# Patient Record
Sex: FEMALE | Race: WHITE | NOT HISPANIC OR LATINO | Employment: FULL TIME | URBAN - METROPOLITAN AREA
[De-identification: names, ages, dates, MRNs, and addresses within clinical notes are randomized per-mention and may not be internally consistent; named-entity substitution may affect disease eponyms.]

---

## 2017-01-03 ENCOUNTER — TELEPHONE (OUTPATIENT)
Dept: FAMILY MEDICINE | Facility: OTHER | Age: 52
End: 2017-01-03

## 2017-01-03 ENCOUNTER — RADIANT APPOINTMENT (OUTPATIENT)
Dept: MRI IMAGING | Facility: CLINIC | Age: 52
End: 2017-01-03
Attending: FAMILY MEDICINE
Payer: COMMERCIAL

## 2017-01-03 DIAGNOSIS — M75.121 COMPLETE TEAR OF RIGHT ROTATOR CUFF: Primary | ICD-10-CM

## 2017-01-03 DIAGNOSIS — S49.91XA RIGHT SHOULDER INJURY, INITIAL ENCOUNTER: ICD-10-CM

## 2017-01-03 PROCEDURE — 73221 MRI JOINT UPR EXTREM W/O DYE: CPT | Mod: RT | Performed by: RADIOLOGY

## 2017-01-03 NOTE — TELEPHONE ENCOUNTER
Please inform patient that the MRI showed a full-thickness tear of one of her rotator cuff muscles. Will refer to orthopedics for further evaluation

## 2017-01-04 NOTE — TELEPHONE ENCOUNTER
Spoke to patient and informed her of message below. And that someone will be calling her to set up appt.

## 2017-01-11 ENCOUNTER — TRANSFERRED RECORDS (OUTPATIENT)
Dept: HEALTH INFORMATION MANAGEMENT | Facility: CLINIC | Age: 52
End: 2017-01-11

## 2017-02-06 ENCOUNTER — OFFICE VISIT (OUTPATIENT)
Dept: OPHTHALMOLOGY | Facility: CLINIC | Age: 52
End: 2017-02-06
Payer: COMMERCIAL

## 2017-02-06 DIAGNOSIS — Z79.899 HIGH RISK MEDICATION USE: Primary | ICD-10-CM

## 2017-02-06 PROCEDURE — 92134 CPTRZ OPH DX IMG PST SGM RTA: CPT | Performed by: OPHTHALMOLOGY

## 2017-02-06 PROCEDURE — 92083 EXTENDED VISUAL FIELD XM: CPT | Performed by: OPHTHALMOLOGY

## 2017-02-06 PROCEDURE — 92014 COMPRE OPH EXAM EST PT 1/>: CPT | Performed by: OPHTHALMOLOGY

## 2017-02-06 RX ORDER — BIMATOPROST 3 UG/ML
SOLUTION TOPICAL
Qty: 1 BOTTLE | Refills: 3 | Status: SHIPPED | OUTPATIENT
Start: 2017-02-06 | End: 2017-02-09 | Stop reason: ALTCHOICE

## 2017-02-06 ASSESSMENT — TONOMETRY
IOP_METHOD: TONOPEN
OD_IOP_MMHG: 17
OS_IOP_MMHG: 15

## 2017-02-06 ASSESSMENT — SLIT LAMP EXAM - LIDS
COMMENTS: NORMAL
COMMENTS: NORMAL

## 2017-02-06 ASSESSMENT — VISUAL ACUITY
OD_CC: 20/20
OS_CC: 20/20
CORRECTION_TYPE: CONTACTS
METHOD: SNELLEN - LINEAR
OD_CC+: +2

## 2017-02-06 ASSESSMENT — EXTERNAL EXAM - LEFT EYE: OS_EXAM: NORMAL

## 2017-02-06 ASSESSMENT — CUP TO DISC RATIO
OD_RATIO: 0.1
OS_RATIO: 0.1

## 2017-02-06 ASSESSMENT — CONF VISUAL FIELD
OS_NORMAL: 1
OD_NORMAL: 1

## 2017-02-06 ASSESSMENT — EXTERNAL EXAM - RIGHT EYE: OD_EXAM: NORMAL

## 2017-02-06 NOTE — PROGRESS NOTES
Assessment & Plan   Zena Quintana is a 51 year old female who presents with:   Review of systems for the eyes was negative other than the pertinent positives and negatives noted in the HPI.  History is obtained from the patient.    High risk medication use - - Plaquenil  - HVF 10-2 OU  - SD-OCT Macula Optovue OU (both eyes)      Return in 1 year for annual exam w/ HVF and OCT    Documentation for today's encounter was performed by Celestina ZULETA  Acting as a scribe in my presence. I have reviewed and verified that it is an accurate recording of today's encounter.    Attending Physician Attestation:  Complete documentation of historical and exam elements from today's encounter can be found in the full encounter summary report (not reduplicated in this progress note).  I personally obtained the chief complaint(s) and history of present illness.  I confirmed and edited as necessary the review of systems, past medical/surgical history, family history, social history, and examination findings as documented by others; and I examined the patient myself.  I personally reviewed the relevant tests, images, and reports as documented above.  I formulated and edited as necessary the assessment and plan and discussed the findings and management plan with the patient and family. - Julito Garcia MD

## 2017-02-06 NOTE — Clinical Note
2/6/2017       RE: Zena Quintana  [address] [facility] [address]     Dear Colleague,    Thank you for referring your patient, Zena Quintana, to the [facility]. Please see a copy of my visit note below.    Assessment & Plan   Zena Quintana is a 51 year old female who presents with:   Review of systems for the eyes was negative other than the pertinent positives and negatives noted in the HPI.  History is obtained from the patient.    High risk medication use - - Plaquenil  - HVF 10-2 OU  - SD-OCT Macula Optovue OU (both eyes)      Return in 1 year for annual exam w/ HVF and OCT    Documentation for today's encounter was performed by Celestina ZULETA  Acting as a scribe in my presence. I have reviewed and verified that it is an accurate recording of today's encounter.    Attending Physician Attestation:  Complete documentation of historical and exam elements from today's encounter can be found in the full encounter summary report (not reduplicated in this progress note).  I personally obtained the chief complaint(s) and history of present illness.  I confirmed and edited as necessary the review of systems, past medical/surgical history, family history, social history, and examination findings as documented by others; and I examined the patient myself.  I personally reviewed the relevant tests, images, and reports as documented above.  I formulated and edited as necessary the assessment and plan and discussed the findings and management plan with the patient and family. - Julito Garcia MD        Again, thank you for allowing me to participate in the care of your patient.      Sincerely,    Julito Garcia MD

## 2017-02-09 DIAGNOSIS — Z79.899 HIGH RISK MEDICATION USE: Primary | ICD-10-CM

## 2017-02-09 RX ORDER — BIMATOPROST 3 UG/ML
SOLUTION TOPICAL AT BEDTIME
Qty: 1 BOTTLE | Refills: 3 | Status: SHIPPED | OUTPATIENT
Start: 2017-02-09

## 2017-02-09 NOTE — TELEPHONE ENCOUNTER
Original order sent to pharmacy with [place] checked. Reordered as previously written.  Sent to RN for signature.  Celestina HOOKER.

## 2017-03-06 DIAGNOSIS — M05.79 RHEUMATOID ARTHRITIS INVOLVING MULTIPLE SITES WITH POSITIVE RHEUMATOID FACTOR (H): ICD-10-CM

## 2017-03-06 LAB
ALBUMIN SERPL-MCNC: 4.3 G/DL (ref 3.4–5)
ALP SERPL-CCNC: 37 U/L (ref 40–150)
ALT SERPL W P-5'-P-CCNC: 23 U/L (ref 0–50)
AST SERPL W P-5'-P-CCNC: 20 U/L (ref 0–45)
BASOPHILS # BLD AUTO: 0 10E9/L (ref 0–0.2)
BASOPHILS NFR BLD AUTO: 0.3 %
BILIRUB DIRECT SERPL-MCNC: 0.1 MG/DL (ref 0–0.2)
BILIRUB SERPL-MCNC: 0.3 MG/DL (ref 0.2–1.3)
CREAT SERPL-MCNC: 0.9 MG/DL (ref 0.52–1.04)
CRP SERPL-MCNC: <2.9 MG/L (ref 0–8)
DIFFERENTIAL METHOD BLD: NORMAL
EOSINOPHIL # BLD AUTO: 0.1 10E9/L (ref 0–0.7)
EOSINOPHIL NFR BLD AUTO: 1.2 %
ERYTHROCYTE [DISTWIDTH] IN BLOOD BY AUTOMATED COUNT: 12.1 % (ref 10–15)
ERYTHROCYTE [SEDIMENTATION RATE] IN BLOOD BY WESTERGREN METHOD: 8 MM/H (ref 0–30)
GFR SERPL CREATININE-BSD FRML MDRD: 65 ML/MIN/1.7M2
HCT VFR BLD AUTO: 37.3 % (ref 35–47)
HGB BLD-MCNC: 12.3 G/DL (ref 11.7–15.7)
LYMPHOCYTES # BLD AUTO: 1.6 10E9/L (ref 0.8–5.3)
LYMPHOCYTES NFR BLD AUTO: 21.1 %
MCH RBC QN AUTO: 31 PG (ref 26.5–33)
MCHC RBC AUTO-ENTMCNC: 33 G/DL (ref 31.5–36.5)
MCV RBC AUTO: 94 FL (ref 78–100)
MONOCYTES # BLD AUTO: 0.7 10E9/L (ref 0–1.3)
MONOCYTES NFR BLD AUTO: 9.8 %
NEUTROPHILS # BLD AUTO: 5.1 10E9/L (ref 1.6–8.3)
NEUTROPHILS NFR BLD AUTO: 67.6 %
PLATELET # BLD AUTO: 307 10E9/L (ref 150–450)
PROT SERPL-MCNC: 7.3 G/DL (ref 6.8–8.8)
RBC # BLD AUTO: 3.97 10E12/L (ref 3.8–5.2)
WBC # BLD AUTO: 7.5 10E9/L (ref 4–11)

## 2017-03-06 PROCEDURE — 85025 COMPLETE CBC W/AUTO DIFF WBC: CPT | Performed by: FAMILY MEDICINE

## 2017-03-06 PROCEDURE — 80076 HEPATIC FUNCTION PANEL: CPT | Performed by: FAMILY MEDICINE

## 2017-03-06 PROCEDURE — 36415 COLL VENOUS BLD VENIPUNCTURE: CPT | Performed by: FAMILY MEDICINE

## 2017-03-06 PROCEDURE — 82565 ASSAY OF CREATININE: CPT | Performed by: FAMILY MEDICINE

## 2017-03-06 PROCEDURE — 86140 C-REACTIVE PROTEIN: CPT | Performed by: FAMILY MEDICINE

## 2017-03-06 PROCEDURE — 85652 RBC SED RATE AUTOMATED: CPT | Performed by: FAMILY MEDICINE

## 2017-03-06 NOTE — PATIENT INSTRUCTIONS
- Hold Plaquenil 5 days pre-op       Before Your Surgery      Call your surgeon if there is any change in your health. This includes signs of a cold or flu (such as a sore throat, runny nose, cough, rash or fever).    Do not smoke, drink alcohol or take over the counter medicine (unless your surgeon or primary care doctor tells you to) for the 24 hours before and after surgery.    If you take prescribed drugs: Follow your doctor s orders about which medicines to take and which to stop until after surgery.    Eating and drinking prior to surgery: follow the instructions from your surgeon    Take a shower or bath the night before surgery. Use the soap your surgeon gave you to gently clean your skin. If you do not have soap from your surgeon, use your regular soap. Do not shave or scrub the surgery site.  Wear clean pajamas and have clean sheets on your bed.

## 2017-03-06 NOTE — PROGRESS NOTES
SUBJECTIVE:                                                    Zena Quintana is a 51 year old female who presents to clinic today for the following health issues:      HPI  [address] [address] [phone]  Dept: [phone]    PRE-OP EVALUATION:  Today's date: 3/9/2017    Zena Quintana (: 1965) presents for pre-operative evaluation assessment as requested by Dr. Perera.  She requires evaluation and anesthesia risk assessment prior to undergoing surgery/procedure for treatment of left shoulder rotator cuff injury.  Proposed procedure: Rotator cuff repair.     Date of Surgery/ Procedure: 2017  Time of Surgery/ Procedure: [facility] [[email or web address]  Hospital/Surgical Facility: [facility] in [place]  Fax number for surgical facility: [phone]  Primary Physician: [facility]  Type of Anesthesia Anticipated: General    Patient has a Health Care Directive or Living Will:  NO    Preop Questions 3/9/2017   1.  Do you have a history of heart attack, stroke, stent, bypass or surgery on an artery in the head, neck, heart or legs? No   2.  Do you ever have any pain or discomfort in your chest? No   3.  Do you have a history of  Heart Failure? No   4.   Are you troubled by shortness of breath when:  walking on a level surface, or up a slight hill, or at night? No   5.  Do you currently have a cold, bronchitis or other respiratory infection? No   6.  Do you have a cough, shortness of breath, or wheezing? No   7.  Do you sometimes get pains in the calves of your legs when you walk? No   8. Do you or anyone in your family have previous history of blood clots? No   9.  Do you or does anyone in your family have a serious bleeding problem such as prolonged bleeding following surgeries or cuts? No   10. Have you ever had problems with anemia or been told to take iron pills? Yes, long time ago, never took anything for it    11. Have you had any abnormal  blood loss such as black, tarry or bloody stools, or abnormal vaginal bleeding? No   12. Have you ever had a blood transfusion? No   13. Have you or any of your relatives ever had problems with anesthesia? No   14. Do you have sleep apnea, excessive snoring or daytime drowsiness? No   15. Do you have any prosthetic heart valves? No   16. Do you have prosthetic joints? No   17. Is there any chance that you may be pregnant? 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  Problems all longstanding and stable, except as noted/documented.  See ROS for pertinent symptoms related to these conditions.                                                                                                  .    MEDICAL HISTORY:                                                      Patient Active Problem List    Diagnosis Date Noted     Disturbance of salivary secretion (XEROSTOMIA) 10/08/2014     Priority: High     RA (rheumatoid arthritis) (H) 10/10/2013     Priority: High     Impingement syndrome of right shoulder 05/24/2016     Priority: Medium     Sjogren's syndrome (H) 05/24/2016     Priority: Medium     Endometriosis 07/18/2014     Priority: Medium     Ovarian cyst 07/18/2014     Priority: Medium     Problem list name updated by automated process. Provider to review       High risk medication use 09/16/2013     Priority: Medium     HYPERLIPIDEMIA LDL GOAL <160 10/31/2010     Priority: Medium     Arthropathy 08/27/2007     Priority: Medium     Problem list name updated by automated process. Provider to review       Pure hypercholesterolemia 07/06/2006     Priority: Medium     Stomatitis and mucositis (ulcerative) 05/06/2005     Priority: Medium     chronic - non-specific  IMO update changed this record. Please review for accuracy        Allergic rhinitis 01/30/2004     Priority: Medium     Problem list name updated by automated process. Provider to review       Internal derangement of knee 04/30/2002     Priority: Medium     Problem list name updated by automated process. Provider to review       Iron deficiency anemia 04/30/2002     Priority: Medium     Problem list name updated by automated process. Provider to review        Past Medical History   Diagnosis Date     Allergic rhinitis, cause unspecified      Allergic rhinitis     Endometriosis, site unspecified      Endometriosis     Female infertility of other specified origin      Iron deficiency anemia, unspecified      Anemia, iron def.     RA (rheumatoid arthritis) (H)      Stomatitis and mucositis (ulcerative)      chronic - non-specific     Past Surgical History   Procedure Laterality Date     C nonspecific procedure  1995, 1997     laparoscopy for endometrial fulguration     Hc remove tonsils/adenoids,11+ y/o       T & A 11+y.o.     Hc create eardrum opening,gen anesth       P.E. Tubes     Hc knee scope, diagnostic        left knee, ruptured ACL     Hc colonoscopy w/wo brush/wash  5/25/2005     Current Outpatient Prescriptions   Medication Sig Dispense Refill     bimatoprost (LATISSE) 0.03 % SOLN Externally apply topically At Bedtime 1 Bottle 3     Ibuprofen (ADVIL PO) Take 200 mg by mouth       HYDROcodone-acetaminophen (NORCO) 5-325 MG per tablet Take 1-2 tablets by mouth every 4 hours as needed for moderate to severe pain maximum 2  tablet(s) per day 20 tablet 0     cyclobenzaprine (FLEXERIL) 5 MG tablet Take 1 tablet (5 mg) by mouth 3 times daily as needed for muscle spasms 42 tablet 0     Loratadine (CLARITIN PO)        ciprofloxacin (CILOXAN) 0.3 % ophthalmic solution Instill 1-2 drops in both eyes every 2 hours while awake for 2 days then 1-2 drops every 4 hours while awake for the next 5 days. 1 Bottle 0     albuterol (PROAIR HFA, PROVENTIL HFA, VENTOLIN HFA) 108 (90 BASE) MCG/ACT  inhaler Inhale 1-2 puffs into the lungs every 4 hours as needed for shortness of breath / dyspnea or wheezing 1 Inhaler 0     hydroxychloroquine (PLAQUENIL) 200 MG tablet Take 2 tablets (400 mg) by mouth daily 180 tablet 3     acetaminophen (TYLENOL) 500 MG tablet Take 1-2 tablets by mouth every 6 hours as needed for pain.       ASPIRIN 81 MG OR TABS 1 TABLET DAILY       MULTIPLE VITAMINS/WOMENS OR None Entered       OMEGA-3 FATTY ACIDS 600 MG OR CAPS 2 tabs daily 30 0     OTC products: None, except as noted above    Allergies   Allergen Reactions     Penicillins Hives     hives      Latex Allergy: NO    Social History   Substance Use Topics     Smoking status: Never Smoker     Smokeless tobacco: Never Used      Comment: no smokers in household     Alcohol use 1.0 oz/week      Comment: juliana Owen x2/1-2x per month     History   Drug Use No       REVIEW OF SYSTEMS:                                                    Constitutional, neuro, ENT, endocrine, pulmonary, cardiac, gastrointestinal, genitourinary, musculoskeletal, integument and psychiatric systems are negative, except as otherwise noted.    EXAM:                                                    There were no vitals taken for this visit.  GENERAL APPEARANCE: healthy, alert and no distress  HENT: ear canals and TM's normal and nose and mouth without ulcers or lesions  RESP: lungs clear to auscultation - no rales, rhonchi or wheezes  CV: regular rate and rhythm, normal S1 S2, no S3 or S4 and no murmur, click or rub   ABDOMEN: soft, nontender, no HSM or masses and bowel sounds normal  NEURO: Normal strength and tone, sensory exam grossly normal, mentation intact and speech normal    DIAGNOSTICS:                                                    EKG: Normal sinus rate and rhythm, no acute changes, normal voltage and axis, no previous available     Recent Labs   Lab Test  05/24/16   0940  11/11/15   1327  04/07/15   1322   HGB  12.2  11.7  11.9   PLT  321   305  327   NA  137   --   139   POTASSIUM  4.4   --   3.9   CR  0.92   --   0.96      Results for SHAR NAVARRETE (MRN [id number])   Ref. Range 3/6/2017 13:00   Creatinine Latest Ref Range: 0.52 - 1.04 mg/dL 0.90   GFR Estimate Latest Ref Range: >60 mL/min/1.7m2 65   GFR Estimate If Black Latest Ref Range: >60 mL/min/1.7m2 79   Albumin Latest Ref Range: 3.4 - 5.0 g/dL 4.3   Protein Total Latest Ref Range: 6.8 - 8.8 g/dL 7.3   Bilirubin Total Latest Ref Range: 0.2 - 1.3 mg/dL 0.3   Alkaline Phosphatase Latest Ref Range: 40 - 150 U/L 37 (L)   ALT Latest Ref Range: 0 - 50 U/L 23   AST Latest Ref Range: 0 - 45 U/L 20   Bilirubin Direct Latest Ref Range: 0.0 - 0.2 mg/dL 0.1   CRP Inflammation Latest Ref Range: 0.0 - 8.0 mg/L <2.9   WBC Latest Ref Range: 4.0 - 11.0 10e9/L 7.5   Hemoglobin Latest Ref Range: 11.7 - 15.7 g/dL 12.3   Hematocrit Latest Ref Range: 35.0 - 47.0 % 37.3   Platelet Count Latest Ref Range: 150 - 450 10e9/L 307   RBC Count Latest Ref Range: 3.8 - 5.2 10e12/L 3.97   MCV Latest Ref Range: 78 - 100 fl 94   MCH Latest Ref Range: 26.5 - 33.0 pg 31.0   MCHC Latest Ref Range: 31.5 - 36.5 g/dL 33.0   RDW Latest Ref Range: 10.0 - 15.0 % 12.1       IMPRESSION:                                                    Reason for surgery/procedure: Rotator cuff injury   Diagnosis/reason for consult: Pre op consult     The proposed surgical procedure is considered INTERMEDIATE risk.    REVISED CARDIAC RISK INDEX  The patient has the following serious cardiovascular risks for perioperative complications such as (MI, PE, VFib and 3  AV Block):  No serious cardiac risks  INTERPRETATION: 0 risks: Class I (very low risk - 0.4% complication rate)    The patient has the following additional risks for perioperative complications:  No identified additional risks      ICD-10-CM    1. Preop general physical exam Z01.818 EKG 12-lead complete w/read - Clinics   2. Rotator cuff injury, left, subsequent encounter S46.002D    3.  Rheumatoid arthritis involving multiple sites with positive rheumatoid factor (H) M05.79    4. Iron deficiency anemia secondary to inadequate dietary iron intake D50.8    5. Hyperlipidemia LDL goal <160 E78.5    6. Sjogren's syndrome (H) M35.00        RECOMMENDATIONS:                                                      --Consult hospital rounder / IM to assist post-op medical management    --Patient is to take all scheduled medications on the day of surgery EXCEPT for modifications listed below.    Rheumatologic DMARD Use  --Hydroxychloroquine - Continue in the perioperative period, hold morning of     - HOLD Asprin 7-10 days before procedure     APPROVAL GIVEN to proceed with proposed procedure, without further diagnostic evaluation       Signed Electronically by: Sonya Mcdowell-DAVY Howell    Copy of this evaluation report is provided to requesting physician.    [facility] Preop Guidelines

## 2017-03-09 ENCOUNTER — OFFICE VISIT (OUTPATIENT)
Dept: FAMILY MEDICINE | Facility: OTHER | Age: 52
End: 2017-03-09
Payer: COMMERCIAL

## 2017-03-09 ENCOUNTER — MYC MEDICAL ADVICE (OUTPATIENT)
Dept: FAMILY MEDICINE | Facility: OTHER | Age: 52
End: 2017-03-09

## 2017-03-09 VITALS
DIASTOLIC BLOOD PRESSURE: 60 MMHG | RESPIRATION RATE: 14 BRPM | WEIGHT: 135.2 LBS | HEIGHT: 66 IN | BODY MASS INDEX: 21.73 KG/M2 | TEMPERATURE: 97 F | SYSTOLIC BLOOD PRESSURE: 90 MMHG | HEART RATE: 53 BPM

## 2017-03-09 DIAGNOSIS — S46.002D ROTATOR CUFF INJURY, LEFT, SUBSEQUENT ENCOUNTER: ICD-10-CM

## 2017-03-09 DIAGNOSIS — D50.8 IRON DEFICIENCY ANEMIA SECONDARY TO INADEQUATE DIETARY IRON INTAKE: ICD-10-CM

## 2017-03-09 DIAGNOSIS — M35.00 SJOGREN'S SYNDROME (H): ICD-10-CM

## 2017-03-09 DIAGNOSIS — Z01.818 PREOP GENERAL PHYSICAL EXAM: Primary | ICD-10-CM

## 2017-03-09 DIAGNOSIS — E78.5 HYPERLIPIDEMIA LDL GOAL <160: ICD-10-CM

## 2017-03-09 DIAGNOSIS — M05.79 RHEUMATOID ARTHRITIS INVOLVING MULTIPLE SITES WITH POSITIVE RHEUMATOID FACTOR (H): ICD-10-CM

## 2017-03-09 PROCEDURE — 99214 OFFICE O/P EST MOD 30 MIN: CPT | Performed by: PHYSICIAN ASSISTANT

## 2017-03-09 PROCEDURE — 93000 ELECTROCARDIOGRAM COMPLETE: CPT | Performed by: PHYSICIAN ASSISTANT

## 2017-03-09 ASSESSMENT — PAIN SCALES - GENERAL: PAINLEVEL: MILD PAIN (2)

## 2017-03-09 NOTE — NURSING NOTE
"Chief Complaint   Patient presents with     Pre-Op Exam     Health Maintenance     height, colon cancer, hep c screening       Initial BP 90/60 (BP Location: Right arm, Patient Position: Chair, Cuff Size: Adult Regular)  Pulse 53  Temp 97  F (36.1  C)  Resp 14  Ht 5' 5.75\" (1.67 m)  Wt 135 lb 3.2 oz (61.3 kg)  BMI 21.99 kg/m2 Estimated body mass index is 21.99 kg/(m^2) as calculated from the following:    Height as of this encounter: 5' 5.75\" (1.67 m).    Weight as of this encounter: 135 lb 3.2 oz (61.3 kg).  Medication Reconciliation: complete   Carli Rasmussen MA  March 9, 2017      "

## 2017-03-09 NOTE — MR AVS SNAPSHOT
After Visit Summary   3/9/2017    Zena Quintana    MRN: [id number]           Patient Information     Date Of Birth          1965        Visit Information        Provider Department      3/9/2017 1:15 PM Sonya Rankin PA-C [facility]        Today's Diagnoses     Preop general physical exam    -  1    Rotator cuff injury, left, subsequent encounter        Rheumatoid arthritis involving multiple sites with positive rheumatoid factor (H)        Iron deficiency anemia secondary to inadequate dietary iron intake        Hyperlipidemia LDL goal <160        Sjogren's syndrome (H)          Care Instructions    - Hold Plaquenil 5 days pre-op       Before Your Surgery      Call your surgeon if there is any change in your health. This includes signs of a cold or flu (such as a sore throat, runny nose, cough, rash or fever).    Do not smoke, drink alcohol or take over the counter medicine (unless your surgeon or primary care doctor tells you to) for the 24 hours before and after surgery.    If you take prescribed drugs: Follow your doctor s orders about which medicines to take and which to stop until after surgery.    Eating and drinking prior to surgery: follow the instructions from your surgeon    Take a shower or bath the night before surgery. Use the soap your surgeon gave you to gently clean your skin. If you do not have soap from your surgeon, use your regular soap. Do not shave or scrub the surgery site.  Wear clean pajamas and have clean sheets on your bed.         Follow-ups after your visit        Your next 10 appointments already scheduled     Mar 15, 2017  1:20 PM CDT   Return Visit with Rome Hardy MD   [facility] [place] ([facility])    [address]  [place] [address] [phone]              Who to contact     If you have questions or need follow up information about today's clinic visit or your schedule please contact  "[facility] directly at [phone].  Normal or non-critical lab and imaging results will be communicated to you by MyChart, letter or phone within 4 business days after the clinic has received the results. If you do not hear from us within 7 days, please contact the clinic through [facility] or phone. If you have a critical or abnormal lab result, we will notify you by phone as soon as possible.  Submit refill requests through [employer] or call your pharmacy and they will forward the refill request to us. Please allow 3 business days for your refill to be completed.          Additional Information About Your Visit        Diabetic[facility]ar[facility]par[facility]zi Information     [employer] gives you secure access to your electronic health record. If you see a primary care provider, you can also send messages to your care team and make appointments. If you have questions, please call your primary care clinic.  If you do not have a primary care provider, please call [phone] and they will assist you.        Care EveryWhere ID     This is your Care EveryWhere ID. This could be used by other organizations to access your [facility] medical records  [id number]        Your Vitals Were     Pulse Temperature Respirations Height BMI (Body Mass Index)       53 97  F (36.1  C) 14 5' 5.75\" (1.67 m) 21.99 kg/m2        Blood Pressure from Last 3 Encounters:   03/09/17 90/60   12/02/16 104/60   11/19/16 96/61    Weight from Last 3 Encounters:   03/09/17 135 lb 3.2 oz (61.3 kg)   12/02/16 133 lb (60.3 kg)   09/14/16 137 lb 9.6 oz (62.4 kg)              We Performed the Following     EKG 12-lead complete w/read - Clinics        Primary Care Provider Office Phone # Fax #    Sonya Rankin PA-C [phone]       [facility] [address]  [facility] [address]        Thank you!     Thank you for choosing [facility]  for your care. Our goal is always to provide you with excellent care. " Hearing back from our patients is one way we can continue to improve our services. Please take a few minutes to complete the written survey that you may receive in the mail after your visit with us. Thank you!             Your Updated Medication List - Protect others around you: Learn how to safely use, store and throw away your medicines at www.disposemymeds.org.          This list is accurate as of: 3/9/17  2:00 PM.  Always use your most recent med list.                   Brand Name Dispense Instructions for use    acetaminophen 500 MG tablet    TYLENOL     Take 1-2 tablets by mouth every 6 hours as needed for pain.       ADVIL PO      Take 200 mg by mouth       albuterol 108 (90 BASE) MCG/ACT Inhaler    PROAIR HFA/PROVENTIL HFA/VENTOLIN HFA    1 Inhaler    Inhale 1-2 puffs into the lungs every 4 hours as needed for shortness of breath / dyspnea or wheezing       aspirin 81 MG tablet      1 TABLET DAILY       bimatoprost 0.03 % Soln    latisse    1 Bottle    Externally apply topically At Bedtime       CLARITIN PO          hydroxychloroquine 200 MG tablet    PLAQUENIL    180 tablet    Take 2 tablets (400 mg) by mouth daily       MULTIPLE VITAMINS/WOMENS PO      None Entered       Omega-3 Fatty Acids 600 MG Caps     30    2 tabs daily

## 2017-03-15 ENCOUNTER — OFFICE VISIT (OUTPATIENT)
Dept: RHEUMATOLOGY | Facility: CLINIC | Age: 52
End: 2017-03-15
Payer: COMMERCIAL

## 2017-03-15 VITALS
BODY MASS INDEX: 21.66 KG/M2 | HEART RATE: 64 BPM | SYSTOLIC BLOOD PRESSURE: 105 MMHG | HEIGHT: 66 IN | OXYGEN SATURATION: 100 % | WEIGHT: 134.8 LBS | DIASTOLIC BLOOD PRESSURE: 63 MMHG

## 2017-03-15 DIAGNOSIS — Z79.899 HIGH RISK MEDICATIONS (NOT ANTICOAGULANTS) LONG-TERM USE: ICD-10-CM

## 2017-03-15 DIAGNOSIS — M05.79 RHEUMATOID ARTHRITIS INVOLVING MULTIPLE SITES WITH POSITIVE RHEUMATOID FACTOR (H): Primary | ICD-10-CM

## 2017-03-15 DIAGNOSIS — M35.00 SJOGREN'S SYNDROME (H): ICD-10-CM

## 2017-03-15 PROCEDURE — 99214 OFFICE O/P EST MOD 30 MIN: CPT | Performed by: INTERNAL MEDICINE

## 2017-03-15 RX ORDER — PILOCARPINE HYDROCHLORIDE 5 MG/1
5 TABLET, FILM COATED ORAL 3 TIMES DAILY
Qty: 90 TABLET | Refills: 5 | Status: SHIPPED | OUTPATIENT
Start: 2017-03-15 | End: 2018-07-26

## 2017-03-15 RX ORDER — HYDROXYCHLOROQUINE SULFATE 200 MG/1
400 TABLET, FILM COATED ORAL DAILY
Qty: 180 TABLET | Refills: 3 | Status: SHIPPED | OUTPATIENT
Start: 2017-03-15 | End: 2017-09-13

## 2017-03-15 NOTE — PROGRESS NOTES
Rheumatology Clinic Visit      Zena Quintana MRN# [id number]   YOB: 1965 Age: 51 year old      Date of visit: 3/15/17   PCP: Sonya Starr    Chief Complaint   Patient presents with:  Arthritis: RA, patient states she has good days and bad days for pain      Assessment and Plan     1. Seropositive nonerosive rheumatoid arthritis (RF low positive, CCP negative): Reportedly at the time of diagnosis, she had synovitis in a symmetric distribution that was steroid responsive. Currently on hydroxychloroquine 400 mg daily.  RA is doing well.   - Continue hydroxychloroquine 400 mg daily (last eye exam on 2/6/2017; has used hydroxychloroquine since approximately 2007)  - Labs 2-3 days prior to her next rheumatology clinic visit: CBC, Cr, Hepatic panel, ESR, CRP    2. 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 They evaluated her left shoulder that is also having similar issues but is not as symptomatic yet and therefore they are operating to prevent rotator cuff tears.  For her surgery, she does not need to hold hydroxychloroquine.    3. Sjogren's syndrome: NJ by EIA negative but positive by IF, SSA and SSB negative, and minor salivary gland biopsy negative. She is currently following with ophthalmology who has given her eyedrops that have been beneficial.  Dry mouth is currently treated with frequent sips of water; she has good oral hygiene, follows with a dentist regularly, and does not have frequent cavities; however, more symptomatic recently and therefore will start pilocarpine. Risks and side effects of protocol were discussed in detail that include, but are not limited to, stomach upset, flushing, and reduced visual acuity particularly at night.  - Start pilocarpine 5 mg TID; she was instructed to start 5 mg once daily ×2  days, then twice daily ×2 days, then 3 times daily thereafter; increase as needed to control dry mouth but do not increase if having side effects.    4. History of oral sores / family history of lupus / polyarthritis / recurrent sinus infections: She is not having oral sores or recurrent sinus infections for several years now. Retrospectively, there would be concern for potential ANCA associated sinus issues and this could be checked if she had recurrent sinusitis again. Oral sores have improved since she started Plaquenil, that argues it could be connective tissue disease related. NJ was negative by EIA, but complement was on the low end of the normal range so NJ was rechecked and positive; further testing was negative as noted in the results section.      Ms. Quintana verbalized agreement with and understanding of the rational for the diagnosis and treatment plan.  All questions were answered to best of my ability and the patient's satisfaction. Ms. Quintana was advised to contact the clinic with any questions that may arise after the clinic visit.      Thank you for involving me in the care of the patient    Return to clinic: 6 months      HPI   Zena Quintana is a 51 year old female with medical history significant for iron deficiency anemia, hyperlipidemia, xerostomia, dry eyes, and rheumatoid arthritis who presents for follow-up of rheumatoid arthritis.    Today, Ms. Quintana reports doing well. Morning stiffness for no more than several minutes. Joints are doing well except for her shoulders. Occasional gelling phenomenon. In general, activity improves her stiffness and pain. Some achiness with weather changes.     She had more pain and difficulty with range of motion of her right shoulder and therefore was evaluated by orthopedic surgery who told her that she had a genetic issue causing a rotator cuff of her shoulder and that was normal and also they checked her left shoulder that was becoming minimally  symptomatic and found similar problems so she is planning to have surgery to prevent rotator cuff tears of her left shoulder. She is seeing an orthopedic surgeon outside of [place] and I do not see records of this; she did not recall the surgeon's name or name of the clinic he is at.    Denies fevers, chills, nausea, vomiting, constipation, diarrhea. No abdominal pain. No chest pain/pressure, palpitations, or shortness of breath. No neck pain. No rash. No LE swelling.  No photosensitivity or photophobia.  No sicca symptoms.  No eye pain or redness. No oral sores.  She has not had a sinus infection for several years now.    Tobacco: None  EtOH: Occasional  Drugs: None  Occupation: Works at a school    ROS   GEN: No fevers, chills, night sweats, or weight change  SKIN: No itching, rashes, sores  HEENT: No epistaxis. No oral or nasal ulcers.  CV: No chest pain, pressure, palpitations, or dyspnea on exertion.  PULM: No SOB, wheeze, cough.  GI:  No nausea, vomiting, constipation, diarrhea. No blood in stool. No abdominal pain.  : No blood in urine.  MSK: See HPI.  NEURO: No numbness, tingling, or weakness.  ENDO: No heat/cold intolerance.  EXT: No LE swelling    Active Problem List     Patient Active Problem List   Diagnosis     Internal derangement of knee     Iron deficiency anemia     Allergic rhinitis     Stomatitis and mucositis (ulcerative)     Pure hypercholesterolemia     Arthropathy     HYPERLIPIDEMIA LDL GOAL <160     High risk medication use     RA (rheumatoid arthritis) (H)     Endometriosis     Ovarian cyst     Disturbance of salivary secretion (XEROSTOMIA)     Impingement syndrome of right shoulder     Sjogren's syndrome (H)     Past Medical History     Past Medical History   Diagnosis Date     Allergic rhinitis, cause unspecified      Allergic rhinitis     Endometriosis, site unspecified      Endometriosis     Female infertility of other specified origin      Iron deficiency anemia, unspecified       Anemia, iron def.     RA (rheumatoid arthritis) (H)      Stomatitis and mucositis (ulcerative)      chronic - non-specific     Past Surgical History     Past Surgical History   Procedure Laterality Date     C nonspecific procedure  1995, 1997     laparoscopy for endometrial fulguration     Hc remove tonsils/adenoids,11+ y/o       T & A 11+y.o.     Hc create eardrum opening,gen anesth       P.E. Tubes     Hc knee scope, diagnostic        left knee, ruptured ACL     Hc colonoscopy w/wo brush/wash  5/25/2005     Allergy     Allergies   Allergen Reactions     Penicillins Hives     hives     Current Medication List     Current Outpatient Prescriptions   Medication Sig     bimatoprost (LATISSE) 0.03 % SOLN Externally apply topically At Bedtime     Ibuprofen (ADVIL PO) Take 200 mg by mouth     Loratadine (CLARITIN PO)      albuterol (PROAIR HFA, PROVENTIL HFA, VENTOLIN HFA) 108 (90 BASE) MCG/ACT inhaler Inhale 1-2 puffs into the lungs every 4 hours as needed for shortness of breath / dyspnea or wheezing     hydroxychloroquine (PLAQUENIL) 200 MG tablet Take 2 tablets (400 mg) by mouth daily     acetaminophen (TYLENOL) 500 MG tablet Take 1-2 tablets by mouth every 6 hours as needed for pain.     ASPIRIN 81 MG OR TABS 1 TABLET DAILY     MULTIPLE VITAMINS/WOMENS OR None Entered     OMEGA-3 FATTY ACIDS 600 MG OR CAPS Reported on 3/15/2017     No current facility-administered medications for this visit.          Social History   See HPI    Family History     Family History   Problem Relation Age of Onset     OSTEOPOROSIS Mother      Arthritis Mother      lupus     Connective Tissue Disorder Mother      Lupus     Cataracts Mother      Macular Degeneration Mother      Celiac Disease Mother      HEART DISEASE Father      aortic aneurysm     GASTROINTESTINAL DISEASE Father      abdominal anurysm     Hypertension Father      Cataracts Father      GASTROINTESTINAL DISEASE Sister      IBS, colon problems     Alzheimer Disease Maternal  "Grandmother      OSTEOPOROSIS Maternal Grandmother      CEREBROVASCULAR DISEASE Maternal Grandfather      CANCER Paternal Grandmother      CEREBROVASCULAR DISEASE Paternal Grandfather      Circulatory Sister      carotid stenosis     Cancer - colorectal Maternal Uncle      Cancer - colorectal Maternal Uncle      Mother: Lupus    Physical Exam     Temp Readings from Last 3 Encounters:   03/09/17 97  F (36.1  C)   12/02/16 98  F (36.7  C) (Temporal)   11/19/16 97.7  F (36.5  C) (Temporal)     BP Readings from Last 5 Encounters:   03/15/17 105/63   03/09/17 90/60   12/02/16 104/60   11/19/16 96/61   09/14/16 106/68     Pulse Readings from Last 1 Encounters:   03/15/17 64     Resp Readings from Last 1 Encounters:   03/09/17 14     Estimated body mass index is 22.09 kg/(m^2) as calculated from the following:    Height as of this encounter: 1.664 m (5' 5.5\").    Weight as of this encounter: 61.1 kg (134 lb 12.8 oz).    GEN: NAD  HEENT: Dry mucous membranes. No oral lesions.  Anicteric, noninjected sclera. Eyes appear to have good moisturizer by gross examination.  CV: S1, S2. RRR. No m/r/g.  PULM: CTA bilaterally. No w/c.  MSK:  Hands, wrists, and elbows without synovial swelling, increased warmth, tenderness to palpation, or overlying erythema.  Negative MCP squeeze.  Normal  strength. Right shoulder with limited flexion and extension and minimal-to-no abduction; left shoulder with full range of motion; no swelling of either shoulder.  Knees, ankles, and feet without swelling, increased warmth, tenderness to palpation, or overlying erythema. Negative MTP squeeze.     NEURO: UE and LE strengths 5/5 and equal bilaterally.   SKIN: No rash  EXT: No LE edema  PSYCH: Alert. Appropriate.    Labs / Imaging (select studies)   RF/CCP  Recent Labs   Lab Test  08/29/13   1413   CCPABY  <20 Interpretation:  Negative     NJ/RNP/Sm/SSA/SSB/dsDNA  Recent Labs   Lab Test  07/12/16   1017  05/24/16   0940  10/10/13   0852  08/29/13   " 1413   HEATHER   --    --   <1.0 Interpretation:  Negative   --    ANAIGG   --   1:160  Reference range: <1:40  (Note)  Homogeneous pattern.  INTERPRETIVE INFORMATION: NJ by IFA, IgG  Anti-nuclear antibodies (NJ) are seen in a variety of  systemic rheumatic diseases and are determined by indirect  fluorescence assay (IFA) using HEp-2 substrate with an  IgG-specific conjugate. NJ titers less than or equal to  1:80 have variable relevance while titers greater than or  equal to 1:160 are considered clinically significant. These  antibodies may precede clinical disease onset; however,  healthy individuals and those with advanced age have been  reported to be positive for NJ. When observed, one of the  five basic patterns is reported: homogeneous,  peripheral/rim, speckled, centromere, or nucleolar. If  cytoplasmic fluorescence is observed, it is noted. IFA  methodology is subjective and has occasionally been shown  to lack sensitivity for anti-SSA/Ro antibodies.  Negative results do not necessarily rule out the presence  of SSc. If clinical suspicion remains, consi netta further  testing for U3-RNP, PM/Scl, or Th/To antibodies associated  with SSc.  Performed by [facility],  [address] [place] [id number] [phone]  [email or web address], Dylan Olvera MD, Lab. Director  *   --    --    RNPIGG  <0.2  Negative   Antibody index (AI) values reflect qualitative changes in antibody   concentration that cannot be directly associated with clinical condition or   disease state.     --    --    --    SMIGG  <0.2  Negative   Antibody index (AI) values reflect qualitative changes in antibody   concentration that cannot be directly associated with clinical condition or   disease state.     --    --    --    ENASM   --    --    --   2   SSAIGG  <0.2  Negative   Antibody index (AI) values reflect qualitative changes in antibody   concentration that cannot be directly associated with clinical condition or   disease state.     --     --    --    ENASSA   --    --    --   8   SSBIGG  <0.2  Negative   Antibody index (AI) values reflect qualitative changes in antibody   concentration that cannot be directly associated with clinical condition or   disease state.     --    --    --    ENASSB   --    --    --   0   ENARMP   --    --    --   0   SCLIGG  <0.2  Negative   Antibody index (AI) values reflect qualitative changes in antibody   concentration that cannot be directly associated with clinical condition or   disease state.     --    --    --    O9MNCQO   --   83   --   81   R5CYWMG   --   17   --   19     Recent Labs   Lab Test  07/12/16   1017  08/29/13   1413   DNA  1  <15 Interpretation:  Negative     Antiphospholipid Antibodies  Recent Labs   Lab Test  07/12/16   1017  08/29/13   1413   B2GPG  1.0   --    B2GPM  <0.9  Negative     --    CARG  <15.0  Interpretation:  Negative    <15.0 Interpretation:  Negative   ANGELITO  <12.5  Interpretation:  Negative    <12.5 Interpretation:  Negative   LUPINT  Negative  (Note)  COMMENTS:  The INR is normal.  APTT ratio is normal.  DRVVT Screen ratio is normal.  Thrombin time is normal.  NEGATIVE TEST; A LUPUS ANTICOAGULANT WAS NOT DETECTED IN THIS  SPECIMEN WITHIN THE LIMITS OF THE TESTING REPERTOIRE.  If the clinical picture is strongly suggestive of an antiphospholipid  syndrome, recommend anticardiolipin and beta-2-glycoprotein (IgG and  IgM) antibody tests.  Bree Woodson M.D.  [phone]  7/13/2016    INR =  0.98    Reference range: 0.86-1.14  Thrombin Time= 16.5    Reference range: 13.0-19.0 sec    APTT:       Ratio  Patient  =  1.00  1:2 Mix  =  N/A  Reference:  Negative: Less than or equal to 1.16  Positive: Greater than or equal to 1.17     DILUTE TARIQ VIPER VENOM TEST:  Screen Ratio = 0.74   Normal is less than 1.21       --      CBC  Recent Labs   Lab Test  03/06/17   1300  05/24/16   0940  11/11/15   1327   WBC  7.5  6.4  5.4   RBC  3.97  4.00  3.77*   HGB  12.3  12.2  11.7   HCT   37.3  37.8  34.5*   MCV  94  95  92   RDW  12.1  12.8  12.3   PLT  307  321  305   MCH  31.0  30.5  31.0   MCHC  33.0  32.3  33.9   NEUTROPHIL  67.6  65.7  56.6   LYMPH  21.1  20.2  29.7   MONOCYTE  9.8  11.2  11.4   EOSINOPHIL  1.2  2.3  1.7   BASOPHIL  0.3  0.6  0.6   ANEU  5.1  4.2  3.1   ALYM  1.6  1.3  1.6   ELIGIO  0.7  0.7  0.6   AEOS  0.1  0.2  0.1   ABAS  0.0  0.0  0.0     CMP  Recent Labs   Lab Test  03/06/17   1300  05/24/16   0940  11/11/15   1327  04/07/15   1322  10/08/14   1348   NA   --   137   --   139  139   POTASSIUM   --   4.4   --   3.9  3.7   CHLORIDE   --   105   --   104  105   CO2   --   29   --   29  26   ANIONGAP   --   3   --   6  8   GLC   --   85   --   88  147*   BUN   --   18   --   16  13   CR  0.90  0.92   --   0.96  0.90   GFRESTIMATED  65  64   --   61  66   GFRESTBLACK  79  77   --   74  80   VALERIA   --   9.2   --   9.1  9.0   BILITOTAL  0.3  0.4  0.3  0.3  0.2   ALBUMIN  4.3  4.4  4.3  3.9  3.6*   PROTTOTAL  7.3  7.3  7.5  7.3  6.8   ALKPHOS  37*  42  40  34*  26*   AST  20  30  44  34  23   ALT  23  24  37  29  20     Calcium/VitaminD  Recent Labs   Lab Test  05/24/16   0940  07/13/15   1036  04/07/15   1322  10/08/14   1348   VALERIA  9.2   --   9.1  9.0   VITDT   --   48   --    --      ESR/CRP  Recent Labs   Lab Test  03/06/17   1300  05/24/16   0940  04/07/15   1322   SED  8  6   --    CRP  <2.9  <2.9  <2.9     CK/Aldolase  Recent Labs   Lab Test  07/12/16   1017   CKT  200     TSH/T4  Recent Labs   Lab Test  07/13/15   1036  12/03/09   0946   TSH  2.95  1.55     Lipid Panel  Recent Labs   Lab Test  07/13/15   1036  12/03/09   0946   CHOL  181  213*   TRIG  132  64   HDL  74  81   LDL  81  119   VLDL  26  13   CHOLHDLRATIO  2.4  3.0     Immunization History     Immunization History   Administered Date(s) Administered     Influenza (H1N1) 12/03/2009     Influenza (IIV3) 11/17/2005, 11/17/2006, 10/25/2010, 01/09/2013     Influenza Vaccine IM 3yrs+ 4 Valent IIV4 10/10/2013, 09/14/2016      TD (ADULT, 7+) 02/01/2005     TDAP (ADACEL AGES 11-64) 01/09/2013          Chart documentation done in part with Dragon Voice recognition Software. Although reviewed after completion, some word and grammatical error may remain.    Rome Hardy MD

## 2017-03-15 NOTE — Clinical Note
Zhang Hassan RA doing well. Sjogren's more symptomatic and I am starting pilocarpine.    Regarding plaquenil, there is no need to hold if for the surgery.  I saw your note to the patient about plaquenil and that I had not replied to you regarding the plaquenil - - I never got a message about this.  Thank you! Rome

## 2017-03-15 NOTE — MR AVS SNAPSHOT
After Visit Summary   3/15/2017    Zena Quintana    MRN: [id number]           Patient Information     Date Of Birth          1965        Visit Information        Provider Department      3/15/2017 1:20 PM Rome Hardy MD [facility]        Today's Diagnoses     Rheumatoid arthritis involving multiple sites with positive rheumatoid factor (H)    -  1    Sicca syndrome (H)          Care Instructions    Please have labs done 2-3 days before follow up appointment.        Follow-ups after your visit        Your next 10 appointments already scheduled     Sep 13, 2017  1:20 PM CDT   Return Visit with Rome Hardy MD   [facility] ([facility])    [address] [facility] [id number] [phone]              Future tests that were ordered for you today     Open Future Orders        Priority Expected Expires Ordered    CBC with platelets differential Routine 9/11/2017 10/7/2017 3/15/2017    Creatinine Routine 9/11/2017 10/7/2017 3/15/2017    CRP inflammation Routine 9/11/2017 10/7/2017 3/15/2017    Erythrocyte sedimentation rate auto Routine 9/11/2017 10/7/2017 3/15/2017    Hepatic panel Routine 9/11/2017 10/7/2017 3/15/2017            Who to contact     If you have questions or need follow up information about today's clinic visit or your schedule please contact [facility] directly at [phone].  Normal or non-critical lab and imaging results will be communicated to you by MyChart, letter or phone within 4 business days after the clinic has received the results. If you do not hear from us within 7 days, please contact the clinic through MyChart or phone. If you have a critical or abnormal lab result, we will notify you by phone as soon as possible.  Submit refill requests through [employer] or call your pharmacy and they will forward the refill request to us. Please allow 3 business days for your refill to be completed.           "Additional Information About Your Visit        MyChart Information     [employer] gives you secure access to your electronic health record. If you see a primary care provider, you can also send messages to your care team and make appointments. If you have questions, please call your primary care clinic.  If you do not have a primary care provider, please call [phone] and they will assist you.        Care EveryWhere ID     This is your Care EveryWhere ID. This could be used by other organizations to access your [facility] medical records  [id number]        Your Vitals Were     Pulse Height Pulse Oximetry BMI (Body Mass Index)          64 1.664 m (5' 5.5\") 100% 22.09 kg/m2         Blood Pressure from Last 3 Encounters:   03/15/17 105/63   03/09/17 90/60   12/02/16 104/60    Weight from Last 3 Encounters:   03/15/17 61.1 kg (134 lb 12.8 oz)   03/09/17 61.3 kg (135 lb 3.2 oz)   12/02/16 60.3 kg (133 lb)                 Today's Medication Changes          These changes are accurate as of: 3/15/17  1:44 PM.  If you have any questions, ask your nurse or doctor.               Start taking these medicines.        Dose/Directions    pilocarpine 5 MG tablet   Commonly known as:  SALAGEN   Used for:  Sicca syndrome (H)   Started by:  Rome Hardy MD        Dose:  5 mg   Take 1 tablet (5 mg) by mouth 3 times daily   Quantity:  90 tablet   Refills:  5            Where to get your medicines      These medications were sent to [id number] - [place] - [address]  [address] [address] [place] [address]    Hours:  test fax successful 4/5/04  Phone:  [phone]     hydroxychloroquine 200 MG tablet    pilocarpine 5 MG tablet                Primary Care Provider Office Phone # Fax #    Sonya Rankin PA-C [phone]       [facility] [address]  [facility] [address]        Thank you!     Thank you for choosing [facility]  for " your care. Our goal is always to provide you with excellent care. Hearing back from our patients is one way we can continue to improve our services. Please take a few minutes to complete the written survey that you may receive in the mail after your visit with us. Thank you!             Your Updated Medication List - Protect others around you: Learn how to safely use, store and throw away your medicines at www.disposemymeds.org.          This list is accurate as of: 3/15/17  1:44 PM.  Always use your most recent med list.                   Brand Name Dispense Instructions for use    acetaminophen 500 MG tablet    TYLENOL     Take 1-2 tablets by mouth every 6 hours as needed for pain.       ADVIL PO      Take 200 mg by mouth       albuterol 108 (90 BASE) MCG/ACT Inhaler    PROAIR HFA/PROVENTIL HFA/VENTOLIN HFA    1 Inhaler    Inhale 1-2 puffs into the lungs every 4 hours as needed for shortness of breath / dyspnea or wheezing       aspirin 81 MG tablet      1 TABLET DAILY       bimatoprost 0.03 % Soln    latisse    1 Bottle    Externally apply topically At Bedtime       CLARITIN PO          hydroxychloroquine 200 MG tablet    PLAQUENIL    180 tablet    Take 2 tablets (400 mg) by mouth daily       MULTIPLE VITAMINS/WOMENS PO      None Entered       Omega-3 Fatty Acids 600 MG Caps     30    Reported on 3/15/2017       pilocarpine 5 MG tablet    SALAGEN    90 tablet    Take 1 tablet (5 mg) by mouth 3 times daily

## 2017-03-15 NOTE — NURSING NOTE
"Chief Complaint   Patient presents with     Arthritis     RA, patient states she has good days and bad days for pain       Initial /63 (BP Location: Left arm, Patient Position: Chair, Cuff Size: Adult Regular)  Pulse 64  Ht 1.664 m (5' 5.5\")  Wt 61.1 kg (134 lb 12.8 oz)  SpO2 100%  BMI 22.09 kg/m2 Estimated body mass index is 22.09 kg/(m^2) as calculated from the following:    Height as of this encounter: 1.664 m (5' 5.5\").    Weight as of this encounter: 61.1 kg (134 lb 12.8 oz).  BP completed using cuff size: regular         RAPID3 (0-30) Cumulative Score  0.8          RAPID3 Weighted Score (divide #4 by 3 and that is the weighted score)  0.267         "

## 2017-04-25 ENCOUNTER — TELEPHONE (OUTPATIENT)
Dept: FAMILY MEDICINE | Facility: OTHER | Age: 52
End: 2017-04-25

## 2017-04-25 NOTE — TELEPHONE ENCOUNTER
Summary:    Patient is due/failing the following:   COLONOSCOPY    Action needed:   Schedule a colonoscopy or complete a FIT test     Type of outreach:    Phone, left message for patient to call back.     Questions for provider review:    None                                                                                                                                    Patricia Prado       Chart routed to Care Team .      Panel Management Review      Patient has the following on her problem list: None      Composite cancer screening  Chart review shows that this patient is due/due soon for the following Colonoscopy

## 2017-04-27 NOTE — TELEPHONE ENCOUNTER
Pt was informed of message, she plans to call back in the summer months to schedule, due to she just had surgery.

## 2017-06-19 NOTE — PROGRESS NOTES
SUBJECTIVE:                                                    Zena Quintana is a 52 year old female who presents to clinic today for the following health issues:      HPI    Concern - Heart Rate High when working out     Onset: x 1 year    Description:   Was around 180-190's, then in the last 3 months has been close to 200-215's    Intensity: mild    Progression of Symptoms:  worsening    Accompanying Signs & Symptoms:  Last couple weeks it has been close to 200's and has been more dizzy- has had to stop and rest.       Previous history of similar problem:   Patient has always had low blood pressures    Precipitating factors:   Worsened by: Running    Alleviating factors:  Improved by: Rest       Therapies Tried and outcome: Stopped Caffeine       Problem list and histories reviewed & adjusted, as indicated.  Additional history: as documented        Patient Active Problem List   Diagnosis     Internal derangement of knee     Iron deficiency anemia     Allergic rhinitis     Stomatitis and mucositis (ulcerative)     Pure hypercholesterolemia     Arthropathy     HYPERLIPIDEMIA LDL GOAL <160     High risk medication use     RA (rheumatoid arthritis) (H)     Endometriosis     Ovarian cyst     Disturbance of salivary secretion (XEROSTOMIA)     Impingement syndrome of right shoulder     Sjogren's syndrome (H)     Palpitations     Past Surgical History:   Procedure Laterality Date     C NONSPECIFIC PROCEDURE  1995, 1997    laparoscopy for endometrial fulguration     HC COLONOSCOPY W/WO BRUSH/WASH  5/25/2005     HC CREATE EARDRUM OPENING,GEN ANESTH      P.E. Tubes     HC KNEE SCOPE, DIAGNOSTIC       left knee, ruptured ACL     HC REMOVE TONSILS/ADENOIDS,12+ Y/O      T & A 11+y.o.       Social History   Substance Use Topics     Smoking status: Never Smoker     Smokeless tobacco: Never Used      Comment: no smokers in household     Alcohol use 1.0 oz/week      Comment: juliana Owen x2/1-2x per month     Family History    Problem Relation Age of Onset     OSTEOPOROSIS Mother      Arthritis Mother      lupus     Connective Tissue Disorder Mother      Lupus     Cataracts Mother      Macular Degeneration Mother      Celiac Disease Mother      HEART DISEASE Father      aortic aneurysm     GASTROINTESTINAL DISEASE Father      abdominal anurysm     Hypertension Father      Cataracts Father      GASTROINTESTINAL DISEASE Sister      IBS, colon problems     Alzheimer Disease Maternal Grandmother      OSTEOPOROSIS Maternal Grandmother      CEREBROVASCULAR DISEASE Maternal Grandfather      CANCER Paternal Grandmother      CEREBROVASCULAR DISEASE Paternal Grandfather      Circulatory Sister      carotid stenosis     Cancer - colorectal Maternal Uncle      Cancer - colorectal Maternal Uncle          Current Outpatient Prescriptions   Medication Sig Dispense Refill     hydroxychloroquine (PLAQUENIL) 200 MG tablet Take 2 tablets (400 mg) by mouth daily 180 tablet 3     bimatoprost (LATISSE) 0.03 % SOLN Externally apply topically At Bedtime 1 Bottle 3     Ibuprofen (ADVIL PO) Take 200 mg by mouth       Loratadine (CLARITIN PO)        acetaminophen (TYLENOL) 500 MG tablet Take 1-2 tablets by mouth every 6 hours as needed for pain.       ASPIRIN 81 MG OR TABS 1 TABLET DAILY       MULTIPLE VITAMINS/WOMENS OR None Entered       OMEGA-3 FATTY ACIDS 600 MG OR CAPS Reported on 3/15/2017 30 0     pilocarpine (SALAGEN) 5 MG tablet Take 1 tablet (5 mg) by mouth 3 times daily (Patient not taking: Reported on 6/20/2017) 90 tablet 5     albuterol (PROAIR HFA, PROVENTIL HFA, VENTOLIN HFA) 108 (90 BASE) MCG/ACT inhaler Inhale 1-2 puffs into the lungs every 4 hours as needed for shortness of breath / dyspnea or wheezing (Patient not taking: Reported on 6/20/2017) 1 Inhaler 0     Allergies   Allergen Reactions     Penicillins Hives     hives     BP Readings from Last 3 Encounters:   06/20/17 100/60   03/15/17 105/63   03/09/17 90/60    Wt Readings from Last 3  "Encounters:   06/20/17 135 lb (61.2 kg)   03/15/17 134 lb 12.8 oz (61.1 kg)   03/09/17 135 lb 3.2 oz (61.3 kg)                  Labs reviewed in EPIC    ROS:  Constitutional, HEENT, cardiovascular, pulmonary, GI, , musculoskeletal, neuro, skin, endocrine and psych systems are negative, except as otherwise noted.    OBJECTIVE:                                                    /60 (BP Location: Right arm, Patient Position: Chair, Cuff Size: Adult Regular)  Pulse 69  Temp 98.3  F (36.8  C) (Oral)  Resp 14  Ht 5' 5.5\" (1.664 m)  Wt 135 lb (61.2 kg)  SpO2 100%  BMI 22.12 kg/m2  Body mass index is 22.12 kg/(m^2).  Physical Exam   Constitutional: She is oriented to person, place, and time. She appears well-developed and well-nourished.   HENT:   Head: Normocephalic and atraumatic.   Right Ear: External ear normal.   Left Ear: External ear normal.   Mouth/Throat: Oropharynx is clear and moist. No oropharyngeal exudate.   Eyes: EOM are normal.   Cardiovascular: Normal rate, regular rhythm, normal heart sounds and intact distal pulses.  Exam reveals no gallop and no friction rub.    No murmur heard.  Pulmonary/Chest: Breath sounds normal.   Neurological: She is alert and oriented to person, place, and time.   Psychiatric: She has a normal mood and affect.         Diagnostic Test Results:  none      ASSESSMENT/PLAN:                                                      Problem List Items Addressed This Visit     Palpitations - Primary     Increased heart rate to upto >200's with exercise  Has strong family history of a.fib and strokes and so has been concerned  Will get EKG and baseline labs to r/o other triggering factors  Will get an Event monitor to get capture rhythm abnormalities  Discussed home care  Reportable signs and symptoms discussed  RTC if symptoms persist or fail to improve           Relevant Orders    Cardiac Event Monitor - Peds/Adult    CBC with platelets and differential    Comprehensive " metabolic panel (BMP + Alb, Alk Phos, ALT, AST, Total. Bili, TP)    TSH with free T4 reflex      Other Visit Diagnoses     Screen for colon cancer        Relevant Orders    GASTROENTEROLOGY ADULT REF PROCEDURE ONLY    Need for hepatitis C screening test        Relevant Orders    Hepatitis C antibody               Chana Armenta MD  [facility]

## 2017-06-20 ENCOUNTER — OFFICE VISIT (OUTPATIENT)
Dept: FAMILY MEDICINE | Facility: OTHER | Age: 52
End: 2017-06-20
Payer: COMMERCIAL

## 2017-06-20 VITALS
HEIGHT: 66 IN | DIASTOLIC BLOOD PRESSURE: 60 MMHG | RESPIRATION RATE: 14 BRPM | TEMPERATURE: 98.3 F | HEART RATE: 69 BPM | SYSTOLIC BLOOD PRESSURE: 100 MMHG | WEIGHT: 135 LBS | BODY MASS INDEX: 21.69 KG/M2 | OXYGEN SATURATION: 100 %

## 2017-06-20 DIAGNOSIS — Z11.59 NEED FOR HEPATITIS C SCREENING TEST: ICD-10-CM

## 2017-06-20 DIAGNOSIS — Z12.11 SCREEN FOR COLON CANCER: ICD-10-CM

## 2017-06-20 DIAGNOSIS — R00.2 PALPITATIONS: Primary | ICD-10-CM

## 2017-06-20 LAB
BASOPHILS # BLD AUTO: 0 10E9/L (ref 0–0.2)
BASOPHILS NFR BLD AUTO: 0.5 %
DIFFERENTIAL METHOD BLD: NORMAL
EOSINOPHIL # BLD AUTO: 0.1 10E9/L (ref 0–0.7)
EOSINOPHIL NFR BLD AUTO: 1.2 %
ERYTHROCYTE [DISTWIDTH] IN BLOOD BY AUTOMATED COUNT: 12.5 % (ref 10–15)
HCT VFR BLD AUTO: 37.7 % (ref 35–47)
HGB BLD-MCNC: 12.3 G/DL (ref 11.7–15.7)
LYMPHOCYTES # BLD AUTO: 1.7 10E9/L (ref 0.8–5.3)
LYMPHOCYTES NFR BLD AUTO: 19.7 %
MCH RBC QN AUTO: 31.1 PG (ref 26.5–33)
MCHC RBC AUTO-ENTMCNC: 32.6 G/DL (ref 31.5–36.5)
MCV RBC AUTO: 95 FL (ref 78–100)
MONOCYTES # BLD AUTO: 1 10E9/L (ref 0–1.3)
MONOCYTES NFR BLD AUTO: 11.8 %
NEUTROPHILS # BLD AUTO: 5.6 10E9/L (ref 1.6–8.3)
NEUTROPHILS NFR BLD AUTO: 66.8 %
PLATELET # BLD AUTO: 324 10E9/L (ref 150–450)
RBC # BLD AUTO: 3.95 10E12/L (ref 3.8–5.2)
WBC # BLD AUTO: 8.4 10E9/L (ref 4–11)

## 2017-06-20 PROCEDURE — 86803 HEPATITIS C AB TEST: CPT | Performed by: FAMILY MEDICINE

## 2017-06-20 PROCEDURE — 80050 GENERAL HEALTH PANEL: CPT | Performed by: FAMILY MEDICINE

## 2017-06-20 PROCEDURE — 36415 COLL VENOUS BLD VENIPUNCTURE: CPT | Performed by: FAMILY MEDICINE

## 2017-06-20 PROCEDURE — 93000 ELECTROCARDIOGRAM COMPLETE: CPT | Performed by: FAMILY MEDICINE

## 2017-06-20 PROCEDURE — 99214 OFFICE O/P EST MOD 30 MIN: CPT | Performed by: FAMILY MEDICINE

## 2017-06-20 ASSESSMENT — PAIN SCALES - GENERAL: PAINLEVEL: NO PAIN (0)

## 2017-06-20 NOTE — MR AVS SNAPSHOT
After Visit Summary   6/20/2017    Zena Quintana    MRN: [id number]           Patient Information     Date Of Birth          1965        Visit Information        Provider Department      6/20/2017 1:40 PM Chana Armenta MD [facility]        Today's Diagnoses     Palpitations    -  1    Screen for colon cancer        Need for hepatitis C screening test           Follow-ups after your visit        Additional Services     GASTROENTEROLOGY ADULT REF PROCEDURE ONLY       Last Lab Result: Creatinine (mg/dL)       Date                     Value                 03/06/2017               0.90             ----------  Body mass index is 22.12 kg/(m^2).     Needed:  No  Language:  English    Patient will be contacted to schedule procedure.     Please be aware that coverage of these services is subject to the terms and limitations of your health insurance plan.  Call member services at your health plan with any benefit or coverage questions.  Any procedures must be performed at a [facility] facility OR coordinated by your clinic's referral office.    Please bring the following with you to your appointment:    (1) Any X-Rays, CTs or MRIs which have been performed.  Contact the facility where they were done to arrange for  prior to your scheduled appointment.    (2) List of current medications   (3) This referral request   (4) Any documents/labs given to you for this referral                  Your next 10 appointments already scheduled     Jun 21, 2017  1:00 PM CDT   Event Monitor with MG DEVICE MG RAYNA CV TECH   [facility]    [address] [place] [id number] [phone]            Sep 13, 2017  1:20 PM CDT   Return Visit with Rome Hardy MD   [facility] [place] ([facility] [place])    [address] [place] [address]  [place] [id number] [phone]              Future tests that were ordered for you  "today     Open Future Orders        Priority Expected Expires Ordered    Cardiac Event Monitor - Peds/Adult Routine  8/4/2017 6/20/2017            Who to contact     If you have questions or need follow up information about today's clinic visit or your schedule please contact [facility] directly at [phone].  Normal or non-critical lab and imaging results will be communicated to you by MyChart, letter or phone within 4 business days after the clinic has received the results. If you do not hear from us within 7 days, please contact the clinic through [facility] or phone. If you have a critical or abnormal lab result, we will notify you by phone as soon as possible.  Submit refill requests through [employer] or call your pharmacy and they will forward the refill request to us. Please allow 3 business days for your refill to be completed.          Additional Information About Your Visit        MyChart Information     [employer] gives you secure access to your electronic health record. If you see a primary care provider, you can also send messages to your care team and make appointments. If you have questions, please call your primary care clinic.  If you do not have a primary care provider, please call [phone] and they will assist you.        Care EveryWhere ID     This is your Care EveryWhere ID. This could be used by other organizations to access your [place] medical records  [id number]        Your Vitals Were     Pulse Temperature Respirations Height Pulse Oximetry BMI (Body Mass Index)    69 98.3  F (36.8  C) (Oral) 14 5' 5.5\" (1.664 m) 100% 22.12 kg/m2       Blood Pressure from Last 3 Encounters:   06/20/17 100/60   03/15/17 105/63   03/09/17 90/60    Weight from Last 3 Encounters:   06/20/17 135 lb (61.2 kg)   03/15/17 134 lb 12.8 oz (61.1 kg)   03/09/17 135 lb 3.2 oz (61.3 kg)              We Performed the Following     CBC with platelets and differential     Comprehensive metabolic panel (BMP " + Alb, Alk Phos, ALT, AST, Total. Bili, TP)     EKG 12-lead complete w/read - Clinics     GASTROENTEROLOGY ADULT REF PROCEDURE ONLY     Hepatitis C antibody     TSH with free T4 reflex        Primary Care Provider Office Phone # Fax #    Sonya YAN Rankin PA-C [phone]       [facility] [address]  [facility] [address]        Thank you!     Thank you for choosing [facility]  for your care. Our goal is always to provide you with excellent care. Hearing back from our patients is one way we can continue to improve our services. Please take a few minutes to complete the written survey that you may receive in the mail after your visit with us. Thank you!             Your Updated Medication List - Protect others around you: Learn how to safely use, store and throw away your medicines at www.disposemymeds.org.          This list is accurate as of: 6/20/17  2:26 PM.  Always use your most recent med list.                   Brand Name Dispense Instructions for use    acetaminophen 500 MG tablet    TYLENOL     Take 1-2 tablets by mouth every 6 hours as needed for pain.       ADVIL PO      Take 200 mg by mouth       albuterol 108 (90 BASE) MCG/ACT Inhaler    PROAIR HFA/PROVENTIL HFA/VENTOLIN HFA    1 Inhaler    Inhale 1-2 puffs into the lungs every 4 hours as needed for shortness of breath / dyspnea or wheezing       aspirin 81 MG tablet      1 TABLET DAILY       bimatoprost 0.03 % Soln    latisse    1 Bottle    Externally apply topically At Bedtime       CLARITIN PO          hydroxychloroquine 200 MG tablet    PLAQUENIL    180 tablet    Take 2 tablets (400 mg) by mouth daily       MULTIPLE VITAMINS/WOMENS PO      None Entered       Omega-3 Fatty Acids 600 MG Caps     30    Reported on 3/15/2017       pilocarpine 5 MG tablet    SALAGEN    90 tablet    Take 1 tablet (5 mg) by mouth 3 times daily

## 2017-06-20 NOTE — NURSING NOTE
"Chief Complaint   Patient presents with     Consult     Heart rate issue when working out     Health Maintenance     colon cancer, hep c       Initial /60 (BP Location: Right arm, Patient Position: Chair, Cuff Size: Adult Regular)  Pulse 69  Temp 98.3  F (36.8  C) (Oral)  Resp 14  Ht 5' 5.5\" (1.664 m)  Wt 135 lb (61.2 kg)  SpO2 100%  BMI 22.12 kg/m2 Estimated body mass index is 22.12 kg/(m^2) as calculated from the following:    Height as of this encounter: 5' 5.5\" (1.664 m).    Weight as of this encounter: 135 lb (61.2 kg).  Medication Reconciliation: complete   Jailene Claros CMA (AAMA)      "

## 2017-06-20 NOTE — ASSESSMENT & PLAN NOTE
Increased heart rate to upto >200's with exercise  Has strong family history of a.fib and strokes and so has been concerned  Will get EKG and baseline labs to r/o other triggering factors  Will get an Event monitor to get capture rhythm abnormalities  Discussed home care  Reportable signs and symptoms discussed  RTC if symptoms persist or fail to improve

## 2017-06-21 ENCOUNTER — ALLIED HEALTH/NURSE VISIT (OUTPATIENT)
Dept: CARDIOLOGY | Facility: CLINIC | Age: 52
End: 2017-06-21
Attending: FAMILY MEDICINE
Payer: COMMERCIAL

## 2017-06-21 DIAGNOSIS — R00.2 PALPITATIONS: ICD-10-CM

## 2017-06-21 LAB
ALBUMIN SERPL-MCNC: 4 G/DL (ref 3.4–5)
ALP SERPL-CCNC: 40 U/L (ref 40–150)
ALT SERPL W P-5'-P-CCNC: 21 U/L (ref 0–50)
ANION GAP SERPL CALCULATED.3IONS-SCNC: 7 MMOL/L (ref 3–14)
AST SERPL W P-5'-P-CCNC: 21 U/L (ref 0–45)
BILIRUB SERPL-MCNC: 0.2 MG/DL (ref 0.2–1.3)
BUN SERPL-MCNC: 22 MG/DL (ref 7–30)
CALCIUM SERPL-MCNC: 9.1 MG/DL (ref 8.5–10.1)
CHLORIDE SERPL-SCNC: 105 MMOL/L (ref 94–109)
CO2 SERPL-SCNC: 29 MMOL/L (ref 20–32)
CREAT SERPL-MCNC: 0.84 MG/DL (ref 0.52–1.04)
GFR SERPL CREATININE-BSD FRML MDRD: 71 ML/MIN/1.7M2
GLUCOSE SERPL-MCNC: 84 MG/DL (ref 70–99)
HCV AB SERPL QL IA: NORMAL
POTASSIUM SERPL-SCNC: 3.8 MMOL/L (ref 3.4–5.3)
PROT SERPL-MCNC: 6.8 G/DL (ref 6.8–8.8)
SODIUM SERPL-SCNC: 141 MMOL/L (ref 133–144)
TSH SERPL DL<=0.005 MIU/L-ACNC: 1.67 MU/L (ref 0.4–4)

## 2017-06-21 PROCEDURE — 93272 ECG/REVIEW INTERPRET ONLY: CPT

## 2017-06-21 PROCEDURE — 93270 REMOTE 30 DAY ECG REV/REPORT: CPT

## 2017-06-21 NOTE — MR AVS SNAPSHOT
MRN:[id number]                      After Visit Summary   6/21/2017    Zena Quintana    MRN: [id number]           Visit Information        Provider Department      6/21/2017 1:00 PM MG CV TECH; MG DEVICE [facility]        Your next 10 appointments already scheduled     Aug 07, 2017   Procedure with Berny Pedro MD   [facility] (--)    [address] [place] [address]   [phone]            Sep 13, 2017  1:20 PM CDT   [facility] Rome [facility]   [facility]    [address]  [facility] [address] [phone]              MyChart Information     [employer] gives you secure access to your electronic health record. If you see a primary care provider, you can also send messages to your care team and make appointments. If you have questions, please call your primary care clinic.  If you do not have a primary care provider, please call [phone] and they will assist you.      [employer] is an electronic gateway that provides easy, online access to your medical records. With [employer], you can request a clinic appointment, read your test results, renew a prescription or communicate with your care team.     To access your existing account, please contact your [facility] or call [phone] for assistance.        Care EveryWhere ID     This is your Care EveryWhere ID. This could be used by other organizations to access your [facility] medical records  [id number]        Equal Access to Services     CARLOS JOHANSEN : Hadii aad ku hadasho Soomaali, waaxda luqadaha, qaybta kaalmada adeegyada, waxrob alfaroin hayaan adeeg kharash la'aan . So [facility] [phone].    ATENCIÓN: Si habla español, tiene a sesay disposición servicios gratuitos de asistencia lingüística. Llame al [phone].    We comply with applicable federal civil rights laws and [place] laws. We do not  discriminate on the basis of race, color, national origin, age, disability sex, sexual orientation or gender identity.

## 2017-06-22 NOTE — PROGRESS NOTES
Ms. Quintana    Your recent test results are attached.     Hepatitis C test is negative. Blood chemistries including kidney and liver functions are normal. Thyroid test is normal. Blood counts did not show any signs of anemia. Will await event monitor results    If you have any questions or concerns please contact me via My Chart or call the clinic at [phone]     Thank You  Chana Armenta MD.

## 2017-07-13 ENCOUNTER — TRANSFERRED RECORDS (OUTPATIENT)
Dept: HEALTH INFORMATION MANAGEMENT | Facility: CLINIC | Age: 52
End: 2017-07-13

## 2017-07-26 ENCOUNTER — MYC MEDICAL ADVICE (OUTPATIENT)
Dept: FAMILY MEDICINE | Facility: OTHER | Age: 52
End: 2017-07-26

## 2017-07-27 NOTE — TELEPHONE ENCOUNTER
Received a call from [facility] stating patient had not yet returned the monitor and thus there are no results available.

## 2017-07-27 NOTE — TELEPHONE ENCOUNTER
Result is only preliminary. Attempted to call [facility] to get results, but had to leave a message. Told to call back.  Harmony De Luna, CMA

## 2017-08-07 ENCOUNTER — SURGERY (OUTPATIENT)
Age: 52
End: 2017-08-07

## 2017-08-07 ENCOUNTER — HOSPITAL ENCOUNTER (OUTPATIENT)
Facility: AMBULATORY SURGERY CENTER | Age: 52
Discharge: HOME | End: 2017-08-07
Attending: SPECIALIST | Admitting: SPECIALIST
Payer: COMMERCIAL

## 2017-08-07 VITALS
OXYGEN SATURATION: 97 % | DIASTOLIC BLOOD PRESSURE: 58 MMHG | TEMPERATURE: 98.4 F | RESPIRATION RATE: 16 BRPM | SYSTOLIC BLOOD PRESSURE: 103 MMHG

## 2017-08-07 LAB — COLONOSCOPY: NORMAL

## 2017-08-07 PROCEDURE — 45378 DIAGNOSTIC COLONOSCOPY: CPT

## 2017-08-07 PROCEDURE — G8918 PT W/O PREOP ORDER IV AB PRO: HCPCS

## 2017-08-07 PROCEDURE — G8907 PT DOC NO EVENTS ON DISCHARG: HCPCS

## 2017-08-07 RX ORDER — LIDOCAINE 40 MG/G
CREAM TOPICAL
Status: DISCONTINUED | OUTPATIENT
Start: 2017-08-07 | End: 2017-08-08 | Stop reason: HOSPADM

## 2017-08-07 RX ORDER — ONDANSETRON 2 MG/ML
4 INJECTION INTRAMUSCULAR; INTRAVENOUS
Status: DISCONTINUED | OUTPATIENT
Start: 2017-08-07 | End: 2017-08-08 | Stop reason: HOSPADM

## 2017-08-07 RX ORDER — FENTANYL CITRATE 50 UG/ML
INJECTION, SOLUTION INTRAMUSCULAR; INTRAVENOUS PRN
Status: DISCONTINUED | OUTPATIENT
Start: 2017-08-07 | End: 2017-08-07 | Stop reason: HOSPADM

## 2017-08-07 RX ADMIN — FENTANYL CITRATE 25 MCG: 50 INJECTION, SOLUTION INTRAMUSCULAR; INTRAVENOUS at 08:44

## 2017-08-07 RX ADMIN — FENTANYL CITRATE 25 MCG: 50 INJECTION, SOLUTION INTRAMUSCULAR; INTRAVENOUS at 08:50

## 2017-08-07 RX ADMIN — FENTANYL CITRATE 25 MCG: 50 INJECTION, SOLUTION INTRAMUSCULAR; INTRAVENOUS at 08:53

## 2017-08-07 RX ADMIN — FENTANYL CITRATE 25 MCG: 50 INJECTION, SOLUTION INTRAMUSCULAR; INTRAVENOUS at 08:42

## 2017-08-07 RX ADMIN — FENTANYL CITRATE 50 MCG: 50 INJECTION, SOLUTION INTRAMUSCULAR; INTRAVENOUS at 08:38

## 2017-08-07 RX ADMIN — FENTANYL CITRATE 25 MCG: 50 INJECTION, SOLUTION INTRAMUSCULAR; INTRAVENOUS at 09:01

## 2017-08-07 NOTE — H&P
Pre-Endoscopy History and Physical     Zena Quintana MRN# [id number]   YOB: 1965 Age: 52 year old     Date of Procedure: 8/7/2017  Primary care provider: Sonya Rankin  Type of Endoscopy: Colonoscopy with possible biopsy, possible polypectomy, high risk group  Reason for Procedure: screening  Type of Anesthesia Anticipated: Conscious Sedation    HPI:    Zena is a 52 year old female who will be undergoing the above procedure.      A history and physical has been performed. The patient's medications and allergies have been reviewed. The risks and benefits of the procedure and the sedation options and risks were discussed with the patient.  All questions were answered and informed consent was obtained.      She denies a personal or family history of anesthesia complications or bleeding disorders.     Patient Active Problem List   Diagnosis     Internal derangement of knee     Iron deficiency anemia     Allergic rhinitis     Stomatitis and mucositis (ulcerative)     Pure hypercholesterolemia     Arthropathy     HYPERLIPIDEMIA LDL GOAL <160     High risk medication use     RA (rheumatoid arthritis) (H)     Endometriosis     Ovarian cyst     Disturbance of salivary secretion (XEROSTOMIA)     Impingement syndrome of right shoulder     Sjogren's syndrome (H)     Palpitations        Past Medical History:   Diagnosis Date     Allergic rhinitis, cause unspecified     Allergic rhinitis     Endometriosis, site unspecified     Endometriosis     Female infertility of other specified origin      Iron deficiency anemia, unspecified     Anemia, iron def.     RA (rheumatoid arthritis) (H)      Stomatitis and mucositis (ulcerative)     chronic - non-specific        Past Surgical History:   Procedure Laterality Date     C NONSPECIFIC PROCEDURE  1995, 1997    laparoscopy for endometrial fulguration     HC COLONOSCOPY W/WO BRUSH/WASH  5/25/2005     HC CREATE EARDRUM OPENING,GEN ANESTH      P.E. Tubes      HC KNEE SCOPE, DIAGNOSTIC       left knee, ruptured ACL     HC REMOVE TONSILS/ADENOIDS,11+ Y/O      T & A 11+y.o.     ORTHOPEDIC SURGERY      rotator cuff repair, left       Social History   Substance Use Topics     Smoking status: Never Smoker     Smokeless tobacco: Never Used      Comment: no smokers in household     Alcohol use 1.0 oz/week      Comment: juliana Owen x2/1-2x per month       Family History   Problem Relation Age of Onset     OSTEOPOROSIS Mother      Arthritis Mother      lupus     Connective Tissue Disorder Mother      Lupus     Cataracts Mother      Macular Degeneration Mother      Celiac Disease Mother      HEART DISEASE Father      aortic aneurysm     GASTROINTESTINAL DISEASE Father      abdominal anurysm     Hypertension Father      Cataracts Father      GASTROINTESTINAL DISEASE Sister      IBS, colon problems     Alzheimer Disease Maternal Grandmother      OSTEOPOROSIS Maternal Grandmother      CEREBROVASCULAR DISEASE Maternal Grandfather      CANCER Paternal Grandmother      CEREBROVASCULAR DISEASE Paternal Grandfather      Circulatory Sister      carotid stenosis     Cancer - colorectal Maternal Uncle      Cancer - colorectal Maternal Uncle    Colon cancer in father.    Prior to Admission medications    Medication Sig Start Date End Date Taking? Authorizing Provider   hydroxychloroquine (PLAQUENIL) 200 MG tablet Take 2 tablets (400 mg) by mouth daily 3/15/17  Yes Rome Hardy MD   Ibuprofen (ADVIL PO) Take 200 mg by mouth   Yes Reported, Patient   Loratadine (CLARITIN PO)    Yes Reported, Patient   acetaminophen (TYLENOL) 500 MG tablet Take 1-2 tablets by mouth every 6 hours as needed for pain. 3/10/12  Yes Ruben Lock MD   ASPIRIN 81 MG OR TABS 1 TABLET DAILY   Yes Reported, Patient   MULTIPLE VITAMINS/WOMENS OR None Entered   Yes Reported, Patient   OMEGA-3 FATTY ACIDS 600 MG OR CAPS Reported on 3/15/2017 5/6/05  Yes Helene Rossi MD   pilocarpine (SALAGEN) 5 MG tablet  "Take 1 tablet (5 mg) by mouth 3 times daily  Patient not taking: Reported on 6/20/2017 3/15/17   Rome Hardy MD   bimatoprost (LATISSE) 0.03 % SOLN Externally apply topically At Bedtime 2/9/17   Julito Garcia MD   albuterol (PROAIR HFA, PROVENTIL HFA, VENTOLIN HFA) 108 (90 BASE) MCG/ACT inhaler Inhale 1-2 puffs into the lungs every 4 hours as needed for shortness of breath / dyspnea or wheezing  Patient not taking: Reported on 6/20/2017 7/18/16   Tiarra Ramirez PA-C       Allergies   Allergen Reactions     Penicillins Hives     hives        REVIEW OF SYSTEMS:   5 point ROS negative except as noted above in HPI, including Gen., Resp., CV, GI &  system review.    PHYSICAL EXAM:   BP 98/56  Temp 98.4  F (36.9  C) (Temporal)  Resp 16  LMP 07/17/2017 (Approximate)  SpO2 100% Estimated body mass index is 22.12 kg/(m^2) as calculated from the following:    Height as of 6/20/17: 1.664 m (5' 5.5\").    Weight as of 6/20/17: 61.2 kg (135 lb).   GENERAL APPEARANCE: alert, and oriented  MENTAL STATUS: alert  AIRWAY EXAM: Mallampatti Class I (visualization of the soft palate, fauces, uvula, anterior and posterior pillars)  RESP: lungs clear to auscultation - no rales, rhonchi or wheezes  CV: regular rates and rhythm  DIAGNOSTICS:    Not indicated    IMPRESSION   ASA Class 2 - Mild systemic disease    PLAN:   Plan for Colonoscopy with possible biopsy, possible polypectomy. We discussed the risks, benefits and alternatives and the patient wished to proceed.    The above has been forwarded to the consulting provider.      Signed Electronically by: Berny Pedro  August 7, 2017          "

## 2017-08-07 NOTE — LETTER
[facility]  [address] [place] [address]  Phone: [phone]  August 7, 2017      Zena Quintana  [address] [facility] [address]      Dear Zena,    This letter is to inform you that your recent colonoscopy was normal. This can be repeated in 5 years.     Please call us at [phone] if you have any further questions.      Healthy Regards,    Your Health Care Team  [facility]

## 2017-08-07 NOTE — BRIEF OP NOTE
[facility] Brief Operative Note    Pre-operative diagnosis: Colonoscopy, Screen for colon cancer.  BMI  22.17  [facility] [phone]   Post-operative diagnosis Normal     Procedure: Procedure(s):  Colonoscopy, Screen for colon cancer.  BMI  22.17  [facility] [phone] - Wound Class: II-Clean Contaminated   Surgeon(s): Surgeon(s) and Role:     * Berny Pedro MD - Primary   Estimated blood loss: * No values recorded between 8/7/2017  8:37 AM and 8/7/2017  9:16 AM *    Specimens: * No specimens in log *   Findings: Please see ProVation procedure note in Chart Review

## 2017-08-10 ENCOUNTER — PRE VISIT (OUTPATIENT)
Dept: CARDIOLOGY | Facility: CLINIC | Age: 52
End: 2017-08-10

## 2017-08-10 NOTE — TELEPHONE ENCOUNTER
Called Dr. Armenta's office @ [phone] to request a copy of event monitor strips and summary, per staff they will have the reports scanned in asap and call when ready for view.

## 2017-08-17 ENCOUNTER — OFFICE VISIT (OUTPATIENT)
Dept: CARDIOLOGY | Facility: CLINIC | Age: 52
End: 2017-08-17
Payer: COMMERCIAL

## 2017-08-17 VITALS
BODY MASS INDEX: 22.31 KG/M2 | HEIGHT: 65 IN | WEIGHT: 133.9 LBS | HEART RATE: 66 BPM | SYSTOLIC BLOOD PRESSURE: 100 MMHG | DIASTOLIC BLOOD PRESSURE: 80 MMHG

## 2017-08-17 DIAGNOSIS — R00.2 PALPITATIONS: Primary | ICD-10-CM

## 2017-08-17 DIAGNOSIS — R42 DIZZINESS: ICD-10-CM

## 2017-08-17 PROCEDURE — 99204 OFFICE O/P NEW MOD 45 MIN: CPT | Mod: 25 | Performed by: INTERNAL MEDICINE

## 2017-08-17 PROCEDURE — 93000 ELECTROCARDIOGRAM COMPLETE: CPT | Performed by: INTERNAL MEDICINE

## 2017-08-17 RX ORDER — OMEGA-3 FATTY ACIDS/FISH OIL 300-1000MG
200 CAPSULE ORAL PRN
COMMUNITY
End: 2023-07-25

## 2017-08-17 RX ORDER — LORATADINE 10 MG/1
10 CAPSULE, LIQUID FILLED ORAL PRN
COMMUNITY

## 2017-08-17 NOTE — PROGRESS NOTES
HISTORY OF PRESENT ILLNESS:  The patient is a 52-year-old slender white female who is referred for cardiology consultation because of episodic palpitations and rapid heartbeat that may or may not be associated with lightheadedness or dizziness.  She has had isolated palpitations for many years, and has also had isolated episodes of lightheadedness and dizziness.  She has had electrocardiograms that have been considered within normal limits and she has also had event monitors that have shown episodic sinus tachycardia without significant tachydysrhythmia.  She has not had documented tachydysrhythmia, although there have been references to heart rates approaching 200, especially when she is exercising.      The patient's history for coronary disease risk factors is negative for cigarette smoking, hypertension, diabetes mellitus, hypercholesterolemia or family history of premature coronary disease, although there is a family history of atrial fibrillation.  She takes in minimal caffeine, seldom alcoholic beverage, but does take pseudoephedrine for treatment of her allergies.  She notices the palpitations and rapid heartbeat oftentimes with exercise.  She has had surgical repair of her rotator cuffs, but no other major surgeries or medical illness.  She works 20-25 hours a week in a low stress job.        She relates isolated episodes of lightheadedness and dizziness occasionally approaching that of near-syncope but no complete fainting spells.  The sense of palpitations or rapid heartbeat are not always associated with the dizziness or lightheadedness and is not necessarily associated with exercise or stress.  The patient does not relate persistent johana chest pain, diaphoresis, nausea, vomiting or syncope.  There is no history of documented myocardial infarction, congestive heart failure, rheumatic heart disease, congenital heart disease or heart murmur.  Because of the recurrent palpitations and also with a vague  history of hyperlipidemia, the patient was referred to Cardiology for further evaluation.  She does feel that the frequent palpitations and rapid heartbeat limit her exercise tolerance and her lifestyle.  It should be noted that she does have rheumatoid arthritis which she feels is well controlled.      MEDICATIONS:   1.  Ibuprofen 200 mg as needed.   2.  Loratadine 10 mg a day as needed for allergies, sometimes with a Sudafed.   3.  Plaquenil 400 mg a day.   4.  Acetaminophen 500-1000 mg every 6 hours as needed.   5.  Aspirin 81 mg a day.   6.  Multivitamins 1 per day.   7.  Omega-3 fatty acids 600 mg a day.   8.  Pilocarpine 5 mg tablet 3 times a day, although it is not clear she is taking this medication.        LABORATORY DATA:  Most recent laboratory data demonstrated sodium 141, potassium 3.8, BUN 22, creatinine 0.84.  Hemoglobin 12.3, platelet count 324,000.  ALT 21, AST 21.  There is a distant report of lipids from 2015 that showed cholesterol of 181, HDL 74, LDL 81 and triglycerides 132.      REVIEW OF SYSTEMS:  Remarkable for the dizziness and lightheadedness as already noted, but no seizure, stroke, severe headache or syncope.  She does have isolated episodes of asthmatic bronchitis which has required inhaler therapy but not johana asthma.  She does have multiple allergies.  She does not relate recent pneumonia, johana chest discomfort, peptic ulcer disease, bowel or bladder dysfunction, weakness or swelling of the lower extremities.  She does have intermittent to occasional UTIs and has the rheumatoid arthritis diagnosis.        She again presents to Cardiology Clinic for consultation because of the palpitations and rapid heartbeat.  She does feel limited by her symptoms.      PHYSICAL EXAMINATION:   GENERAL:  The patient is a middle-aged, slender, white female in no acute distress.   VITAL SIGNS:  Blood pressure 100/80 with a heart rate of 60-70 and regular.  Weight was 133 pounds.   HEENT:  Pupils equal,  round, react to light and accommodate.  Ear, nose and throat unremarkable.   NECK:  Without jugular venous distention, carotid bruit or palpable thyroid.   CHEST:  Essentially clear to percussion and auscultation with perhaps slightly prolonged expiratory phase.   CARDIAC:  Regular rhythm, soft S1, physiologically split S2.  There was no significant rub, murmur, gallop or click.   ABDOMEN:  Soft, nontender, without palpable liver, spleen or masses.  Bowel sounds are present.   EXTREMITIES:  Without cyanosis or edema.  Pulses were 2+ throughout without bruit.   NEUROLOGIC:  Grossly intact.  The patient was able to respond to sensory stimuli and move all extremities.      Electrocardiogram demonstrates a normal sinus rhythm with normal WA, QRS, QT interval and no significant ST abnormality.      CLINICAL IMPRESSION:   1.  Palpitations with episodic rapid heartbeat.   2.  Episodes of dizziness and lightheadedness, not necessarily associated with #1.   3.  History of rheumatoid arthritis.   4.  History of asthmatic bronchitis and multiple allergies.   5.  Recurrent UTIs.   6.  Positive family history for atrial fibrillation.      DISCUSSION:  The patient presents a history of isolated palpitations, rapid heartbeat as well as dizziness, lightheadedness not necessarily associated with palpitations, but can be.  She had multiple strips from an event recorder that showed sinus tachycardia ranging in rate anywhere from 100-150.  The patient does not remember her exact activity at the time.  She is concerned about her tolerance for rapid heartbeat and whether she is at risk for damaging her heart.  It would not be unreasonable to proceed with a stress echocardiogram to rule out ischemic disease but also to determine heart rate response and blood pressure response to exercise and exercise tolerance and to reassure the patient with regards to her symptomatology.  One could also consider a CT calcium score to rule out the  presence of ischemic disease; however, the patient does not have significant risk factors for coronary artery disease at this time.  She will be encouraged to avoid caffeine, Sudafed and other stimulants.  She appears to have adequate sleep at this time.  She might be a candidate if the frequency of the symptoms warrants for a 24-48 hour Holter monitor to record all of her rhythm for those 2 days.      RECOMMENDATIONS:   1.  Continue present medications.   2.  Schedule stress echocardiogram to  left ventricular function as well as heart rate and blood pressure response to exercise and evaluate rhythm with exercise.   3.  Avoid caffeine, Sudafed and other stimulants.   4.  Routine medical followup.   5.  Cardiology followup depending upon the results of tests.      cc:      [place] [address]         NA[facility] KIRK MD, [facility]             D: 2017 17:49   T: 2017 18:48   MT: AXEL      Name:     SHAR NAVARRETE   MRN:      [id number]        Account:      [id number]   :      1965           Service Date: 2017      Document: [id number]

## 2017-08-17 NOTE — MR AVS SNAPSHOT
After Visit Summary   8/17/2017    Zena Quintana    MRN: [id number]           Patient Information     Date Of Birth          1965        Visit Information        Provider Department      8/17/2017 11:15 AM Murphy Russell MD [facility] [place]        Today's Diagnoses     Palpitations    -  1    Dizziness           Follow-ups after your visit        Your next 10 appointments already scheduled     Sep 13, 2017  1:20 PM CDT   Return Visit with Rome Hardy MD   [facility] ([facility]    [address] [address]   [phone]              Future tests that were ordered for you today     Open Future Orders        Priority Expected Expires Ordered    Exercise Stress Echocardiogram Routine 8/24/2017 8/17/2018 8/17/2017            Who to contact     If you have questions or need follow up information about today's clinic visit or your schedule please contact [facility] [place] directly at [phone].  Normal or non-critical lab and imaging results will be communicated to you by [employer], letter or phone within 4 business days after the clinic has received the results. If you do not hear from us within 7 days, please contact the clinic through [employer] or phone. If you have a critical or abnormal lab result, we will notify you by phone as soon as possible.  Submit refill requests through [employer] or call your pharmacy and they will forward the refill request to us. Please allow 3 business days for your refill to be completed.          Additional Information About Your Visit        [employer] Information     [employer] gives you secure access to your electronic health record. If you see a primary care provider, you can also send messages to your care team and make appointments. If you have questions, please call your primary care clinic.  If you do not have a primary care provider,  "please call [phone] and they will assist you.        Care EveryWhere ID     This is your Care EveryWhere ID. This could be used by other organizations to access your [facility] medical records  [id number]        Your Vitals Were     Pulse Height Last Period BMI (Body Mass Index)          66 1.651 m (5' 5\") 07/17/2017 (Approximate) 22.28 kg/m2         Blood Pressure from Last 3 Encounters:   08/17/17 100/80   08/07/17 103/58   06/20/17 100/60    Weight from Last 3 Encounters:   08/17/17 60.7 kg (133 lb 14.4 oz)   06/20/17 61.2 kg (135 lb)   03/15/17 61.1 kg (134 lb 12.8 oz)              We Performed the Following     EKG 12-lead complete w/read - Clinics (performed today)        Primary Care Provider Office Phone # Fax #    Chana Armenta [phone]       [address] [place] [address]  [facility] [address]        Equal Access to Services     [facility]: Hadii sarah orlando hadasho Soomaali, waaxda luqadaha, qaybta kaalmada adeegyabret, ang stewart . So [facility] [phone].    ATENCIÓN: Si habla español, tiene a sesay disposición servicios gratuitos de asistencia lingüística. [facility] [phone].    We comply with applicable federal civil rights laws and [place] laws. We do not discriminate on the basis of race, color, national origin, age, disability sex, sexual orientation or gender identity.            Thank you!     Thank you for choosing [facility] [place]  for your care. Our goal is always to provide you with excellent care. Hearing back from our patients is one way we can continue to improve our services. Please take a few minutes to complete the written survey that you may receive in the mail after your visit with us. Thank you!             Your Updated Medication List - Protect others around you: Learn how to safely use, store and throw away your medicines at www.disposemymeds.org.          This list is accurate as of: 8/17/17 12:38 PM.  " Always use your most recent med list.                   Brand Name Dispense Instructions for use Diagnosis    acetaminophen 500 MG tablet    TYLENOL     Take 1-2 tablets by mouth every 6 hours as needed for pain.        ADVIL 200 MG capsule   Generic drug:  ibuprofen      Take 200 mg by mouth as needed for fever        aspirin 81 MG tablet      1 TABLET DAILY        bimatoprost 0.03 % Soln    latisse    1 Bottle    Externally apply topically At Bedtime    High risk medication use       hydroxychloroquine 200 MG tablet    PLAQUENIL    180 tablet    Take 2 tablets (400 mg) by mouth daily    Sjogren's syndrome (H), Rheumatoid arthritis involving multiple sites with positive rheumatoid factor (H)       loratadine 10 MG capsule      Take 10 mg by mouth as needed for allergies        MULTIPLE VITAMINS/WOMENS PO      None Entered        Omega-3 Fatty Acids 600 MG Caps     30    Reported on 3/15/2017    Stomatitis and mucositis (ulcerative)       pilocarpine 5 MG tablet    SALAGEN    90 tablet    Take 1 tablet (5 mg) by mouth 3 times daily    Sjogren's syndrome (H)

## 2017-08-17 NOTE — LETTER
8/17/2017    Sonya Rankin PA-C  [address] [facility] [address]  [place] [address]    RE: Zena Quintana       Dear Colleague,    I had the pleasure of seeing Zena Quintana in the [facility].    The patient is a 52-year-old slender white female who is referred for cardiology consultation because of episodic palpitations and rapid heartbeat that may or may not be associated with lightheadedness or dizziness.  She has had isolated palpitations for many years, and has also had isolated episodes of lightheadedness and dizziness.  She has had electrocardiograms that have been considered within normal limits and she has also had event monitors that have shown episodic sinus tachycardia without significant tachydysrhythmia.  She has not had documented tachydysrhythmia, although there have been references to heart rates approaching 200, especially when she is exercising.      The patient's history for coronary disease risk factors is negative for cigarette smoking, hypertension, diabetes mellitus, hypercholesterolemia or family history of premature coronary disease, although there is a family history of atrial fibrillation.  She takes in minimal caffeine, seldom alcoholic beverage, but does take pseudoephedrine for treatment of her allergies.  She notices the palpitations and rapid heartbeat oftentimes with exercise.  She has had surgical repair of her rotator cuffs, but no other major surgeries or medical illness.  She works 20-25 hours a week in a low stress job.        She relates isolated episodes of lightheadedness and dizziness occasionally approaching that of near-syncope but no complete fainting spells.  The sense of palpitations or rapid heartbeat are not always associated with the dizziness or lightheadedness and is not necessarily associated with exercise or stress.  The patient does not relate persistent johana chest pain, diaphoresis, nausea, vomiting or syncope.   There is no history of documented myocardial infarction, congestive heart failure, rheumatic heart disease, congenital heart disease or heart murmur.  Because of the recurrent palpitations and also with a vague history of hyperlipidemia, the patient was referred to Cardiology for further evaluation.  She does feel that the frequent palpitations and rapid heartbeat limit her exercise tolerance and her lifestyle.  It should be noted that she does have rheumatoid arthritis which she feels is well controlled.      MEDICATIONS:   1.  Ibuprofen 200 mg as needed.   2.  Loratadine 10 mg a day as needed for allergies, sometimes with a Sudafed.   3.  Plaquenil 400 mg a day.   4.  Acetaminophen 500-1000 mg every 6 hours as needed.   5.  Aspirin 81 mg a day.   6.  Multivitamins 1 per day.   7.  Omega-3 fatty acids 600 mg a day.   8.  Pilocarpine 5 mg tablet 3 times a day, although it is not clear she is taking this medication.        LABORATORY DATA:  Most recent laboratory data demonstrated sodium 141, potassium 3.8, BUN 22, creatinine 0.84.  Hemoglobin 12.3, platelet count 324,000.  ALT 21, AST 21.  There is a distant report of lipids from 2015 that showed cholesterol of 181, HDL 74, LDL 81 and triglycerides 132.      REVIEW OF SYSTEMS:  Remarkable for the dizziness and lightheadedness as already noted, but no seizure, stroke, severe headache or syncope.  She does have isolated episodes of asthmatic bronchitis which has required inhaler therapy but not johana asthma.  She does have multiple allergies.  She does not relate recent pneumonia, johana chest discomfort, peptic ulcer disease, bowel or bladder dysfunction, weakness or swelling of the lower extremities.  She does have intermittent to occasional UTIs and has the rheumatoid arthritis diagnosis.        She again presents to Cardiology Clinic for consultation because of the palpitations and rapid heartbeat.  She does feel limited by her symptoms.      PHYSICAL EXAMINATION:    GENERAL:  The patient is a middle-aged, slender, white female in no acute distress.   VITAL SIGNS:  Blood pressure 100/80 with a heart rate of 60-70 and regular.  Weight was 133 pounds.   HEENT:  Pupils equal, round, react to light and accommodate.  Ear, nose and throat unremarkable.   NECK:  Without jugular venous distention, carotid bruit or palpable thyroid.   CHEST:  Essentially clear to percussion and auscultation with perhaps slightly prolonged expiratory phase.   CARDIAC:  Regular rhythm, soft S1, physiologically split S2.  There was no significant rub, murmur, gallop or click.   ABDOMEN:  Soft, nontender, without palpable liver, spleen or masses.  Bowel sounds are present.   EXTREMITIES:  Without cyanosis or edema.  Pulses were 2+ throughout without bruit.   NEUROLOGIC:  Grossly intact.  The patient was able to respond to sensory stimuli and move all extremities.      Electrocardiogram demonstrates a normal sinus rhythm with normal IL, QRS, QT interval and no significant ST abnormality.      CLINICAL IMPRESSION:   1.  Palpitations with episodic rapid heartbeat.   2.  Episodes of dizziness and lightheadedness, not necessarily associated with #1.   3.  History of rheumatoid arthritis.   4.  History of asthmatic bronchitis and multiple allergies.   5.  Recurrent UTIs.   6.  Positive family history for atrial fibrillation.      DISCUSSION:  The patient presents a history of isolated palpitations, rapid heartbeat as well as dizziness, lightheadedness not necessarily associated with palpitations, but can be.  She had multiple strips from an event recorder that showed sinus tachycardia ranging in rate anywhere from 100-150.  The patient does not remember her exact activity at the time.  She is concerned about her tolerance for rapid heartbeat and whether she is at risk for damaging her heart.  It would not be unreasonable to proceed with a stress echocardiogram to rule out ischemic disease but also to determine  heart rate response and blood pressure response to exercise and exercise tolerance and to reassure the patient with regards to her symptomatology.  One could also consider a CT calcium score to rule out the presence of ischemic disease; however, the patient does not have significant risk factors for coronary artery disease at this time.  She will be encouraged to avoid caffeine, Sudafed and other stimulants.  She appears to have adequate sleep at this time.  She might be a candidate if the frequency of the symptoms warrants for a 24-48 hour Holter monitor to record all of her rhythm for those 2 days.      RECOMMENDATIONS:   1.  Continue present medications.   2.  Schedule stress echocardiogram to  left ventricular function as well as heart rate and blood pressure response to exercise and evaluate rhythm with exercise.   3.  Avoid caffeine, Sudafed and other stimulants.   4.  Routine medical followup.   5.  Cardiology followup depending upon the results of tests.     Again, thank you for allowing me to participate in the care of your patient.      Sincerely,    Murphy Russell MD     [facility]    cc:   Chana Armenta MD  [address] [place] [id number]

## 2017-08-24 ENCOUNTER — HOSPITAL ENCOUNTER (OUTPATIENT)
Dept: CARDIOLOGY | Facility: CLINIC | Age: 52
Discharge: HOME | End: 2017-08-24
Attending: INTERNAL MEDICINE | Admitting: INTERNAL MEDICINE
Payer: COMMERCIAL

## 2017-08-24 ENCOUNTER — TELEPHONE (OUTPATIENT)
Dept: CARDIOLOGY | Facility: CLINIC | Age: 52
End: 2017-08-24

## 2017-08-24 DIAGNOSIS — R42 DIZZINESS: ICD-10-CM

## 2017-08-24 DIAGNOSIS — R00.2 PALPITATIONS: ICD-10-CM

## 2017-08-24 PROCEDURE — 93350 STRESS TTE ONLY: CPT | Mod: 26 | Performed by: INTERNAL MEDICINE

## 2017-08-24 PROCEDURE — 93016 CV STRESS TEST SUPVJ ONLY: CPT | Performed by: INTERNAL MEDICINE

## 2017-08-24 PROCEDURE — 93325 DOPPLER ECHO COLOR FLOW MAPG: CPT | Mod: 26 | Performed by: INTERNAL MEDICINE

## 2017-08-24 PROCEDURE — 93321 DOPPLER ECHO F-UP/LMTD STD: CPT | Mod: 26 | Performed by: INTERNAL MEDICINE

## 2017-08-24 PROCEDURE — 93018 CV STRESS TEST I&R ONLY: CPT | Performed by: INTERNAL MEDICINE

## 2017-08-24 PROCEDURE — 93350 STRESS TTE ONLY: CPT | Mod: TC

## 2017-08-24 NOTE — TELEPHONE ENCOUNTER
Stress echo 8-24-17 - ordered at  8-13-17 for palpitations.   Resting ECG is normal. The visual ejection fraction is estimated at 55-60%  with no regional wall motion abnormalities noted.   There was a normal BP response to exercise.  The EKG portion of this stress test was negative for inducible ischemia (see  echo results below) as there was no chest pain or significant ST changes with  exercise.  The visual ejection fraction is estimated at >70% with normal resting wall  motion and no stress-induced wall motion abnormalities.   This was a normal stress EKG and a normal stress echocardiogram with no  evidence of stress-induced ischemia.   **The results of the stress echocardiogram were conveyed to the patient today.  [place]: Dr. Parson

## 2017-08-25 NOTE — TELEPHONE ENCOUNTER
Attempted to contact patient to clarify if she had any further questions about stress echo results (read as normal). Left message for patient to call back.

## 2017-09-07 DIAGNOSIS — M05.79 RHEUMATOID ARTHRITIS INVOLVING MULTIPLE SITES WITH POSITIVE RHEUMATOID FACTOR (H): ICD-10-CM

## 2017-09-07 LAB
ALBUMIN SERPL-MCNC: 4.3 G/DL (ref 3.4–5)
ALP SERPL-CCNC: 42 U/L (ref 40–150)
ALT SERPL W P-5'-P-CCNC: 25 U/L (ref 0–50)
AST SERPL W P-5'-P-CCNC: 25 U/L (ref 0–45)
BASOPHILS # BLD AUTO: 0 10E9/L (ref 0–0.2)
BASOPHILS NFR BLD AUTO: 0.6 %
BILIRUB DIRECT SERPL-MCNC: <0.1 MG/DL (ref 0–0.2)
BILIRUB SERPL-MCNC: 0.3 MG/DL (ref 0.2–1.3)
CREAT SERPL-MCNC: 0.99 MG/DL (ref 0.52–1.04)
CRP SERPL-MCNC: <2.9 MG/L (ref 0–8)
DIFFERENTIAL METHOD BLD: NORMAL
EOSINOPHIL # BLD AUTO: 0.1 10E9/L (ref 0–0.7)
EOSINOPHIL NFR BLD AUTO: 1.6 %
ERYTHROCYTE [DISTWIDTH] IN BLOOD BY AUTOMATED COUNT: 12.2 % (ref 10–15)
ERYTHROCYTE [SEDIMENTATION RATE] IN BLOOD BY WESTERGREN METHOD: 9 MM/H (ref 0–30)
GFR SERPL CREATININE-BSD FRML MDRD: 58 ML/MIN/1.7M2
HCT VFR BLD AUTO: 38.3 % (ref 35–47)
HGB BLD-MCNC: 12.5 G/DL (ref 11.7–15.7)
LYMPHOCYTES # BLD AUTO: 1.4 10E9/L (ref 0.8–5.3)
LYMPHOCYTES NFR BLD AUTO: 20.9 %
MCH RBC QN AUTO: 30.7 PG (ref 26.5–33)
MCHC RBC AUTO-ENTMCNC: 32.6 G/DL (ref 31.5–36.5)
MCV RBC AUTO: 94 FL (ref 78–100)
MONOCYTES # BLD AUTO: 0.8 10E9/L (ref 0–1.3)
MONOCYTES NFR BLD AUTO: 11.7 %
NEUTROPHILS # BLD AUTO: 4.4 10E9/L (ref 1.6–8.3)
NEUTROPHILS NFR BLD AUTO: 65.2 %
PLATELET # BLD AUTO: 305 10E9/L (ref 150–450)
PROT SERPL-MCNC: 7.7 G/DL (ref 6.8–8.8)
RBC # BLD AUTO: 4.07 10E12/L (ref 3.8–5.2)
WBC # BLD AUTO: 6.7 10E9/L (ref 4–11)

## 2017-09-07 PROCEDURE — 85652 RBC SED RATE AUTOMATED: CPT | Performed by: INTERNAL MEDICINE

## 2017-09-07 PROCEDURE — 80076 HEPATIC FUNCTION PANEL: CPT | Performed by: INTERNAL MEDICINE

## 2017-09-07 PROCEDURE — 36415 COLL VENOUS BLD VENIPUNCTURE: CPT | Performed by: INTERNAL MEDICINE

## 2017-09-07 PROCEDURE — 86140 C-REACTIVE PROTEIN: CPT | Performed by: INTERNAL MEDICINE

## 2017-09-07 PROCEDURE — 85025 COMPLETE CBC W/AUTO DIFF WBC: CPT | Performed by: INTERNAL MEDICINE

## 2017-09-07 PROCEDURE — 82565 ASSAY OF CREATININE: CPT | Performed by: INTERNAL MEDICINE

## 2017-09-13 ENCOUNTER — OFFICE VISIT (OUTPATIENT)
Dept: RHEUMATOLOGY | Facility: CLINIC | Age: 52
End: 2017-09-13
Payer: COMMERCIAL

## 2017-09-13 VITALS
HEART RATE: 64 BPM | DIASTOLIC BLOOD PRESSURE: 68 MMHG | BODY MASS INDEX: 22.33 KG/M2 | OXYGEN SATURATION: 100 % | TEMPERATURE: 97.2 F | WEIGHT: 134.2 LBS | SYSTOLIC BLOOD PRESSURE: 103 MMHG

## 2017-09-13 DIAGNOSIS — M35.01 SJOGREN'S SYNDROME WITH KERATOCONJUNCTIVITIS SICCA (H): ICD-10-CM

## 2017-09-13 DIAGNOSIS — Z79.899 HIGH RISK MEDICATIONS (NOT ANTICOAGULANTS) LONG-TERM USE: ICD-10-CM

## 2017-09-13 DIAGNOSIS — Z23 NEED FOR PROPHYLACTIC VACCINATION AND INOCULATION AGAINST INFLUENZA: ICD-10-CM

## 2017-09-13 DIAGNOSIS — M05.79 RHEUMATOID ARTHRITIS INVOLVING MULTIPLE SITES WITH POSITIVE RHEUMATOID FACTOR (H): Primary | ICD-10-CM

## 2017-09-13 DIAGNOSIS — Z23 NEED FOR VACCINATION: ICD-10-CM

## 2017-09-13 PROCEDURE — 90472 IMMUNIZATION ADMIN EACH ADD: CPT | Performed by: INTERNAL MEDICINE

## 2017-09-13 PROCEDURE — 90670 PCV13 VACCINE IM: CPT | Performed by: INTERNAL MEDICINE

## 2017-09-13 PROCEDURE — 99214 OFFICE O/P EST MOD 30 MIN: CPT | Mod: 25 | Performed by: INTERNAL MEDICINE

## 2017-09-13 PROCEDURE — 90471 IMMUNIZATION ADMIN: CPT | Performed by: INTERNAL MEDICINE

## 2017-09-13 PROCEDURE — 90686 IIV4 VACC NO PRSV 0.5 ML IM: CPT | Performed by: INTERNAL MEDICINE

## 2017-09-13 RX ORDER — HYDROXYCHLOROQUINE SULFATE 200 MG/1
TABLET, FILM COATED ORAL
Qty: 135 TABLET | Refills: 3 | Status: SHIPPED | OUTPATIENT
Start: 2017-09-13 | End: 2018-09-12

## 2017-09-13 NOTE — NURSING NOTE
"Chief Complaint   Patient presents with     RECHECK     6 month follow up for RA pt states a day here and there that may bother her but overall pretty good       Initial /68  Pulse 64  Temp 97.2  F (36.2  C) (Oral)  Wt 60.9 kg (134 lb 3.2 oz)  SpO2 100%  BMI 22.33 kg/m2 Estimated body mass index is 22.33 kg/(m^2) as calculated from the following:    Height as of 8/17/17: 1.651 m (5' 5\").    Weight as of this encounter: 60.9 kg (134 lb 3.2 oz).  BP completed using cuff size: regular         RAPID3 (0-30) Cumulative Score  2.2          RAPID3 Weighted Score (divide #4 by 3 and that is the weighted score)  .73     [place] Dominguez, MITA 9/13/2017 1:29 PM        "

## 2017-09-13 NOTE — PATIENT INSTRUCTIONS
Dr. Hardy s Rheumatology Clinics  Locations Clinic Hours Telephone Number   [facility]  [address] [place] [address]     Wednesday: 7:20AM - 4:00PM  Thursday:     7:20AM - 4:00PM     Friday:          7:20AM - 11:00AM       To schedule an appointment with  Dr. Hardy,  please contact  Specialty [phone]       [facility] [place]  [address]  [place] [address]       Monday:       7:20AM - 4:00PM      Lal[facility] [place]  [address] [place] [address]       Tuesday:      7:20AM - 4:00PM          Thank you!    [place]/ MITA

## 2017-09-13 NOTE — PROGRESS NOTES
Rheumatology Clinic Visit      Zena Quintana MRN# [id number]   YOB: 1965 Age: 52 year old      Date of visit: 9/13/17   PCP: Sonya Starr    Chief Complaint   Patient presents with:  RECHECK: 6 month follow up for RA pt states a day here and there that may bother her but overall pretty good      Assessment and Plan     1. Seropositive nonerosive rheumatoid arthritis (RF low positive, CCP negative): Reportedly at the time of diagnosis she had synovitis in a symmetric distribution that was steroid responsive. Currently on hydroxychloroquine 400 mg daily.  RA is doing well. Reduce HCQ to avg of 300mg daily.  - Reduce hydroxychloroquine to 200mg daily with an additional 200mg every other day (last eye exam on 2/6/2017; has used hydroxychloroquine since approximately 2007)    2. 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 Left shoulder doing better s/p surgery.    3. Sjogren's syndrome: NJ by EIA negative but positive by IF, SSA and SSB negative, and minor salivary gland biopsy negative. She is currently following with ophthalmology who has given her eyedrops that have been beneficial.  Dry mouth is currently treated with frequent sips of water; she has good oral hygiene, follows with a dentist regularly, and does not have frequent cavities; however, more symptomatic recently and therefore pilocarpine was previously Rx'd but not started yet.  She is considering pilocarpine versus a mouth rinse rec'd by her dentist.   - Start pilocarpine 5 mg TID; she was instructed to start 5 mg once daily ×2 days, then twice daily ×2 days, then 3 times daily thereafter; increase as needed to control dry mouth but do not increase if having side effects.    4. History of oral sores / family history of lupus / polyarthritis / recurrent sinus  infections: She is not having oral sores or recurrent sinus infections for several years now. Retrospectively, there would be concern for potential ANCA associated sinus issues and this could be checked if she had recurrent sinusitis again. Oral sores have improved since she started Plaquenil, that argues it could be connective tissue disease related. NJ was negative by EIA, but complement was on the low end of the normal range so NJ was rechecked and positive; further testing was negative as noted in the results section.      5. Vaccinations: Vaccinations reviewed with Ms. Quintana.  Risks and benefits of vaccinations were discussed.  - Influenza: will receive today  - [id number]: will receive today  - [id number]: to receive at least 8 weeks after [id number] is administered    Ms. Quintana verbalized agreement with and understanding of the rational for the diagnosis and treatment plan.  All questions were answered to best of my ability and the patient's satisfaction. Ms. Quintana was advised to contact the clinic with any questions that may arise after the clinic visit.      Thank you for involving me in the care of the patient    Return to clinic: 12 months, sooner if needed      HPI   Zena Quintana is a 52 year old female with medical history significant for iron deficiency anemia, hyperlipidemia, xerostomia, dry eyes, and rheumatoid arthritis who presents for follow-up of rheumatoid arthritis.    Today, Ms. Quintana reports doing well. Morning stiffness for no more than several minutes. Joints are doing well; left shoulder better after operation.  Right shoulder still and issue. In general, activity improves her stiffness and pain. Some achiness with weather changes. Dry mouth; didn't start pilocarpine because she had so many other things going on.  Also considering a rinse recommended by her dentist.     Denies fevers, chills, nausea, vomiting, constipation, diarrhea. No abdominal pain. No chest pain/pressure,  palpitations, or shortness of breath. No neck pain. No rash. No LE swelling.  No photosensitivity or photophobia.  No sicca symptoms.  No eye pain or redness. No oral sores.  She has not had a sinus infection for several years now.    Tobacco: None  EtOH: Occasional  Drugs: None  Occupation: Works at a school    ROS   GEN: No fevers, chills, night sweats, or weight change  SKIN: No itching, rashes, sores  HEENT: No epistaxis. No oral or nasal ulcers.  CV: No chest pain, pressure, palpitations, or dyspnea on exertion.  PULM: No SOB, wheeze, cough.  GI:  No nausea, vomiting, constipation, diarrhea. No blood in stool. No abdominal pain.  : No blood in urine.  MSK: See HPI.  NEURO: No numbness, tingling, or weakness.  ENDO: No heat/cold intolerance.  EXT: No LE swelling    Active Problem List     Patient Active Problem List   Diagnosis     Internal derangement of knee     Iron deficiency anemia     Allergic rhinitis     Stomatitis and mucositis (ulcerative)     Pure hypercholesterolemia     Arthropathy     HYPERLIPIDEMIA LDL GOAL <160     High risk medication use     RA (rheumatoid arthritis) (H)     Endometriosis     Ovarian cyst     Disturbance of salivary secretion (XEROSTOMIA)     Impingement syndrome of right shoulder     Sjogren's syndrome (H)     Palpitations     Past Medical History     Past Medical History:   Diagnosis Date     Allergic rhinitis, cause unspecified     Allergic rhinitis     Endometriosis, site unspecified     Endometriosis     Female infertility of other specified origin      Iron deficiency anemia, unspecified     Anemia, iron def.     RA (rheumatoid arthritis) (H)      Stomatitis and mucositis (ulcerative)     chronic - non-specific     Past Surgical History     Past Surgical History:   Procedure Laterality Date     C NONSPECIFIC PROCEDURE  1995, 1997    laparoscopy for endometrial fulguration     COLONOSCOPY WITH CO2 INSUFFLATION N/A 8/7/2017    Procedure: COLONOSCOPY WITH CO2 INSUFFLATION;   Colonoscopy, Screen for colon cancer.  BMI  22.17  [facility] [place] [phone];  Surgeon: Berny Pedro MD;  Location: [facility] OR     HC COLONOSCOPY W/WO BRUSH/WASH  5/25/2005     HC CREATE EARDRUM OPENING,GEN ANESTH      P.E. Tubes     HC KNEE SCOPE, DIAGNOSTIC       left knee, ruptured ACL     HC REMOVE TONSILS/ADENOIDS,12+ Y/O      T & A 12+y.o.     ORTHOPEDIC SURGERY      rotator cuff repair, left     Allergy     Allergies   Allergen Reactions     Penicillins Hives     hives     Current Medication List     Current Outpatient Prescriptions   Medication Sig     ibuprofen (ADVIL) 200 MG capsule Take 200 mg by mouth as needed for fever     loratadine 10 MG capsule Take 10 mg by mouth as needed for allergies     hydroxychloroquine (PLAQUENIL) 200 MG tablet Take 2 tablets (400 mg) by mouth daily     bimatoprost (LATISSE) 0.03 % SOLN Externally apply topically At Bedtime     acetaminophen (TYLENOL) 500 MG tablet Take 1-2 tablets by mouth every 6 hours as needed for pain.     ASPIRIN 81 MG OR TABS 1 TABLET DAILY     MULTIPLE VITAMINS/WOMENS OR None Entered     OMEGA-3 FATTY ACIDS 600 MG OR CAPS Reported on 3/15/2017     pilocarpine (SALAGEN) 5 MG tablet Take 1 tablet (5 mg) by mouth 3 times daily (Patient not taking: Reported on 9/13/2017)     No current facility-administered medications for this visit.          Social History   See HPI    Family History     Family History   Problem Relation Age of Onset     OSTEOPOROSIS Mother      Arthritis Mother      lupus     Connective Tissue Disorder Mother      Lupus     Cataracts Mother      Macular Degeneration Mother      Celiac Disease Mother      HEART DISEASE Father      aortic aneurysm     GASTROINTESTINAL DISEASE Father      abdominal anurysm     Hypertension Father      Cataracts Father      GASTROINTESTINAL DISEASE Sister      IBS, colon problems     Alzheimer Disease Maternal Grandmother      OSTEOPOROSIS Maternal Grandmother      CEREBROVASCULAR DISEASE Maternal  "Grandfather      CANCER Paternal Grandmother      CEREBROVASCULAR DISEASE Paternal Grandfather      Circulatory Sister      carotid stenosis     Cancer - colorectal Maternal Uncle      Cancer - colorectal Maternal Uncle      Mother: Lupus    Physical Exam     Temp Readings from Last 3 Encounters:   09/13/17 97.2  F (36.2  C) (Oral)   08/07/17 98.4  F (36.9  C) (Temporal)   06/20/17 98.3  F (36.8  C) (Oral)     BP Readings from Last 5 Encounters:   09/13/17 103/68   08/17/17 100/80   08/07/17 103/58   06/20/17 100/60   03/15/17 105/63     Pulse Readings from Last 1 Encounters:   09/13/17 64     Resp Readings from Last 1 Encounters:   08/07/17 16     Estimated body mass index is 22.33 kg/(m^2) as calculated from the following:    Height as of 8/17/17: 1.651 m (5' 5\").    Weight as of this encounter: 60.9 kg (134 lb 3.2 oz).    GEN: NAD  HEENT: Dry mucous membranes. No oral lesions.  Anicteric, noninjected sclera. Eyes appear to have good moisturizer by gross examination.  CV: S1, S2. RRR. No m/r/g.  PULM: CTA bilaterally. No w/c.  MSK:  Hands, wrists, and elbows without synovial swelling, increased warmth, tenderness to palpation, or overlying erythema.  Negative MCP squeeze.  Normal  strength. Right shoulder with limited flexion and extension and minimal-to-no abduction; left shoulder with full range of motion; no swelling of either shoulder.  Knees and feet without swelling, increased warmth, tenderness to palpation, or overlying erythema. Negative MTP squeeze.  Right ankle mildly tender to palpation but no swelling.  Left ankle without swelling or tenderness to palpation.   NEURO: UE and LE strengths 5/5 and equal bilaterally.   SKIN: No rash  EXT: No LE edema  PSYCH: Alert. Appropriate.    Labs / Imaging (select studies)   RF/CCP  Recent Labs   Lab Test  08/29/13   1413   CCPABY  <20 Interpretation:  Negative     CBC  Recent Labs   Lab Test  09/07/17   1322  06/20/17   1437  03/06/17   1300   WBC  6.7  8.4  " 7.5   RBC  4.07  3.95  3.97   HGB  12.5  12.3  12.3   HCT  38.3  37.7  37.3   MCV  94  95  94   RDW  12.2  12.5  12.1   PLT  305  324  307   MCH  30.7  31.1  31.0   MCHC  32.6  32.6  33.0   NEUTROPHIL  65.2  66.8  67.6   LYMPH  20.9  19.7  21.1   MONOCYTE  11.7  11.8  9.8   EOSINOPHIL  1.6  1.2  1.2   BASOPHIL  0.6  0.5  0.3   ANEU  4.4  5.6  5.1   ALYM  1.4  1.7  1.6   ELIGIO  0.8  1.0  0.7   AEOS  0.1  0.1  0.1   ABAS  0.0  0.0  0.0     CMP  Recent Labs   Lab Test  09/07/17   1322  06/20/17   1437  03/06/17   1300  05/24/16   0940   04/07/15   1322   NA   --   141   --   137   --   139   POTASSIUM   --   3.8   --   4.4   --   3.9   CHLORIDE   --   105   --   105   --   104   CO2   --   29   --   29   --   29   ANIONGAP   --   7   --   3   --   6   GLC   --   84   --   85   --   88   BUN   --   22   --   18   --   16   CR  0.99  0.84  0.90  0.92   --   0.96   GFRESTIMATED  58*  71  65  64   --   61   GFRESTBLACK  71  85  79  77   --   74   VALERIA   --   9.1   --   9.2   --   9.1   BILITOTAL  0.3  0.2  0.3  0.4   < >  0.3   ALBUMIN  4.3  4.0  4.3  4.4   < >  3.9   PROTTOTAL  7.7  6.8  7.3  7.3   < >  7.3   ALKPHOS  42  40  37*  42   < >  34*   AST  25  21  20  30   < >  34   ALT  25  21  23  24   < >  29    < > = values in this interval not displayed.     Calcium/VitaminD  Recent Labs   Lab Test  06/20/17   1437  05/24/16   0940  07/13/15   1036  04/07/15   1322   VALERIA  9.1  9.2   --   9.1   VITDT   --    --   48   --      ESR/CRP  Recent Labs   Lab Test  09/07/17   1322  03/06/17   1300  05/24/16   0940   SED  9  8  6   CRP  <2.9  <2.9  <2.9     Lipid Panel  Recent Labs   Lab Test  07/13/15   1036  12/03/09   0946   CHOL  181  213*   TRIG  132  64   HDL  74  81   LDL  81  119   VLDL  26  13   CHOLHDLRATIO  2.4  3.0     Hepatitis C  Recent Labs   Lab Test  06/20/17   1437   HCVAB  Nonreactive   Assay performance characteristics have not been established for newborns,   infants, and children       Immunization History      Immunization History   Administered Date(s) Administered     Influenza (H1N1) 12/03/2009     Influenza (IIV3) 11/17/2005, 11/17/2006, 10/25/2010, 01/09/2013     Influenza Vaccine IM 3yrs+ 4 Valent IIV4 10/10/2013, 09/14/2016     TD (ADULT, 7+) 02/01/2005     TDAP Vaccine (Adacel) 01/09/2013          Chart documentation done in part with Dragon Voice recognition Software. Although reviewed after completion, some word and grammatical error may remain.    Rome Hardy MD

## 2017-09-13 NOTE — MR AVS SNAPSHOT
After Visit Summary   2017    Zena Quintana    MRN: [id number]           Patient Information     Date Of Birth          1965        Visit Information        Provider Department      2017 1:20 PM Rome Hardy MD [facility]        Today's Diagnoses     Rheumatoid arthritis involving multiple sites with positive rheumatoid factor (H)    -  1    High risk medications (not anticoagulants) long-term use        Sjogren's syndrome with keratoconjunctivitis sicca (H)          Care Instructions    Dr. Hardy s Rheumatology Clinics  Locations Clinic Hours Telephone Number   [place] [facility]  [address]  [place] [address]     Wednesday: 7:20AM - 4:00PM  Thursday:     7:20AM - 4:00PM     Friday:          7:20AM - 11:00AM       To schedule an appointment with  Dr. Hardy,  please contact  Specialty [phone]       [place] [facility]  [address]  [place] [address]       Monday:       7:20AM - 4:00PM      [facility]  [address] [place] [address]       Tuesday:      7:20AM - 4:00PM          Thank you!    [place]/ [facility]            Follow-ups after your visit        Follow-up notes from your care team     Return in about 1 year (around 2018) for f/u RA.      Who to contact     If you have questions or need follow up information about today's clinic visit or your schedule please contact [facility] directly at [phone].  Normal or non-critical lab and imaging results will be communicated to you by MyChart, letter or phone within 4 business days after the clinic has received the results. If you do not hear from us within 7 days, please contact the clinic through [employer]karth[employer] or phone. If you have a critical or abnormal lab result, we will notify you by phone as soon as possible.  Submit refill requests through [employer] or call your pharmacy and they will forward the refill request to us. Please allow 3  business days for your refill to be completed.          Additional Information About Your Visit        [email or web address] Information     [employer] gives you secure access to your electronic health record. If you see a primary care provider, you can also send messages to your care team and make appointments. If you have questions, please call your primary care clinic.  If you do not have a primary care provider, please call [phone] and they will assist you.        Care EveryWhere ID     This is your Care EveryWhere ID. This could be used by other organizations to access your [place] medical records  [id number]        Your Vitals Were     Pulse Temperature Pulse Oximetry BMI (Body Mass Index)          64 97.2  F (36.2  C) (Oral) 100% 22.33 kg/m2         Blood Pressure from Last 3 Encounters:   09/13/17 103/68   08/17/17 100/80   08/07/17 103/58    Weight from Last 3 Encounters:   09/13/17 60.9 kg (134 lb 3.2 oz)   08/17/17 60.7 kg (133 lb 14.4 oz)   06/20/17 61.2 kg (135 lb)              Today, you had the following     No orders found for display         Today's Medication Changes          These changes are accurate as of: 9/13/17  1:56 PM.  If you have any questions, ask your nurse or doctor.               These medicines have changed or have updated prescriptions.        Dose/Directions    hydroxychloroquine 200 MG tablet   Commonly known as:  PLAQUENIL   This may have changed:    - how much to take  - how to take this  - when to take this  - additional instructions   Used for:  Rheumatoid arthritis involving multiple sites with positive rheumatoid factor (H)   Changed by:  Rome Hardy MD        Hydroxychloroquine 200mg daily; and an additional 200mg every other day.   Quantity:  135 tablet   Refills:  3            Where to get your medicines      These medications were sent to [id number] - [place] - [address]  [address] [place] [address]    Hours:  test fax successful  4/5/04  Phone:  [phone]     hydroxychloroquine 200 MG tablet                Primary Care Provider Office Phone # Fax #    Chana Armenta [phone]       [address] [place] [address] [address]  [facility] [address]        Equal Access to Services     CARLOS JOHANSEN : Hadii sarah ku hadasho Soomaali, waaxda luqadaha, qaybta kaalmada aderafatbret, ang guzmanmuralimario haider. So [facility] [phone].    ATENCIÓN: Si habla español, tiene a sesay disposición servicios gratuitos de asistencia lingüística. [facility] [phone].    We comply with applicable federal civil rights laws and [place] laws. We do not discriminate on the basis of race, color, national origin, age, disability sex, sexual orientation or gender identity.            Thank you!     Thank you for choosing [facility]  for your care. Our goal is always to provide you with excellent care. Hearing back from our patients is one way we can continue to improve our services. Please take a few minutes to complete the written survey that you may receive in the mail after your visit with us. Thank you!             Your Updated Medication List - Protect others around you: Learn how to safely use, store and throw away your medicines at www.disposemymeds.org.          This list is accurate as of: 9/13/17  1:56 PM.  Always use your most recent med list.                   Brand Name Dispense Instructions for use Diagnosis    acetaminophen 500 MG tablet    TYLENOL     Take 1-2 tablets by mouth every 6 hours as needed for pain.        ADVIL 200 MG capsule   Generic drug:  ibuprofen      Take 200 mg by mouth as needed for fever        aspirin 81 MG tablet      1 TABLET DAILY        bimatoprost 0.03 % Soln    latisse    1 Bottle    Externally apply topically At Bedtime    High risk medication use       hydroxychloroquine 200 MG tablet    PLAQUENIL    135 tablet    Hydroxychloroquine 200mg daily; and an additional 200mg every other day.     Rheumatoid arthritis involving multiple sites with positive rheumatoid factor (H)       loratadine 10 MG capsule      Take 10 mg by mouth as needed for allergies        MULTIPLE VITAMINS/WOMENS PO      None Entered        Omega-3 Fatty Acids 600 MG Caps     30    Reported on 3/15/2017    Stomatitis and mucositis (ulcerative)       pilocarpine 5 MG tablet    SALAGEN    90 tablet    Take 1 tablet (5 mg) by mouth 3 times daily    Sjogren's syndrome (H)

## 2017-10-08 ENCOUNTER — MYC MEDICAL ADVICE (OUTPATIENT)
Dept: RHEUMATOLOGY | Facility: CLINIC | Age: 52
End: 2017-10-08

## 2017-10-09 ENCOUNTER — MYC MEDICAL ADVICE (OUTPATIENT)
Dept: RHEUMATOLOGY | Facility: CLINIC | Age: 52
End: 2017-10-09

## 2018-01-02 ENCOUNTER — OFFICE VISIT (OUTPATIENT)
Dept: URGENT CARE | Facility: RETAIL CLINIC | Age: 53
End: 2018-01-02
Payer: COMMERCIAL

## 2018-01-02 VITALS
SYSTOLIC BLOOD PRESSURE: 99 MMHG | DIASTOLIC BLOOD PRESSURE: 65 MMHG | HEART RATE: 67 BPM | OXYGEN SATURATION: 100 % | TEMPERATURE: 97.2 F

## 2018-01-02 DIAGNOSIS — J20.9 ACUTE BRONCHITIS WITH COEXISTING CONDITION REQUIRING PROPHYLACTIC TREATMENT: Primary | ICD-10-CM

## 2018-01-02 PROCEDURE — 99213 OFFICE O/P EST LOW 20 MIN: CPT | Performed by: PHYSICIAN ASSISTANT

## 2018-01-02 RX ORDER — DOXYCYCLINE HYCLATE 100 MG
100 TABLET ORAL 2 TIMES DAILY
Qty: 14 TABLET | Refills: 0 | Status: SHIPPED | OUTPATIENT
Start: 2018-01-02 | End: 2018-01-09

## 2018-01-02 RX ORDER — CODEINE PHOSPHATE AND GUAIFENESIN 10; 100 MG/5ML; MG/5ML
1-2 SOLUTION ORAL EVERY 4 HOURS PRN
Qty: 120 ML | Refills: 0 | Status: SHIPPED | OUTPATIENT
Start: 2018-01-02 | End: 2018-07-26

## 2018-01-02 NOTE — NURSING NOTE
"Chief Complaint   Patient presents with     Cough     x 3 days, had cough 3 weeks ago and went away now it came back, low grade fever last night       Initial BP 99/65 (BP Location: Left arm)  Pulse 67  Temp 97.2  F (36.2  C) (Temporal)  SpO2 100% Estimated body mass index is 22.33 kg/(m^2) as calculated from the following:    Height as of 8/17/17: 5' 5\" (1.651 m).    Weight as of 9/13/17: 134 lb 3.2 oz (60.9 kg).  Medication Reconciliation: complete    "

## 2018-01-02 NOTE — PROGRESS NOTES
Chief Complaint   Patient presents with     Cough     x 3 days, had cough 3 weeks ago and went away now it came back, low grade fever last night     SUBJECTIVE:  Zena Quintana is a 52 year old female who presents to the clinic today with a chief complaint of cough  for 3 days.  Her cough is described as persistent, productive and feeling deep in her chest.  The patient's symptoms are moderate and worsening.  Associated symptoms include fever last night.  The patient's symptoms are exacerbated by no particular triggers.  Patient has been using OTC cough suppressants to improve symptoms.  Predisposing factors include: RA, taking Plaquenil.    Past Medical History:   Diagnosis Date     Allergic rhinitis, cause unspecified     Allergic rhinitis     Endometriosis, site unspecified     Endometriosis     Female infertility of other specified origin      Iron deficiency anemia, unspecified     Anemia, iron def.     RA (rheumatoid arthritis) (H)      Stomatitis and mucositis (ulcerative)     chronic - non-specific     Current Outpatient Prescriptions   Medication Sig Dispense Refill     doxycycline (VIBRA-TABS) 100 MG tablet Take 1 tablet (100 mg) by mouth 2 times daily for 7 days 14 tablet 0     guaiFENesin-codeine (ROBITUSSIN AC) 100-10 MG/5ML SOLN solution Take 5-10 mLs by mouth every 4 hours as needed for cough 120 mL 0     hydroxychloroquine (PLAQUENIL) 200 MG tablet Hydroxychloroquine 200mg daily; and an additional 200mg every other day. 135 tablet 3     ibuprofen (ADVIL) 200 MG capsule Take 200 mg by mouth as needed for fever       bimatoprost (LATISSE) 0.03 % SOLN Externally apply topically At Bedtime 1 Bottle 3     ASPIRIN 81 MG OR TABS 1 TABLET DAILY       MULTIPLE VITAMINS/WOMENS OR None Entered       loratadine 10 MG capsule Take 10 mg by mouth as needed for allergies       pilocarpine (SALAGEN) 5 MG tablet Take 1 tablet (5 mg) by mouth 3 times daily (Patient not taking: Reported on 9/13/2017) 90 tablet 5      acetaminophen (TYLENOL) 500 MG tablet Take 1-2 tablets by mouth every 6 hours as needed for pain. (Patient not taking: Reported on 1/2/2018)       OMEGA-3 FATTY ACIDS 600 MG OR CAPS Reported on 3/15/2017 30 0     Social History   Substance Use Topics     Smoking status: Never Smoker     Smokeless tobacco: Never Used      Comment: no smokers in household     Alcohol use 1.0 oz/week      Comment: has a couple drinks now and again     Allergies   Allergen Reactions     Penicillins Hives     hives     ROS  Review of systems negative except as stated above.    OBJECTIVE:  BP 99/65 (BP Location: Left arm)  Pulse 67  Temp 97.2  F (36.2  C) (Temporal)  SpO2 100%  GENERAL APPEARANCE: healthy, alert and in no distress  HEENT: PERRL, conjunctiva clear. Bilateral ear canals and TM's normal. Nose without erythematous or edematous turbinates. Posterior pharynx nonerythematous and without tonsillar hypertrophy or exudate.  NECK: supple, nontender, no lymphadenopathy  RESP: lungs clear to auscultation - no rales, rhonchi or wheezes. Breathing is comfortable, not labored and without use of accessory muscles.  CV: regular rates and rhythm, normal S1 S2, no murmur noted    ASSESSMENT:    ICD-10-CM    1. Acute bronchitis with coexisting condition requiring prophylactic treatment J20.9 doxycycline (VIBRA-TABS) 100 MG tablet     guaiFENesin-codeine (ROBITUSSIN AC) 100-10 MG/5ML SOLN solution     PLAN:   Patient Instructions   Doxycycline twice daily for 7 days.  Robitussin with codeine as needed for cough before sleep, up to every 4-6 hours. DO NOT take before driving a vehicle.  Rest! Your body needs more rest to heal.  Drink plenty of fluids (warm fluids like tea or soup are soothing and reduce cough)  Sit in the bathroom with a hot shower running and breathe in the steam.  Honey may soothe your sore throat and help manage your cough- may take straight or in warm water with lemon juice.  Avoid smoke from cigarettes, fireplace,  bonfire etc.  Take Tylenol or an NSAID such as ibuprofen or naproxen as needed for pain.  Delsym (dextromethorphan) is a 12 hour over the counter cough medication   Mucinex or Robitussin (guiafenesin) thin mucus and may help it to loosen more quickly  Good handwashing is the best way to prevent spread of germs  Present to emergency room if you develop trouble breathing, swallowing or cough-up blood.  Follow up with your primary care provider if symptoms worsen or fail to improve as expected.    Follow up with primary care provider with any problems, questions or concerns or if symptoms worsen or fail to improve. Patient agreed to plan and verbalized understanding.    Chanell Jackson PA-C  [facility]

## 2018-01-02 NOTE — MR AVS SNAPSHOT
After Visit Summary   1/2/2018    Zena Quintana    MRN: [id number]           Patient Information     Date Of Birth          1965        Visit Information        Provider Department      1/2/2018 1:40 PM Rosy Jackson PA-C [facility]        Today's Diagnoses     Acute bronchitis with coexisting condition requiring prophylactic treatment    -  1      Care Instructions    Doxycycline twice daily for 7 days.  Rest! Your body needs more rest to heal.  Drink plenty of fluids (warm fluids like tea or soup are soothing and reduce cough)  Sit in the bathroom with a hot shower running and breathe in the steam.  Honey may soothe your sore throat and help manage your cough- may take straight or in warm water with lemon juice.  Avoid smoke from cigarettes, fireplace, bonfire etc.  Take Tylenol or an NSAID such as ibuprofen or naproxen as needed for pain.  Delsym (dextromethorphan) is a 12 hour over the counter cough medication   Mucinex or Robitussin (guiafenesin) thin mucus and may help it to loosen more quickly  Good handwashing is the best way to prevent spread of germs  Present to emergency room if you develop trouble breathing, swallowing or cough-up blood.  Follow up with your primary care provider if symptoms worsen or fail to improve as expected.          Follow-ups after your visit        Your next 10 appointments already scheduled     Sep 12, 2018  1:40 PM CDT   [facility]    [address] [place] [address] [phone]              Who to contact     You can reach your care team any time of the day by calling [phone].  Notification of test results:  If you have an abnormal lab result, we will notify you by phone as soon as possible.         Additional Information About Your Visit        MyChart Information     Jewel Tonedt gives you secure access to your electronic health record.  If you see a primary care provider, you can also send messages to your care team and make appointments. If you have questions, please call your primary care clinic.  If you do not have a primary care provider, please call [phone] and they will assist you.        Care EveryWhere ID     This is your Care EveryWhere ID. This could be used by other organizations to access your [facility] medical records  [id number]        Your Vitals Were     Pulse Temperature Pulse Oximetry             67 97.2  F (36.2  C) (Temporal) 100%          Blood Pressure from Last 3 Encounters:   01/02/18 99/65   09/13/17 103/68   08/17/17 100/80    Weight from Last 3 Encounters:   09/13/17 134 lb 3.2 oz (60.9 kg)   08/17/17 133 lb 14.4 oz (60.7 kg)   06/20/17 135 lb (61.2 kg)              Today, you had the following     No orders found for display         Today's Medication Changes          These changes are accurate as of: 1/2/18  1:58 PM.  If you have any questions, ask your nurse or doctor.               Start taking these medicines.        Dose/Directions    doxycycline 100 MG tablet   Commonly known as:  VIBRA-TABS   Used for:  Acute bronchitis with coexisting condition requiring prophylactic treatment        Dose:  100 mg   Take 1 tablet (100 mg) by mouth 2 times daily for 7 days   Quantity:  14 tablet   Refills:  0            Where to get your medicines      These medications were sent to [id number] - [place], [place] - [address] [address]  [address] [address] [address]     Phone:  [phone]     doxycycline 100 MG tablet                Primary Care Provider Office Phone # Fax #    Chana Armenta [phone]       [address] [facility] [address]  [facility] [address]        Equal Access to Services     [facility] HAILY AH: Alona Carmona, mk crum, ang garcia. So [facility] [phone].    ATENCIÓN: Si habla español, tiene a sesay disposición  servicios gratuitos de asistencia lingüística. Rosa Elena dixon [phone].    We comply with applicable federal civil rights laws and [place] laws. We do not discriminate on the basis of race, color, national origin, age, disability, sex, sexual orientation, or gender identity.            Thank you!     Thank you for choosing [facility]  for your care. Our goal is always to provide you with excellent care. Hearing back from our patients is one way we can continue to improve our services. Please take a few minutes to complete the written survey that you may receive in the mail after your visit with us. Thank you!             Your Updated Medication List - Protect others around you: Learn how to safely use, store and throw away your medicines at www.disposemymeds.org.          This list is accurate as of: 1/2/18  1:58 PM.  Always use your most recent med list.                   Brand Name Dispense Instructions for use Diagnosis    acetaminophen 500 MG tablet    TYLENOL     Take 1-2 tablets by mouth every 6 hours as needed for pain.        ADVIL 200 MG capsule   Generic drug:  ibuprofen      Take 200 mg by mouth as needed for fever        aspirin 81 MG tablet      1 TABLET DAILY        bimatoprost 0.03 % Soln    latisse    1 Bottle    Externally apply topically At Bedtime    High risk medication use       doxycycline 100 MG tablet    VIBRA-TABS    14 tablet    Take 1 tablet (100 mg) by mouth 2 times daily for 7 days    Acute bronchitis with coexisting condition requiring prophylactic treatment       hydroxychloroquine 200 MG tablet    PLAQUENIL    135 tablet    Hydroxychloroquine 200mg daily; and an additional 200mg every other day.    Rheumatoid arthritis involving multiple sites with positive rheumatoid factor (H)       loratadine 10 MG capsule      Take 10 mg by mouth as needed for allergies        MULTIPLE VITAMINS/WOMENS PO      None Entered        Omega-3 Fatty Acids 600 MG Caps     30     Reported on 3/15/2017    Stomatitis and mucositis (ulcerative)       pilocarpine 5 MG tablet    SALAGEN    90 tablet    Take 1 tablet (5 mg) by mouth 3 times daily    Sjogren's syndrome (H)

## 2018-01-04 ENCOUNTER — TELEPHONE (OUTPATIENT)
Dept: FAMILY MEDICINE | Facility: OTHER | Age: 53
End: 2018-01-04

## 2018-01-04 DIAGNOSIS — J20.9 ACUTE BRONCHITIS, UNSPECIFIED ORGANISM: Primary | ICD-10-CM

## 2018-01-04 RX ORDER — AZITHROMYCIN 250 MG/1
TABLET, FILM COATED ORAL
Qty: 6 TABLET | Refills: 0 | Status: SHIPPED | OUTPATIENT
Start: 2018-01-04 | End: 2018-07-26

## 2018-01-04 NOTE — TELEPHONE ENCOUNTER
RK-Patient seen at [facility] for bronchitis and sinus infection. Pt feels the nausea and vomiting is from doxycycline and would like a different medication.   [facility]. Patient can be reached at [phone]    Zena Quintana is a 52 year old female who calls with vomiting.    NURSING ASSESSMENT:  Description:  I spoke with patient who states she has had nausea since starting doxycycline 1/2/2018. Yesterday she started vomiting. Last dose was [id number] 1/3/2018  Onset/duration:  Yesterday  Precip. factors:  Started a new antibiotic  Pain scale (0-10)   0/10  Last exam/Treatment:  1/2/2018    Allergies:   Allergies   Allergen Reactions     Penicillins Hives     hives       RECOMMENDED DISPOSITION:  Route to provider to review  Will comply with recommendation: Will wait for callback  If further questions/concerns or if symptoms do not improve, worsen or new symptoms develop, call your PCP or [facility] as soon as possible.      Guideline used:  Telephone Triage Protocols for Nurses, Fifth Edition, Berenice Zaldivar RN

## 2018-01-04 NOTE — TELEPHONE ENCOUNTER
Reason for Call:  Other prescription    Detailed comments: pt stated she vomited several times last night. Should pt continue taking doxycycline?    Phone Number Patient can be reached at: Home number on file [phone] (home)    Best Time: anytime    Can we leave a detailed message on this number? YES    Call taken on 1/4/2018 at 9:21 AM by Tania Stout

## 2018-02-02 ENCOUNTER — TELEPHONE (OUTPATIENT)
Dept: OPHTHALMOLOGY | Facility: CLINIC | Age: 53
End: 2018-02-02

## 2018-02-06 NOTE — TELEPHONE ENCOUNTER
Central Prior Authorization Team   Phone: [phone]      PA Initiation    Medication: bimatoprost (LATISSE) 0.03 % SOLN  Insurance Company: [employer] - Phone [phone] Fax [phone]  Pharmacy Filling the Rx: [facility] [address] [place] [address] - [address] AT [facility] [address]  Filling Pharmacy Phone: [phone]  Filling Pharmacy Fax:    Start Date: 2/6/2018

## 2018-02-07 NOTE — TELEPHONE ENCOUNTER
Central Prior Authorization Team   Phone: [phone]    PRIOR AUTHORIZATION DENIED    Medication: bimatoprost (LATISSE) 0.03 % SOLN - DENIED    Denial Date: 2/6/2018    Denial Rational: DRUG EXCLUDED FROM PLAN          Appeal Information: NO CRITERIA TO REVIEW FOR APPEAL

## 2018-03-19 ENCOUNTER — OFFICE VISIT (OUTPATIENT)
Dept: OPHTHALMOLOGY | Facility: CLINIC | Age: 53
End: 2018-03-19
Payer: COMMERCIAL

## 2018-03-19 DIAGNOSIS — H52.13 MYOPIA WITH PRESBYOPIA OF BOTH EYES: ICD-10-CM

## 2018-03-19 DIAGNOSIS — H52.4 MYOPIA WITH PRESBYOPIA OF BOTH EYES: ICD-10-CM

## 2018-03-19 DIAGNOSIS — Z79.899 HIGH RISK MEDICATION USE: Primary | ICD-10-CM

## 2018-03-19 PROCEDURE — 92083 EXTENDED VISUAL FIELD XM: CPT | Performed by: OPHTHALMOLOGY

## 2018-03-19 PROCEDURE — 92134 CPTRZ OPH DX IMG PST SGM RTA: CPT | Performed by: OPHTHALMOLOGY

## 2018-03-19 PROCEDURE — 92014 COMPRE OPH EXAM EST PT 1/>: CPT | Performed by: OPHTHALMOLOGY

## 2018-03-19 ASSESSMENT — TONOMETRY
IOP_METHOD: TONOPEN
OS_IOP_MMHG: 15
OD_IOP_MMHG: 15

## 2018-03-19 ASSESSMENT — EXTERNAL EXAM - LEFT EYE: OS_EXAM: NORMAL

## 2018-03-19 ASSESSMENT — SLIT LAMP EXAM - LIDS
COMMENTS: UPPER LID DERMATOCHALASIS
COMMENTS: UPPER LID DERMATOCHALASIS

## 2018-03-19 ASSESSMENT — VISUAL ACUITY
OS_CC: 20/15
METHOD_MR: DECLINED REFRACTION
OS_CC+: -1
CORRECTION_TYPE: CONTACTS
METHOD: SNELLEN - LINEAR
OD_CC: 20/20
OD_CC: 20/40
OS_CC: 20/40
OD_CC+: -1

## 2018-03-19 ASSESSMENT — REFRACTION_CURRENTRX
OD_ADD: +2.50
OS_ADD: +2.50
OS_SPHERE: -2.00
OD_SPHERE: -2.00

## 2018-03-19 ASSESSMENT — CUP TO DISC RATIO
OD_RATIO: 0.1
OS_RATIO: 0.1

## 2018-03-19 ASSESSMENT — EXTERNAL EXAM - RIGHT EYE: OD_EXAM: NORMAL

## 2018-03-19 NOTE — PROGRESS NOTES
Assessment & Plan   Zena Quintana is a 52 year old female who presents with:   Review of systems for the eyes was negative other than the pertinent positives and negatives noted in the HPI.  History is obtained from the patient.    High risk medication use  - No signs of toxicity, continue yearly observation  - SD-OCT Macula Optovue OU (both eyes)  - HVF 10-2 OU    Myopia with presbyopia of both eyes  - Declined refraction today  - Continue MF CTL's    Return in 1 year for annual DFE, OCT and color    Documentation for today's encounter was performed by Celestina Briggs COA. OSC. Acting as a scribe in my presence. I have reviewed and verified that it is an accurate recording of today's encounter.    Attending Physician Attestation:  Complete documentation of historical and exam elements from today's encounter can be found in the full encounter summary report (not reduplicated in this progress note).  I personally obtained the chief complaint(s) and history of present illness.  I confirmed and edited as necessary the review of systems, past medical/surgical history, family history, social history, and examination findings as documented by others; and I examined the patient myself.  I personally reviewed the relevant tests, images, and reports as documented above.  I formulated and edited as necessary the assessment and plan and discussed the findings and management plan with the patient and family. - Julito Garcia MD

## 2018-03-19 NOTE — MR AVS SNAPSHOT
After Visit Summary   3/19/2018    Zena Quintana    MRN: [id number]           Patient Information     Date Of Birth          1965        Visit Information        Provider Department      3/19/2018 1:15 PM Julito Garcia MD; MG OPHTH NURSE ONLY [facility]        Today's Diagnoses     High risk medication use    -  1    Myopia with presbyopia of both eyes           Follow-ups after your visit        Follow-up notes from your care team     Return in about 1 year (around 3/19/2019) for Annual Eye Exam, Plaquenil toxicity screening, OCT.      Your next 10 appointments already scheduled     Sep 12, 2018  1:40 PM CDT   Return Visit with Rome Hardy MD   [facility] ([facility])    [address] [address] [phone]              Who to contact     If you have questions or need follow up information about today's clinic visit or your schedule please contact [facility] directly at [phone].  Normal or non-critical lab and imaging results will be communicated to you by MyChart, letter or phone within 4 business days after the clinic has received the results. If you do not hear from us within 7 days, please contact the clinic through [employer] or phone. If you have a critical or abnormal lab result, we will notify you by phone as soon as possible.  Submit refill requests through [employer] or call your pharmacy and they will forward the refill request to us. Please allow 3 business days for your refill to be completed.          Additional Information About Your Visit        MyChart Information     [employer] gives you secure access to your electronic health record. If you see a primary care provider, you can also send messages to your care team and make appointments. If you have questions, please call your primary care clinic.  If you do not have a primary care provider, please call [phone] and they will  assist you.      [employer] is an electronic gateway that provides easy, online access to your medical records. With [employer], you can request a clinic appointment, read your test results, renew a prescription or communicate with your care team.     To access your existing account, please contact your [facility] or call [phone] for assistance.        Care EveryWhere ID     This is your Care EveryWhere ID. This could be used by other organizations to access your [facility] medical records  [id number]         Blood Pressure from Last 3 Encounters:   01/02/18 99/65   09/13/17 103/68   08/17/17 100/80    Weight from Last 3 Encounters:   09/13/17 60.9 kg (134 lb 3.2 oz)   08/17/17 60.7 kg (133 lb 14.4 oz)   06/20/17 61.2 kg (135 lb)              We Performed the Following     EYE EXAM, EST PATIENT,COMPREHESV     HVF 10-2 OU     SD-OCT Macula Optovue OU (both eyes)        Primary Care Provider Office Phone # Fax #    Chana Armenta [phone]       [address] [place] [address] [address]  [facility] [address]        Equal Access to Services     SRIKANTH JOHANSEN AH: Hadii aad ku hadasho Soomaali, waaxda luqadaha, qaybta kaalmada adeegyada, ang lemonsn debbie stewart ah. So [facility] [phone].    ATENCIÓN: Si habla español, tiene a sesay disposición servicios gratuitos de asistencia lingüística. Qingame al [phone].    We comply with applicable federal civil rights laws and [place] laws. We do not discriminate on the basis of race, color, national origin, age, disability, sex, sexual orientation, or gender identity.            Thank you!     Thank you for choosing [facility]  for your care. Our goal is always to provide you with excellent care. Hearing back from our patients is one way we can continue to improve our services. Please take a few minutes to complete the written survey that you may receive in the mail after your visit with us. Thank you!             Your  Updated Medication List - Protect others around you: Learn how to safely use, store and throw away your medicines at www.disposemymeds.org.          This list is accurate as of 3/19/18  2:29 PM.  Always use your most recent med list.                   Brand Name Dispense Instructions for use Diagnosis    acetaminophen 500 MG tablet    TYLENOL     Take 1-2 tablets by mouth every 6 hours as needed for pain.        ADVIL 200 MG capsule   Generic drug:  ibuprofen      Take 200 mg by mouth as needed for fever        aspirin 81 MG tablet      1 TABLET DAILY        azithromycin 250 MG tablet    ZITHROMAX    6 tablet    Two tablets first day, then one tablet daily for four days.    Acute bronchitis, unspecified organism       bimatoprost 0.03 % Soln    latisse    1 Bottle    Externally apply topically At Bedtime    High risk medication use       guaiFENesin-codeine 100-10 MG/5ML Soln solution    ROBITUSSIN AC    120 mL    Take 5-10 mLs by mouth every 4 hours as needed for cough    Acute bronchitis with coexisting condition requiring prophylactic treatment       hydroxychloroquine 200 MG tablet    PLAQUENIL    135 tablet    Hydroxychloroquine 200mg daily; and an additional 200mg every other day.    Rheumatoid arthritis involving multiple sites with positive rheumatoid factor (H)       loratadine 10 MG capsule      Take 10 mg by mouth as needed for allergies        MULTIPLE VITAMINS/WOMENS PO      None Entered        Omega-3 Fatty Acids 600 MG Caps     30    Reported on 3/15/2017    Stomatitis and mucositis (ulcerative)       pilocarpine 5 MG tablet    SALAGEN    90 tablet    Take 1 tablet (5 mg) by mouth 3 times daily    Sjogren's syndrome (H)

## 2018-03-19 NOTE — LETTER
3/19/2018       RE: Zena Quintana  [address] [employer] [address]     Dear Colleague,    Thank you for referring your patient, Zena Quintana, to the [facility]. Please see a copy of my visit note below.    Assessment & Plan   Zena Quintana is a 52 year old female who presents with:   Review of systems for the eyes was negative other than the pertinent positives and negatives noted in the HPI.  History is obtained from the patient.    High risk medication use  - No signs of toxicity, continue yearly observation  - SD-OCT Macula Optovue OU (both eyes)  - HVF 10-2 OU    Myopia with presbyopia of both eyes  - Declined refraction today  - Continue MF CTL's    Return in 1 year for annual DFE, OCT and color    Documentation for today's encounter was performed by Celesitna Briggs COA. OSC. Acting as a scribe in my presence. I have reviewed and verified that it is an accurate recording of today's encounter.    Attending Physician Attestation:  Complete documentation of historical and exam elements from today's encounter can be found in the full encounter summary report (not reduplicated in this progress note).  I personally obtained the chief complaint(s) and history of present illness.  I confirmed and edited as necessary the review of systems, past medical/surgical history, family history, social history, and examination findings as documented by others; and I examined the patient myself.  I personally reviewed the relevant tests, images, and reports as documented above.  I formulated and edited as necessary the assessment and plan and discussed the findings and management plan with the patient and family. - Julito Garcia MD      Again, thank you for allowing me to participate in the care of your patient.      Sincerely,    Julito Garcia MD

## 2018-03-19 NOTE — NURSING NOTE
Patient presents with:  COMPREHENSIVE EYE EXAM: Pt takes Plaqunil; declined refraction today       Referring Provider:  Julito Garcia MD  [facility]  [address] [place] [address]    HPI    Last Eye Exam:  2/6/17   Affected eye(s):  Both   Symptoms:     Dryness         Do you have eye pain now?:  No      Comments:  No issues or concerns today - Yearly health check with plaquenil use.  Declined refraction today.  Pt uses Latisse for lash growth - last used yesterday

## 2018-07-26 ENCOUNTER — OFFICE VISIT (OUTPATIENT)
Dept: URGENT CARE | Facility: RETAIL CLINIC | Age: 53
End: 2018-07-26
Payer: COMMERCIAL

## 2018-07-26 VITALS
TEMPERATURE: 97.7 F | SYSTOLIC BLOOD PRESSURE: 111 MMHG | DIASTOLIC BLOOD PRESSURE: 74 MMHG | HEART RATE: 60 BPM | OXYGEN SATURATION: 100 %

## 2018-07-26 DIAGNOSIS — R31.0 GROSS HEMATURIA: Primary | ICD-10-CM

## 2018-07-26 DIAGNOSIS — R30.0 DYSURIA: ICD-10-CM

## 2018-07-26 LAB
APPEARANCE UR: CLEAR
BILIRUB UR QL: NORMAL
COLOR UR: NORMAL
GLUCOSE URINE: NORMAL MG/DL
HGB UR QL: NORMAL
KETONES UR QL: NORMAL MG/DL
LEUKOCYTE ESTERASE URINE: NORMAL
NITRITE UR QL STRIP: NORMAL
PH UR STRIP: 7 PH (ref 5–7)
PROTEIN ALBUMIN URINE: NORMAL MG/DL
SOURCE: NORMAL
SP GR UR STRIP: 1.01 (ref 1–1.03)
UROBILINOGEN UR QL STRIP: 0.2 EU/DL (ref 0.2–1)

## 2018-07-26 PROCEDURE — 87086 URINE CULTURE/COLONY COUNT: CPT | Performed by: PHYSICIAN ASSISTANT

## 2018-07-26 PROCEDURE — 99213 OFFICE O/P EST LOW 20 MIN: CPT | Performed by: PHYSICIAN ASSISTANT

## 2018-07-26 PROCEDURE — 81002 URINALYSIS NONAUTO W/O SCOPE: CPT | Mod: QW | Performed by: PHYSICIAN ASSISTANT

## 2018-07-26 ASSESSMENT — PAIN SCALES - GENERAL: PAINLEVEL: MILD PAIN (3)

## 2018-07-26 NOTE — PATIENT INSTRUCTIONS
No sign of infection today, antibiotic is not needed.  Urine culture pending, we will call you only if culture shows bacterial growth and an antibiotic is needed.  May use over the counter AZO pain relief or Uristat (phenazopyridine) for urinary burning but do not use for 24 hours before future urine tests.  Drink plenty of fluids. Limit caffeine and alcohol as these are bladder irritants.  May take tylenol or ibuprofen as needed for discomfort.   If you develop any vomiting, high fevers or lower back pain, these can be signs of a kidney infection and you should be seen in urgent care or in the ER.  Call primary care provider to make an appointment for a repeat urine test and to discuss symptoms.

## 2018-07-26 NOTE — PROGRESS NOTES
Chief Complaint   Patient presents with     Dysuria     since today     SUBJECTIVE:   Zena Quintana is a 53 year old female who  presents today for a possible UTI.   She has symptoms of dysuria, urgency and frequency that have been going on for maybe a few days but got quite a bit worse today. Hematuria noticed about 1.5 hours ago. She is uncertain whether the blood is coming from her urethra or vagina. Some lower back pain but more lumbago than flank pain. She has also had lower abdominal/pelvic discomfort.  Symptom timing described as gradual onset and mild in severity.    This patient does have a history of urinary tract infections. Last infection was about 1 year ago.  There is no history of fever, chills, nausea or vomiting.   Patient denies vaginal discharge, vaginal odor and vaginal itching     Past Medical History:   Diagnosis Date     Allergic rhinitis, cause unspecified     Allergic rhinitis     Endometriosis, site unspecified     Endometriosis     Female infertility of other specified origin      Iron deficiency anemia, unspecified     Anemia, iron def.     RA (rheumatoid arthritis) (H)      Stomatitis and mucositis (ulcerative)     chronic - non-specific     Current Outpatient Prescriptions   Medication Sig Dispense Refill     ASPIRIN 81 MG OR TABS 1 TABLET DAILY       bimatoprost (LATISSE) 0.03 % SOLN Externally apply topically At Bedtime 1 Bottle 3     hydroxychloroquine (PLAQUENIL) 200 MG tablet Hydroxychloroquine 200mg daily; and an additional 200mg every other day. 135 tablet 3     ibuprofen (ADVIL) 200 MG capsule Take 200 mg by mouth as needed for fever       loratadine 10 MG capsule Take 10 mg by mouth as needed for allergies       MULTIPLE VITAMINS/WOMENS OR None Entered       OMEGA-3 FATTY ACIDS 600 MG OR CAPS Reported on 3/15/2017 30 0     acetaminophen (TYLENOL) 500 MG tablet Take 1-2 tablets by mouth every 6 hours as needed for pain.       Social History   Substance Use Topics     Smoking  status: Never Smoker     Smokeless tobacco: Never Used      Comment: no smokers in household     Alcohol use 1.0 oz/week      Comment: has a couple drinks now and again     Allergies   Allergen Reactions     Penicillins Hives     hives     REVIEW OF SYSTEMS  General: NEGATIVE for fever.  : POSITIVE for negative, dysuria, frequency, urgency and hematuria. NEGATIVE for negative, nocturia, decreased urinary stream, incontinence and vaginal discharge.  Musculoskeletal: POSITIVE for lower back tenderness.     OBJECTIVE:  /74  Pulse 60  Temp 97.7  F (36.5  C) (Temporal)  SpO2 100%  GENERAL APPEARANCE: healthy, alert and no distress  RESP: lungs clear to auscultation - no rales, rhonchi or wheezes  CV: regular rates and rhythm, normal S1 S2, no murmur noted  ABDOMEN: soft, mild suprapubic tenderness  BACK: No CVA tenderness    UA:  Leukocytes: negative  Nitrites: negative  Blood: large    ASSESSMENT:    ICD-10-CM    1. Gross hematuria R31.0    2. Dysuria R30.0 Urine Culture Aerobic Bacterial     HCL U/A, W/O MICRO, NON AUTO     PLAN:   Patient Instructions   No sign of infection today, antibiotic is not needed.  Urine culture pending, we will call you only if culture shows bacterial growth and an antibiotic is needed.  May use over the counter AZO pain relief or Uristat (phenazopyridine) for urinary burning but do not use for 24 hours before future urine tests.  Drink plenty of fluids. Limit caffeine and alcohol as these are bladder irritants.  May take tylenol or ibuprofen as needed for discomfort.   If you develop any vomiting, high fevers or lower back pain, these can be signs of a kidney infection and you should be seen in urgent care or in the ER.  Call primary care provider to make an appointment for a repeat urine test and to discuss symptoms.    Follow up with primary care provider with any problems, questions or concerns or if symptoms worsen or fail to improve. Patient agreed to plan and verbalized  understanding.    Chanell Jackson PA-C  [facility]

## 2018-07-26 NOTE — MR AVS SNAPSHOT
After Visit Summary   7/26/2018    Zena Quintana    MRN: [id number]           Patient Information     Date Of Birth          1965        Visit Information        Provider Department      7/26/2018 6:10 PM [facility]        Today's Diagnoses     Gross hematuria    -  1    Dysuria          Care Instructions    No sign of infection today, antibiotic is not needed.  Urine culture pending, we will call you only if culture shows bacterial growth and an antibiotic is needed.  May use over the counter AZO pain relief or Uristat (phenazopyridine) for urinary burning but do not use for 24 hours before future urine tests.  Drink plenty of fluids. Limit caffeine and alcohol as these are bladder irritants.  May take tylenol or ibuprofen as needed for discomfort.   If you develop any vomiting, high fevers or lower back pain, these can be signs of a kidney infection and you should be seen in urgent care or in the ER.  Call primary care provider to make an appointment for a repeat urine test and to discuss symptoms.          Follow-ups after your visit        Your next 10 appointments already scheduled     Sep 12, 2018  1:40 PM CDT   [facility]    [address] [facility] [address] [phone]              Who to contact     You can reach your care team any time of the day by calling [phone].  Notification of test results:  If you have an abnormal lab result, we will notify you by phone as soon as possible.         Additional Information About Your Visit        MyChart Information     [employer] gives you secure access to your electronic health record. If you see a primary care provider, you can also send messages to your care team and make appointments. If you have questions, please call your primary care clinic.  If you do not have a primary care provider, please call  [phone] and they will assist you.        Care EveryWhere ID     This is your Care EveryWhere ID. This could be used by other organizations to access your [place] medical records  [id number]        Your Vitals Were     Pulse Temperature Pulse Oximetry             60 97.7  F (36.5  C) (Temporal) 100%          Blood Pressure from Last 3 Encounters:   07/26/18 111/74   01/02/18 99/65   09/13/17 103/68    Weight from Last 3 Encounters:   09/13/17 134 lb 3.2 oz (60.9 kg)   08/17/17 133 lb 14.4 oz (60.7 kg)   06/20/17 135 lb (61.2 kg)              We Performed the Following     HCL U/A, W/O MICRO, NON AUTO     Urine Culture Aerobic Bacterial        Primary Care Provider Office Phone # Fax #    Chana Armenta [phone]       [address]  [facility] [address]        Equal Access to Services     [facility]: Hadii aad ku hadasho Soomaali, waaxda luqadaha, qaybta kaalmada adeegyada, ang stewart . So [facility] [phone].    ATENCIÓN: Si habla español, tiene a sesay disposición servicios gratuitos de asistencia lingüística. Llame al [phone].    We comply with applicable federal civil rights laws and [place] laws. We do not discriminate on the basis of race, color, national origin, age, disability, sex, sexual orientation, or gender identity.            Thank you!     Thank you for choosing [facility]  for your care. Our goal is always to provide you with excellent care. Hearing back from our patients is one way we can continue to improve our services. Please take a few minutes to complete the written survey that you may receive in the mail after your visit with us. Thank you!             Your Updated Medication List - Protect others around you: Learn how to safely use, store and throw away your medicines at www.disposemymeds.org.          This list is accurate as of 7/26/18  6:46 PM.  Always use your most recent med list.                    Brand Name Dispense Instructions for use Diagnosis    acetaminophen 500 MG tablet    TYLENOL     Take 1-2 tablets by mouth every 6 hours as needed for pain.        ADVIL 200 MG capsule   Generic drug:  ibuprofen      Take 200 mg by mouth as needed for fever        aspirin 81 MG tablet      1 TABLET DAILY        bimatoprost 0.03 % Soln    latisse    1 Bottle    Externally apply topically At Bedtime    High risk medication use       hydroxychloroquine 200 MG tablet    PLAQUENIL    135 tablet    Hydroxychloroquine 200mg daily; and an additional 200mg every other day.    Rheumatoid arthritis involving multiple sites with positive rheumatoid factor (H)       loratadine 10 MG capsule      Take 10 mg by mouth as needed for allergies        MULTIPLE VITAMINS/WOMENS PO      None Entered        Omega-3 Fatty Acids 600 MG Caps     30    Reported on 3/15/2017    Stomatitis and mucositis (ulcerative)

## 2018-07-27 ENCOUNTER — TELEPHONE (OUTPATIENT)
Dept: FAMILY MEDICINE | Facility: OTHER | Age: 53
End: 2018-07-27

## 2018-07-27 ENCOUNTER — OFFICE VISIT (OUTPATIENT)
Dept: FAMILY MEDICINE | Facility: CLINIC | Age: 53
End: 2018-07-27
Payer: COMMERCIAL

## 2018-07-27 VITALS
RESPIRATION RATE: 16 BRPM | WEIGHT: 135.9 LBS | HEART RATE: 84 BPM | TEMPERATURE: 99.6 F | SYSTOLIC BLOOD PRESSURE: 112 MMHG | BODY MASS INDEX: 21.84 KG/M2 | HEIGHT: 66 IN | DIASTOLIC BLOOD PRESSURE: 72 MMHG | OXYGEN SATURATION: 99 %

## 2018-07-27 DIAGNOSIS — R31.0 GROSS HEMATURIA: Primary | ICD-10-CM

## 2018-07-27 DIAGNOSIS — R30.0 DYSURIA: ICD-10-CM

## 2018-07-27 LAB
ALBUMIN UR-MCNC: ABNORMAL MG/DL
APPEARANCE UR: ABNORMAL
BACTERIA #/AREA URNS HPF: ABNORMAL /HPF
BETA HCG QUAL IFA URINE: NEGATIVE
BILIRUB UR QL STRIP: NEGATIVE
COLOR UR AUTO: YELLOW
GLUCOSE UR STRIP-MCNC: NEGATIVE MG/DL
HGB UR QL STRIP: ABNORMAL
KETONES UR STRIP-MCNC: NEGATIVE MG/DL
LEUKOCYTE ESTERASE UR QL STRIP: NEGATIVE
NITRATE UR QL: POSITIVE
NON-SQ EPI CELLS #/AREA URNS LPF: ABNORMAL /LPF
PH UR STRIP: 6 PH (ref 5–7)
RBC #/AREA URNS AUTO: >100 /HPF
SOURCE: ABNORMAL
SP GR UR STRIP: 1.01 (ref 1–1.03)
UROBILINOGEN UR STRIP-ACNC: 1 EU/DL (ref 0.2–1)
WBC #/AREA URNS AUTO: ABNORMAL /HPF

## 2018-07-27 PROCEDURE — 81001 URINALYSIS AUTO W/SCOPE: CPT | Performed by: PHYSICIAN ASSISTANT

## 2018-07-27 PROCEDURE — 84703 CHORIONIC GONADOTROPIN ASSAY: CPT | Performed by: PHYSICIAN ASSISTANT

## 2018-07-27 PROCEDURE — 99214 OFFICE O/P EST MOD 30 MIN: CPT | Performed by: PHYSICIAN ASSISTANT

## 2018-07-27 PROCEDURE — 87086 URINE CULTURE/COLONY COUNT: CPT | Performed by: PHYSICIAN ASSISTANT

## 2018-07-27 RX ORDER — CIPROFLOXACIN 500 MG/1
500 TABLET, FILM COATED ORAL 2 TIMES DAILY
Qty: 10 TABLET | Refills: 0 | Status: SHIPPED | OUTPATIENT
Start: 2018-07-27 | End: 2018-09-22

## 2018-07-27 ASSESSMENT — PAIN SCALES - GENERAL: PAINLEVEL: MILD PAIN (2)

## 2018-07-27 NOTE — TELEPHONE ENCOUNTER
Reason for Call:  Same Day Appointment, Requested Provider:  Josie Armenta MD     PCP: Chana Armenta    Reason for visit: blood in urine    Duration of symptoms: 1 day    Have you been treated for this in the past? Yes    Additional comments: [facility] and [facility] told her she didn't have a UTI but she should be seen to see why she has blood in her urine. Patient would like to be seen today.    Can we leave a detailed message on this number? YES    Phone number patient can be reached at: Home number on file [phone] (home) or Cell number on file:    Telephone Information:   Mobile [phone]       Best Time: anytime    Call taken on 7/27/2018 at 7:25 AM by Maria Guadalupe Seaman

## 2018-07-27 NOTE — PATIENT INSTRUCTIONS
Urine pregnancy test negative    The urine today showed large amount of blood (greater than 100 cells per microscopic field) and bacteria on the quick test.  Sending for culture  If the culture is negative - then need to plan work up with urology for the blood in the urine- CT scan and cystoscopy of bladder (we discussed)    If urine is POSITIVE for infection. Then finish the antibiotics and 1 month after you are done treating- please follow up for repeat urine and culture to be sure the blood is gone.

## 2018-07-27 NOTE — MR AVS SNAPSHOT
After Visit Summary   7/27/2018    Zena Quintana    MRN: [id number]           Patient Information     Date Of Birth          1965        Visit Information        Provider Department      7/27/2018 1:40 PM Odessa Ivan PA-C [facility] [place]        Today's Diagnoses     Gross hematuria    -  1    Dysuria          Care Instructions    Urine pregnancy test negative    The urine today showed large amount of blood (greater than 100 cells per microscopic field) and bacteria on the quick test.  Sending for culture  If the culture is negative - then need to plan work up with urology for the blood in the urine- CT scan and cystoscopy of bladder (we discussed)    If urine is POSITIVE for infection. Then finish the antibiotics and 1 month after you are done treating- please follow up for repeat urine and culture to be sure the blood is gone.             Follow-ups after your visit        Your next 10 appointments already scheduled     Sep 12, 2018  1:40 PM CDT   Return Visit with Rome Hardy MD   [facility] [place] ([address]  [place] [address] [phone]              Who to contact     If you have questions or need follow up information about today's clinic visit or your schedule please contact [facility] directly at [phone].  Normal or non-critical lab and imaging results will be communicated to you by MyChart, letter or phone within 4 business days after the clinic has received the results. If you do not hear from us within 7 days, please contact the clinic through MyChart or phone. If you have a critical or abnormal lab result, we will notify you by phone as soon as possible.  Submit refill requests through [employer] or call your pharmacy and they will forward the refill request to us. Please allow 3 business days for your refill to be completed.          Additional Information About Your Visit        [facility]  "Information     [employer] gives you secure access to your electronic health record. If you see a primary care provider, you can also send messages to your care team and make appointments. If you have questions, please call your primary care clinic.  If you do not have a primary care provider, please call [phone] and they will assist you.        Care EveryWhere ID     This is your Care EveryWhere ID. This could be used by other organizations to access your [place] medical records  [id number]        Your Vitals Were     Pulse Temperature Respirations Height Pulse Oximetry Breastfeeding?    84 99.6  F (37.6  C) (Temporal) 16 5' 5.5\" (1.664 m) 99% No    BMI (Body Mass Index)                   22.27 kg/m2            Blood Pressure from Last 3 Encounters:   07/27/18 112/72   07/26/18 111/74   01/02/18 99/65    Weight from Last 3 Encounters:   07/27/18 135 lb 14.4 oz (61.6 kg)   09/13/17 134 lb 3.2 oz (60.9 kg)   08/17/17 133 lb 14.4 oz (60.7 kg)              We Performed the Following     *UA reflex to Microscopic and Culture ([place] and [facility] (except [place] and [place])     Beta HCG Qual, Urine - [facility] and [place] ([id number])     Urine Culture Aerobic Bacterial     Urine Microscopic          Today's Medication Changes          These changes are accurate as of 7/27/18  2:34 PM.  If you have any questions, ask your nurse or doctor.               Start taking these medicines.        Dose/Directions    ciprofloxacin 500 MG tablet   Commonly known as:  CIPRO   Used for:  Gross hematuria, Dysuria   Started by:  Odessa Ivan PA-C        Dose:  500 mg   Take 1 tablet (500 mg) by mouth 2 times daily   Quantity:  10 tablet   Refills:  0            Where to get your medicines      These medications were sent to [facility] [address] [place], [place] - [address] [address] [facility] [address]  [address] [address], [facility] [address]     Phone:  [phone]     ciprofloxacin 500 MG tablet    "             Primary Care Provider Office Phone # Fax #    Chana Armenta [phone]       [address] [place] [address] [address]  [facility] [address]        Equal Access to Services     CARLOS JOHANSEN : Hadii aad ku hadrenu Carmona, waalanda luqsloane, qaybta kaalmada henok, ang garcia oumarlibertad johns pat haider. So [facility] [phone].    ATENCIÓN: Si habla español, tiene a sesay disposición servicios gratuitos de asistencia lingüística. Llame al [phone].    We comply with applicable federal civil rights laws and [place] laws. We do not discriminate on the basis of race, color, national origin, age, disability, sex, sexual orientation, or gender identity.            Thank you!     Thank you for choosing [facility]  for your care. Our goal is always to provide you with excellent care. Hearing back from our patients is one way we can continue to improve our services. Please take a few minutes to complete the written survey that you may receive in the mail after your visit with us. Thank you!             Your Updated Medication List - Protect others around you: Learn how to safely use, store and throw away your medicines at www.disposemymeds.org.          This list is accurate as of 7/27/18  2:34 PM.  Always use your most recent med list.                   Brand Name Dispense Instructions for use Diagnosis    acetaminophen 500 MG tablet    TYLENOL     Take 1-2 tablets by mouth every 6 hours as needed for pain.        ADVIL 200 MG capsule   Generic drug:  ibuprofen      Take 200 mg by mouth as needed for fever        aspirin 81 MG tablet      1 TABLET DAILY        bimatoprost 0.03 % Soln    latisse    1 Bottle    Externally apply topically At Bedtime    High risk medication use       ciprofloxacin 500 MG tablet    CIPRO    10 tablet    Take 1 tablet (500 mg) by mouth 2 times daily    Gross hematuria, Dysuria       hydroxychloroquine 200 MG tablet    PLAQUENIL    135 tablet    Hydroxychloroquine  200mg daily; and an additional 200mg every other day.    Rheumatoid arthritis involving multiple sites with positive rheumatoid factor (H)       loratadine 10 MG capsule      Take 10 mg by mouth as needed for allergies        MULTIPLE VITAMINS/WOMENS PO      None Entered        Omega-3 Fatty Acids 600 MG Caps     30    Reported on 3/15/2017    Stomatitis and mucositis (ulcerative)

## 2018-07-27 NOTE — TELEPHONE ENCOUNTER
Zena Quintana is a 53 year old female who calls with urination concerns.    NURSING ASSESSMENT:  Description:  Painful urination with blood. Has some pressure. Stated it doesn't feel right. [facility] Bilateral lower back pain. Taking Azo. Denies abdominal pain, headaches, lightheaded, dizzy, weak, fevers, chills/sweats, severe pain.   Onset/duration:  2 days   Precip. factors:  Unknown   Associated symptoms:  Blood in urine, painful, urgency, frequency, bilateral lower back pain   Improves/worsens symptoms:  Same   Pain scale (0-10)   Mild   LMP/preg/breast feeding:  No LMP recorded.  Last exam/Treatment:  07/26/2018  Allergies:   Allergies   Allergen Reactions     Penicillins Hives     hives     NURSING PLAN: Nursing advice to patient scheduled OV per EC    RECOMMENDED DISPOSITION:  See in 24 hours - scheduled  Will comply with recommendation: Yes  If further questions/concerns or if symptoms do not improve, worsen or new symptoms develop, call your PCP or [facility] as soon as possible.    NOTES:  Disposition was determined by the first positive assessment question, therefore all previous assessment questions were negative    Guideline used:  Telephone Triage Protocols for Nurses, Fifth Edition, Berenice Silverio  Urination, painful  Nursing Judgment    Radha Wolf, RN, BSN

## 2018-07-27 NOTE — PROGRESS NOTES
"  SUBJECTIVE:   Zena Quintana is a 53 year old female who presents to clinic today for the following health issues:      UTI     History of Present Illness     Diet:  Gluten-free/reduced  Frequency of exercise:  6-7 days/week  Duration of exercise:  Greater than 60 minutes  Taking medications regularly:  Yes  Medication side effects:  Other  Additional concerns today:  No    URINARY TRACT SYMPTOMS  Onset: possibly one week ago but has gotten worse since yesterday  Seen yesterday at urgent care for blood in urine and suprapubic pressure and frequency. Told urine normal except for blood on quick test and to be seen in primary care.    Seemed like a lot of blood, but this is not atypical for her when she gets UTIs- \"I always see blood\".   Not true dysuria \"but I don't typically have that\".   +nocturia - up 2-3x per night. Worse at night. More pressure in bladder at night.   Sx about the same- AZO helping.   Taking Azo so can't tell if blood today.    Blood is not vaginal. No LMP recorded. Patient is not currently having periods (Reason: Premenopausal).  No period for thinks about a year.   No vaginal discharge of other sort. No STD cocnerns.   Sister and father have had evaluation for blood in the urine.   Rhematologists have monitored kidneys and urine in past- for visible hematuria. Never seen urology.     Description:   Painful urination (Dysuria): no   Blood in urine (Hematuria): YES  Delay in urine (Hesitency): YES    Intensity: moderate    Progression of Symptoms:  worsening    Accompanying Signs & Symptoms:  Fever/chills: YES  Flank pain YES  Nausea and vomiting: YES  Any vaginal symptoms: none  Abdominal/Pelvic Pain: YES    History:   History of frequent UTI's: YES- years ago  History of kidney stones: no   Sexually Active: YES  Possibility of pregnancy: No    Precipitating factors:   None    Therapies Tried and outcome: AZO    Problem list and histories reviewed & adjusted, as indicated.  Additional history: " as documented      Patient Active Problem List   Diagnosis     Internal derangement of knee     Iron deficiency anemia     Allergic rhinitis     Stomatitis and mucositis (ulcerative)     Pure hypercholesterolemia     Arthropathy     HYPERLIPIDEMIA LDL GOAL <160     High risk medication use     RA (rheumatoid arthritis) (H)     Endometriosis     Ovarian cyst     Disturbance of salivary secretion (XEROSTOMIA)     Impingement syndrome of right shoulder     Sjogren's syndrome (H)     Palpitations     Past Surgical History:   Procedure Laterality Date     C NONSPECIFIC PROCEDURE  1995, 1997    laparoscopy for endometrial fulguration     COLONOSCOPY WITH CO2 INSUFFLATION N/A 8/7/2017    Procedure: COLONOSCOPY WITH CO2 INSUFFLATION;  Colonoscopy, Screen for colon cancer.  BMI  22.17  [facility] [phone];  Surgeon: Berny Pedro MD;  Location: [facility] OR     HC COLONOSCOPY W/WO BRUSH/WASH  5/25/2005     HC CREATE EARDRUM OPENING,GEN ANESTH      P.E. Tubes     HC KNEE SCOPE, DIAGNOSTIC       left knee, ruptured ACL     HC REMOVE TONSILS/ADENOIDS,11+ Y/O      T & A 11+y.o.     ORTHOPEDIC SURGERY      rotator cuff repair, left       Social History   Substance Use Topics     Smoking status: Never Smoker     Smokeless tobacco: Never Used      Comment: no smokers in household     Alcohol use 1.0 oz/week      Comment: has a couple drinks now and again     Family History   Problem Relation Age of Onset     Osteoperosis Mother      Arthritis Mother      lupus     Connective Tissue Disorder Mother      Lupus     Cataracts Mother      Macular Degeneration Mother      Celiac Disease Mother      HEART DISEASE Father      aortic aneurysm     GASTROINTESTINAL DISEASE Father      abdominal anurysm     Hypertension Father      Cataracts Father      GASTROINTESTINAL DISEASE Sister      IBS, colon problems     Alzheimer Disease Maternal Grandmother      Osteoperosis Maternal Grandmother      Cerebrovascular Disease Maternal  "Grandfather      Cancer Paternal Grandmother      Cerebrovascular Disease Paternal Grandfather      Circulatory Sister      carotid stenosis     Cancer - colorectal Maternal Uncle      Cancer - colorectal Maternal Uncle      Glaucoma No family hx of          Current Outpatient Prescriptions   Medication Sig Dispense Refill     ASPIRIN 81 MG OR TABS 1 TABLET DAILY       bimatoprost (LATISSE) 0.03 % SOLN Externally apply topically At Bedtime 1 Bottle 3     ciprofloxacin (CIPRO) 500 MG tablet Take 1 tablet (500 mg) by mouth 2 times daily 10 tablet 0     hydroxychloroquine (PLAQUENIL) 200 MG tablet Hydroxychloroquine 200mg daily; and an additional 200mg every other day. 135 tablet 3     ibuprofen (ADVIL) 200 MG capsule Take 200 mg by mouth as needed for fever       MULTIPLE VITAMINS/WOMENS OR None Entered       acetaminophen (TYLENOL) 500 MG tablet Take 1-2 tablets by mouth every 6 hours as needed for pain. (Patient not taking: Reported on 7/27/2018)       loratadine 10 MG capsule Take 10 mg by mouth as needed for allergies       OMEGA-3 FATTY ACIDS 600 MG OR CAPS Reported on 3/15/2017 30 0     Allergies   Allergen Reactions     Penicillins Hives     hives     BP Readings from Last 3 Encounters:   07/27/18 112/72   07/26/18 111/74   01/02/18 99/65    Wt Readings from Last 3 Encounters:   07/27/18 135 lb 14.4 oz (61.6 kg)   09/13/17 134 lb 3.2 oz (60.9 kg)   08/17/17 133 lb 14.4 oz (60.7 kg)                  Labs reviewed in EPIC    ROS:  Constitutional, HEENT, cardiovascular, pulmonary, gi and gu systems are negative, except as otherwise noted.    OBJECTIVE:     /72  Pulse 84  Temp 99.6  F (37.6  C) (Temporal)  Resp 16  Ht 5' 5.5\" (1.664 m)  Wt 135 lb 14.4 oz (61.6 kg)  SpO2 99%  Breastfeeding? No  BMI 22.27 kg/m2  Body mass index is 22.27 kg/(m^2).  GENERAL: healthy, alert and no distress  EYES: Eyes grossly normal to inspection, PERRL and conjunctivae and sclerae normal  NECK: no adenopathy, no " asymmetry, masses, or scars and thyroid normal to palpation  RESP: lungs clear to auscultation - no rales, rhonchi or wheezes  CV: regular rate and rhythm, normal S1 S2, no S3 or S4, no murmur, click or rub, no peripheral edema and peripheral pulses strong  ABDOMEN: soft, mild suprapubic tenderness, no guarding or rebound, no masses and bowel sounds normal; neg CVA TTP  MS: no gross musculoskeletal defects noted, no edema  NEURO: Normal strength and tone, mentation intact and speech normal  BACK: no CVA tenderness, no paralumbar tenderness    Diagnostic Test Results:  Results for orders placed or performed in visit on 07/27/18 (from the past 24 hour(s))   *UA reflex to Microscopic and Culture ([place] and [facility] (except [place] and [place])   Result Value Ref Range    Color Urine Yellow     Appearance Urine Cloudy     Glucose Urine Negative NEG^Negative mg/dL    Bilirubin Urine Negative NEG^Negative    Ketones Urine Negative NEG^Negative mg/dL    Specific Gravity Urine 1.010 1.003 - 1.035    Blood Urine Large (A) NEG^Negative    pH Urine 6.0 5.0 - 7.0 pH    Protein Albumin Urine Trace (A) NEG^Negative mg/dL    Urobilinogen Urine 1.0 0.2 - 1.0 EU/dL    Nitrite Urine Positive (A) NEG^Negative    Leukocyte Esterase Urine Negative NEG^Negative    Source Midstream Urine    Urine Microscopic   Result Value Ref Range    WBC Urine 0 - 5 OTO5^0 - 5 /HPF    RBC Urine >100 (A) OTO2^O - 2 /HPF    Squamous Epithelial /LPF Urine Few FEW^Few /LPF    Bacteria Urine Many (A) NEG^Negative /HPF   Beta HCG Qual, Urine - [facility] and [place] ([id number])   Result Value Ref Range    Beta HCG Qual IFA Urine Negative NEG^Negative          ASSESSMENT/PLAN:     1. Gross hematuria  Hematuria with pelvic pressure, frequency. UA showing +nitrites.   Treat as below  Stop azo - so can monitor urine and sx  Push fluids  S/sx for further eval if worsening.   If urine culture is negative- needs hematuria eval- CT and cysto- discussed  rationale with pt.   If urine culture is positive- then f/u UA, micro and culture in 1 mo to ensure hematuria is resolved. Pt states understanding.- ciprofloxacin (CIPRO) 500 MG tablet; Take 1 tablet (500 mg) by mouth 2 times daily  Dispense: 10 tablet; Refill: 0    2. Dysuria  See above.   - *UA reflex to Microscopic and Culture ([place] and [facility] (except [place] and [place])  - Urine Culture Aerobic Bacterial  - Urine Microscopic  - Beta HCG Qual, Urine - [facility] and [place] ([id number])  - ciprofloxacin (CIPRO) 500 MG tablet; Take 1 tablet (500 mg) by mouth 2 times daily  Dispense: 10 tablet; Refill: 0    Follow Up: The patient was instructed to contact clinic for worsening symptoms, non-improvement as expected/discussed, and for questions regarding medications or treatment plan. Discussed parameters for follow up and included in After Visit Summary given to patient.      Odessa Ivan PA-C  [facility]  Answers for HPI/ROS submitted by the patient on 7/27/2018   PHQ-2 Score: 0

## 2018-07-28 LAB
BACTERIA SPEC CULT: NO GROWTH
BACTERIA SPEC CULT: NO GROWTH
Lab: NORMAL
Lab: NORMAL
SPECIMEN SOURCE: NORMAL
SPECIMEN SOURCE: NORMAL

## 2018-07-28 NOTE — PROGRESS NOTES
UC final no growth.   Was seen in PCP office next day for f/u and repeat UA/UC.  Tiarra Ramirez PA-C  [facility]

## 2018-07-29 ENCOUNTER — TELEPHONE (OUTPATIENT)
Dept: FAMILY MEDICINE | Facility: CLINIC | Age: 53
End: 2018-07-29

## 2018-07-29 ENCOUNTER — TRANSFERRED RECORDS (OUTPATIENT)
Dept: HEALTH INFORMATION MANAGEMENT | Facility: CLINIC | Age: 53
End: 2018-07-29

## 2018-07-29 DIAGNOSIS — R31.0 GROSS HEMATURIA: Primary | ICD-10-CM

## 2018-07-30 ENCOUNTER — MYC MEDICAL ADVICE (OUTPATIENT)
Dept: FAMILY MEDICINE | Facility: CLINIC | Age: 53
End: 2018-07-30

## 2018-08-10 ENCOUNTER — TELEPHONE (OUTPATIENT)
Dept: FAMILY MEDICINE | Facility: CLINIC | Age: 53
End: 2018-08-10

## 2018-08-10 NOTE — TELEPHONE ENCOUNTER
Please call pt- her urine culture was negative for infection. No bacterial growth. She can stop the antibiotic. As we discussed in clinic she needs to see urology for evaluation of the visible blood she has been having in her urine. Referral is placed. Please assist her with getting scheduled with urology. Odessa Ivan PA-C     Ensure Enlive®/x1 can BID

## 2018-08-10 NOTE — TELEPHONE ENCOUNTER
You placed a referral for patient to urology on 7/29/18.  Patient has not scheduled as of yet.      Please review and forward to team if follow up with the patient is needed.     Thank you!  Danitza/Clinic Referrals Dyad II

## 2018-09-12 ENCOUNTER — RADIANT APPOINTMENT (OUTPATIENT)
Dept: GENERAL RADIOLOGY | Facility: CLINIC | Age: 53
End: 2018-09-12
Attending: INTERNAL MEDICINE
Payer: COMMERCIAL

## 2018-09-12 ENCOUNTER — OFFICE VISIT (OUTPATIENT)
Dept: RHEUMATOLOGY | Facility: CLINIC | Age: 53
End: 2018-09-12
Payer: COMMERCIAL

## 2018-09-12 VITALS
HEART RATE: 65 BPM | BODY MASS INDEX: 21.96 KG/M2 | TEMPERATURE: 97.6 F | DIASTOLIC BLOOD PRESSURE: 61 MMHG | OXYGEN SATURATION: 96 % | SYSTOLIC BLOOD PRESSURE: 99 MMHG | WEIGHT: 134 LBS

## 2018-09-12 DIAGNOSIS — Z78.0 POST-MENOPAUSAL: ICD-10-CM

## 2018-09-12 DIAGNOSIS — M05.79 RHEUMATOID ARTHRITIS INVOLVING MULTIPLE SITES WITH POSITIVE RHEUMATOID FACTOR (H): Primary | ICD-10-CM

## 2018-09-12 DIAGNOSIS — M05.79 RHEUMATOID ARTHRITIS INVOLVING MULTIPLE SITES WITH POSITIVE RHEUMATOID FACTOR (H): ICD-10-CM

## 2018-09-12 DIAGNOSIS — Z13.820 OSTEOPOROSIS SCREENING: ICD-10-CM

## 2018-09-12 PROCEDURE — 90732 PPSV23 VACC 2 YRS+ SUBQ/IM: CPT | Performed by: INTERNAL MEDICINE

## 2018-09-12 PROCEDURE — 71046 X-RAY EXAM CHEST 2 VIEWS: CPT

## 2018-09-12 PROCEDURE — 99214 OFFICE O/P EST MOD 30 MIN: CPT | Performed by: INTERNAL MEDICINE

## 2018-09-12 PROCEDURE — 90471 IMMUNIZATION ADMIN: CPT | Performed by: INTERNAL MEDICINE

## 2018-09-12 RX ORDER — FOLIC ACID 1 MG/1
1 TABLET ORAL DAILY
Qty: 100 TABLET | Refills: 3 | Status: SHIPPED | OUTPATIENT
Start: 2018-09-12 | End: 2019-09-04

## 2018-09-12 RX ORDER — METHOTREXATE 2.5 MG/1
15 TABLET ORAL WEEKLY
Qty: 24 TABLET | Refills: 4 | Status: SHIPPED | OUTPATIENT
Start: 2018-09-12 | End: 2019-01-10

## 2018-09-12 RX ORDER — HYDROXYCHLOROQUINE SULFATE 200 MG/1
TABLET, FILM COATED ORAL
Qty: 135 TABLET | Refills: 3 | Status: SHIPPED | OUTPATIENT
Start: 2018-09-12 | End: 2019-09-04

## 2018-09-12 ASSESSMENT — PAIN SCALES - GENERAL: PAINLEVEL: NO PAIN (0)

## 2018-09-12 NOTE — MR AVS SNAPSHOT
After Visit Summary   9/12/2018    Zena Quintana    MRN: [id number]           Patient Information     Date Of Birth          1965        Visit Information        Provider Department      9/12/2018 1:40 PM Rome Hardy MD [facility]        Today's Diagnoses     Rheumatoid arthritis involving multiple sites with positive rheumatoid factor (H)    -  1    Osteoporosis screening        Post-menopausal           Follow-ups after your visit        Your next 10 appointments already scheduled     Oct 15, 2018  5:30 PM CDT   LAB with NL LAB [facility] ([facility])    [address] [address]   [phone]           Please do not eat 10-12 hours before your appointment if you are coming in fasting for labs on lipids, cholesterol, or glucose (sugar). This does not apply to pregnant women. Water, hot tea and black coffee (with nothing added) are okay. Do not drink other fluids, diet soda or chew gum.            Nov 12, 2018  5:15 PM CST   LAB with NL LAB [facility] ([facility])    [address] [address]   [phone]           Please do not eat 10-12 hours before your appointment if you are coming in fasting for labs on lipids, cholesterol, or glucose (sugar). This does not apply to pregnant women. Water, hot tea and black coffee (with nothing added) are okay. Do not drink other fluids, diet soda or chew gum.            Dec 17, 2018  5:30 PM CST   [facility]    [address] [address]   [phone]           Please do not eat 10-12 hours before your appointment if you are coming in fasting for labs on lipids, cholesterol, or glucose (sugar). This does not apply to pregnant women. Water, hot tea and black coffee (with nothing added) are okay. Do not drink other fluids, diet soda or chew gum.             Mikel 10, 2019  3:40 PM CST   Return Visit with Rome Hardy MD   [facility] [place] ([facility])    [address]  [place] [address] [phone]              Future tests that were ordered for you today     Open Standing Orders        Priority Remaining Interval Expires Ordered    CBC with platelets differential Routine 2/2 Every 4 Weeks 3/11/2019 9/12/2018    Creatinine Routine 2/2 Every 4 Weeks 3/11/2019 9/12/2018    Hepatic panel Routine 2/2 Every 4 Weeks 3/11/2019 9/12/2018          Open Future Orders        Priority Expected Expires Ordered    DX Hip/Pelvis/Spine Routine  9/13/2019 9/12/2018    XR Chest 2 Views Routine 9/12/2018 9/12/2019 9/12/2018    CBC with platelets differential Routine 12/7/2018 1/10/2019 9/12/2018    Creatinine Routine 12/7/2018 1/10/2019 9/12/2018    Erythrocyte sedimentation rate auto Routine 12/7/2018 1/10/2019 9/12/2018    CRP inflammation Routine 12/7/2018 1/10/2019 9/12/2018    Hepatic panel Routine 12/7/2018 1/10/2019 9/12/2018            Who to contact     If you have questions or need follow up information about today's clinic visit or your schedule please contact [facility] [place] directly at [phone].  Normal or non-critical lab and imaging results will be communicated to you by [employer], letter or phone within 4 business days after the clinic has received the results. If you do not hear from us within 7 days, please contact the clinic through [employer] or phone. If you have a critical or abnormal lab result, we will notify you by phone as soon as possible.  Submit refill requests through [employer] or call your pharmacy and they will forward the refill request to us. Please allow 3 business days for your refill to be completed.          Additional Information About Your Visit        [employer] Information     [employer] gives you secure access to your electronic health record. If you see a primary care provider, you can also send messages to  your care team and make appointments. If you have questions, please call your primary care clinic.  If you do not have a primary care provider, please call [phone] and they will assist you.        Care EveryWhere ID     This is your Care EveryWhere ID. This could be used by other organizations to access your [facility] medical records  [id number]        Your Vitals Were     Pulse Temperature Pulse Oximetry BMI (Body Mass Index)          65 97.6  F (36.4  C) (Oral) 96% 21.96 kg/m2         Blood Pressure from Last 3 Encounters:   09/12/18 99/61   07/27/18 112/72   07/26/18 111/74    Weight from Last 3 Encounters:   09/12/18 60.8 kg (134 lb)   07/27/18 61.6 kg (135 lb 14.4 oz)   09/13/17 60.9 kg (134 lb 3.2 oz)                 Today's Medication Changes          These changes are accurate as of 9/12/18  2:31 PM.  If you have any questions, ask your nurse or doctor.               Start taking these medicines.        Dose/Directions    folic acid 1 MG tablet   Commonly known as:  FOLVITE   Used for:  Rheumatoid arthritis involving multiple sites with positive rheumatoid factor (H)   Started by:  Rome Hardy MD        Dose:  1 mg   Take 1 tablet (1 mg) by mouth daily   Quantity:  100 tablet   Refills:  3       methotrexate sodium 2.5 MG Tabs   Used for:  Rheumatoid arthritis involving multiple sites with positive rheumatoid factor (H)   Started by:  Rome Hardy MD        Dose:  15 mg   Take 6 tablets (15 mg) by mouth once a week . Take all 6 tablets on the same day of each week.   Quantity:  24 tablet   Refills:  4            Where to get your medicines      These medications were sent to [facility] [address] [place], [place] - [address] AT [facility] [address]  [address], [facility] [address]     Phone:  [phone]     folic acid 1 MG tablet    hydroxychloroquine 200 MG tablet    methotrexate sodium 2.5 MG Tabs                Primary Care Provider Office Phone # Fax #    Chana  MD Kathi [phone]       [address]  [facility] [address]        Equal Access to Services     CARLOS JOHANSEN : Alona Carmona, mk crum, amanda tatemabret whiting, ang gustafson hayguera guzmanmuralimario hadier. So [facility] [phone].    ATENCIÓN: Si habla español, tiene a sesay disposición servicios gratuitos de asistencia lingüística. Llame al [phone].    We comply with applicable federal civil rights laws and [place] laws. We do not discriminate on the basis of race, color, national origin, age, disability, sex, sexual orientation, or gender identity.            Thank you!     Thank you for choosing [facility]  for your care. Our goal is always to provide you with excellent care. Hearing back from our patients is one way we can continue to improve our services. Please take a few minutes to complete the written survey that you may receive in the mail after your visit with us. Thank you!             Your Updated Medication List - Protect others around you: Learn how to safely use, store and throw away your medicines at www.disposemymeds.org.          This list is accurate as of 9/12/18  2:31 PM.  Always use your most recent med list.                   Brand Name Dispense Instructions for use Diagnosis    acetaminophen 500 MG tablet    TYLENOL     Take 1-2 tablets by mouth every 6 hours as needed for pain.        ADVIL 200 MG capsule   Generic drug:  ibuprofen      Take 200 mg by mouth as needed for fever        aspirin 81 MG tablet      1 TABLET DAILY        bimatoprost 0.03 % Soln    latisse    1 Bottle    Externally apply topically At Bedtime    High risk medication use       ciprofloxacin 500 MG tablet    CIPRO    10 tablet    Take 1 tablet (500 mg) by mouth 2 times daily    Gross hematuria, Dysuria       folic acid 1 MG tablet    FOLVITE    100 tablet    Take 1 tablet (1 mg) by mouth daily    Rheumatoid arthritis involving multiple sites with  positive rheumatoid factor (H)       hydroxychloroquine 200 MG tablet    PLAQUENIL    135 tablet    Hydroxychloroquine 200mg daily; and an additional 200mg every other day.    Rheumatoid arthritis involving multiple sites with positive rheumatoid factor (H)       loratadine 10 MG capsule      Take 10 mg by mouth as needed for allergies        methotrexate sodium 2.5 MG Tabs     24 tablet    Take 6 tablets (15 mg) by mouth once a week . Take all 6 tablets on the same day of each week.    Rheumatoid arthritis involving multiple sites with positive rheumatoid factor (H)       MULTIPLE VITAMINS/WOMENS PO      None Entered        Omega-3 Fatty Acids 600 MG Caps     30    Reported on 3/15/2017    Stomatitis and mucositis (ulcerative)

## 2018-09-12 NOTE — PROGRESS NOTES
Rheumatology Clinic Visit      Zena Quintana MRN# [id number]   YOB: 1965 Age: 53 year old      Date of visit: 9/12/18   PCP: Dr. Chana Armenta    Chief Complaint   Patient presents with:  RECHECK: 3 month follow up.  No questions or concerns      Assessment and Plan     1. Seropositive nonerosive rheumatoid arthritis (RF low positive, CCP negative): Reportedly at the time of diagnosis she had synovitis in a symmetric distribution that was steroid responsive. Currently on hydroxychloroquine 300 mg daily on average (started in approximately 2007).  No synovitis on exam, but MCPs, PIPs, and MTPs tender to palpation in a symmetric distribution and symptoms are inflammatory.  Discussed tx options and will start MTX.   - Start Methotrexate 15mg once weekly   - Start folic acid 1mg daily  - Continue hydroxychloroquine 200mg daily with an additional 200mg every other day (last eye exam was on 3/19/2018)  - X-rays today: chest  - Labs monthly ×2 months: CBC, creatinine, hepatic panel  - Labs in 3 months: CBC, Creatinine, Hepatic Panel, ESR, CRP    # Methotrexate Risks and Benefits: The risks and benefits of methotrexate were discussed in detail and the patient verbalized understanding.  The risks discussed include, but are not limited to, the risk for hypersensitivity, anaphylaxis, anaphylactoid reactions, infections, bone marrow suppression, renal toxicity, hepatotoxicity, pulmonary toxicity, malignancy, impaired fertility, GI upset, alopecia, and oral and nasal sores.  Folic acid supplementation is recommended during methotrexate therapy to help prevent some of the side effects. Pregnancy prevention and planning was discussed; it is recommended that women of childbearing potential use reliable contraception during therapy.  The risks of taking both methotrexate and alcohol were reviewed; complete alcohol avoidance was discussed.  Routine laboratory monitoring is required during methotrexate therapy.  Taking MTX once weekly, all within a 24 hour period was stressed and the patient verbalized this instruction back to me.  I encouraged reviewing the package insert and asking any questions about the medication.    2. 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 Left shoulder doing better s/p surgery.    3. Sjogren's syndrome: NJ by EIA negative but positive by IF, SSA and SSB negative, and minor salivary gland biopsy negative. She is currently following with ophthalmology who has given her eyedrops that have been beneficial.  Dry mouth is currently treated with frequent sips of water; she has good oral hygiene, follows with a dentist regularly, and does not have frequent cavities. Pilocarpine was previously Rx'd but she has not started it and does not plan to; will remove from the medication list.     4. History of oral sores / family history of lupus / polyarthritis / recurrent sinus infections: She is not having oral sores or recurrent sinus infections for several years now. Retrospectively, there would be concern for potential ANCA associated sinus issues and this could be checked if she had recurrent sinusitis again. Oral sores have improved since she started Plaquenil, that argues it could be connective tissue disease related. NJ was negative by EIA, but complement was on the low end of the normal range so NJ was rechecked and positive; further testing was negative.    5. Vaccinations: Vaccinations reviewed with Ms. Quintana.  Risks and benefits of vaccinations were discussed.  - Influenza: encouraged yearly vaccination  - [id number]: up to date  - [id number]: will receive today     6. Bone Health: mother with hx of vertebral compression fracture.  Post-menopausal.  Taking vitamin D that is in the multivitamin.  Check DEXA for osteoporosis  screening.  - Check DEXA    Ms. Quintana verbalized agreement with and understanding of the rational for the diagnosis and treatment plan.  All questions were answered to best of my ability and the patient's satisfaction. Ms. Quintana was advised to contact the clinic with any questions that may arise after the clinic visit.      Thank you for involving me in the care of the patient    Return to clinic: 3-4 months      HPI   Zena Quintana is a 53 year old female with medical history significant for iron deficiency anemia, nephrolithiasis, hyperlipidemia, xerostomia, dry eyes, and rheumatoid arthritis who presents for follow-up of rheumatoid arthritis.    Today, Ms. Quintana reports doing okay.  Morning stiffness for about 20 minutes; hours if during a flare and less some other days.  Flaring for 1-2 days at a time with worsening joint pain and stiffness.  +gelling phenomenon.  All joint symptoms are worse in the AM and improved by evening.  Advil 400mg 1-2x/day is helpful when needed. Didn't start pilocarpine. Had kidney stones requiring stenting by urology and she continues to follow with urology.     Denies fevers, chills, nausea, vomiting, constipation, diarrhea. No abdominal pain. No chest pain/pressure, palpitations, or shortness of breath. No neck pain. No rash. No LE swelling.  No photosensitivity or photophobia.  No sicca symptoms.  No eye pain or redness. No oral sores.  She has not had a sinus infection for several years now.    Tobacco: None  EtOH: Occasional; no more than 2 drinks per day when she does drink  Drugs: None  Occupation: Works at a school    ROS   GEN: No fevers, chills, night sweats, or weight change  SKIN: No itching, rashes, sores  HEENT: No oral or nasal ulcers.  CV: No chest pain, pressure, palpitations, or dyspnea on exertion.  PULM: No SOB, wheeze, cough.  GI:  No nausea, vomiting, constipation, diarrhea. No blood in stool. No abdominal pain.  : No blood in urine.  MSK: See  HPI.  NEURO: No numbness, tingling, or weakness.  EXT: No LE swelling  PSYCH: Negative    Active Problem List     Patient Active Problem List   Diagnosis     Internal derangement of knee     Iron deficiency anemia     Allergic rhinitis     Stomatitis and mucositis (ulcerative)     Pure hypercholesterolemia     Arthropathy     HYPERLIPIDEMIA LDL GOAL <160     High risk medication use     RA (rheumatoid arthritis) (H)     Endometriosis     Ovarian cyst     Disturbance of salivary secretion (XEROSTOMIA)     Impingement syndrome of right shoulder     Sjogren's syndrome (H)     Palpitations     Past Medical History     Past Medical History:   Diagnosis Date     Allergic rhinitis, cause unspecified     Allergic rhinitis     Endometriosis, site unspecified     Endometriosis     Female infertility of other specified origin      Iron deficiency anemia, unspecified     Anemia, iron def.     RA (rheumatoid arthritis) (H)      Stomatitis and mucositis (ulcerative)     chronic - non-specific     Past Surgical History     Past Surgical History:   Procedure Laterality Date     C NONSPECIFIC PROCEDURE  1995, 1997    laparoscopy for endometrial fulguration     COLONOSCOPY WITH CO2 INSUFFLATION N/A 8/7/2017    Procedure: COLONOSCOPY WITH CO2 INSUFFLATION;  Colonoscopy, Screen for colon cancer.  BMI  22.17  [facility] [phone];  Surgeon: Berny Pedro MD;  Location: [facility] OR     HC COLONOSCOPY W/WO BRUSH/WASH  5/25/2005     HC CREATE EARDRUM OPENING,GEN ANESTH      P.E. Tubes     HC KNEE SCOPE, DIAGNOSTIC       left knee, ruptured ACL     HC REMOVE TONSILS/ADENOIDS,12+ Y/O      T & A 11+y.o.     ORTHOPEDIC SURGERY      rotator cuff repair, left     Allergy     Allergies   Allergen Reactions     Penicillins Hives     hives     Current Medication List     Current Outpatient Prescriptions   Medication Sig     ASPIRIN 81 MG OR TABS 1 TABLET DAILY     bimatoprost (LATISSE) 0.03 % SOLN Externally apply topically At Bedtime      hydroxychloroquine (PLAQUENIL) 200 MG tablet Hydroxychloroquine 200mg daily; and an additional 200mg every other day.     loratadine 10 MG capsule Take 10 mg by mouth as needed for allergies     MULTIPLE VITAMINS/WOMENS OR None Entered     acetaminophen (TYLENOL) 500 MG tablet Take 1-2 tablets by mouth every 6 hours as needed for pain. (Patient not taking: Reported on 7/27/2018)     ciprofloxacin (CIPRO) 500 MG tablet Take 1 tablet (500 mg) by mouth 2 times daily (Patient not taking: Reported on 9/12/2018)     ibuprofen (ADVIL) 200 MG capsule Take 200 mg by mouth as needed for fever     OMEGA-3 FATTY ACIDS 600 MG OR CAPS Reported on 3/15/2017     No current facility-administered medications for this visit.          Social History   See HPI    Family History     Family History   Problem Relation Age of Onset     Osteoporosis Mother      Arthritis Mother      lupus     Connective Tissue Disorder Mother      Lupus     Cataracts Mother      Macular Degeneration Mother      Celiac Disease Mother      HEART DISEASE Father      aortic aneurysm     GASTROINTESTINAL DISEASE Father      abdominal anurysm     Hypertension Father      Cataracts Father      GASTROINTESTINAL DISEASE Sister      IBS, colon problems     Alzheimer Disease Maternal Grandmother      Osteoporosis Maternal Grandmother      Cerebrovascular Disease Maternal Grandfather      Cancer Paternal Grandmother      Cerebrovascular Disease Paternal Grandfather      Circulatory Sister      carotid stenosis     Cancer - colorectal Maternal Uncle      Cancer - colorectal Maternal Uncle      Glaucoma No family hx of      Mother: Lupus    Physical Exam     Temp Readings from Last 3 Encounters:   09/12/18 97.6  F (36.4  C) (Oral)   07/27/18 99.6  F (37.6  C) (Temporal)   07/26/18 97.7  F (36.5  C) (Temporal)     BP Readings from Last 5 Encounters:   09/12/18 99/61   07/27/18 112/72   07/26/18 111/74   01/02/18 99/65   09/13/17 103/68     Pulse Readings from Last 1  "Encounters:   09/12/18 65     Resp Readings from Last 1 Encounters:   07/27/18 16     Estimated body mass index is 21.96 kg/(m^2) as calculated from the following:    Height as of 7/27/18: 1.664 m (5' 5.5\").    Weight as of this encounter: 60.8 kg (134 lb).    GEN: NAD  HEENT: Dry mucous membranes. No oral lesions.  Anicteric, noninjected sclera. Eyes appear to have good moisturizer by gross examination.  CV: S1, S2. RRR. No m/r/g.  PULM: CTA bilaterally. No w/c.  MSK:  Bilateral 2nd-3rd MCPs tender to palpation but without swelling.  PIPs diffusely tender to palpation but no swelling.  Wrists, elbows, and shoulders without swelling or tenderness to palpation.  Hips nontender to palpation.  Knees with medial joint line tenderness but no effusion or increased warmth.  Ankles without swelling or tenderness to palpation.  Achilles tendons nontender to palpation.  MTPs nontender to individual palpation but MTP squeeze positive bilaterally; no swelling of the MTPs.    NEURO: UE and LE strengths 5/5 and equal bilaterally.   SKIN: No rash  EXT: No LE edema  PSYCH: Alert. Appropriate.    Labs / Imaging (select studies)   RF/CCP  Recent Labs   Lab Test  08/29/13   1413   CCPABY  <20 Interpretation:  Negative     CBC  Recent Labs   Lab Test  09/07/17   1322  06/20/17   1437  03/06/17   1300   WBC  6.7  8.4  7.5   RBC  4.07  3.95  3.97   HGB  12.5  12.3  12.3   HCT  38.3  37.7  37.3   MCV  94  95  94   RDW  12.2  12.5  12.1   PLT  305  324  307   MCH  30.7  31.1  31.0   MCHC  32.6  32.6  33.0   NEUTROPHIL  65.2  66.8  67.6   LYMPH  20.9  19.7  21.1   MONOCYTE  11.7  11.8  9.8   EOSINOPHIL  1.6  1.2  1.2   BASOPHIL  0.6  0.5  0.3   ANEU  4.4  5.6  5.1   ALYM  1.4  1.7  1.6   ELIGIO  0.8  1.0  0.7   AEOS  0.1  0.1  0.1   ABAS  0.0  0.0  0.0     CMP  Recent Labs   Lab Test  09/07/17   1322  06/20/17   1437  03/06/17   1300  05/24/16   0940   04/07/15   1322   NA   --   141   --   137   --   139   POTASSIUM   --   3.8   --   4.4   " --   3.9   CHLORIDE   --   105   --   105   --   104   CO2   --   29   --   29   --   29   ANIONGAP   --   7   --   3   --   6   GLC   --   84   --   85   --   88   BUN   --   22   --   18   --   16   CR  0.99  0.84  0.90  0.92   --   0.96   GFRESTIMATED  58*  71  65  64   --   61   GFRESTBLACK  71  85  79  77   --   74   VALERIA   --   9.1   --   9.2   --   9.1   BILITOTAL  0.3  0.2  0.3  0.4   < >  0.3   ALBUMIN  4.3  4.0  4.3  4.4   < >  3.9   PROTTOTAL  7.7  6.8  7.3  7.3   < >  7.3   ALKPHOS  42  40  37*  42   < >  34*   AST  25  21  20  30   < >  34   ALT  25  21  23  24   < >  29    < > = values in this interval not displayed.     Calcium/VitaminD  Recent Labs   Lab Test  06/20/17   1437  05/24/16   0940  07/13/15   1036  04/07/15   1322   VALERIA  9.1  9.2   --   9.1   VITDT   --    --   48   --      ESR/CRP  Recent Labs   Lab Test  09/07/17   1322  03/06/17   1300  05/24/16   0940   SED  9  8  6   CRP  <2.9  <2.9  <2.9     Hepatitis C  Recent Labs   Lab Test  06/20/17   1437   HCVAB  Nonreactive   Assay performance characteristics have not been established for newborns,   infants, and children       Immunization History     Immunization History   Administered Date(s) Administered     Influenza (H1N1) 12/03/2009     Influenza (IIV3) PF 11/17/2005, 11/17/2006, 10/25/2010, 01/09/2013     Influenza Vaccine IM 3yrs+ 4 Valent IIV4 10/10/2013, 09/14/2016, 09/13/2017     Pneumo Conj 13-V (2010&after) 09/13/2017     TD (ADULT, 7+) 02/01/2005     TDAP Vaccine (Adacel) 01/09/2013          Chart documentation done in part with Dragon Voice recognition Software. Although reviewed after completion, some word and grammatical error may remain.    Rome Hardy MD

## 2018-09-22 ENCOUNTER — OFFICE VISIT (OUTPATIENT)
Dept: URGENT CARE | Facility: RETAIL CLINIC | Age: 53
End: 2018-09-22
Payer: COMMERCIAL

## 2018-09-22 VITALS — DIASTOLIC BLOOD PRESSURE: 64 MMHG | SYSTOLIC BLOOD PRESSURE: 105 MMHG | HEART RATE: 81 BPM | TEMPERATURE: 97.7 F

## 2018-09-22 DIAGNOSIS — J06.9 VIRAL URI: ICD-10-CM

## 2018-09-22 DIAGNOSIS — H69.93 DYSFUNCTION OF BOTH EUSTACHIAN TUBES: Primary | ICD-10-CM

## 2018-09-22 PROCEDURE — 99213 OFFICE O/P EST LOW 20 MIN: CPT | Performed by: PHYSICIAN ASSISTANT

## 2018-09-22 RX ORDER — BIMATOPROST 3 UG/ML
SOLUTION TOPICAL
COMMUNITY
Start: 2017-02-09 | End: 2020-12-09

## 2018-09-22 RX ORDER — FLUTICASONE PROPIONATE 50 MCG
2 SPRAY, SUSPENSION (ML) NASAL DAILY
Qty: 1 BOTTLE | Refills: 0 | Status: SHIPPED | OUTPATIENT
Start: 2018-09-22

## 2018-09-22 NOTE — PATIENT INSTRUCTIONS
Sudafed (decongestant) for congestion.  Continue antihistamine daily (zyrtec or claritin)  Flonase nasal spray 2 places in each nostril daily   Continue for few days AFTER ears symptoms resovle  Apply warm compresses/packs for 15 minutes daily.  Steam treatments or humidifier.  Tylenol or ibuprofen as needed for pain or fever  Please follow up with primary care provider if not improving, worsening or new symptoms

## 2018-09-22 NOTE — MR AVS SNAPSHOT
After Visit Summary   9/22/2018    Zena Quintana    MRN: [id number]           Patient Information     Date Of Birth          1965        Visit Information        Provider Department      9/22/2018 9:00 AM Tiarra Ramirez PA-C [facility]        Today's Diagnoses     Dysfunction of both eustachian tubes    -  1      Care Instructions    Sudafed (decongestant) for congestion.  Continue antihistamine daily (zyrtec or claritin)  Flonase nasal spray 2 places in each nostril daily   Continue for few days AFTER ears symptoms resovle  Apply warm compresses/packs for 15 minutes daily.  Steam treatments or humidifier.  Tylenol or ibuprofen as needed for pain or fever  Please follow up with primary care provider if not improving, worsening or new symptoms           Follow-ups after your visit        Your next 10 appointments already scheduled     Oct 15, 2018  5:30 PM CDT   LAB with NL LAB [facility] ([facility])    [address] [address]   [phone]           Please do not eat 10-12 hours before your appointment if you are coming in fasting for labs on lipids, cholesterol, or glucose (sugar). This does not apply to pregnant women. Water, hot tea and black coffee (with nothing added) are okay. Do not drink other fluids, diet soda or chew gum.            Nov 12, 2018  5:15 PM CST   LAB with NL LAB [facility] [place] [facility] ([facility])    [address] [address]   [phone]           Please do not eat 10-12 hours before your appointment if you are coming in fasting for labs on lipids, cholesterol, or glucose (sugar). This does not apply to pregnant women. Water, hot tea and black coffee (with nothing added) are okay. Do not drink other fluids, diet soda or chew gum.            Dec 17, 2018  5:30 PM CST   LAB with NL LAB [facility] [place] [facility] ([facility])     [address] [facility] [address]   [phone]           Please do not eat 10-12 hours before your appointment if you are coming in fasting for labs on lipids, cholesterol, or glucose (sugar). This does not apply to pregnant women. Water, hot tea and black coffee (with nothing added) are okay. Do not drink other fluids, diet soda or chew gum.            Mikel 10, 2019  3:40 PM CST   Return Visit with Rome Hardy MD   [facility] ([facility])    [address] [place] [address]  [place] [id number] [phone]              Who to contact     You can reach your care team any time of the day by calling [phone].  Notification of test results:  If you have an abnormal lab result, we will notify you by phone as soon as possible.         Additional Information About Your Visit        MyChart Information     [employer] gives you secure access to your electronic health record. If you see a primary care provider, you can also send messages to your care team and make appointments. If you have questions, please call your primary care clinic.  If you do not have a primary care provider, please call [phone] and they will assist you.        Care EveryWhere ID     This is your Care EveryWhere ID. This could be used by other organizations to access your [facility] medical records  [id number]        Your Vitals Were     Pulse Temperature                81 97.7  F (36.5  C) (Oral)           Blood Pressure from Last 3 Encounters:   09/22/18 105/64   09/12/18 99/61   07/27/18 112/72    Weight from Last 3 Encounters:   09/12/18 134 lb (60.8 kg)   07/27/18 135 lb 14.4 oz (61.6 kg)   09/13/17 134 lb 3.2 oz (60.9 kg)              Today, you had the following     No orders found for display         Today's Medication Changes          These changes are accurate as of 9/22/18  9:30 AM.  If you have any questions, ask your nurse or doctor.               Start taking these medicines.        Dose/Directions     fluticasone 50 MCG/ACT spray   Commonly known as:  FLONASE   Used for:  Dysfunction of both eustachian tubes        Dose:  2 spray   Spray 2 sprays into both nostrils daily   Quantity:  1 Bottle   Refills:  0            Where to get your medicines      These medications were sent to [id number] - [place], [place] - [address] [address] AT [facility] [address]  [address], [facility] [address]     Phone:  [phone]     fluticasone 50 MCG/ACT spray                Primary Care Provider Office Phone # Fax #    Chana Armenta [phone]       [address]  [facility] [address]        Equal Access to Services     CARLOS JOHANSEN : Alona Carmona, waaxda luqadaha, qaybta kaalmada adeegyada, ang haider. So [facility] [phone].    ATENCIÓN: Si habla español, tiene a sesay disposición servicios gratuitos de asistencia lingüística. [facility] [phone].    We comply with applicable federal civil rights laws and [place] laws. We do not discriminate on the basis of race, color, national origin, age, disability, sex, sexual orientation, or gender identity.            Thank you!     Thank you for choosing [facility]  for your care. Our goal is always to provide you with excellent care. Hearing back from our patients is one way we can continue to improve our services. Please take a few minutes to complete the written survey that you may receive in the mail after your visit with us. Thank you!             Your Updated Medication List - Protect others around you: Learn how to safely use, store and throw away your medicines at www.disposemymeds.org.          This list is accurate as of 9/22/18  9:30 AM.  Always use your most recent med list.                   Brand Name Dispense Instructions for use Diagnosis    acetaminophen 500 MG tablet    TYLENOL     Take 1-2 tablets by mouth every 6 hours as needed for pain.         ADVIL 200 MG capsule   Generic drug:  ibuprofen      Take 200 mg by mouth as needed for fever        aspirin 81 MG tablet      1 TABLET DAILY        * bimatoprost 0.03 % Soln    latisse    1 Bottle    Externally apply topically At Bedtime    High risk medication use       * bimatoprost 0.03 % Soln    latisse     Externally apply topically At Bedtime        fluticasone 50 MCG/ACT spray    FLONASE    1 Bottle    Spray 2 sprays into both nostrils daily    Dysfunction of both eustachian tubes       folic acid 1 MG tablet    FOLVITE    100 tablet    Take 1 tablet (1 mg) by mouth daily    Rheumatoid arthritis involving multiple sites with positive rheumatoid factor (H)       hydroxychloroquine 200 MG tablet    PLAQUENIL    135 tablet    Hydroxychloroquine 200mg daily; and an additional 200mg every other day.    Rheumatoid arthritis involving multiple sites with positive rheumatoid factor (H)       loratadine 10 MG capsule      Take 10 mg by mouth as needed for allergies        methotrexate sodium 2.5 MG Tabs     24 tablet    Take 6 tablets (15 mg) by mouth once a week . Take all 6 tablets on the same day of each week.    Rheumatoid arthritis involving multiple sites with positive rheumatoid factor (H)       MUCINEX ALLERGY PO           MULTIPLE VITAMINS/WOMENS PO      None Entered        Omega-3 Fatty Acids 600 MG Caps     30    Reported on 3/15/2017    Stomatitis and mucositis (ulcerative)       * Notice:  This list has 2 medication(s) that are the same as other medications prescribed for you. Read the directions carefully, and ask your doctor or other care provider to review them with you.

## 2018-09-22 NOTE — PROGRESS NOTES
Chief Complaint   Patient presents with     Otalgia     bilateral ear pain, left ear worse since last night, sore throat since thursday, pain has got better, no fevers     Sinus Problem     since last night, sinus pain and congestion        SUBJECTIVE:  Zena Quintana is a 53 year old female who presents with bilateral ear pain L>R since yesterday.  Severity: moderate   Timing:gradual onset and still present  Additional symptoms include sore throat x 3 days/has improved, sinus congestion, pressure congestion/pressure since last night    History of recurrent otitis: no  Tried OTC cold med    Past Medical History:   Diagnosis Date     Allergic rhinitis, cause unspecified     Allergic rhinitis     Endometriosis, site unspecified     Endometriosis     Female infertility of other specified origin      Iron deficiency anemia, unspecified     Anemia, iron def.     RA (rheumatoid arthritis) (H)      Stomatitis and mucositis (ulcerative)     chronic - non-specific     Current Outpatient Prescriptions   Medication Sig Dispense Refill     ASPIRIN 81 MG OR TABS 1 TABLET DAILY       bimatoprost (LATISSE) 0.03 % SOLN Externally apply topically At Bedtime 1 Bottle 3     Fexofenadine HCl (MUCINEX ALLERGY PO)        folic acid (FOLVITE) 1 MG tablet Take 1 tablet (1 mg) by mouth daily 100 tablet 3     hydroxychloroquine (PLAQUENIL) 200 MG tablet Hydroxychloroquine 200mg daily; and an additional 200mg every other day. 135 tablet 3     loratadine 10 MG capsule Take 10 mg by mouth as needed for allergies       methotrexate sodium 2.5 MG TABS Take 6 tablets (15 mg) by mouth once a week . Take all 6 tablets on the same day of each week. 24 tablet 4     MULTIPLE VITAMINS/WOMENS OR None Entered       acetaminophen (TYLENOL) 500 MG tablet Take 1-2 tablets by mouth every 6 hours as needed for pain. (Patient not taking: Reported on 7/27/2018)       bimatoprost (LATISSE) 0.03 % SOLN Externally apply topically At Bedtime       ibuprofen (ADVIL)  200 MG capsule Take 200 mg by mouth as needed for fever       OMEGA-3 FATTY ACIDS 600 MG OR CAPS Reported on 3/15/2017 30 0        Allergies   Allergen Reactions     Penicillins Hives     hives        History   Smoking Status     Never Smoker   Smokeless Tobacco     Never Used     Comment: no smokers in household       ROS:   CONSTITUTIONAL:NEGATIVE for fever, chills,  ENT/MOUTH: POSITIVE for ear pain bilateral L>R, nasal congestion and rhinorrhea-purulent  RESP:NEGATIVE for significant cough or wheezing    OBJECTIVE:  /64 (BP Location: Left arm)  Pulse 81  Temp 97.7  F (36.5  C) (Oral)  The right TM is gray, neutral position with serous effusion   The right auditory canal is normal and without drainage, edema or erythema  The left TM is gray, neutral position with serous effusion   The left auditory canal is normal and without drainage, edema or erythema  Nose boggy, scant rhinorrhea.  Oropharynx exam is normal: no lesions, erythema, adenopathy or exudate.  GENERAL: no acute distress  EYES: conjunctiva clear  NECK: supple, non-tender to palpation, no adenopathy noted  RESP: lungs clear to auscultation - no rales, rhonchi or wheezes  CV: regular rates and rhythm, normal S1 S2, no murmur noted  SKIN: no suspicious lesions or rashes     ASSESSMENT:  (H69.83) Dysfunction of both eustachian tubes  (primary encounter diagnosis)  (J06.9,  B97.89) Viral URI     PLAN:  Plan: fluticasone (FLONASE) 50 MCG/ACT spray  Sudafed (decongestant) for congestion.  Continue antihistamine daily (zyrtec or claritin)  Flonase nasal spray 2 places in each nostril daily   Continue for few days AFTER ears symptoms resovle  Apply warm compresses/packs for 15 minutes daily.  Steam treatments or humidifier.  Tylenol or ibuprofen as needed for pain or fever  Please follow up with primary care provider if not improving, worsening or new symptoms      Tiarra Ramirez PA-C  [facility] [place]

## 2018-09-28 ENCOUNTER — OFFICE VISIT (OUTPATIENT)
Dept: URGENT CARE | Facility: RETAIL CLINIC | Age: 53
End: 2018-09-28
Payer: COMMERCIAL

## 2018-09-28 VITALS
OXYGEN SATURATION: 100 % | TEMPERATURE: 97.3 F | SYSTOLIC BLOOD PRESSURE: 120 MMHG | DIASTOLIC BLOOD PRESSURE: 73 MMHG | HEART RATE: 69 BPM

## 2018-09-28 DIAGNOSIS — J20.9 ACUTE BRONCHITIS WITH COEXISTING CONDITION REQUIRING PROPHYLACTIC TREATMENT: Primary | ICD-10-CM

## 2018-09-28 DIAGNOSIS — J98.01 ACUTE BRONCHOSPASM: ICD-10-CM

## 2018-09-28 DIAGNOSIS — Z29.9 PROPHYLACTIC MEASURE: ICD-10-CM

## 2018-09-28 PROCEDURE — 99213 OFFICE O/P EST LOW 20 MIN: CPT | Performed by: PHYSICIAN ASSISTANT

## 2018-09-28 RX ORDER — ALBUTEROL SULFATE 90 UG/1
2 AEROSOL, METERED RESPIRATORY (INHALATION) EVERY 4 HOURS PRN
Qty: 1 INHALER | Refills: 0 | Status: SHIPPED | OUTPATIENT
Start: 2018-09-28 | End: 2022-07-18

## 2018-09-28 RX ORDER — PREDNISONE 20 MG/1
40 TABLET ORAL DAILY
Qty: 10 TABLET | Refills: 0 | Status: SHIPPED | OUTPATIENT
Start: 2018-09-28 | End: 2018-10-03

## 2018-09-28 RX ORDER — AZITHROMYCIN 250 MG/1
TABLET, FILM COATED ORAL
Qty: 6 TABLET | Refills: 0 | Status: SHIPPED | OUTPATIENT
Start: 2018-09-28 | End: 2019-03-08

## 2018-09-28 RX ORDER — ONDANSETRON 4 MG/1
TABLET, ORALLY DISINTEGRATING ORAL
Qty: 10 TABLET | Refills: 0 | Status: SHIPPED | OUTPATIENT
Start: 2018-09-28 | End: 2020-12-09

## 2018-09-28 NOTE — PATIENT INSTRUCTIONS
Azithromycin 250 mg as directed- 2 pills together at the same time today then 1 pill a day for the next 4 days.  Albuterol 2 puffs every 4 hours as needed for tightness in chest.  Prednisone 40 mg daily for 5 days.  May take Zofran 30 min before prednisone dose.    Rest! Your body needs more rest to heal.  Drink plenty of fluids (warm fluids like tea or soup are soothing and reduce cough)  Sit in the bathroom with a hot shower running and breathe in the steam.  Honey may soothe your sore throat and help manage your cough- may take straight or in warm water with lemon juice.  Avoid smoke (cigarettes, bonfires, fireplace, wood burning stoves).  Take Tylenol or an NSAID such as ibuprofen or naproxen as needed for pain.  Delsym (dextromethorphan polistirex) is an over the counter cough medication that lasts 12 hours.   Mucinex or Robitussin (guiafenesin) thin mucus and may help it to loosen more quickly  Good handwashing is the best way to prevent spread of germs  Present to emergency room if you develop trouble breathing, swallowing or cough-up blood.  Follow up with your primary care provider if symptoms worsen or fail to improve as expected.

## 2018-09-28 NOTE — MR AVS SNAPSHOT
After Visit Summary   9/28/2018    Zena Quintana    MRN: [id number]           Patient Information     Date Of Birth          1965        Visit Information        Provider Department      9/28/2018 5:35 PM Rosy Jackson PA-C [facility]        Today's Diagnoses     Acute bronchitis with coexisting condition requiring prophylactic treatment    -  1    Acute bronchospasm        Prophylactic measure          Care Instructions    Azithromycin 250 mg as directed- 2 pills together at the same time today then 1 pill a day for the next 4 days.  Albuterol 2 puffs every 4 hours as needed for tightness in chest.  Prednisone 40 mg daily for 5 days.  May take Zofran 30 min before prednisone dose.    Rest! Your body needs more rest to heal.  Drink plenty of fluids (warm fluids like tea or soup are soothing and reduce cough)  Sit in the bathroom with a hot shower running and breathe in the steam.  Honey may soothe your sore throat and help manage your cough- may take straight or in warm water with lemon juice.  Avoid smoke (cigarettes, bonfires, fireplace, wood burning stoves).  Take Tylenol or an NSAID such as ibuprofen or naproxen as needed for pain.  Delsym (dextromethorphan polistirex) is an over the counter cough medication that lasts 12 hours.   Mucinex or Robitussin (guiafenesin) thin mucus and may help it to loosen more quickly  Good handwashing is the best way to prevent spread of germs  Present to emergency room if you develop trouble breathing, swallowing or cough-up blood.  Follow up with your primary care provider if symptoms worsen or fail to improve as expected.          Follow-ups after your visit        Your next 10 appointments already scheduled     Oct 15, 2018  5:30 PM CDT   LAB with NL LAB [facility]   [facility]    [address] [facility] [address] [phone]           Please do not eat 10-12 hours before your  appointment if you are coming in fasting for labs on lipids, cholesterol, or glucose (sugar). This does not apply to pregnant women. Water, hot tea and black coffee (with nothing added) are okay. Do not drink other fluids, diet soda or chew gum.            Nov 12, 2018  5:15 PM CST   [facility]    [address] [address]   [phone]           Please do not eat 10-12 hours before your appointment if you are coming in fasting for labs on lipids, cholesterol, or glucose (sugar). This does not apply to pregnant women. Water, hot tea and black coffee (with nothing added) are okay. Do not drink other fluids, diet soda or chew gum.            Dec 17, 2018  5:30 PM CST   LAB with NL LAB [facility] ([facility])    [address] [address]   [phone]           Please do not eat 10-12 hours before your appointment if you are coming in fasting for labs on lipids, cholesterol, or glucose (sugar). This does not apply to pregnant women. Water, hot tea and black coffee (with nothing added) are okay. Do not drink other fluids, diet soda or chew gum.            Mikel 10, 2019  3:40 PM CST   Return Visit with Rome Hardy MD   [facility] ([facility])    [address] [address]   [phone]              Who to contact     You can reach your care team any time of the day by calling [phone].  Notification of test results:  If you have an abnormal lab result, we will notify you by phone as soon as possible.         Additional Information About Your Visit        [facility] Information     [employer] gives you secure access to your electronic health record. If you see a primary care provider, you can also send messages to your care team and make appointments. If you have questions, please call your primary care clinic.  If you do not have a primary  care provider, please call [phone] and they will assist you.        Care EveryWhere ID     This is your Care EveryWhere ID. This could be used by other organizations to access your [facility] medical records  [id number]        Your Vitals Were     Pulse Temperature Pulse Oximetry             69 97.3  F (36.3  C) (Tympanic) 100%          Blood Pressure from Last 3 Encounters:   09/28/18 120/73   09/22/18 105/64   09/12/18 99/61    Weight from Last 3 Encounters:   09/12/18 134 lb (60.8 kg)   07/27/18 135 lb 14.4 oz (61.6 kg)   09/13/17 134 lb 3.2 oz (60.9 kg)              Today, you had the following     No orders found for display         Today's Medication Changes          These changes are accurate as of 9/28/18  6:34 PM.  If you have any questions, ask your nurse or doctor.               Start taking these medicines.        Dose/Directions    albuterol 108 (90 Base) MCG/ACT inhaler   Commonly known as:  PROAIR HFA/PROVENTIL HFA/VENTOLIN HFA   Used for:  Acute bronchospasm        Dose:  2 puff   Inhale 2 puffs into the lungs every 4 hours as needed for shortness of breath / dyspnea or wheezing   Quantity:  1 Inhaler   Refills:  0       azithromycin 250 MG tablet   Commonly known as:  ZITHROMAX   Used for:  Acute bronchitis with coexisting condition requiring prophylactic treatment        Two tablets first day, then one tablet daily for four days.   Quantity:  6 tablet   Refills:  0       ondansetron 4 MG ODT tab   Commonly known as:  ZOFRAN ODT   Used for:  Prophylactic measure        Take 1-2 tablet 30 min before prednisone.   Quantity:  10 tablet   Refills:  0       predniSONE 20 MG tablet   Commonly known as:  DELTASONE   Used for:  Acute bronchitis with coexisting condition requiring prophylactic treatment, Acute bronchospasm        Dose:  40 mg   Take 2 tablets (40 mg) by mouth daily for 5 days   Quantity:  10 tablet   Refills:  0            Where to get your medicines      These medications were sent to  [id number] - [place], [place] - [address]  [address] [facility] [address]     Phone:  [phone]     albuterol 108 (90 Base) MCG/ACT inhaler    azithromycin 250 MG tablet    ondansetron 4 MG ODT tab    predniSONE 20 MG tablet                Primary Care Provider Office Phone # Fax #    Chana Armenta [phone]       [address]  [facility] [address]        Equal Access to Services     CARLOS JOHANSEN : Hadii aad ku hadasho Soomaali, waaxda luqadaha, qaybta kaalmada adeegyada, waxay idiin hayberton debbie stewart . So [facility] [phone].    ATENCIÓN: Si habla español, tiene a sesay disposición servicios gratuitos de asistencia lingüística. [facility] [phone].    We comply with applicable federal civil rights laws and [place] laws. We do not discriminate on the basis of race, color, national origin, age, disability, sex, sexual orientation, or gender identity.            Thank you!     Thank you for choosing [facility]  for your care. Our goal is always to provide you with excellent care. Hearing back from our patients is one way we can continue to improve our services. Please take a few minutes to complete the written survey that you may receive in the mail after your visit with us. Thank you!             Your Updated Medication List - Protect others around you: Learn how to safely use, store and throw away your medicines at www.disposemymeds.org.          This list is accurate as of 9/28/18  6:34 PM.  Always use your most recent med list.                   Brand Name Dispense Instructions for use Diagnosis    acetaminophen 500 MG tablet    TYLENOL     Take 1-2 tablets by mouth every 6 hours as needed for pain.        ADVIL 200 MG capsule   Generic drug:  ibuprofen      Take 200 mg by mouth as needed for fever        albuterol 108 (90 Base) MCG/ACT inhaler    PROAIR HFA/PROVENTIL HFA/VENTOLIN HFA    1 Inhaler    Inhale 2 puffs into the  lungs every 4 hours as needed for shortness of breath / dyspnea or wheezing    Acute bronchospasm       aspirin 81 MG tablet      1 TABLET DAILY        azithromycin 250 MG tablet    ZITHROMAX    6 tablet    Two tablets first day, then one tablet daily for four days.    Acute bronchitis with coexisting condition requiring prophylactic treatment       * bimatoprost 0.03 % Soln    latisse    1 Bottle    Externally apply topically At Bedtime    High risk medication use       * bimatoprost 0.03 % Soln    latisse     Externally apply topically At Bedtime        fluticasone 50 MCG/ACT spray    FLONASE    1 Bottle    Spray 2 sprays into both nostrils daily    Dysfunction of both eustachian tubes       folic acid 1 MG tablet    FOLVITE    100 tablet    Take 1 tablet (1 mg) by mouth daily    Rheumatoid arthritis involving multiple sites with positive rheumatoid factor (H)       hydroxychloroquine 200 MG tablet    PLAQUENIL    135 tablet    Hydroxychloroquine 200mg daily; and an additional 200mg every other day.    Rheumatoid arthritis involving multiple sites with positive rheumatoid factor (H)       loratadine 10 MG capsule      Take 10 mg by mouth as needed for allergies        methotrexate sodium 2.5 MG Tabs     24 tablet    Take 6 tablets (15 mg) by mouth once a week . Take all 6 tablets on the same day of each week.    Rheumatoid arthritis involving multiple sites with positive rheumatoid factor (H)       MUCINEX ALLERGY PO           MULTIPLE VITAMINS/WOMENS PO      None Entered        Omega-3 Fatty Acids 600 MG Caps     30    Reported on 3/15/2017    Stomatitis and mucositis (ulcerative)       ondansetron 4 MG ODT tab    ZOFRAN ODT    10 tablet    Take 1-2 tablet 30 min before prednisone.    Prophylactic measure       predniSONE 20 MG tablet    DELTASONE    10 tablet    Take 2 tablets (40 mg) by mouth daily for 5 days    Acute bronchitis with coexisting condition requiring prophylactic treatment, Acute bronchospasm        * Notice:  This list has 2 medication(s) that are the same as other medications prescribed for you. Read the directions carefully, and ask your doctor or other care provider to review them with you.

## 2018-10-15 DIAGNOSIS — M05.79 RHEUMATOID ARTHRITIS INVOLVING MULTIPLE SITES WITH POSITIVE RHEUMATOID FACTOR (H): ICD-10-CM

## 2018-10-15 LAB
ALBUMIN SERPL-MCNC: 4.2 G/DL (ref 3.4–5)
ALP SERPL-CCNC: 47 U/L (ref 40–150)
ALT SERPL W P-5'-P-CCNC: 22 U/L (ref 0–50)
AST SERPL W P-5'-P-CCNC: 21 U/L (ref 0–45)
BASOPHILS # BLD AUTO: 0 10E9/L (ref 0–0.2)
BASOPHILS NFR BLD AUTO: 0.3 %
BILIRUB DIRECT SERPL-MCNC: <0.1 MG/DL (ref 0–0.2)
BILIRUB SERPL-MCNC: 0.3 MG/DL (ref 0.2–1.3)
CREAT SERPL-MCNC: 1.04 MG/DL (ref 0.52–1.04)
DIFFERENTIAL METHOD BLD: NORMAL
EOSINOPHIL # BLD AUTO: 0.2 10E9/L (ref 0–0.7)
EOSINOPHIL NFR BLD AUTO: 3.3 %
ERYTHROCYTE [DISTWIDTH] IN BLOOD BY AUTOMATED COUNT: 12.8 % (ref 10–15)
GFR SERPL CREATININE-BSD FRML MDRD: 55 ML/MIN/1.7M2
HCT VFR BLD AUTO: 39.5 % (ref 35–47)
HGB BLD-MCNC: 12.6 G/DL (ref 11.7–15.7)
LYMPHOCYTES # BLD AUTO: 1.6 10E9/L (ref 0.8–5.3)
LYMPHOCYTES NFR BLD AUTO: 25.2 %
MCH RBC QN AUTO: 30.1 PG (ref 26.5–33)
MCHC RBC AUTO-ENTMCNC: 31.9 G/DL (ref 31.5–36.5)
MCV RBC AUTO: 94 FL (ref 78–100)
MONOCYTES # BLD AUTO: 0.6 10E9/L (ref 0–1.3)
MONOCYTES NFR BLD AUTO: 9 %
NEUTROPHILS # BLD AUTO: 3.9 10E9/L (ref 1.6–8.3)
NEUTROPHILS NFR BLD AUTO: 62.2 %
PLATELET # BLD AUTO: 323 10E9/L (ref 150–450)
PROT SERPL-MCNC: 7.7 G/DL (ref 6.8–8.8)
RBC # BLD AUTO: 4.19 10E12/L (ref 3.8–5.2)
WBC # BLD AUTO: 6.3 10E9/L (ref 4–11)

## 2018-10-15 PROCEDURE — 36415 COLL VENOUS BLD VENIPUNCTURE: CPT | Performed by: INTERNAL MEDICINE

## 2018-10-15 PROCEDURE — 82565 ASSAY OF CREATININE: CPT | Performed by: INTERNAL MEDICINE

## 2018-10-15 PROCEDURE — 85025 COMPLETE CBC W/AUTO DIFF WBC: CPT | Performed by: INTERNAL MEDICINE

## 2018-10-15 PROCEDURE — 80076 HEPATIC FUNCTION PANEL: CPT | Performed by: INTERNAL MEDICINE

## 2018-10-20 NOTE — PROGRESS NOTES
"[employer] message sent:  \"Ms. Quintana,    Labs are stable.  There is mild renal insufficiency that is similar to labs from September 2017.     Sincerely,  Rome Hardy MD  10/20/2018 1:07 PM\""

## 2018-11-12 DIAGNOSIS — M05.79 RHEUMATOID ARTHRITIS INVOLVING MULTIPLE SITES WITH POSITIVE RHEUMATOID FACTOR (H): ICD-10-CM

## 2018-11-12 LAB
ALBUMIN SERPL-MCNC: 4.1 G/DL (ref 3.4–5)
ALP SERPL-CCNC: 42 U/L (ref 40–150)
ALT SERPL W P-5'-P-CCNC: 25 U/L (ref 0–50)
AST SERPL W P-5'-P-CCNC: 25 U/L (ref 0–45)
BASOPHILS # BLD AUTO: 0 10E9/L (ref 0–0.2)
BASOPHILS NFR BLD AUTO: 0.7 %
BILIRUB DIRECT SERPL-MCNC: <0.1 MG/DL (ref 0–0.2)
BILIRUB SERPL-MCNC: 0.3 MG/DL (ref 0.2–1.3)
CREAT SERPL-MCNC: 1.02 MG/DL (ref 0.52–1.04)
DIFFERENTIAL METHOD BLD: ABNORMAL
EOSINOPHIL # BLD AUTO: 0.2 10E9/L (ref 0–0.7)
EOSINOPHIL NFR BLD AUTO: 3.1 %
ERYTHROCYTE [DISTWIDTH] IN BLOOD BY AUTOMATED COUNT: 13.7 % (ref 10–15)
GFR SERPL CREATININE-BSD FRML MDRD: 56 ML/MIN/1.7M2
HCT VFR BLD AUTO: 35.9 % (ref 35–47)
HGB BLD-MCNC: 11.6 G/DL (ref 11.7–15.7)
LYMPHOCYTES # BLD AUTO: 1.4 10E9/L (ref 0.8–5.3)
LYMPHOCYTES NFR BLD AUTO: 25 %
MCH RBC QN AUTO: 30.9 PG (ref 26.5–33)
MCHC RBC AUTO-ENTMCNC: 32.3 G/DL (ref 31.5–36.5)
MCV RBC AUTO: 96 FL (ref 78–100)
MONOCYTES # BLD AUTO: 0.8 10E9/L (ref 0–1.3)
MONOCYTES NFR BLD AUTO: 14 %
NEUTROPHILS # BLD AUTO: 3.3 10E9/L (ref 1.6–8.3)
NEUTROPHILS NFR BLD AUTO: 57.2 %
PLATELET # BLD AUTO: 286 10E9/L (ref 150–450)
PROT SERPL-MCNC: 7.3 G/DL (ref 6.8–8.8)
RBC # BLD AUTO: 3.75 10E12/L (ref 3.8–5.2)
WBC # BLD AUTO: 5.7 10E9/L (ref 4–11)

## 2018-11-12 PROCEDURE — 82565 ASSAY OF CREATININE: CPT | Performed by: INTERNAL MEDICINE

## 2018-11-12 PROCEDURE — 85025 COMPLETE CBC W/AUTO DIFF WBC: CPT | Performed by: INTERNAL MEDICINE

## 2018-11-12 PROCEDURE — 36415 COLL VENOUS BLD VENIPUNCTURE: CPT | Performed by: INTERNAL MEDICINE

## 2018-11-12 PROCEDURE — 80076 HEPATIC FUNCTION PANEL: CPT | Performed by: INTERNAL MEDICINE

## 2018-11-13 ENCOUNTER — TELEPHONE (OUTPATIENT)
Dept: FAMILY MEDICINE | Facility: OTHER | Age: 53
End: 2018-11-13

## 2018-11-13 DIAGNOSIS — Z12.31 ENCOUNTER FOR SCREENING MAMMOGRAM FOR BREAST CANCER: Primary | ICD-10-CM

## 2018-11-13 NOTE — TELEPHONE ENCOUNTER
Summary:    Patient is due/failing the following:   MAMMOGRAM    Action needed:   Schedule a mammogram     Type of outreach:    Phone, spoke to patient.  patient scheduled    Questions for provider review:    None                                                                                                                                    Patricia Prado       Chart routed to Care Team .    Panel Management Review      Patient has the following on her problem list: None      Composite cancer screening  Chart review shows that this patient is due/due soon for the following Mammogram

## 2018-11-14 ENCOUNTER — RADIANT APPOINTMENT (OUTPATIENT)
Dept: MAMMOGRAPHY | Facility: CLINIC | Age: 53
End: 2018-11-14
Attending: FAMILY MEDICINE
Payer: COMMERCIAL

## 2018-11-14 DIAGNOSIS — Z12.31 ENCOUNTER FOR SCREENING MAMMOGRAM FOR BREAST CANCER: ICD-10-CM

## 2018-11-14 PROCEDURE — 77067 SCR MAMMO BI INCL CAD: CPT | Performed by: RADIOLOGY

## 2018-11-14 PROCEDURE — 77063 BREAST TOMOSYNTHESIS BI: CPT | Performed by: RADIOLOGY

## 2018-11-19 ENCOUNTER — TELEPHONE (OUTPATIENT)
Dept: FAMILY MEDICINE | Facility: OTHER | Age: 53
End: 2018-11-19

## 2018-11-19 NOTE — TELEPHONE ENCOUNTER
----- Message from Chana Armenta MD sent at 11/16/2018  6:40 PM CST -----  Mammogram did not show any concerning lesions. Needs repeat in 1 yr

## 2018-12-17 DIAGNOSIS — M05.79 RHEUMATOID ARTHRITIS INVOLVING MULTIPLE SITES WITH POSITIVE RHEUMATOID FACTOR (H): ICD-10-CM

## 2018-12-17 LAB
ALBUMIN SERPL-MCNC: 4.1 G/DL (ref 3.4–5)
ALP SERPL-CCNC: 43 U/L (ref 40–150)
ALT SERPL W P-5'-P-CCNC: 22 U/L (ref 0–50)
AST SERPL W P-5'-P-CCNC: 21 U/L (ref 0–45)
BASOPHILS # BLD AUTO: 0 10E9/L (ref 0–0.2)
BASOPHILS NFR BLD AUTO: 0.3 %
BILIRUB DIRECT SERPL-MCNC: <0.1 MG/DL (ref 0–0.2)
BILIRUB SERPL-MCNC: 0.2 MG/DL (ref 0.2–1.3)
CREAT SERPL-MCNC: 0.93 MG/DL (ref 0.52–1.04)
CRP SERPL-MCNC: <2.9 MG/L (ref 0–8)
DIFFERENTIAL METHOD BLD: ABNORMAL
EOSINOPHIL # BLD AUTO: 0.1 10E9/L (ref 0–0.7)
EOSINOPHIL NFR BLD AUTO: 1.8 %
ERYTHROCYTE [DISTWIDTH] IN BLOOD BY AUTOMATED COUNT: 13.8 % (ref 10–15)
ERYTHROCYTE [SEDIMENTATION RATE] IN BLOOD BY WESTERGREN METHOD: 9 MM/H (ref 0–30)
GFR SERPL CREATININE-BSD FRML MDRD: 63 ML/MIN/1.7M2
HCT VFR BLD AUTO: 34.5 % (ref 35–47)
HGB BLD-MCNC: 11.1 G/DL (ref 11.7–15.7)
LYMPHOCYTES # BLD AUTO: 1.3 10E9/L (ref 0.8–5.3)
LYMPHOCYTES NFR BLD AUTO: 21.6 %
MCH RBC QN AUTO: 31.4 PG (ref 26.5–33)
MCHC RBC AUTO-ENTMCNC: 32.2 G/DL (ref 31.5–36.5)
MCV RBC AUTO: 98 FL (ref 78–100)
MONOCYTES # BLD AUTO: 0.8 10E9/L (ref 0–1.3)
MONOCYTES NFR BLD AUTO: 13.5 %
NEUTROPHILS # BLD AUTO: 3.8 10E9/L (ref 1.6–8.3)
NEUTROPHILS NFR BLD AUTO: 62.8 %
PLATELET # BLD AUTO: 265 10E9/L (ref 150–450)
PROT SERPL-MCNC: 7.2 G/DL (ref 6.8–8.8)
RBC # BLD AUTO: 3.54 10E12/L (ref 3.8–5.2)
WBC # BLD AUTO: 6 10E9/L (ref 4–11)

## 2018-12-17 PROCEDURE — 36415 COLL VENOUS BLD VENIPUNCTURE: CPT | Performed by: INTERNAL MEDICINE

## 2018-12-17 PROCEDURE — 80076 HEPATIC FUNCTION PANEL: CPT | Performed by: INTERNAL MEDICINE

## 2018-12-17 PROCEDURE — 85652 RBC SED RATE AUTOMATED: CPT | Performed by: INTERNAL MEDICINE

## 2018-12-17 PROCEDURE — 85025 COMPLETE CBC W/AUTO DIFF WBC: CPT | Performed by: INTERNAL MEDICINE

## 2018-12-17 PROCEDURE — 86140 C-REACTIVE PROTEIN: CPT | Performed by: INTERNAL MEDICINE

## 2018-12-17 PROCEDURE — 82565 ASSAY OF CREATININE: CPT | Performed by: INTERNAL MEDICINE

## 2018-12-26 NOTE — RESULT ENCOUNTER NOTE
"Basic-Fitt message sent:  \"Ms. Quintana,    Labs showed mild anemia.  We will discuss this in more detail at your follow-up appointment.     Sincerely,  Rome Hardy MD  12/26/2018 8:04 AM\""

## 2019-01-10 ENCOUNTER — OFFICE VISIT (OUTPATIENT)
Dept: RHEUMATOLOGY | Facility: CLINIC | Age: 54
End: 2019-01-10
Payer: COMMERCIAL

## 2019-01-10 VITALS
BODY MASS INDEX: 21.73 KG/M2 | WEIGHT: 135.2 LBS | HEIGHT: 66 IN | DIASTOLIC BLOOD PRESSURE: 58 MMHG | SYSTOLIC BLOOD PRESSURE: 100 MMHG | HEART RATE: 67 BPM | OXYGEN SATURATION: 98 %

## 2019-01-10 DIAGNOSIS — Z23 NEED FOR PROPHYLACTIC VACCINATION AND INOCULATION AGAINST INFLUENZA: ICD-10-CM

## 2019-01-10 DIAGNOSIS — M05.79 RHEUMATOID ARTHRITIS INVOLVING MULTIPLE SITES WITH POSITIVE RHEUMATOID FACTOR (H): Primary | ICD-10-CM

## 2019-01-10 DIAGNOSIS — Z79.899 HIGH RISK MEDICATION USE: ICD-10-CM

## 2019-01-10 PROCEDURE — 90471 IMMUNIZATION ADMIN: CPT | Performed by: INTERNAL MEDICINE

## 2019-01-10 PROCEDURE — 99213 OFFICE O/P EST LOW 20 MIN: CPT | Mod: 25 | Performed by: INTERNAL MEDICINE

## 2019-01-10 PROCEDURE — 90686 IIV4 VACC NO PRSV 0.5 ML IM: CPT | Performed by: INTERNAL MEDICINE

## 2019-01-10 RX ORDER — METHOTREXATE 2.5 MG/1
10 TABLET ORAL WEEKLY
Qty: 48 TABLET | Refills: 1 | Status: SHIPPED | OUTPATIENT
Start: 2019-01-10 | End: 2019-04-17

## 2019-01-10 ASSESSMENT — MIFFLIN-ST. JEOR: SCORE: 1222.07

## 2019-01-10 NOTE — PROGRESS NOTES

## 2019-01-10 NOTE — NURSING NOTE
RAPID3 (0-30) Cumulative Score  1.0          RAPID3 Weighted Score (divide #4 by 3 and that is the weighted score)  0.33     Genesis Medley [facility]  1/10/2019 3:58 PM

## 2019-01-10 NOTE — PROGRESS NOTES
Rheumatology Clinic Visit      Zena Quintana MRN# [id number]   YOB: 1965 Age: 53 year old      Date of visit: 1/10/19   PCP: Dr. Chana Armenta    Chief Complaint   Patient presents with:  Arthritis: RA. Patient states she sometimes has pain in shoulder, hand and elbows      Assessment and Plan     1. Seropositive nonerosive rheumatoid arthritis (RF low positive, CCP negative): Reportedly at the time of diagnosis she had synovitis in a symmetric distribution that was steroid responsive. Currently on hydroxychloroquine 300 mg daily on average (started in approximately 2007) and MTX 15mg once weekly (started 2018).  Mild anemia and elevating MCV; previous symmetric joint tenderness resolved and she reports improvement since adding MTX; because of anemia will reduce MTX.  Note that anemia is within the range she has been in in the past, but is trending down. If worsening symptoms then could increase MTX and folic acid.    - reduce Methotrexate to 10mg once weekly   - Continue folic acid 1mg daily  - Continue hydroxychloroquine 200mg daily with an additional 200mg every other day (last eye exam was on 3/19/2018)  - Labs in 3 months: CBC, Creatinine, Hepatic Panel, ESR, CRP    2. 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 Left shoulder doing better s/p surgery.    3. Sjogren's syndrome: NJ by EIA negative but positive by IF, SSA and SSB negative, and minor salivary gland biopsy negative. She is currently following with ophthalmology who has given her eyedrops that have been beneficial.  Dry mouth is currently treated with frequent sips of water; she has good oral hygiene, follows with a dentist regularly, and does not have frequent cavities. Pilocarpine was previously Rx'd but she has not started it and does not plan to; will remove from  the medication list.     4. History of oral sores / family history of lupus / polyarthritis / recurrent sinus infections: She is not having oral sores or recurrent sinus infections for several years now. Retrospectively, there would be concern for potential ANCA associated sinus issues and this could be checked if she had recurrent sinusitis again. Oral sores have improved since she started Plaquenil, that argues it could be connective tissue disease related. NJ was negative by EIA, but complement was on the low end of the normal range so NJ was rechecked and positive; further testing was negative.    5. Vaccinations: Vaccinations reviewed with Ms. Quintana.  Risks and benefits of vaccinations were discussed.  - Influenza: encouraged yearly vaccination  - [id number]: up to date  - [id number]: up to date   - Shingrix: she will consider receiving in the future     6. Bone Health: mother with hx of vertebral compression fracture.  Post-menopausal.  Taking vitamin D that is in the multivitamin.  Check DEXA for osteoporosis screening.  - DEXA pending    Ms. Quintana verbalized agreement with and understanding of the rational for the diagnosis and treatment plan.  All questions were answered to best of my ability and the patient's satisfaction. Ms. Quintana was advised to contact the clinic with any questions that may arise after the clinic visit.      Thank you for involving me in the care of the patient    Return to clinic: 3-4 months      HPI   Zena Quintana is a 53 year old female with medical history significant for iron deficiency anemia, nephrolithiasis, hyperlipidemia, xerostomia, dry eyes, and rheumatoid arthritis who presents for follow-up of rheumatoid arthritis.    Today, Ms. Quintana reports doing okay.  Morning stiffness for about 20 minutes; hours if during a flare and less some other days.  Flaring for 1-2 days at a time with worsening joint pain and stiffness.  +gelling phenomenon.  All joint symptoms are  worse in the AM and improved by evening.  Advil 400mg 1-2x/day is helpful when needed. Didn't start pilocarpine. Had kidney stones requiring stenting by urology and she continues to follow with urology.     Denies fevers, chills, nausea, vomiting, constipation, diarrhea. No abdominal pain. No chest pain/pressure, palpitations, or shortness of breath. No neck pain. No rash. No LE swelling.  No photosensitivity or photophobia.  No sicca symptoms.  No eye pain or redness. No oral sores.  She has not had a sinus infection for several years now.    Tobacco: None  EtOH: Occasional; no more than 2 drinks per day when she does drink  Drugs: None  Occupation: Works at a school    ROS   GEN: No fevers, chills, night sweats, or weight change  SKIN: No itching, rashes, sores  HEENT: No oral or nasal ulcers.  CV: No chest pain, pressure, palpitations, or dyspnea on exertion.  PULM: No SOB, wheeze, cough.  GI:  No nausea, vomiting, constipation, diarrhea. No blood in stool. No abdominal pain.  : No blood in urine.  MSK: See HPI.  NEURO: No numbness, tingling, or weakness.  EXT: No LE swelling  PSYCH: Negative    Active Problem List     Patient Active Problem List   Diagnosis     Internal derangement of knee     Iron deficiency anemia     Allergic rhinitis     Stomatitis and mucositis (ulcerative)     Pure hypercholesterolemia     Arthropathy     HYPERLIPIDEMIA LDL GOAL <160     High risk medication use     RA (rheumatoid arthritis) (H)     Endometriosis     Ovarian cyst     Disturbance of salivary secretion (XEROSTOMIA)     Impingement syndrome of right shoulder     Sjogren's syndrome (H)     Palpitations     Past Medical History     Past Medical History:   Diagnosis Date     Allergic rhinitis, cause unspecified     Allergic rhinitis     Endometriosis, site unspecified     Endometriosis     Female infertility of other specified origin      Iron deficiency anemia, unspecified     Anemia, iron def.     RA (rheumatoid arthritis)  (H)      Stomatitis and mucositis (ulcerative)     chronic - non-specific     Past Surgical History     Past Surgical History:   Procedure Laterality Date     C NONSPECIFIC PROCEDURE  1995, 1997    laparoscopy for endometrial fulguration     COLONOSCOPY WITH CO2 INSUFFLATION N/A 8/7/2017    Procedure: COLONOSCOPY WITH CO2 INSUFFLATION;  Colonoscopy, Screen for colon cancer.  BMI  22.17  [facility] [place] [phone];  Surgeon: Berny Pedro MD;  Location: [facility] OR     HC COLONOSCOPY W/WO BRUSH/WASH  5/25/2005     HC CREATE EARDRUM OPENING,GEN ANESTH      P.E. Tubes     HC KNEE SCOPE, DIAGNOSTIC       left knee, ruptured ACL     HC REMOVE TONSILS/ADENOIDS,12+ Y/O      T & A 11+y.o.     ORTHOPEDIC SURGERY      rotator cuff repair, left     Allergy     Allergies   Allergen Reactions     Penicillins Hives     hives     Current Medication List     Current Outpatient Medications   Medication Sig     acetaminophen (TYLENOL) 500 MG tablet Take 1-2 tablets by mouth every 6 hours as needed for pain.     albuterol (PROAIR HFA/PROVENTIL HFA/VENTOLIN HFA) 108 (90 Base) MCG/ACT inhaler Inhale 2 puffs into the lungs every 4 hours as needed for shortness of breath / dyspnea or wheezing     ASPIRIN 81 MG OR TABS 1 TABLET DAILY     bimatoprost (LATISSE) 0.03 % SOLN Externally apply topically At Bedtime     Fexofenadine HCl (MUCINEX ALLERGY PO)      fluticasone (FLONASE) 50 MCG/ACT spray Spray 2 sprays into both nostrils daily     folic acid (FOLVITE) 1 MG tablet Take 1 tablet (1 mg) by mouth daily     hydroxychloroquine (PLAQUENIL) 200 MG tablet Hydroxychloroquine 200mg daily; and an additional 200mg every other day.     ibuprofen (ADVIL) 200 MG capsule Take 200 mg by mouth as needed for fever     methotrexate sodium 2.5 MG TABS Take 6 tablets (15 mg) by mouth once a week . Take all 6 tablets on the same day of each week.     MULTIPLE VITAMINS/WOMENS OR None Entered     azithromycin (ZITHROMAX) 250 MG tablet Two tablets first day,  "then one tablet daily for four days. (Patient not taking: Reported on 1/10/2019)     bimatoprost (LATISSE) 0.03 % SOLN Externally apply topically At Bedtime     loratadine 10 MG capsule Take 10 mg by mouth as needed for allergies     OMEGA-3 FATTY ACIDS 600 MG OR CAPS Reported on 3/15/2017     ondansetron (ZOFRAN ODT) 4 MG ODT tab Take 1-2 tablet 30 min before prednisone. (Patient not taking: Reported on 1/10/2019)     No current facility-administered medications for this visit.          Social History   See HPI    Family History     Family History   Problem Relation Age of Onset     Osteoporosis Mother      Arthritis Mother         lupus     Connective Tissue Disorder Mother         Lupus     Cataracts Mother      Macular Degeneration Mother      Celiac Disease Mother      Heart Disease Father         aortic aneurysm     Gastrointestinal Disease Father         abdominal anurysm     Hypertension Father      Cataracts Father      Gastrointestinal Disease Sister         IBS, colon problems     Alzheimer Disease Maternal Grandmother      Osteoporosis Maternal Grandmother      Cerebrovascular Disease Maternal Grandfather      Cancer Paternal Grandmother      Cerebrovascular Disease Paternal Grandfather      Circulatory Sister         carotid stenosis     Cancer - colorectal Maternal Uncle      Cancer - colorectal Maternal Uncle      Glaucoma No family hx of      Mother: Lupus    Physical Exam     Temp Readings from Last 3 Encounters:   09/28/18 97.3  F (36.3  C) (Tympanic)   09/22/18 97.7  F (36.5  C) (Oral)   09/12/18 97.6  F (36.4  C) (Oral)     BP Readings from Last 5 Encounters:   01/10/19 100/58   09/28/18 120/73   09/22/18 105/64   09/12/18 99/61   07/27/18 112/72     Pulse Readings from Last 1 Encounters:   01/10/19 67     Resp Readings from Last 1 Encounters:   07/27/18 16     Estimated body mass index is 22.16 kg/m  as calculated from the following:    Height as of this encounter: 1.664 m (5' 5.5\").    Weight " as of this encounter: 61.3 kg (135 lb 3.2 oz).    GEN: NAD  HEENT: Moist mucous membranes. No oral lesions.  Anicteric, noninjected sclera. Eyes appear to have good moisturizer by gross examination.  CV: S1, S2. RRR. No m/r/g.  PULM: CTA bilaterally. No w/c.  MSK: MCPs, PIPs, wrists, elbows, shoulders, knees, and ankles without swelling or tenderness to palpation.  Negative MCP and MTP squeeze.   Hips nontender to palpation.  NEURO: UE and LE strengths 5/5 and equal bilaterally.   SKIN: No rash  EXT: No LE edema  PSYCH: Alert. Appropriate.    Labs / Imaging (select studies)   RF/CCP  Recent Labs   Lab Test 08/29/13  1413   CCPABY <20 Interpretation:  Negative     CBC  Recent Labs   Lab Test 12/17/18 1659 11/12/18  1705 10/15/18  1709   WBC 6.0 5.7 6.3   RBC 3.54* 3.75* 4.19   HGB 11.1* 11.6* 12.6   HCT 34.5* 35.9 39.5   MCV 98 96 94   RDW 13.8 13.7 12.8    286 323   MCH 31.4 30.9 30.1   MCHC 32.2 32.3 31.9   NEUTROPHIL 62.8 57.2 62.2   LYMPH 21.6 25.0 25.2   MONOCYTE 13.5 14.0 9.0   EOSINOPHIL 1.8 3.1 3.3   BASOPHIL 0.3 0.7 0.3   ANEU 3.8 3.3 3.9   ALYM 1.3 1.4 1.6   ELIGIO 0.8 0.8 0.6   AEOS 0.1 0.2 0.2   ABAS 0.0 0.0 0.0     CMP  Recent Labs   Lab Test 12/17/18 1659 11/12/18  1705 10/15/18  1709 06/20/17  1437  05/24/16  0940  04/07/15  1322   NA  --   --   --   --  141  --  137  --  139   POTASSIUM  --   --   --   --  3.8  --  4.4  --  3.9   CHLORIDE  --   --   --   --  105  --  105  --  104   CO2  --   --   --   --  29  --  29  --  29   ANIONGAP  --   --   --   --  7  --  3  --  6   GLC  --   --   --   --  84  --  85  --  88   BUN  --   --   --   --  22  --  18  --  16   CR 0.93 1.02 1.04   < > 0.84   < > 0.92  --  0.96   GFRESTIMATED 63 56* 55*   < > 71   < > 64  --  61   GFRESTBLACK 76 68 67   < > 85   < > 77  --  74   VALERIA  --   --   --   --  9.1  --  9.2  --  9.1   BILITOTAL 0.2 0.3 0.3   < > 0.2   < > 0.4   < > 0.3   ALBUMIN 4.1 4.1 4.2   < > 4.0   < > 4.4   < > 3.9   PROTTOTAL 7.2 7.3 7.7   < > 6.8    < > 7.3   < > 7.3   ALKPHOS 43 42 47   < > 40   < > 42   < > 34*   AST 21 25 21   < > 21   < > 30   < > 34   ALT 22 25 22   < > 21   < > 24   < > 29    < > = values in this interval not displayed.     Calcium/VitaminD  Recent Labs   Lab Test 06/20/17  1437 05/24/16  0940 07/13/15  1036 04/07/15  1322   VALERIA 9.1 9.2  --  9.1   VITDT  --   --  48  --      ESR/CRP  Recent Labs   Lab Test 12/17/18  1659 09/07/17  1322 03/06/17  1300   SED 9 9 8   CRP <2.9 <2.9 <2.9     Lipid Panel  Recent Labs   Lab Test 07/13/15  1036   CHOL 181   TRIG 132   HDL 74   LDL 81   VLDL 26   CHOLHDLRATIO 2.4     Hepatitis C  Recent Labs   Lab Test 06/20/17  1437   HCVAB Nonreactive   Assay performance characteristics have not been established for newborns,   infants, and children       Immunization History     Immunization History   Administered Date(s) Administered     Influenza (H1N1) 12/03/2009     Influenza (IIV3) PF 11/17/2005, 11/17/2006, 10/25/2010, 01/09/2013     Influenza Vaccine IM 3yrs+ 4 Valent IIV4 10/10/2013, 09/14/2016, 09/13/2017     Pneumo Conj 13-V (2010&after) 09/13/2017     Pneumococcal 23 valent 09/12/2018     TD (ADULT, 7+) 02/01/2005     TDAP Vaccine (Adacel) 01/09/2013          Chart documentation done in part with Dragon Voice recognition Software. Although reviewed after completion, some word and grammatical error may remain.    Rome Hardy MD

## 2019-02-26 DIAGNOSIS — N20.0 KIDNEY STONE: Primary | ICD-10-CM

## 2019-03-08 ENCOUNTER — RESULT FOLLOW UP (OUTPATIENT)
Dept: FAMILY MEDICINE | Facility: OTHER | Age: 54
End: 2019-03-08

## 2019-03-08 ENCOUNTER — OFFICE VISIT (OUTPATIENT)
Dept: OBGYN | Facility: OTHER | Age: 54
End: 2019-03-08
Payer: COMMERCIAL

## 2019-03-08 VITALS
SYSTOLIC BLOOD PRESSURE: 106 MMHG | WEIGHT: 134 LBS | HEART RATE: 78 BPM | BODY MASS INDEX: 21.96 KG/M2 | DIASTOLIC BLOOD PRESSURE: 66 MMHG

## 2019-03-08 DIAGNOSIS — R87.810 CERVICAL HIGH RISK HPV (HUMAN PAPILLOMAVIRUS) TEST POSITIVE: ICD-10-CM

## 2019-03-08 DIAGNOSIS — Z12.4 SCREENING FOR MALIGNANT NEOPLASM OF CERVIX: ICD-10-CM

## 2019-03-08 DIAGNOSIS — N89.8 VAGINAL DISCHARGE: Primary | ICD-10-CM

## 2019-03-08 DIAGNOSIS — R89.7 ABNORMAL BIOPSY RESULT: ICD-10-CM

## 2019-03-08 LAB
SPECIMEN SOURCE: NORMAL
WET PREP SPEC: NORMAL

## 2019-03-08 PROCEDURE — G0145 SCR C/V CYTO,THINLAYER,RESCR: HCPCS | Performed by: ADVANCED PRACTICE MIDWIFE

## 2019-03-08 PROCEDURE — 88305 TISSUE EXAM BY PATHOLOGIST: CPT | Performed by: ADVANCED PRACTICE MIDWIFE

## 2019-03-08 PROCEDURE — 58100 BIOPSY OF UTERUS LINING: CPT | Performed by: ADVANCED PRACTICE MIDWIFE

## 2019-03-08 PROCEDURE — 87624 HPV HI-RISK TYP POOLED RSLT: CPT | Performed by: ADVANCED PRACTICE MIDWIFE

## 2019-03-08 PROCEDURE — 99213 OFFICE O/P EST LOW 20 MIN: CPT | Mod: 25 | Performed by: ADVANCED PRACTICE MIDWIFE

## 2019-03-08 PROCEDURE — 87210 SMEAR WET MOUNT SALINE/INK: CPT | Performed by: ADVANCED PRACTICE MIDWIFE

## 2019-03-08 RX ORDER — FLUCONAZOLE 150 MG/1
150 TABLET ORAL ONCE
Qty: 1 TABLET | Refills: 0 | Status: SHIPPED | OUTPATIENT
Start: 2019-03-08 | End: 2019-03-08

## 2019-03-08 NOTE — LETTER
February 24, 2020      Zena Quintana  [address] [facility] [address]    Dear Ms.Josekeenan,      At [facility], your health and wellness is our primary concern. That is why we are following up on a positive high risk HPV test from 3/8/19. Your provider had recommended that you have a Pap smear and HPV test completed by 3/8/20. Our records do not show that this has been scheduled.    It is important to complete the follow up that your provider has suggested for you to ensure that there are no worsening changes which may, over time, develop into cancer.      Please contact our office at  [phone] to schedule an appointment for a Pap smear and HPV test at your earliest convenience. If you have questions or concerns, please call the clinic and we will be happy to assist you.    If you have completed the tests outside of [facility], please have the results forwarded to our office. We will update the chart for your primary Physician to review before your next annual physical.     Thank you for choosing [facility]!    Sincerely,      Your [facility] Team/mesha

## 2019-03-08 NOTE — PROGRESS NOTES
Zena Quintana is a 53 year old who presents to the clinic for evaluation of vaginal changes.  Would like her pap done today.  Which is due in August.   And was supposed to have an EMB completed about 18 months ago and would like to do that as well.   That EMB showed possible hyperplasia.         Vaginal Symptoms      Onset 2-3 weeks    number of episodes of vaginitis in the past year 0    Description  itching    Intensity:  mild    Accompanying signs and symptoms (fever/dysuria/abdominal or back pain): None    History  Sexually active: yes, single partner, contraception - not needed  Currently pregnant: no  Possibility of pregnancy: No  Recent antibiotic use: no  Diabetes: no  Precipitating or alleviating factors: None    Therapies tried and outcome: Vagisil one day   Outcome: improved initially but still having itching         Histories reviewed and updated  Past Medical History:   Diagnosis Date     Allergic rhinitis, cause unspecified     Allergic rhinitis     Endometriosis, site unspecified     Endometriosis     Female infertility of other specified origin      Iron deficiency anemia, unspecified     Anemia, iron def.     RA (rheumatoid arthritis) (H)      Stomatitis and mucositis (ulcerative)     chronic - non-specific     Past Surgical History:   Procedure Laterality Date     C NONSPECIFIC PROCEDURE  1995, 1997    laparoscopy for endometrial fulguration     COLONOSCOPY WITH CO2 INSUFFLATION N/A 8/7/2017    Procedure: COLONOSCOPY WITH CO2 INSUFFLATION;  Colonoscopy, Screen for colon cancer.  BMI  22.17  [facility] [place] [phone];  Surgeon: Berny Pedro MD;  Location: [facility] OR      COLONOSCOPY W/WO BRUSH/WASH  5/25/2005     HC CREATE EARDRUM OPENING,GEN ANESTH      P.E. Tubes     HC KNEE SCOPE, DIAGNOSTIC       left knee, ruptured ACL     HC REMOVE TONSILS/ADENOIDS,12+ Y/O      T & A 11+y.o.     ORTHOPEDIC SURGERY      rotator cuff repair, left     Social History     Socioeconomic History      Marital status:      Spouse name: Wolf     Number of children: 2     Years of education: 12     Highest education level: Not on file   Occupational History     Occupation: homemaker     Employer: HOMEMAKER     Employer:    Social Needs     Financial resource strain: Not on file     Food insecurity:     Worry: Not on file     Inability: Not on file     Transportation needs:     Medical: Not on file     Non-medical: Not on file   Tobacco Use     Smoking status: Never Smoker     Smokeless tobacco: Never Used     Tobacco comment: no smokers in household   Substance and Sexual Activity     Alcohol use: Yes     Alcohol/week: 1.0 oz     Comment: has a couple drinks now and again     Drug use: No     Sexual activity: Yes     Partners: Male     Birth control/protection: None     Comment: Infertility   Lifestyle     Physical activity:     Days per week: Not on file     Minutes per session: Not on file     Stress: Not on file   Relationships     Social connections:     Talks on phone: Not on file     Gets together: Not on file     Attends Hindu service: Not on file     Active member of club or organization: Not on file     Attends meetings of clubs or organizations: Not on file     Relationship status: Not on file     Intimate partner violence:     Fear of current or ex partner: Not on file     Emotionally abused: Not on file     Physically abused: Not on file     Forced sexual activity: Not on file   Other Topics Concern      Service No     Blood Transfusions No     Caffeine Concern No     Occupational Exposure No     Hobby Hazards No     Sleep Concern No     Stress Concern No     Weight Concern No     Special Diet No     Back Care No     Exercise Yes     Bike Helmet No     Seat Belt Yes     Self-Exams Yes     Parent/sibling w/ CABG, MI or angioplasty before 65F 55M? Not Asked   Social History Narrative    Adopted son at 7wk and dtr at 4mo, both from [place]     Family History   Problem Relation  Age of Onset     Osteoporosis Mother      Arthritis Mother         lupus     Connective Tissue Disorder Mother         Lupus     Cataracts Mother      Macular Degeneration Mother      Celiac Disease Mother      Heart Disease Father         aortic aneurysm     Gastrointestinal Disease Father         abdominal anurysm     Hypertension Father      Cataracts Father      Gastrointestinal Disease Sister         IBS, colon problems     Alzheimer Disease Maternal Grandmother      Osteoporosis Maternal Grandmother      Cerebrovascular Disease Maternal Grandfather      Cancer Paternal Grandmother      Cerebrovascular Disease Paternal Grandfather      Circulatory Sister         carotid stenosis     Cancer - colorectal Maternal Uncle      Cancer - colorectal Maternal Uncle      Glaucoma No family hx of              EXAM:  /66   Pulse 78   Wt 60.8 kg (134 lb)   BMI 21.96 kg/m    Patient appears well, vital signs normal.   Abdomen normal, soft without tenderness, guarding, mass or organomegaly.  No inguinal adenopathy or CVA tenderness.    : PELVIC EXAM:  Vulva: No external lesions, normal hair distribution, no adenopathy, BUS WNL  Vagina: Moist, pink, no abnormal discharge, well rugated, no lesions  Cervix:, smooth, pink, no visible lesions, neg CMT  Uterus: Normal size, anteverted, non-tender, mobile  Ovaries: No mass, non-tender, mobile  Rectal exam: deferred    WET PREP: no trichomonas, monilia, or clue cells       ASSESSMENT:   (N89.8) Vaginal discharge  (primary encounter diagnosis)  Comment:   Plan: Wet prep        Will treat based on symptoms with on day of diflucan     (R89.7) Abnormal biopsy result  Comment:   Plan: ENDOMETRIAL BIOPSY W/O CERVICAL DILATION,         Surgical pathology exam            (Z12.4) Screening for malignant neoplasm of cervix  Comment:   Plan: Pap imaged thin layer screen with HPV -         recommended age 30 - 65 years (select HPV order        below), HPV High Risk Types DNA  Cervical                  PLAN:  Return if symptoms do not resolve as anticipated.          Zena Quintana is a 53 year old who presents to the clinic for an endometrial biopsy due to   Previous abnormal biopsy     Procedure:  Endometrial biopsy      Discussed risk of bleeding, infection, uterine perforation, cramping pain.  Pt agreed to proceed with procedure after all questions answered and consent signed.      Speculum placed and cervix visualized.  Cervix cleansed with betadine x 3. Tenaculum placed on anterior lip of the cervix.  Endometrial biopsy pipelle passed through cervix and uterus sounded to 7 cm.  Biopsy specimen collected with one pass with return of moderate amount of pink tissue.  Specimen placed in a labeled container and set aside to be sent to pathology.  Tenaculum removed from the cervix and sites hemostatic.  No bleeding noted from cervical os.     Patient tolerated the procedure well.  There were no apparent complications and bleeding was minimal.    See AVS for instructions  Would plan ultrasound for follow up if EMB inadequate.

## 2019-03-08 NOTE — PATIENT INSTRUCTIONS
Here is a list of suggestions that may help eliminate or prevent vaginal infections or reduce their recurrence.  Many of these suggestions:   1. boosting your immune system  2. changing the vaginal environment to a more acidic state   3. increasing the good healthy bacteria    Read through them and try the ones that seem to fit you and your life style.    Soak in a warm bath tub, no soap, no bubble bath and no oils.  Add one cup vinegar or lemon juice to bath water once in a while,     Keep a water bottle with a squirt top in your bathroom, fill with warm water and use as a spray after wiping, then pat dry.  You may add 1-3 TBS of white vinegar to help maintain an acidic environment.    Wear cotton underwear; loose pants or skirts, avoid pantyhose and thong underwear.    Try to avoid wearing underware to bed so that your vaginal area can breathe and stay drier.    Keep vaginal area as dry as possible so don't sit for long periods of time in workout clothing or wet bathing suits.     Consider using either male or female condoms or asking your partner not to ejaculate in your vagina.    To boost your immune system consider the followin. Sleep:7-8 hours a night  2. Fluids: get a minimum of 8-10 glasses of water a day  3. Foods: try reducing processed foods in your diet and add whole grains, raw vegetables, fruit, and yogurt that contains active culture or kefir  4. Consider taking a vitamin B complex tablet, sometimes called stress tabs, Vitamin D 1000mg a day, or cranberry tablets.    5.  Consider the stress in your life and try to reduce it through yoga or meditation.    Decrease daily intake of refined sugars, honey, red meat and alcohol    At each meal drink 1tsp apple cider vinegar and 1 tsp honey in   cup warm water    Consider probiotic tablets to restore normal bacteria back into your system    Boric acid capsules, insert 2 capsules  00  daily into vagina every 3 days if you have chronic problems with  yeast or bacterial vaginosis.    If your symptoms do not resolve or if you have questions please call the clinic at [phone] for [place] or [phone] for [place] or send a message through My Chart.         What you may expect after an endometrial biopsy:  Mild cramping for less than 48 hours is to be expected, if you have can take ibuprofen or Motrin you may use this for the cramping.   A small amount of bleeding would be considered normal as well.    You may resume your normal activities including sexual intercourse as soon as you feel ready.       WARNING SIGNS:  If you are experiencing:  Fever  Foul smelling vaginal discharge  Cramping lasting longer than 48 hours  Severe cramping  Bleeding heavier than a period  CALL THE CLINIC IMMEDIATELY at [phone] for the [facility] or [phone] for the [facility].      You will be contacted with the results of your biopsy in about one week.   A follow up plan will be made with you when your results are available.

## 2019-03-12 LAB
COPATH REPORT: NORMAL
COPATH REPORT: NORMAL
PAP: NORMAL

## 2019-03-13 LAB
FINAL DIAGNOSIS: ABNORMAL
HPV HR 12 DNA CVX QL NAA+PROBE: POSITIVE
HPV16 DNA SPEC QL NAA+PROBE: NEGATIVE
HPV18 DNA SPEC QL NAA+PROBE: NEGATIVE
SPECIMEN DESCRIPTION: ABNORMAL
SPECIMEN SOURCE CVX/VAG CYTO: ABNORMAL

## 2019-03-14 PROBLEM — R87.810 CERVICAL HIGH RISK HPV (HUMAN PAPILLOMAVIRUS) TEST POSITIVE: Status: ACTIVE | Noted: 2019-03-08

## 2019-03-14 NOTE — PROGRESS NOTES
2/13/14 NIL pap, neg HR HPV.  7/13/15 Unsat pap  8/8/16 NIL pap, neg HR HPV.  3/8/19 NIL pap, + HR HPV (not 16 or 18). Plan: cotest in 1 yr.  3/14/19 Pt notified by phone.  2/24/20 Cotest reminder letter sent (rlm)  4/7/20 COVID 19 interim plan updated to: Plan unchanged     5/15/20 NIL Pap, + HR HPV (not 16 or 18). Plan: colp bef 8/15/20  5/27/20 Pt notified by phone.   6/9/20 [facility] - Negative. Plan cotest in 1 year due 6/9/21.   6/12/20 Pt viewed result on [employer] CCT Tracking.

## 2019-04-11 DIAGNOSIS — Z79.899 HIGH RISK MEDICATION USE: ICD-10-CM

## 2019-04-11 DIAGNOSIS — M05.79 RHEUMATOID ARTHRITIS INVOLVING MULTIPLE SITES WITH POSITIVE RHEUMATOID FACTOR (H): ICD-10-CM

## 2019-04-11 LAB
ALBUMIN SERPL-MCNC: 3.9 G/DL (ref 3.4–5)
ALP SERPL-CCNC: 47 U/L (ref 40–150)
ALT SERPL W P-5'-P-CCNC: 80 U/L (ref 0–50)
AST SERPL W P-5'-P-CCNC: 62 U/L (ref 0–45)
BASOPHILS # BLD AUTO: 0 10E9/L (ref 0–0.2)
BASOPHILS NFR BLD AUTO: 0.2 %
BILIRUB DIRECT SERPL-MCNC: <0.1 MG/DL (ref 0–0.2)
BILIRUB SERPL-MCNC: 0.3 MG/DL (ref 0.2–1.3)
CREAT SERPL-MCNC: 1.07 MG/DL (ref 0.52–1.04)
CRP SERPL-MCNC: <2.9 MG/L (ref 0–8)
DIFFERENTIAL METHOD BLD: ABNORMAL
EOSINOPHIL # BLD AUTO: 0.2 10E9/L (ref 0–0.7)
EOSINOPHIL NFR BLD AUTO: 1.5 %
ERYTHROCYTE [DISTWIDTH] IN BLOOD BY AUTOMATED COUNT: 13.1 % (ref 10–15)
ERYTHROCYTE [SEDIMENTATION RATE] IN BLOOD BY WESTERGREN METHOD: 8 MM/H (ref 0–30)
GFR SERPL CREATININE-BSD FRML MDRD: 58 ML/MIN/{1.73_M2}
HCT VFR BLD AUTO: 37.4 % (ref 35–47)
HGB BLD-MCNC: 12 G/DL (ref 11.7–15.7)
LYMPHOCYTES # BLD AUTO: 1 10E9/L (ref 0.8–5.3)
LYMPHOCYTES NFR BLD AUTO: 10.6 %
MCH RBC QN AUTO: 31.9 PG (ref 26.5–33)
MCHC RBC AUTO-ENTMCNC: 32.1 G/DL (ref 31.5–36.5)
MCV RBC AUTO: 100 FL (ref 78–100)
MONOCYTES # BLD AUTO: 0.9 10E9/L (ref 0–1.3)
MONOCYTES NFR BLD AUTO: 9.3 %
NEUTROPHILS # BLD AUTO: 7.7 10E9/L (ref 1.6–8.3)
NEUTROPHILS NFR BLD AUTO: 78.4 %
PLATELET # BLD AUTO: 301 10E9/L (ref 150–450)
PROT SERPL-MCNC: 7 G/DL (ref 6.8–8.8)
RBC # BLD AUTO: 3.76 10E12/L (ref 3.8–5.2)
WBC # BLD AUTO: 9.8 10E9/L (ref 4–11)

## 2019-04-11 PROCEDURE — 86140 C-REACTIVE PROTEIN: CPT | Performed by: INTERNAL MEDICINE

## 2019-04-11 PROCEDURE — 85025 COMPLETE CBC W/AUTO DIFF WBC: CPT | Performed by: INTERNAL MEDICINE

## 2019-04-11 PROCEDURE — 80076 HEPATIC FUNCTION PANEL: CPT | Performed by: INTERNAL MEDICINE

## 2019-04-11 PROCEDURE — 82565 ASSAY OF CREATININE: CPT | Performed by: INTERNAL MEDICINE

## 2019-04-11 PROCEDURE — 85652 RBC SED RATE AUTOMATED: CPT | Performed by: INTERNAL MEDICINE

## 2019-04-11 PROCEDURE — 36415 COLL VENOUS BLD VENIPUNCTURE: CPT | Performed by: INTERNAL MEDICINE

## 2019-04-12 DIAGNOSIS — N20.0 KIDNEY STONE: ICD-10-CM

## 2019-04-12 LAB
ANION GAP SERPL CALCULATED.3IONS-SCNC: 7 MMOL/L (ref 3–14)
CALCIUM SERPL-MCNC: 8.7 MG/DL (ref 8.5–10.1)
CHLORIDE SERPL-SCNC: 103 MMOL/L (ref 94–109)
CO2 SERPL-SCNC: 29 MMOL/L (ref 20–32)
CREAT SERPL-MCNC: 0.86 MG/DL (ref 0.52–1.04)
GFR SERPL CREATININE-BSD FRML MDRD: 76 ML/MIN/{1.73_M2}
PHOSPHATE SERPL-MCNC: 3.4 MG/DL (ref 2.5–4.5)
POTASSIUM SERPL-SCNC: 4.3 MMOL/L (ref 3.4–5.3)
PTH-INTACT SERPL-MCNC: 53 PG/ML (ref 18–80)
SODIUM SERPL-SCNC: 139 MMOL/L (ref 133–144)
URATE SERPL-MCNC: 4.5 MG/DL (ref 2.6–6)

## 2019-04-12 PROCEDURE — 36415 COLL VENOUS BLD VENIPUNCTURE: CPT | Performed by: UROLOGY

## 2019-04-12 PROCEDURE — 83970 ASSAY OF PARATHORMONE: CPT | Performed by: UROLOGY

## 2019-04-12 PROCEDURE — 84550 ASSAY OF BLOOD/URIC ACID: CPT | Performed by: UROLOGY

## 2019-04-12 PROCEDURE — 84100 ASSAY OF PHOSPHORUS: CPT | Performed by: UROLOGY

## 2019-04-12 PROCEDURE — 80051 ELECTROLYTE PANEL: CPT | Performed by: UROLOGY

## 2019-04-12 PROCEDURE — 82565 ASSAY OF CREATININE: CPT | Performed by: UROLOGY

## 2019-04-12 PROCEDURE — 82310 ASSAY OF CALCIUM: CPT | Performed by: UROLOGY

## 2019-04-17 DIAGNOSIS — R79.89 ELEVATED LFTS: Primary | ICD-10-CM

## 2019-04-17 NOTE — RESULT ENCOUNTER NOTE
"Rheumatology team: Please call to notify Ms. Quintana of the information below to ensure that she gets it.    [employer] message sent:  \"Ms. Quintana,    Liver enzymes AST and ALT are elevated.  This could be from methotrexate so for now please discontinue methotrexate and repeat labs later this month - I see you have a lab appointment on 4/26/2019.  Liver enzymes elevations could also be from other medications such as tylenol, ibuprofen, naproxen, etc.  Possibly a new medication started?    Sincerely,  Rome Hardy MD  4/17/2019 12:18 AM\""

## 2019-04-26 DIAGNOSIS — R79.89 ELEVATED LFTS: ICD-10-CM

## 2019-04-26 LAB
ALBUMIN SERPL-MCNC: 4 G/DL (ref 3.4–5)
ALP SERPL-CCNC: 50 U/L (ref 40–150)
ALT SERPL W P-5'-P-CCNC: 32 U/L (ref 0–50)
AST SERPL W P-5'-P-CCNC: 28 U/L (ref 0–45)
BILIRUB DIRECT SERPL-MCNC: <0.1 MG/DL (ref 0–0.2)
BILIRUB SERPL-MCNC: 0.3 MG/DL (ref 0.2–1.3)
PROT SERPL-MCNC: 7.1 G/DL (ref 6.8–8.8)

## 2019-04-26 PROCEDURE — 36415 COLL VENOUS BLD VENIPUNCTURE: CPT | Performed by: INTERNAL MEDICINE

## 2019-04-26 PROCEDURE — 80076 HEPATIC FUNCTION PANEL: CPT | Performed by: INTERNAL MEDICINE

## 2019-04-29 NOTE — RESULT ENCOUNTER NOTE
"[employer] message sent:  \"Ms. Quintana,    Repeat liver enzymes were normal.    Sincerely,  Rome Hardy MD  4/28/2019 10:58 PM\""

## 2019-06-04 ENCOUNTER — OFFICE VISIT (OUTPATIENT)
Dept: OPHTHALMOLOGY | Facility: CLINIC | Age: 54
End: 2019-06-04
Payer: COMMERCIAL

## 2019-06-04 DIAGNOSIS — Z79.899 LONG-TERM USE OF PLAQUENIL: Primary | ICD-10-CM

## 2019-06-04 PROCEDURE — 92014 COMPRE OPH EXAM EST PT 1/>: CPT | Performed by: OPHTHALMOLOGY

## 2019-06-04 PROCEDURE — 92134 CPTRZ OPH DX IMG PST SGM RTA: CPT | Performed by: OPHTHALMOLOGY

## 2019-06-04 PROCEDURE — 92083 EXTENDED VISUAL FIELD XM: CPT | Performed by: OPHTHALMOLOGY

## 2019-06-04 ASSESSMENT — CONF VISUAL FIELD
METHOD: COUNTING FINGERS
OS_NORMAL: 1
OD_NORMAL: 1

## 2019-06-04 ASSESSMENT — EXTERNAL EXAM - RIGHT EYE: OD_EXAM: NORMAL

## 2019-06-04 ASSESSMENT — VISUAL ACUITY
OS_CC: J5
METHOD: SNELLEN - LINEAR
OD_SC: 20/20
OD_CC: J5
OS_SC: 20/20

## 2019-06-04 ASSESSMENT — SLIT LAMP EXAM - LIDS
COMMENTS: UPPER LID DERMATOCHALASIS
COMMENTS: UPPER LID DERMATOCHALASIS

## 2019-06-04 ASSESSMENT — CUP TO DISC RATIO
OS_RATIO: 0.1
OD_RATIO: 0.1

## 2019-06-04 ASSESSMENT — TONOMETRY
OS_IOP_MMHG: 12
OD_IOP_MMHG: 14
IOP_METHOD: ICARE

## 2019-06-04 ASSESSMENT — EXTERNAL EXAM - LEFT EYE: OS_EXAM: NORMAL

## 2019-06-04 NOTE — NURSING NOTE
Patient presents with:  Monitor Current High Risk Meds: On Plaquenil for 10 years - 200 mg bid for RA      Referring Provider:  No referring provider defined for this encounter.        Jo Gomes, COA

## 2019-06-04 NOTE — PROGRESS NOTES
Assessment & Plan   Zena Quintana is a 54 year old female who presents with:   Review of systems for the eyes was negative other than the pertinent positives and negatives noted in the HPI.    Long-term use of Plaquenil  - No retinopathy  - SD-OCT Macula Optovue OU (both eyes)  - HVF 10-2 OU    Return in 12 months for annual with Plaquenil protocol.      Attending Physician Attestation:  Complete documentation of historical and exam elements from today's encounter can be found in the full encounter summary report (not reduplicated in this progress note).  I personally obtained the chief complaint(s) and history of present illness.  I confirmed and edited as necessary the review of systems, past medical/surgical history, family history, social history, and examination findings as documented by others; and I examined the patient myself.  I personally reviewed the relevant tests, images, and reports as documented above.  I formulated and edited as necessary the assessment and plan and discussed the findings and management plan with the patient and family. - Julito Garcai MD

## 2019-09-04 ENCOUNTER — OFFICE VISIT (OUTPATIENT)
Dept: RHEUMATOLOGY | Facility: CLINIC | Age: 54
End: 2019-09-04
Payer: COMMERCIAL

## 2019-09-04 VITALS
WEIGHT: 140.2 LBS | DIASTOLIC BLOOD PRESSURE: 57 MMHG | BODY MASS INDEX: 22.53 KG/M2 | OXYGEN SATURATION: 99 % | HEART RATE: 62 BPM | SYSTOLIC BLOOD PRESSURE: 95 MMHG | HEIGHT: 66 IN

## 2019-09-04 DIAGNOSIS — Z79.899 HIGH RISK MEDICATION USE: ICD-10-CM

## 2019-09-04 DIAGNOSIS — M05.79 RHEUMATOID ARTHRITIS INVOLVING MULTIPLE SITES WITH POSITIVE RHEUMATOID FACTOR (H): Primary | ICD-10-CM

## 2019-09-04 LAB
ALBUMIN SERPL-MCNC: 4 G/DL (ref 3.4–5)
ALP SERPL-CCNC: 55 U/L (ref 40–150)
ALT SERPL W P-5'-P-CCNC: 24 U/L (ref 0–50)
AST SERPL W P-5'-P-CCNC: 26 U/L (ref 0–45)
BASOPHILS # BLD AUTO: 0 10E9/L (ref 0–0.2)
BASOPHILS NFR BLD AUTO: 0.6 %
BILIRUB DIRECT SERPL-MCNC: <0.1 MG/DL (ref 0–0.2)
BILIRUB SERPL-MCNC: 0.3 MG/DL (ref 0.2–1.3)
CREAT SERPL-MCNC: 1 MG/DL (ref 0.52–1.04)
CRP SERPL-MCNC: <2.9 MG/L (ref 0–8)
DIFFERENTIAL METHOD BLD: NORMAL
EOSINOPHIL # BLD AUTO: 0.1 10E9/L (ref 0–0.7)
EOSINOPHIL NFR BLD AUTO: 2 %
ERYTHROCYTE [DISTWIDTH] IN BLOOD BY AUTOMATED COUNT: 12.4 % (ref 10–15)
ERYTHROCYTE [SEDIMENTATION RATE] IN BLOOD BY WESTERGREN METHOD: 7 MM/H (ref 0–30)
GFR SERPL CREATININE-BSD FRML MDRD: 63 ML/MIN/{1.73_M2}
HCT VFR BLD AUTO: 37.7 % (ref 35–47)
HGB BLD-MCNC: 12.3 G/DL (ref 11.7–15.7)
LYMPHOCYTES # BLD AUTO: 1.4 10E9/L (ref 0.8–5.3)
LYMPHOCYTES NFR BLD AUTO: 25.5 %
MCH RBC QN AUTO: 30.5 PG (ref 26.5–33)
MCHC RBC AUTO-ENTMCNC: 32.6 G/DL (ref 31.5–36.5)
MCV RBC AUTO: 94 FL (ref 78–100)
MONOCYTES # BLD AUTO: 0.7 10E9/L (ref 0–1.3)
MONOCYTES NFR BLD AUTO: 12.3 %
NEUTROPHILS # BLD AUTO: 3.3 10E9/L (ref 1.6–8.3)
NEUTROPHILS NFR BLD AUTO: 59.6 %
PLATELET # BLD AUTO: 284 10E9/L (ref 150–450)
PROT SERPL-MCNC: 7 G/DL (ref 6.8–8.8)
RBC # BLD AUTO: 4.03 10E12/L (ref 3.8–5.2)
WBC # BLD AUTO: 5.5 10E9/L (ref 4–11)

## 2019-09-04 PROCEDURE — 87340 HEPATITIS B SURFACE AG IA: CPT | Performed by: INTERNAL MEDICINE

## 2019-09-04 PROCEDURE — 82565 ASSAY OF CREATININE: CPT | Performed by: INTERNAL MEDICINE

## 2019-09-04 PROCEDURE — 99214 OFFICE O/P EST MOD 30 MIN: CPT | Performed by: INTERNAL MEDICINE

## 2019-09-04 PROCEDURE — 80076 HEPATIC FUNCTION PANEL: CPT | Performed by: INTERNAL MEDICINE

## 2019-09-04 PROCEDURE — 86140 C-REACTIVE PROTEIN: CPT | Performed by: INTERNAL MEDICINE

## 2019-09-04 PROCEDURE — 36415 COLL VENOUS BLD VENIPUNCTURE: CPT | Performed by: INTERNAL MEDICINE

## 2019-09-04 PROCEDURE — 85025 COMPLETE CBC W/AUTO DIFF WBC: CPT | Performed by: INTERNAL MEDICINE

## 2019-09-04 PROCEDURE — 86704 HEP B CORE ANTIBODY TOTAL: CPT | Performed by: INTERNAL MEDICINE

## 2019-09-04 PROCEDURE — 85652 RBC SED RATE AUTOMATED: CPT | Performed by: INTERNAL MEDICINE

## 2019-09-04 RX ORDER — LEFLUNOMIDE 10 MG/1
10 TABLET ORAL DAILY
Qty: 30 TABLET | Refills: 4 | Status: SHIPPED | OUTPATIENT
Start: 2019-09-04 | End: 2020-01-08

## 2019-09-04 RX ORDER — HYDROXYCHLOROQUINE SULFATE 200 MG/1
TABLET, FILM COATED ORAL
Qty: 135 TABLET | Refills: 3 | Status: SHIPPED | OUTPATIENT
Start: 2019-09-04 | End: 2020-07-30

## 2019-09-04 ASSESSMENT — MIFFLIN-ST. JEOR: SCORE: 1239.75

## 2019-09-04 NOTE — NURSING NOTE
RAPID3 (0-30) Cumulative Score  3.0          RAPID3 Weighted Score (divide #4 by 3 and that is the weighted score)  1.0       Genesis Medley CMA Rheumatology  9/4/2019 3:23 PM

## 2019-09-04 NOTE — PATIENT INSTRUCTIONS
Rheumatology    Dr. Rome Hardy         [facility]   (Monday)  [address]  [place] [address]       [facility]   (Tuesday)  [address]  [place] [address]    [facility]   (Wed., Thurs., and Friday)  [address] [place] [address]    Phone number: [phone]  Thank you for choosing [facility].  Genesis Medley CMA

## 2019-09-04 NOTE — PROGRESS NOTES
Rheumatology Clinic Visit      Zena Quintana MRN# [id number]   YOB: 1965 Age: 54 year old      Date of visit: 9/04/19   PCP: Dr. Chana Armenta    Chief Complaint   Patient presents with:  Arthritis: RA, doing pretty good, has little problems here and there but mostly good      Assessment and Plan     1. Seropositive nonerosive rheumatoid arthritis (RF low positive, CCP negative): Reportedly at the time of diagnosis she had synovitis in a symmetric distribution that was steroid responsive. Previously on MTX (effective, anemia, LFT elevation). Currently on hydroxychloroquine 300 mg daily on average (started in approximately 2007).  Mild synovitis on exam; inflammatory symptoms.  Discussed tx options; will start leflunomide.  - Start leflunomide 10mg daily   - Continue hydroxychloroquine 200mg daily with an additional 200mg every other day (last eye exam was on 6/4/2019 by Dr. Julito Garcia)  - Labs today: CBC, Creatinine, Hepatic Panel, ESR, CRP  - Labs monthly [id number]: CBC, Cr, Hepatic Panel  - Labs in 3 months: CBC, Creatinine, Hepatic Panel, ESR, CRP    # Leflunomide (Arava) Risks and Benefits: The risks and benefits of leflunomide were discussed in detail and the patient verbalized understanding.  The risks discussed include, but are not limited to, the risk for hypersensitivity, anaphylaxis, anaphylactoid reactions, hepatotoxicity, infections, interstitial lung disease, alopecia, rash, nausea, and diarrhea.  The impact on malignancies is not fully defined.   Alcohol should be avoided while taking leflunomide.  Pregnancy prevention and planning was discussed; it is recommended that women of childbearing potential use reliable contraception before, during, and for a period of 2 years after treatment with leflunomide; if pregnancy is planned within 2 years of discontinuing medication then women should undergo drug elimination procedures (i.e. cholestyramine). Routine laboratory monitoring is  required during leflunomide therapy. I encouraged reviewing the package insert and asking any questions about the medication.      2. 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 S/p surgery.  Doing okay today.    3. Sjogren's syndrome: NJ by EIA negative but positive by IF, SSA and SSB negative, and minor salivary gland biopsy negative. She is currently following with ophthalmology who has given her eyedrops that have been beneficial.  Dry mouth is currently treated with frequent sips of water; she has good oral hygiene, follows with a dentist regularly, and does not have frequent cavities. Pilocarpine was previously Rx'd but she has not started it and does not plan to; will remove from the medication list.     4. History of oral sores / family history of lupus / polyarthritis / recurrent sinus infections: She is not having oral sores or recurrent sinus infections for several years now. Retrospectively, there would be concern for potential ANCA associated sinus issues and this could be checked if she had recurrent sinusitis again. Oral sores have improved since she started Plaquenil, that argues it could be connective tissue disease related. NJ was negative by EIA, but complement was on the low end of the normal range so NJ was rechecked and positive; further testing was negative.    5. Vaccinations: Vaccinations reviewed with Ms. Quintana.  Risks and benefits of vaccinations were discussed.  - Influenza: encouraged yearly vaccination  - [id number]: up to date  - [id number]: up to date   - Shingrix: she plans to receive on a Friday    6. Bone Health: mother with hx of vertebral compression fracture.  Post-menopausal.  Taking vitamin D that is in the multivitamin.  Check DEXA for osteoporosis screening.  - DEXA pending    Ms. Quintana verbalized  agreement with and understanding of the rational for the diagnosis and treatment plan.  All questions were answered to best of my ability and the patient's satisfaction. Ms. Quintana was advised to contact the clinic with any questions that may arise after the clinic visit.      Thank you for involving me in the care of the patient    Return to clinic: 3-4 months      HPI   Zena Quintana is a 54 year old female with medical history significant for iron deficiency anemia, nephrolithiasis, hyperlipidemia, xerostomia, dry eyes, and rheumatoid arthritis who presents for follow-up of rheumatoid arthritis.    Today, Ms. Quintana reports doing okay.  Morning stiffness for approximately 30 minutes.  Feels much better after going to the gym and in general feels better with more activity.  Positive gelling phenomenon.  All joints are stiff in the morning.  Most symptomatic joints are her PIPs, followed by her MCPs and MTPs bilaterally.  No joint swelling.  All joint pain is improved with activity; worsens with inactivity.  .    Denies fevers, chills, nausea, vomiting, constipation, diarrhea. No abdominal pain. No chest pain/pressure, palpitations, or shortness of breath. No neck pain. No rash. No LE swelling.  No photosensitivity or photophobia.  No sicca symptoms.  No eye pain or redness. No oral sores.  She has not had a sinus infection for several years now.    Tobacco: None  EtOH: Occasional; no more than 2 drinks per day when she does drink  Drugs: None  Occupation: Works at a school    ROS   GEN: No fevers, chills, night sweats, or weight change  SKIN: No itching, rashes, sores  HEENT: No oral or nasal ulcers.  CV: No chest pain, pressure, palpitations, or dyspnea on exertion.  PULM: No SOB, wheeze, cough.  GI:  No nausea, vomiting, constipation, diarrhea. No blood in stool. No abdominal pain.  : No blood in urine.  MSK: See HPI.  NEURO: No numbness, tingling, or weakness.  EXT: No LE swelling  PSYCH:  Negative    Active Problem List     Patient Active Problem List   Diagnosis     Internal derangement of knee     Iron deficiency anemia     Allergic rhinitis     Stomatitis and mucositis (ulcerative)     Pure hypercholesterolemia     Arthropathy     HYPERLIPIDEMIA LDL GOAL <160     High risk medication use     RA (rheumatoid arthritis) (H)     Endometriosis     Ovarian cyst     Disturbance of salivary secretion (XEROSTOMIA)     Impingement syndrome of right shoulder     Sjogren's syndrome (H)     Palpitations     Cervical high risk HPV (human papillomavirus) test positive     Past Medical History     Past Medical History:   Diagnosis Date     Allergic rhinitis, cause unspecified     Allergic rhinitis     Cervical high risk HPV (human papillomavirus) test positive 3/8/2019     Endometriosis, site unspecified     Endometriosis     Female infertility of other specified origin      Iron deficiency anemia, unspecified     Anemia, iron def.     RA (rheumatoid arthritis) (H)      Sjogren's syndrome (H)      Stomatitis and mucositis (ulcerative)     chronic - non-specific     Past Surgical History     Past Surgical History:   Procedure Laterality Date     C NONSPECIFIC PROCEDURE  1995, 1997    laparoscopy for endometrial fulguration     COLONOSCOPY WITH CO2 INSUFFLATION N/A 8/7/2017    Procedure: COLONOSCOPY WITH CO2 INSUFFLATION;  Colonoscopy, Screen for colon cancer.  BMI  22.17  [facility] [place] [phone];  Surgeon: Berny Pedro MD;  Location: [facility] OR     HC COLONOSCOPY W/WO BRUSH/WASH  5/25/2005     HC CREATE EARDRUM OPENING,GEN ANESTH      P.E. Tubes     HC KNEE SCOPE, DIAGNOSTIC       left knee, ruptured ACL     HC REMOVE TONSILS/ADENOIDS,12+ Y/O      T & A 11+y.o.     ORTHOPEDIC SURGERY      rotator cuff repair, left     Allergy     Allergies   Allergen Reactions     Penicillins Hives     hives     Current Medication List     Current Outpatient Medications   Medication Sig     ASPIRIN 81 MG OR TABS 1 TABLET  DAILY     bimatoprost (LATISSE) 0.03 % SOLN Externally apply topically At Bedtime     hydroxychloroquine (PLAQUENIL) 200 MG tablet Hydroxychloroquine 200mg daily; and an additional 200mg every other day.     ibuprofen (ADVIL) 200 MG capsule Take 200 mg by mouth as needed for fever     MULTIPLE VITAMINS/WOMENS OR None Entered     acetaminophen (TYLENOL) 500 MG tablet Take 1-2 tablets by mouth every 6 hours as needed for pain. (Patient not taking: Reported on 3/8/2019)     albuterol (PROAIR HFA/PROVENTIL HFA/VENTOLIN HFA) 108 (90 Base) MCG/ACT inhaler Inhale 2 puffs into the lungs every 4 hours as needed for shortness of breath / dyspnea or wheezing (Patient not taking: Reported on 3/8/2019)     bimatoprost (LATISSE) 0.03 % SOLN Externally apply topically At Bedtime     Fexofenadine HCl (MUCINEX ALLERGY PO)      fluticasone (FLONASE) 50 MCG/ACT spray Spray 2 sprays into both nostrils daily (Patient not taking: Reported on 3/8/2019)     folic acid (FOLVITE) 1 MG tablet Take 1 tablet (1 mg) by mouth daily (Patient not taking: Reported on 3/8/2019)     loratadine 10 MG capsule Take 10 mg by mouth as needed for allergies     OMEGA-3 FATTY ACIDS 600 MG OR CAPS Reported on 3/15/2017     ondansetron (ZOFRAN ODT) 4 MG ODT tab Take 1-2 tablet 30 min before prednisone. (Patient not taking: Reported on 3/8/2019)     No current facility-administered medications for this visit.          Social History   See HPI    Family History     Family History   Problem Relation Age of Onset     Osteoporosis Mother      Arthritis Mother         lupus     Connective Tissue Disorder Mother         Lupus     Cataracts Mother      Macular Degeneration Mother      Celiac Disease Mother      Heart Disease Father         aortic aneurysm     Gastrointestinal Disease Father         abdominal anurysm     Hypertension Father      Cataracts Father      Gastrointestinal Disease Sister         IBS, colon problems     Alzheimer Disease Maternal Grandmother   "    Osteoporosis Maternal Grandmother      Cerebrovascular Disease Maternal Grandfather      Cancer Paternal Grandmother      Cerebrovascular Disease Paternal Grandfather      Circulatory Sister         carotid stenosis     Cancer - colorectal Maternal Uncle      Cancer - colorectal Maternal Uncle      Glaucoma No family hx of      Mother: Lupus    Physical Exam     Temp Readings from Last 3 Encounters:   09/28/18 97.3  F (36.3  C) (Tympanic)   09/22/18 97.7  F (36.5  C) (Oral)   09/12/18 97.6  F (36.4  C) (Oral)     BP Readings from Last 5 Encounters:   09/04/19 95/57   03/08/19 106/66   01/10/19 100/58   09/28/18 120/73   09/22/18 105/64     Pulse Readings from Last 1 Encounters:   09/04/19 62     Resp Readings from Last 1 Encounters:   07/27/18 16     Estimated body mass index is 22.98 kg/m  as calculated from the following:    Height as of this encounter: 1.664 m (5' 5.5\").    Weight as of this encounter: 63.6 kg (140 lb 3.2 oz).    GEN: NAD  HEENT: Moist mucous membranes. No oral lesions.  Anicteric, noninjected sclera. Eyes appear to have good moisturizer by gross examination.  CV: S1, S2. RRR. No m/r/g.  PULM: CTA bilaterally. No w/c.  MSK: Subtle synovial swelling of the right second MCP and bilateral second and third PIPs.  Wrists, elbows, shoulders, knees, and ankles without swelling or tenderness palpation.  MTPs diffusely tender to palpation but without swelling or increased warmth.   Hips nontender to palpation.  NEURO: UE and LE strengths 5/5 and equal bilaterally.   SKIN: No rash. No nail pitting.   EXT: No LE edema  PSYCH: Alert. Appropriate.    Labs / Imaging (select studies)   RF/CCP  Recent Labs   Lab Test 08/29/13  1413   CCPABY <20 Interpretation:  Negative     CBC  Recent Labs   Lab Test 04/11/19  1325 12/17/18  1659 11/12/18  1705   WBC 9.8 6.0 5.7   RBC 3.76* 3.54* 3.75*   HGB 12.0 11.1* 11.6*   HCT 37.4 34.5* 35.9    98 96   RDW 13.1 13.8 13.7    265 286   MCH 31.9 31.4 30.9 "   MCHC 32.1 32.2 32.3   NEUTROPHIL 78.4 62.8 57.2   LYMPH 10.6 21.6 25.0   MONOCYTE 9.3 13.5 14.0   EOSINOPHIL 1.5 1.8 3.1   BASOPHIL 0.2 0.3 0.7   ANEU 7.7 3.8 3.3   ALYM 1.0 1.3 1.4   ELIGIO 0.9 0.8 0.8   AEOS 0.2 0.1 0.2   ABAS 0.0 0.0 0.0     CMP  Recent Labs   Lab Test 04/26/19  1327 04/12/19  1509 04/11/19  1325 12/17/18  1659  06/20/17  1437  05/24/16  0940  04/07/15  1322   NA  --  139  --   --   --  141  --  137  --  139   POTASSIUM  --  4.3  --   --   --  3.8  --  4.4  --  3.9   CHLORIDE  --  103  --   --   --  105  --  105  --  104   CO2  --  29  --   --   --  29  --  29  --  29   ANIONGAP  --  7  --   --   --  7  --  3  --  6   GLC  --   --   --   --   --  84  --  85  --  88   BUN  --   --   --   --   --  22  --  18  --  16   CR  --  0.86 1.07* 0.93   < > 0.84   < > 0.92  --  0.96   GFRESTIMATED  --  76 58* 63   < > 71   < > 64  --  61   GFRESTBLACK  --  88 68 76   < > 85   < > 77  --  74   VALERIA  --  8.7  --   --   --  9.1  --  9.2  --  9.1   BILITOTAL 0.3  --  0.3 0.2   < > 0.2   < > 0.4   < > 0.3   ALBUMIN 4.0  --  3.9 4.1   < > 4.0   < > 4.4   < > 3.9   PROTTOTAL 7.1  --  7.0 7.2   < > 6.8   < > 7.3   < > 7.3   ALKPHOS 50  --  47 43   < > 40   < > 42   < > 34*   AST 28  --  62* 21   < > 21   < > 30   < > 34   ALT 32  --  80* 22   < > 21   < > 24   < > 29    < > = values in this interval not displayed.     Calcium/VitaminD  Recent Labs   Lab Test 04/12/19  1509 06/20/17  1437 05/24/16  0940 07/13/15  1036   VALERIA 8.7 9.1 9.2  --    VITDT  --   --   --  48     ESR/CRP  Recent Labs   Lab Test 04/11/19  1325 12/17/18  1659 09/07/17  1322   SED 8 9 9   CRP <2.9 <2.9 <2.9     Lipid Panel  Recent Labs   Lab Test 07/13/15  1036   CHOL 181   TRIG 132   HDL 74   LDL 81   VLDL 26   CHOLHDLRATIO 2.4     Hepatitis C  Recent Labs   Lab Test 06/20/17  1437   HCVAB Nonreactive   Assay performance characteristics have not been established for newborns,   infants, and children       Immunization History     Immunization  History   Administered Date(s) Administered     Influenza (H1N1) 12/03/2009     Influenza (IIV3) PF 11/17/2005, 11/17/2006, 10/25/2010, 01/09/2013     Influenza Vaccine IM > 6 months Valent IIV4 10/10/2013, 09/14/2016, 09/13/2017, 01/10/2019     Pneumo Conj 13-V (2010&after) 09/13/2017     Pneumococcal 23 valent 09/12/2018     TD (ADULT, 7+) 02/01/2005     TDAP Vaccine (Adacel) 01/09/2013          Chart documentation done in part with Dragon Voice recognition Software. Although reviewed after completion, some word and grammatical error may remain.    Rome Hardy MD

## 2019-09-05 LAB
HBV CORE AB SERPL QL IA: NONREACTIVE
HBV SURFACE AG SERPL QL IA: NONREACTIVE

## 2019-09-29 ENCOUNTER — HEALTH MAINTENANCE LETTER (OUTPATIENT)
Age: 54
End: 2019-09-29

## 2019-10-02 DIAGNOSIS — Z79.899 HIGH RISK MEDICATION USE: ICD-10-CM

## 2019-10-02 DIAGNOSIS — M05.79 RHEUMATOID ARTHRITIS INVOLVING MULTIPLE SITES WITH POSITIVE RHEUMATOID FACTOR (H): ICD-10-CM

## 2019-10-02 LAB
ALBUMIN SERPL-MCNC: 4.1 G/DL (ref 3.4–5)
ALP SERPL-CCNC: 57 U/L (ref 40–150)
ALT SERPL W P-5'-P-CCNC: 29 U/L (ref 0–50)
AST SERPL W P-5'-P-CCNC: 30 U/L (ref 0–45)
BASOPHILS # BLD AUTO: 0 10E9/L (ref 0–0.2)
BASOPHILS NFR BLD AUTO: 0.4 %
BILIRUB DIRECT SERPL-MCNC: <0.1 MG/DL (ref 0–0.2)
BILIRUB SERPL-MCNC: 0.4 MG/DL (ref 0.2–1.3)
CREAT SERPL-MCNC: 0.93 MG/DL (ref 0.52–1.04)
CRP SERPL-MCNC: <2.9 MG/L (ref 0–8)
DIFFERENTIAL METHOD BLD: NORMAL
EOSINOPHIL # BLD AUTO: 0.1 10E9/L (ref 0–0.7)
EOSINOPHIL NFR BLD AUTO: 2.6 %
ERYTHROCYTE [DISTWIDTH] IN BLOOD BY AUTOMATED COUNT: 12.8 % (ref 10–15)
ERYTHROCYTE [SEDIMENTATION RATE] IN BLOOD BY WESTERGREN METHOD: 8 MM/H (ref 0–30)
GFR SERPL CREATININE-BSD FRML MDRD: 69 ML/MIN/{1.73_M2}
HCT VFR BLD AUTO: 37.2 % (ref 35–47)
HGB BLD-MCNC: 12.2 G/DL (ref 11.7–15.7)
LYMPHOCYTES # BLD AUTO: 1.8 10E9/L (ref 0.8–5.3)
LYMPHOCYTES NFR BLD AUTO: 33.2 %
MCH RBC QN AUTO: 30.6 PG (ref 26.5–33)
MCHC RBC AUTO-ENTMCNC: 32.8 G/DL (ref 31.5–36.5)
MCV RBC AUTO: 93 FL (ref 78–100)
MONOCYTES # BLD AUTO: 0.8 10E9/L (ref 0–1.3)
MONOCYTES NFR BLD AUTO: 14.7 %
NEUTROPHILS # BLD AUTO: 2.6 10E9/L (ref 1.6–8.3)
NEUTROPHILS NFR BLD AUTO: 49.1 %
PLATELET # BLD AUTO: 266 10E9/L (ref 150–450)
PROT SERPL-MCNC: 7.2 G/DL (ref 6.8–8.8)
RBC # BLD AUTO: 3.99 10E12/L (ref 3.8–5.2)
WBC # BLD AUTO: 5.3 10E9/L (ref 4–11)

## 2019-10-02 PROCEDURE — 85025 COMPLETE CBC W/AUTO DIFF WBC: CPT | Performed by: INTERNAL MEDICINE

## 2019-10-02 PROCEDURE — 36415 COLL VENOUS BLD VENIPUNCTURE: CPT | Performed by: INTERNAL MEDICINE

## 2019-10-02 PROCEDURE — 80076 HEPATIC FUNCTION PANEL: CPT | Performed by: INTERNAL MEDICINE

## 2019-10-02 PROCEDURE — 86140 C-REACTIVE PROTEIN: CPT | Performed by: INTERNAL MEDICINE

## 2019-10-02 PROCEDURE — 82565 ASSAY OF CREATININE: CPT | Performed by: INTERNAL MEDICINE

## 2019-10-02 PROCEDURE — 85652 RBC SED RATE AUTOMATED: CPT | Performed by: INTERNAL MEDICINE

## 2019-10-17 DIAGNOSIS — M05.79 RHEUMATOID ARTHRITIS INVOLVING MULTIPLE SITES WITH POSITIVE RHEUMATOID FACTOR (H): ICD-10-CM

## 2019-10-17 RX ORDER — HYDROXYCHLOROQUINE SULFATE 200 MG/1
TABLET, FILM COATED ORAL
Qty: 135 TABLET | Refills: 3 | Status: CANCELLED | OUTPATIENT
Start: 2019-10-17

## 2019-10-17 NOTE — TELEPHONE ENCOUNTER
Duplicate request.   Closing encounter.     hydroxychloroquine (PLAQUENIL) 200 MG tablet 135 tablet 3 9/4/2019  No   Sig: Hydroxychloroquine 200mg daily; and an additional 200mg every other day.   Sent to pharmacy as: hydroxychloroquine (PLAQUENIL) 200 MG tablet   Class: E-Prescribe   Order: [id number]   E-Prescribing Status: Receipt confirmed by pharmacy (9/4/2019  3:36 PM CDT)     Camryn Jarrett RN

## 2019-11-06 DIAGNOSIS — M05.79 RHEUMATOID ARTHRITIS INVOLVING MULTIPLE SITES WITH POSITIVE RHEUMATOID FACTOR (H): ICD-10-CM

## 2019-11-06 DIAGNOSIS — Z79.899 HIGH RISK MEDICATION USE: ICD-10-CM

## 2019-11-06 LAB
ALBUMIN SERPL-MCNC: 4.2 G/DL (ref 3.4–5)
ALP SERPL-CCNC: 70 U/L (ref 40–150)
ALT SERPL W P-5'-P-CCNC: 27 U/L (ref 0–50)
AST SERPL W P-5'-P-CCNC: 18 U/L (ref 0–45)
BASOPHILS # BLD AUTO: 0 10E9/L (ref 0–0.2)
BASOPHILS NFR BLD AUTO: 0.5 %
BILIRUB DIRECT SERPL-MCNC: <0.1 MG/DL (ref 0–0.2)
BILIRUB SERPL-MCNC: 0.3 MG/DL (ref 0.2–1.3)
CREAT SERPL-MCNC: 0.98 MG/DL (ref 0.52–1.04)
DIFFERENTIAL METHOD BLD: NORMAL
EOSINOPHIL # BLD AUTO: 0.2 10E9/L (ref 0–0.7)
EOSINOPHIL NFR BLD AUTO: 3.5 %
ERYTHROCYTE [DISTWIDTH] IN BLOOD BY AUTOMATED COUNT: 12.5 % (ref 10–15)
GFR SERPL CREATININE-BSD FRML MDRD: 65 ML/MIN/{1.73_M2}
HCT VFR BLD AUTO: 39.1 % (ref 35–47)
HGB BLD-MCNC: 12.6 G/DL (ref 11.7–15.7)
LYMPHOCYTES # BLD AUTO: 1.4 10E9/L (ref 0.8–5.3)
LYMPHOCYTES NFR BLD AUTO: 21.6 %
MCH RBC QN AUTO: 30.2 PG (ref 26.5–33)
MCHC RBC AUTO-ENTMCNC: 32.2 G/DL (ref 31.5–36.5)
MCV RBC AUTO: 94 FL (ref 78–100)
MONOCYTES # BLD AUTO: 0.8 10E9/L (ref 0–1.3)
MONOCYTES NFR BLD AUTO: 11.4 %
NEUTROPHILS # BLD AUTO: 4.1 10E9/L (ref 1.6–8.3)
NEUTROPHILS NFR BLD AUTO: 63 %
PLATELET # BLD AUTO: 342 10E9/L (ref 150–450)
PROT SERPL-MCNC: 7.9 G/DL (ref 6.8–8.8)
RBC # BLD AUTO: 4.17 10E12/L (ref 3.8–5.2)
WBC # BLD AUTO: 6.6 10E9/L (ref 4–11)

## 2019-11-06 PROCEDURE — 36415 COLL VENOUS BLD VENIPUNCTURE: CPT | Performed by: INTERNAL MEDICINE

## 2019-11-06 PROCEDURE — 85025 COMPLETE CBC W/AUTO DIFF WBC: CPT | Performed by: INTERNAL MEDICINE

## 2019-11-06 PROCEDURE — 80076 HEPATIC FUNCTION PANEL: CPT | Performed by: INTERNAL MEDICINE

## 2019-11-06 PROCEDURE — 82565 ASSAY OF CREATININE: CPT | Performed by: INTERNAL MEDICINE

## 2019-12-04 DIAGNOSIS — Z79.899 HIGH RISK MEDICATION USE: ICD-10-CM

## 2019-12-04 DIAGNOSIS — M05.79 RHEUMATOID ARTHRITIS INVOLVING MULTIPLE SITES WITH POSITIVE RHEUMATOID FACTOR (H): ICD-10-CM

## 2019-12-04 LAB
ALBUMIN SERPL-MCNC: 4.2 G/DL (ref 3.4–5)
ALP SERPL-CCNC: 60 U/L (ref 40–150)
ALT SERPL W P-5'-P-CCNC: 27 U/L (ref 0–50)
AST SERPL W P-5'-P-CCNC: 24 U/L (ref 0–45)
BASOPHILS # BLD AUTO: 0 10E9/L (ref 0–0.2)
BASOPHILS NFR BLD AUTO: 0.6 %
BILIRUB DIRECT SERPL-MCNC: <0.1 MG/DL (ref 0–0.2)
BILIRUB SERPL-MCNC: 0.3 MG/DL (ref 0.2–1.3)
CREAT SERPL-MCNC: 0.94 MG/DL (ref 0.52–1.04)
DIFFERENTIAL METHOD BLD: NORMAL
EOSINOPHIL # BLD AUTO: 0.1 10E9/L (ref 0–0.7)
EOSINOPHIL NFR BLD AUTO: 3 %
ERYTHROCYTE [DISTWIDTH] IN BLOOD BY AUTOMATED COUNT: 12.2 % (ref 10–15)
GFR SERPL CREATININE-BSD FRML MDRD: 68 ML/MIN/{1.73_M2}
HCT VFR BLD AUTO: 37.4 % (ref 35–47)
HGB BLD-MCNC: 12.3 G/DL (ref 11.7–15.7)
LYMPHOCYTES # BLD AUTO: 1.2 10E9/L (ref 0.8–5.3)
LYMPHOCYTES NFR BLD AUTO: 26.5 %
MCH RBC QN AUTO: 30.1 PG (ref 26.5–33)
MCHC RBC AUTO-ENTMCNC: 32.9 G/DL (ref 31.5–36.5)
MCV RBC AUTO: 92 FL (ref 78–100)
MONOCYTES # BLD AUTO: 0.8 10E9/L (ref 0–1.3)
MONOCYTES NFR BLD AUTO: 16.9 %
NEUTROPHILS # BLD AUTO: 2.5 10E9/L (ref 1.6–8.3)
NEUTROPHILS NFR BLD AUTO: 53 %
PLATELET # BLD AUTO: 341 10E9/L (ref 150–450)
PROT SERPL-MCNC: 7.5 G/DL (ref 6.8–8.8)
RBC # BLD AUTO: 4.08 10E12/L (ref 3.8–5.2)
WBC # BLD AUTO: 4.7 10E9/L (ref 4–11)

## 2019-12-04 PROCEDURE — 80076 HEPATIC FUNCTION PANEL: CPT | Performed by: INTERNAL MEDICINE

## 2019-12-04 PROCEDURE — 36415 COLL VENOUS BLD VENIPUNCTURE: CPT | Performed by: INTERNAL MEDICINE

## 2019-12-04 PROCEDURE — 82565 ASSAY OF CREATININE: CPT | Performed by: INTERNAL MEDICINE

## 2019-12-04 PROCEDURE — 85025 COMPLETE CBC W/AUTO DIFF WBC: CPT | Performed by: INTERNAL MEDICINE

## 2020-01-08 ENCOUNTER — OFFICE VISIT (OUTPATIENT)
Dept: RHEUMATOLOGY | Facility: CLINIC | Age: 55
End: 2020-01-08
Payer: COMMERCIAL

## 2020-01-08 VITALS
HEART RATE: 69 BPM | OXYGEN SATURATION: 100 % | BODY MASS INDEX: 22.05 KG/M2 | SYSTOLIC BLOOD PRESSURE: 105 MMHG | DIASTOLIC BLOOD PRESSURE: 64 MMHG | WEIGHT: 137.2 LBS | HEIGHT: 66 IN

## 2020-01-08 DIAGNOSIS — Z79.899 HIGH RISK MEDICATION USE: ICD-10-CM

## 2020-01-08 DIAGNOSIS — M80.00XA OSTEOPOROSIS WITH CURRENT PATHOLOGICAL FRACTURE, UNSPECIFIED OSTEOPOROSIS TYPE, INITIAL ENCOUNTER: ICD-10-CM

## 2020-01-08 DIAGNOSIS — M05.79 RHEUMATOID ARTHRITIS INVOLVING MULTIPLE SITES WITH POSITIVE RHEUMATOID FACTOR (H): Primary | ICD-10-CM

## 2020-01-08 DIAGNOSIS — G56.03 BILATERAL CARPAL TUNNEL SYNDROME: ICD-10-CM

## 2020-01-08 LAB
ALBUMIN SERPL-MCNC: 4.1 G/DL (ref 3.4–5)
ALP SERPL-CCNC: 58 U/L (ref 40–150)
ALT SERPL W P-5'-P-CCNC: 27 U/L (ref 0–50)
AST SERPL W P-5'-P-CCNC: 23 U/L (ref 0–45)
BASOPHILS # BLD AUTO: 0.1 10E9/L (ref 0–0.2)
BASOPHILS NFR BLD AUTO: 0.9 %
BILIRUB DIRECT SERPL-MCNC: <0.1 MG/DL (ref 0–0.2)
BILIRUB SERPL-MCNC: 0.3 MG/DL (ref 0.2–1.3)
CALCIUM SERPL-MCNC: 9.4 MG/DL (ref 8.5–10.1)
CREAT SERPL-MCNC: 0.92 MG/DL (ref 0.52–1.04)
CRP SERPL-MCNC: <2.9 MG/L (ref 0–8)
DIFFERENTIAL METHOD BLD: NORMAL
EOSINOPHIL # BLD AUTO: 0.1 10E9/L (ref 0–0.7)
EOSINOPHIL NFR BLD AUTO: 2.6 %
ERYTHROCYTE [DISTWIDTH] IN BLOOD BY AUTOMATED COUNT: 12.3 % (ref 10–15)
ERYTHROCYTE [SEDIMENTATION RATE] IN BLOOD BY WESTERGREN METHOD: 9 MM/H (ref 0–30)
GFR SERPL CREATININE-BSD FRML MDRD: 69 ML/MIN/{1.73_M2}
HCT VFR BLD AUTO: 36.3 % (ref 35–47)
HGB BLD-MCNC: 11.9 G/DL (ref 11.7–15.7)
LYMPHOCYTES # BLD AUTO: 1.4 10E9/L (ref 0.8–5.3)
LYMPHOCYTES NFR BLD AUTO: 26.4 %
MCH RBC QN AUTO: 29.9 PG (ref 26.5–33)
MCHC RBC AUTO-ENTMCNC: 32.8 G/DL (ref 31.5–36.5)
MCV RBC AUTO: 91 FL (ref 78–100)
MONOCYTES # BLD AUTO: 0.8 10E9/L (ref 0–1.3)
MONOCYTES NFR BLD AUTO: 14.8 %
NEUTROPHILS # BLD AUTO: 3 10E9/L (ref 1.6–8.3)
NEUTROPHILS NFR BLD AUTO: 55.3 %
PLATELET # BLD AUTO: 343 10E9/L (ref 150–450)
PROT SERPL-MCNC: 7 G/DL (ref 6.8–8.8)
PTH-INTACT SERPL-MCNC: 41 PG/ML (ref 18–80)
RBC # BLD AUTO: 3.98 10E12/L (ref 3.8–5.2)
WBC # BLD AUTO: 5.4 10E9/L (ref 4–11)

## 2020-01-08 PROCEDURE — 36415 COLL VENOUS BLD VENIPUNCTURE: CPT | Performed by: INTERNAL MEDICINE

## 2020-01-08 PROCEDURE — 83970 ASSAY OF PARATHORMONE: CPT | Performed by: INTERNAL MEDICINE

## 2020-01-08 PROCEDURE — 82565 ASSAY OF CREATININE: CPT | Performed by: INTERNAL MEDICINE

## 2020-01-08 PROCEDURE — 80076 HEPATIC FUNCTION PANEL: CPT | Performed by: INTERNAL MEDICINE

## 2020-01-08 PROCEDURE — 82310 ASSAY OF CALCIUM: CPT | Performed by: INTERNAL MEDICINE

## 2020-01-08 PROCEDURE — 85652 RBC SED RATE AUTOMATED: CPT | Performed by: INTERNAL MEDICINE

## 2020-01-08 PROCEDURE — 82306 VITAMIN D 25 HYDROXY: CPT | Performed by: INTERNAL MEDICINE

## 2020-01-08 PROCEDURE — 85025 COMPLETE CBC W/AUTO DIFF WBC: CPT | Performed by: INTERNAL MEDICINE

## 2020-01-08 PROCEDURE — 99214 OFFICE O/P EST MOD 30 MIN: CPT | Performed by: INTERNAL MEDICINE

## 2020-01-08 PROCEDURE — 86140 C-REACTIVE PROTEIN: CPT | Performed by: INTERNAL MEDICINE

## 2020-01-08 RX ORDER — LEFLUNOMIDE 10 MG/1
10 TABLET ORAL DAILY
Qty: 90 TABLET | Refills: 1 | Status: SHIPPED | OUTPATIENT
Start: 2020-01-08 | End: 2020-04-22

## 2020-01-08 ASSESSMENT — MIFFLIN-ST. JEOR: SCORE: 1226.15

## 2020-01-08 NOTE — PROGRESS NOTES
\Rheumatology Clinic Visit      Zena Quintana MRN# [id number]   YOB: 1965 Age: 54 year old      Date of visit: 1/08/20   PCP: Dr. Chana Armenta    Chief Complaint   Patient presents with:  Arthritis: RA. Broke hip in October. Hands get numb and ache during the night. (right is worse) does happen in feet also but not as bad.    Assessment and Plan     1. Seropositive nonerosive rheumatoid arthritis (RF low positive, CCP negative): Reportedly at the time of diagnosis she had synovitis in a symmetric distribution that was steroid responsive. Previously on MTX (effective, anemia, LFT elevation). Currently on hydroxychloroquine 300 mg daily on average (started in approximately 2007) and leflunomide 10mg daily.  Doing well with regard to RA.   - Continue leflunomide 10mg daily   - Continue hydroxychloroquine 200mg daily with an additional 200mg every other day (last eye exam was on 6/4/2019 by Dr. Julito Garcia)  - Labs today: CBC, Creatinine, Hepatic Panel, ESR, CRP  - Labs monthly g1astwrm: CBC, Cr, Hepatic Panel    2. Osteoporosis with current pathological fracture, unspecified osteoporosis type, initial encounter: s/p hip fx from ground level fall. Check DEXA, Ca, Vitamin D, PTH. If labs okay start alendronate. Note that her mother has a hx of vertebral compression fx; Ms. Quintana is post-menopausal; and a DEXA was originally ordered to be done in 2018.   - Start alendronate 70mg PO once every 7 days if labs okay  - Continue calcium 1200mg daily  - Continue vitamin D 1000 IU daily  - Labs today: Cr, vitamin D, PTH, Ca    Addendum: labs okay. Alendronate Rx'd    # Bisphosphonate risks and side effects:  Risks and side effects include esophageal irritation, heartburn, osteonecrosis of the jaw (most often in people with dental disease or a recent dental procedure), and atypical femoral fractures. Unusual side effects that have been reported include occular symptoms such as uveitis, keartitis, optic  neuritis, and orbital swelling.  Oral bisphosphonates should not be used in patients with esophageal problems (such as strictures, achalasia, or severe dysmotility, varices), malabsorption, or the inability to sit upright.  Oral and IV bisphosphonates should not be used if the CrCl is less than 25-40mL/min, or the patient is pregnant or breastfeeding.  Prior to starting a biosphosphonate, invasive dental work should be completed if needed, and vitamin D level should be adequate.      3. Bilateral carpal tunnel syndrome: doesn't tolerate NSAIDs per patient.  Use wrist splints bilaterally, and if not improved within 2 weeks then she is to call to schedule an EMG/NCS.  - EMG/NCS ordered in case it is needed    4. 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 S/p surgery.  Doing okay today.    5. Sjogren's syndrome: NJ by EIA negative but positive by IF, SSA and SSB negative, and minor salivary gland biopsy negative. She is currently following with ophthalmology who has given her eyedrops that have been beneficial.  Dry mouth is currently treated with frequent sips of water; she has good oral hygiene, follows with a dentist regularly, and does not have frequent cavities. Pilocarpine was previously Rx'd but she has not started it and does not plan to; will remove from the medication list.     6. History of oral sores / family history of lupus / polyarthritis / recurrent sinus infections: She is not having oral sores or recurrent sinus infections for several years now. Retrospectively, there would be concern for potential ANCA associated sinus issues and this could be checked if she had recurrent sinusitis again. Oral sores have improved since she started Plaquenil, that argues it could be connective tissue disease related. NJ was negative by EIA, but  complement was on the low end of the normal range so NJ was rechecked and positive; further testing was negative.      Ms. Quintana verbalized agreement with and understanding of the rational for the diagnosis and treatment plan.  All questions were answered to best of my ability and the patient's satisfaction. Ms. Quintana was advised to contact the clinic with any questions that may arise after the clinic visit.      Thank you for involving me in the care of the patient    Return to clinic: 3-4 months      HPI   Zena Quintana is a 54 year old female with medical history significant for iron deficiency anemia, nephrolithiasis, hyperlipidemia, xerostomia, dry eyes, and rheumatoid arthritis who presents for follow-up of rheumatoid arthritis.    Today, Ms. Quintana reports doing okay.  Morning stiffness for approximately 30 minutes.  Feels much better after going to the gym and in general feels better with more activity.  Positive gelling phenomenon.  All joints are stiff in the morning.  Most symptomatic joints are her PIPs, followed by her MCPs and MTPs bilaterally.  No joint swelling.  All joint pain is improved with activity; worsens with inactivity.  .    Denies fevers, chills, nausea, vomiting, constipation, diarrhea. No abdominal pain. No chest pain/pressure, palpitations, or shortness of breath. No neck pain. No rash. No LE swelling.  No photosensitivity or photophobia.  No sicca symptoms.  No eye pain or redness. No oral sores.  She has not had a sinus infection for several years now.    Tobacco: None  EtOH: Occasional; no more than 2 drinks per day when she does drink  Drugs: None  Occupation: Works at a school    ROS   GEN: No fevers, chills, night sweats, or weight change  SKIN: No itching, rashes, sores  HEENT: No oral or nasal ulcers.  CV: No chest pain, pressure, palpitations, or dyspnea on exertion.  PULM: No SOB, wheeze, cough.  GI:  No nausea, vomiting, constipation, diarrhea. No blood in stool. No  abdominal pain.  : No blood in urine.  MSK: See HPI.  NEURO: No numbness, tingling, or weakness.  EXT: No LE swelling  PSYCH: Negative    Active Problem List     Patient Active Problem List   Diagnosis     Internal derangement of knee     Iron deficiency anemia     Allergic rhinitis     Stomatitis and mucositis (ulcerative)     Pure hypercholesterolemia     Arthropathy     HYPERLIPIDEMIA LDL GOAL <160     High risk medication use     RA (rheumatoid arthritis) (H)     Endometriosis     Ovarian cyst     Disturbance of salivary secretion (XEROSTOMIA)     Impingement syndrome of right shoulder     Sjogren's syndrome (H)     Palpitations     Cervical high risk HPV (human papillomavirus) test positive     Past Medical History     Past Medical History:   Diagnosis Date     Allergic rhinitis, cause unspecified     Allergic rhinitis     Cervical high risk HPV (human papillomavirus) test positive 3/8/2019     Endometriosis, site unspecified     Endometriosis     Female infertility of other specified origin      Iron deficiency anemia, unspecified     Anemia, iron def.     RA (rheumatoid arthritis) (H)      Sjogren's syndrome (H)      Stomatitis and mucositis (ulcerative)     chronic - non-specific     Past Surgical History     Past Surgical History:   Procedure Laterality Date     C NONSPECIFIC PROCEDURE  1995, 1997    laparoscopy for endometrial fulguration     COLONOSCOPY WITH CO2 INSUFFLATION N/A 8/7/2017    Procedure: COLONOSCOPY WITH CO2 INSUFFLATION;  Colonoscopy, Screen for colon cancer.  BMI  22.17  [facility] [phone];  Surgeon: Berny Pedro MD;  Location: [facility] OR     HC COLONOSCOPY W/WO BRUSH/WASH  5/25/2005     HC CREATE EARDRUM OPENING,GEN ANESTH      P.E. Tubes     HC KNEE SCOPE, DIAGNOSTIC       left knee, ruptured ACL     HC REMOVE TONSILS/ADENOIDS,12+ Y/O      T & A 11+y.o.     ORTHOPEDIC SURGERY      rotator cuff repair, left     Allergy     Allergies   Allergen Reactions     Penicillins Hives      hives     Current Medication List     Current Outpatient Medications   Medication Sig     acetaminophen (TYLENOL) 500 MG tablet Take 1-2 tablets by mouth every 6 hours as needed for pain. (Patient not taking: Reported on 3/8/2019)     albuterol (PROAIR HFA/PROVENTIL HFA/VENTOLIN HFA) 108 (90 Base) MCG/ACT inhaler Inhale 2 puffs into the lungs every 4 hours as needed for shortness of breath / dyspnea or wheezing (Patient not taking: Reported on 3/8/2019)     ASPIRIN 81 MG OR TABS 1 TABLET DAILY     bimatoprost (LATISSE) 0.03 % SOLN Externally apply topically At Bedtime     bimatoprost (LATISSE) 0.03 % SOLN Externally apply topically At Bedtime     Fexofenadine HCl (MUCINEX ALLERGY PO)      fluticasone (FLONASE) 50 MCG/ACT spray Spray 2 sprays into both nostrils daily (Patient not taking: Reported on 3/8/2019)     hydroxychloroquine (PLAQUENIL) 200 MG tablet Hydroxychloroquine 200mg daily; and an additional 200mg every other day.     ibuprofen (ADVIL) 200 MG capsule Take 200 mg by mouth as needed for fever     leflunomide (ARAVA) 10 MG tablet Take 1 tablet (10 mg) by mouth daily     loratadine 10 MG capsule Take 10 mg by mouth as needed for allergies     MULTIPLE VITAMINS/WOMENS OR None Entered     OMEGA-3 FATTY ACIDS 600 MG OR CAPS Reported on 3/15/2017     ondansetron (ZOFRAN ODT) 4 MG ODT tab Take 1-2 tablet 30 min before prednisone. (Patient not taking: Reported on 3/8/2019)     No current facility-administered medications for this visit.          Social History   See HPI    Family History     Family History   Problem Relation Age of Onset     Osteoporosis Mother      Arthritis Mother         lupus     Connective Tissue Disorder Mother         Lupus     Cataracts Mother      Macular Degeneration Mother      Celiac Disease Mother      Heart Disease Father         aortic aneurysm     Gastrointestinal Disease Father         abdominal anurysm     Hypertension Father      Cataracts Father      Gastrointestinal  "Disease Sister         IBS, colon problems     Alzheimer Disease Maternal Grandmother      Osteoporosis Maternal Grandmother      Cerebrovascular Disease Maternal Grandfather      Cancer Paternal Grandmother      Cerebrovascular Disease Paternal Grandfather      Circulatory Sister         carotid stenosis     Cancer - colorectal Maternal Uncle      Cancer - colorectal Maternal Uncle      Glaucoma No family hx of      Mother: Lupus    Physical Exam     Temp Readings from Last 3 Encounters:   09/28/18 97.3  F (36.3  C) (Tympanic)   09/22/18 97.7  F (36.5  C) (Oral)   09/12/18 97.6  F (36.4  C) (Oral)     BP Readings from Last 5 Encounters:   09/04/19 95/57   03/08/19 106/66   01/10/19 100/58   09/28/18 120/73   09/22/18 105/64     Pulse Readings from Last 1 Encounters:   09/04/19 62     Resp Readings from Last 1 Encounters:   07/27/18 16     Estimated body mass index is 22.98 kg/m  as calculated from the following:    Height as of this encounter: 1.664 m (5' 5.5\").    Weight as of 9/4/19: 63.6 kg (140 lb 3.2 oz).    GEN: NAD  HEENT: Moist mucous membranes. No oral lesions.  Anicteric, noninjected sclera. Eyes appear to have good moisturizer by gross examination.  CV: S1, S2. RRR. No m/r/g.  PULM: CTA bilaterally. No w/c.  MSK: Subtle synovial swelling of the right second MCP and bilateral second and third PIPs.  Wrists, elbows, shoulders, knees, and ankles without swelling or tenderness palpation.  MTPs diffusely tender to palpation but without swelling or increased warmth.   Hips nontender to palpation.  NEURO: UE and LE strengths 5/5 and equal bilaterally.   SKIN: No rash. No nail pitting.   EXT: No LE edema  PSYCH: Alert. Appropriate.    Labs / Imaging (select studies)   RF/CCP  Recent Labs   Lab Test 08/29/13  1413   CCPABY <20 Interpretation:  Negative     CBC  Recent Labs   Lab Test 12/04/19  1505 11/06/19  1504 10/02/19  1504   WBC 4.7 6.6 5.3   RBC 4.08 4.17 3.99   HGB 12.3 12.6 12.2   HCT 37.4 39.1 37.2   MCV " 92 94 93   RDW 12.2 12.5 12.8    342 266   MCH 30.1 30.2 30.6   MCHC 32.9 32.2 32.8   NEUTROPHIL 53.0 63.0 49.1   LYMPH 26.5 21.6 33.2   MONOCYTE 16.9 11.4 14.7   EOSINOPHIL 3.0 3.5 2.6   BASOPHIL 0.6 0.5 0.4   ANEU 2.5 4.1 2.6   ALYM 1.2 1.4 1.8   ELIGIO 0.8 0.8 0.8   AEOS 0.1 0.2 0.1   ABAS 0.0 0.0 0.0     CMP  Recent Labs   Lab Test 12/04/19  1505 11/06/19  1504 10/02/19  1504  04/12/19  1509  06/20/17  1437  05/24/16  0940  04/07/15  1322   NA  --   --   --   --  139  --  141  --  137  --  139   POTASSIUM  --   --   --   --  4.3  --  3.8  --  4.4  --  3.9   CHLORIDE  --   --   --   --  103  --  105  --  105  --  104   CO2  --   --   --   --  29  --  29  --  29  --  29   ANIONGAP  --   --   --   --  7  --  7  --  3  --  6   GLC  --   --   --   --   --   --  84  --  85  --  88   BUN  --   --   --   --   --   --  22  --  18  --  16   CR 0.94 0.98 0.93   < > 0.86   < > 0.84   < > 0.92  --  0.96   GFRESTIMATED 68 65 69   < > 76   < > 71   < > 64  --  61   GFRESTBLACK 79 75 80   < > 88   < > 85   < > 77  --  74   VALERIA  --   --   --   --  8.7  --  9.1  --  9.2  --  9.1   BILITOTAL 0.3 0.3 0.4   < >  --    < > 0.2   < > 0.4   < > 0.3   ALBUMIN 4.2 4.2 4.1   < >  --    < > 4.0   < > 4.4   < > 3.9   PROTTOTAL 7.5 7.9 7.2   < >  --    < > 6.8   < > 7.3   < > 7.3   ALKPHOS 60 70 57   < >  --    < > 40   < > 42   < > 34*   AST 24 18 30   < >  --    < > 21   < > 30   < > 34   ALT 27 27 29   < >  --    < > 21   < > 24   < > 29    < > = values in this interval not displayed.     Calcium/VitaminD  Recent Labs   Lab Test 04/12/19  1509 06/20/17  1437 05/24/16  0940 07/13/15  1036   VALERIA 8.7 9.1 9.2  --    VITDT  --   --   --  48     ESR/CRP  Recent Labs   Lab Test 10/02/19  1504 09/04/19  1552 04/11/19  1325   SED 8 7 8   CRP <2.9 <2.9 <2.9     Lipid Panel  Recent Labs   Lab Test 07/13/15  1036   CHOL 181   TRIG 132   HDL 74   LDL 81   VLDL 26   CHOLHDLRATIO 2.4     Hepatitis B  Recent Labs   Lab Test 09/04/19  1552   HBCAB  Nonreactive   HEPBANG Nonreactive     Hepatitis C  Recent Labs   Lab Test 06/20/17  1437   HCVAB Nonreactive   Assay performance characteristics have not been established for newborns,   infants, and children       Immunization History     Immunization History   Administered Date(s) Administered     Influenza (H1N1) 12/03/2009     Influenza (IIV3) PF 11/17/2005, 11/17/2006, 10/25/2010, 01/09/2013     Influenza Vaccine IM > 6 months Valent IIV4 10/10/2013, 09/14/2016, 09/13/2017, 01/10/2019     Pneumo Conj 13-V (2010&after) 09/13/2017     Pneumococcal 23 valent 09/12/2018     TD (ADULT, 7+) 02/01/2005     TDAP Vaccine (Adacel) 01/09/2013          Chart documentation done in part with Dragon Voice recognition Software. Although reviewed after completion, some word and grammatical error may remain.    Rome Hardy MD

## 2020-01-08 NOTE — PATIENT INSTRUCTIONS
Carpal tunnel syndrome  Wear carpal tunnel syndrome wrist splints. If no better in 2 weeks then call to schedule the nerve conduction study / EMG    [place]  [address] [place] [address]  [phone]    Rheumatoid Arthritis  - Continue leflunomide and hydroxychloroquine       Osteoporosis  - Call to schedule DEXA (bone density scan)    [place] [facility])  [facility]   [phone]    - Calcium 1200mg daily  - Vitamin D 1000 IU daily   - Start alendronate 70mg once weekly if labs are okay (prescription not sent yet)

## 2020-01-08 NOTE — NURSING NOTE
RAPID3 (0-30) Cumulative Score  7.3          RAPID3 Weighted Score (divide #4 by 3 and that is the weighted score)  2.4       Genesis Medley [facility]  1/8/2020 3:19 PM

## 2020-01-09 LAB — DEPRECATED CALCIDIOL+CALCIFEROL SERPL-MC: 38 UG/L (ref 20–75)

## 2020-01-09 RX ORDER — ALENDRONATE SODIUM 70 MG/1
70 TABLET ORAL
Qty: 12 TABLET | Refills: 3 | Status: SHIPPED | OUTPATIENT
Start: 2020-01-09 | End: 2020-04-22

## 2020-01-13 ENCOUNTER — ANCILLARY PROCEDURE (OUTPATIENT)
Dept: BONE DENSITY | Facility: CLINIC | Age: 55
End: 2020-01-13
Attending: INTERNAL MEDICINE
Payer: COMMERCIAL

## 2020-01-13 DIAGNOSIS — M05.79 RHEUMATOID ARTHRITIS INVOLVING MULTIPLE SITES WITH POSITIVE RHEUMATOID FACTOR (H): ICD-10-CM

## 2020-01-13 DIAGNOSIS — Z13.820 OSTEOPOROSIS SCREENING: ICD-10-CM

## 2020-01-13 DIAGNOSIS — Z78.0 POST-MENOPAUSAL: ICD-10-CM

## 2020-01-13 PROCEDURE — 77080 DXA BONE DENSITY AXIAL: CPT | Performed by: RADIOLOGY

## 2020-01-14 NOTE — RESULT ENCOUNTER NOTE
"[employer] message sent:  \"Ms. Quintana,    The bone density scan shows osteoporosis of the lumbar spine, and osteopenia of the left hip (close to the level of osteoporosis).     This supports the decision to treat osteoporosis with alendronate (fosamax).      Sincerely,  Rome Hardy MD  1/14/2020 5:56 PM\""

## 2020-02-10 DIAGNOSIS — M05.79 RHEUMATOID ARTHRITIS INVOLVING MULTIPLE SITES WITH POSITIVE RHEUMATOID FACTOR (H): ICD-10-CM

## 2020-02-10 DIAGNOSIS — Z79.899 HIGH RISK MEDICATION USE: ICD-10-CM

## 2020-02-10 LAB
ALBUMIN SERPL-MCNC: 4.3 G/DL (ref 3.4–5)
ALP SERPL-CCNC: 55 U/L (ref 40–150)
ALT SERPL W P-5'-P-CCNC: 25 U/L (ref 0–50)
AST SERPL W P-5'-P-CCNC: 24 U/L (ref 0–45)
BASOPHILS # BLD AUTO: 0 10E9/L (ref 0–0.2)
BASOPHILS NFR BLD AUTO: 0.6 %
BILIRUB DIRECT SERPL-MCNC: <0.1 MG/DL (ref 0–0.2)
BILIRUB SERPL-MCNC: 0.3 MG/DL (ref 0.2–1.3)
CREAT SERPL-MCNC: 0.83 MG/DL (ref 0.52–1.04)
DIFFERENTIAL METHOD BLD: NORMAL
EOSINOPHIL # BLD AUTO: 0.1 10E9/L (ref 0–0.7)
EOSINOPHIL NFR BLD AUTO: 2.6 %
ERYTHROCYTE [DISTWIDTH] IN BLOOD BY AUTOMATED COUNT: 12.7 % (ref 10–15)
GFR SERPL CREATININE-BSD FRML MDRD: 79 ML/MIN/{1.73_M2}
HCT VFR BLD AUTO: 38.6 % (ref 35–47)
HGB BLD-MCNC: 12.3 G/DL (ref 11.7–15.7)
LYMPHOCYTES # BLD AUTO: 1.3 10E9/L (ref 0.8–5.3)
LYMPHOCYTES NFR BLD AUTO: 26.3 %
MCH RBC QN AUTO: 30.2 PG (ref 26.5–33)
MCHC RBC AUTO-ENTMCNC: 31.9 G/DL (ref 31.5–36.5)
MCV RBC AUTO: 95 FL (ref 78–100)
MONOCYTES # BLD AUTO: 0.8 10E9/L (ref 0–1.3)
MONOCYTES NFR BLD AUTO: 16 %
NEUTROPHILS # BLD AUTO: 2.7 10E9/L (ref 1.6–8.3)
NEUTROPHILS NFR BLD AUTO: 54.5 %
PLATELET # BLD AUTO: 325 10E9/L (ref 150–450)
PROT SERPL-MCNC: 7.6 G/DL (ref 6.8–8.8)
RBC # BLD AUTO: 4.07 10E12/L (ref 3.8–5.2)
WBC # BLD AUTO: 5 10E9/L (ref 4–11)

## 2020-02-10 PROCEDURE — 85025 COMPLETE CBC W/AUTO DIFF WBC: CPT | Performed by: INTERNAL MEDICINE

## 2020-02-10 PROCEDURE — 82565 ASSAY OF CREATININE: CPT | Performed by: INTERNAL MEDICINE

## 2020-02-10 PROCEDURE — 36415 COLL VENOUS BLD VENIPUNCTURE: CPT | Performed by: INTERNAL MEDICINE

## 2020-02-10 PROCEDURE — 80076 HEPATIC FUNCTION PANEL: CPT | Performed by: INTERNAL MEDICINE

## 2020-03-09 DIAGNOSIS — M05.79 RHEUMATOID ARTHRITIS INVOLVING MULTIPLE SITES WITH POSITIVE RHEUMATOID FACTOR (H): ICD-10-CM

## 2020-03-09 DIAGNOSIS — Z79.899 HIGH RISK MEDICATION USE: ICD-10-CM

## 2020-03-09 LAB
ALBUMIN SERPL-MCNC: 4.5 G/DL (ref 3.4–5)
ALP SERPL-CCNC: 50 U/L (ref 40–150)
ALT SERPL W P-5'-P-CCNC: 27 U/L (ref 0–50)
AST SERPL W P-5'-P-CCNC: 28 U/L (ref 0–45)
BASOPHILS # BLD AUTO: 0.1 10E9/L (ref 0–0.2)
BASOPHILS NFR BLD AUTO: 1.3 %
BILIRUB DIRECT SERPL-MCNC: <0.1 MG/DL (ref 0–0.2)
BILIRUB SERPL-MCNC: 0.3 MG/DL (ref 0.2–1.3)
CREAT SERPL-MCNC: 0.98 MG/DL (ref 0.52–1.04)
DIFFERENTIAL METHOD BLD: NORMAL
EOSINOPHIL # BLD AUTO: 0.1 10E9/L (ref 0–0.7)
EOSINOPHIL NFR BLD AUTO: 2.4 %
ERYTHROCYTE [DISTWIDTH] IN BLOOD BY AUTOMATED COUNT: 12.6 % (ref 10–15)
GFR SERPL CREATININE-BSD FRML MDRD: 65 ML/MIN/{1.73_M2}
HCT VFR BLD AUTO: 37.9 % (ref 35–47)
HGB BLD-MCNC: 12.2 G/DL (ref 11.7–15.7)
LYMPHOCYTES # BLD AUTO: 1.3 10E9/L (ref 0.8–5.3)
LYMPHOCYTES NFR BLD AUTO: 27.7 %
MCH RBC QN AUTO: 30 PG (ref 26.5–33)
MCHC RBC AUTO-ENTMCNC: 32.2 G/DL (ref 31.5–36.5)
MCV RBC AUTO: 93 FL (ref 78–100)
MONOCYTES # BLD AUTO: 0.7 10E9/L (ref 0–1.3)
MONOCYTES NFR BLD AUTO: 14.8 %
NEUTROPHILS # BLD AUTO: 2.4 10E9/L (ref 1.6–8.3)
NEUTROPHILS NFR BLD AUTO: 53.8 %
PLATELET # BLD AUTO: 283 10E9/L (ref 150–450)
PROT SERPL-MCNC: 7.5 G/DL (ref 6.8–8.8)
RBC # BLD AUTO: 4.07 10E12/L (ref 3.8–5.2)
WBC # BLD AUTO: 4.5 10E9/L (ref 4–11)

## 2020-03-09 PROCEDURE — 82565 ASSAY OF CREATININE: CPT | Performed by: INTERNAL MEDICINE

## 2020-03-09 PROCEDURE — 80076 HEPATIC FUNCTION PANEL: CPT | Performed by: INTERNAL MEDICINE

## 2020-03-09 PROCEDURE — 36415 COLL VENOUS BLD VENIPUNCTURE: CPT | Performed by: INTERNAL MEDICINE

## 2020-03-09 PROCEDURE — 85025 COMPLETE CBC W/AUTO DIFF WBC: CPT | Performed by: INTERNAL MEDICINE

## 2020-04-22 ENCOUNTER — VIRTUAL VISIT (OUTPATIENT)
Dept: RHEUMATOLOGY | Facility: CLINIC | Age: 55
End: 2020-04-22
Payer: COMMERCIAL

## 2020-04-22 DIAGNOSIS — M05.79 RHEUMATOID ARTHRITIS INVOLVING MULTIPLE SITES WITH POSITIVE RHEUMATOID FACTOR (H): Primary | ICD-10-CM

## 2020-04-22 DIAGNOSIS — G56.03 BILATERAL CARPAL TUNNEL SYNDROME: ICD-10-CM

## 2020-04-22 DIAGNOSIS — T45.8X5A ORAL BISPHOSPHONATES CAUSING ADVERSE EFFECT IN THERAPEUTIC USE, INITIAL ENCOUNTER: ICD-10-CM

## 2020-04-22 DIAGNOSIS — Z79.899 HIGH RISK MEDICATION USE: ICD-10-CM

## 2020-04-22 DIAGNOSIS — M80.00XA OSTEOPOROSIS WITH CURRENT PATHOLOGICAL FRACTURE, UNSPECIFIED OSTEOPOROSIS TYPE, INITIAL ENCOUNTER: ICD-10-CM

## 2020-04-22 PROCEDURE — 99214 OFFICE O/P EST MOD 30 MIN: CPT | Mod: 95 | Performed by: INTERNAL MEDICINE

## 2020-04-22 RX ORDER — HEPARIN SODIUM,PORCINE 10 UNIT/ML
5 VIAL (ML) INTRAVENOUS
Status: CANCELLED | OUTPATIENT
Start: 2020-04-22

## 2020-04-22 RX ORDER — ZOLEDRONIC ACID 5 MG/100ML
5 INJECTION, SOLUTION INTRAVENOUS ONCE
Status: CANCELLED
Start: 2020-04-22

## 2020-04-22 RX ORDER — LEFLUNOMIDE 10 MG/1
10 TABLET ORAL DAILY
Qty: 90 TABLET | Refills: 2 | Status: SHIPPED | OUTPATIENT
Start: 2020-04-22 | End: 2021-04-08

## 2020-04-22 RX ORDER — HEPARIN SODIUM (PORCINE) LOCK FLUSH IV SOLN 100 UNIT/ML 100 UNIT/ML
5 SOLUTION INTRAVENOUS
Status: CANCELLED | OUTPATIENT
Start: 2020-04-22

## 2020-04-22 NOTE — PROGRESS NOTES
"Zena Quintana is a 54 year old female who is being evaluated via a billable video visit.      The patient has been notified of following:     \"This video visit will be conducted via a call between you and your physician/provider. We have found that certain health care needs can be provided without the need for an in-person physical exam.  This service lets us provide the care you need with a video conversation.  If a prescription is necessary we can send it directly to your pharmacy.  If lab work is needed we can place an order for that and you can then stop by our lab to have the test done at a later time.    Video visits are billed at different rates depending on your insurance coverage.  Please reach out to your insurance provider with any questions.    If during the course of the call the physician/provider feels a video visit is not appropriate, you will not be charged for this service.\"    Patient has given verbal consent for Video visit? Yes    How would you like to obtain your AVS? [facility]    Patient would like the video invitation sent by: Text to cell phone: [phone]    Will anyone else be joining your video visit? No    \Rheumatology Video Visit      Zena Quintana MRN# [id number]   YOB: 1965 Age: 54 year old      Date of visit: 4/22/20   PCP: Dr. Chana Armenta    Chief Complaint   Patient presents with:  RECHECK: RA; doing well overall. Reports she stopped fosamax due to indigestion and hair loss    Assessment and Plan     1. Seropositive nonerosive rheumatoid arthritis (RF low positive, CCP negative): Reportedly at the time of diagnosis she had synovitis in a symmetric distribution that was steroid responsive. Previously on MTX (effective, anemia, LFT elevation). Currently on hydroxychloroquine 300 mg daily on average (started in approximately 2007) and leflunomide 10mg daily.  Doing well with regard to RA.   - Continue leflunomide 10mg daily   - Continue hydroxychloroquine 200mg " daily with an additional 200mg every other day (last eye exam was on 6/4/2019 by Dr. Julito Garcia)  - Labs every 3 months (next in early June): CBC, Cr, Hepatic Panel    2. Osteoporosis with current pathological fracture, unspecified osteoporosis type, initial encounter: s/p hip fx from ground level fall. Note that her mother has a hx of vertebral compression fx; Ms. Quintana is post-menopausal. 1/13/2020 DEXA showed osteoporosis; L-spine T score of -2.6, Left femoral neck T-score of -2.4. Alendronate was started 1/2020 but stopped in early April 2020 by Ms. Quintana due to hair loss and GERD.  Discussed reclast and she would like to try this; [facility] # provided and asked that she call to schedule.   - Discontinue alendronate 70mg PO once every 7 days if labs okay  - Start reclast 5mg IV once yearly; patient advised to call to schedule  - Continue calcium 1200mg daily  - Continue vitamin D 1000 IU daily    # Bisphosphonate risks and side effects:  Risks and side effects include esophageal irritation, heartburn, osteonecrosis of the jaw (most often in people with dental disease or a recent dental procedure), and atypical femoral fractures.  IV bisphosphonates can cause a flu-like illness and bone pain lasting up to 2-3 days; premedication with acetaminophen will often prevent or lessen these symptoms. Unusual side effects that have been reported include occular symptoms such as uveitis, keartitis, optic neuritis, and orbital swelling.  Prior to starting a biosphosphonate, invasive dental work should be completed if needed, and vitamin D level should be adequate.    3. Bilateral carpal tunnel syndrome: doesn't tolerate NSAIDs per patient.  Using wrist splints bilaterally with significant improvement, and only intermittent symptoms now.  Advised that if worsens or does not completely resolve within a couple months that she should get the NCS and/or consider seeing a hand surgeon    4. 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 S/p surgery.  Doing okay today.    5. Sjogren's syndrome: NJ by EIA negative but positive by IF, SSA and SSB negative, and minor salivary gland biopsy negative. She is currently following with ophthalmology who has given her eyedrops that have been beneficial.  Dry mouth is currently treated with frequent sips of water; she has good oral hygiene, follows with a dentist regularly, and does not have frequent cavities. Pilocarpine was previously Rx'd but she has not started it and does not plan to; will remove from the medication list.     6. History of oral sores / family history of lupus / polyarthritis / recurrent sinus infections: She is not having oral sores or recurrent sinus infections for several years now. Retrospectively, there would be concern for potential ANCA associated sinus issues and this could be checked if she had recurrent sinusitis again. Oral sores have improved since she started Plaquenil, that argues it could be connective tissue disease related. NJ was negative by EIA, but complement was on the low end of the normal range so NJ was rechecked and positive; further testing was negative.    # Relevant labs and imaging were reviewed with the patient    # High risk medication toxicity monitoring: discussion and labs reviewed; appropriate labs ordered. See above    # Note that this is a virtual visit to reduce the risk of COVID-19 exposure during this current pandemic.         Ms. Quintana verbalized agreement with and understanding of the rational for the diagnosis and treatment plan.  All questions were answered to best of my ability and the patient's satisfaction. Ms. Quintana was advised to contact the clinic with any questions that may arise after the clinic visit.      Thank you for involving me  in the care of the patient    Return to clinic: 3-4 months      HPI   Zena Quintana is a 54 year old female with medical history significant for iron deficiency anemia, nephrolithiasis, hyperlipidemia, xerostomia, dry eyes, and rheumatoid arthritis who presents for follow-up of rheumatoid arthritis.    Today, Ms. Quintana reports doing okay.  Shoulders are not as good because she isn't going to the gym any more; she is going to try starting home exercises.  Other joints are doing well.  No joint swelling; no morning stiffness; no gelling.  She reports having hair thinning and GERD worse with alendronate so she stopped it 3 weeks ago in early April 2020 with resolution of these issues; hair stopped falling out in clumps she says.      Denies fevers, chills, nausea, vomiting, constipation, diarrhea. No abdominal pain. No chest pain/pressure, palpitations, or shortness of breath. No neck pain. No rash. No LE swelling.  No photosensitivity or photophobia.  No sicca symptoms.     Tobacco: None  EtOH: Occasional; no more than 2 drinks per day when she does drink  Drugs: None  Occupation: Works at a school    ROS   GEN: No fevers, chills, night sweats, or weight change  SKIN: No itching, rashes, sores  HEENT: No oral or nasal ulcers.  CV: No chest pain, pressure, palpitations, or dyspnea on exertion.  PULM: No SOB, wheeze, cough.  GI:  No nausea, vomiting, constipation, diarrhea. No blood in stool. No abdominal pain.  : No blood in urine.  MSK: See HPI.  NEURO: See HPI  EXT: No LE swelling  PSYCH: Negative    Active Problem List     Patient Active Problem List   Diagnosis     Internal derangement of knee     Iron deficiency anemia     Allergic rhinitis     Stomatitis and mucositis (ulcerative)     Pure hypercholesterolemia     Arthropathy     HYPERLIPIDEMIA LDL GOAL <160     High risk medication use     RA (rheumatoid arthritis) (H)     Endometriosis     Ovarian cyst     Disturbance of salivary secretion (XEROSTOMIA)      Impingement syndrome of right shoulder     Sjogren's syndrome (H)     Palpitations     Cervical high risk HPV (human papillomavirus) test positive     Past Medical History     Past Medical History:   Diagnosis Date     Allergic rhinitis, cause unspecified     Allergic rhinitis     Cervical high risk HPV (human papillomavirus) test positive 3/8/2019     Endometriosis, site unspecified     Endometriosis     Female infertility of other specified origin      Iron deficiency anemia, unspecified     Anemia, iron def.     RA (rheumatoid arthritis) (H)      Sjogren's syndrome (H)      Stomatitis and mucositis (ulcerative)     chronic - non-specific     Past Surgical History     Past Surgical History:   Procedure Laterality Date     C NONSPECIFIC PROCEDURE  1995, 1997    laparoscopy for endometrial fulguration     COLONOSCOPY WITH CO2 INSUFFLATION N/A 8/7/2017    Procedure: COLONOSCOPY WITH CO2 INSUFFLATION;  Colonoscopy, Screen for colon cancer.  BMI  22.17  [facility] [phone];  Surgeon: Berny Pedro MD;  Location: [facility] OR     HC COLONOSCOPY W/WO BRUSH/WASH  5/25/2005     HC CREATE EARDRUM OPENING,GEN ANESTH      P.E. Tubes     HC KNEE SCOPE, DIAGNOSTIC       left knee, ruptured ACL     HC REMOVE TONSILS/ADENOIDS,12+ Y/O      T & A 11+y.o.     ORTHOPEDIC SURGERY      rotator cuff repair, left     Allergy     Allergies   Allergen Reactions     Penicillins Hives     hives     Current Medication List     Current Outpatient Medications   Medication Sig     acetaminophen (TYLENOL) 500 MG tablet Take 1-2 tablets by mouth every 6 hours as needed for pain.     ASPIRIN 81 MG OR TABS 1 TABLET DAILY     bimatoprost (LATISSE) 0.03 % SOLN Externally apply topically At Bedtime     bimatoprost (LATISSE) 0.03 % SOLN Externally apply topically At Bedtime     Fexofenadine HCl (MUCINEX ALLERGY PO)      hydroxychloroquine (PLAQUENIL) 200 MG tablet Hydroxychloroquine 200mg daily; and an additional 200mg every other day.      ibuprofen (ADVIL) 200 MG capsule Take 200 mg by mouth as needed for fever     leflunomide (ARAVA) 10 MG tablet Take 1 tablet (10 mg) by mouth daily     loratadine 10 MG capsule Take 10 mg by mouth as needed for allergies     MULTIPLE VITAMINS/WOMENS OR None Entered     OMEGA-3 FATTY ACIDS 600 MG OR CAPS Reported on 3/15/2017     albuterol (PROAIR HFA/PROVENTIL HFA/VENTOLIN HFA) 108 (90 Base) MCG/ACT inhaler Inhale 2 puffs into the lungs every 4 hours as needed for shortness of breath / dyspnea or wheezing (Patient not taking: Reported on 3/8/2019)     alendronate (FOSAMAX) 70 MG tablet Take 1 tablet (70 mg) by mouth every 7 days on empty stomach with 8oz glass of water in the morning, stay upright and do not eat for 30 min (Patient not taking: Reported on 4/22/2020)     fluticasone (FLONASE) 50 MCG/ACT spray Spray 2 sprays into both nostrils daily (Patient not taking: Reported on 3/8/2019)     ondansetron (ZOFRAN ODT) 4 MG ODT tab Take 1-2 tablet 30 min before prednisone. (Patient not taking: Reported on 3/8/2019)     No current facility-administered medications for this visit.          Social History   See HPI    Family History     Family History   Problem Relation Age of Onset     Osteoporosis Mother      Arthritis Mother         lupus     Connective Tissue Disorder Mother         Lupus     Cataracts Mother      Macular Degeneration Mother      Celiac Disease Mother      Heart Disease Father         aortic aneurysm     Gastrointestinal Disease Father         abdominal anurysm     Hypertension Father      Cataracts Father      Gastrointestinal Disease Sister         IBS, colon problems     Alzheimer Disease Maternal Grandmother      Osteoporosis Maternal Grandmother      Cerebrovascular Disease Maternal Grandfather      Cancer Paternal Grandmother      Cerebrovascular Disease Paternal Grandfather      Circulatory Sister         carotid stenosis     Cancer - colorectal Maternal Uncle      Cancer - colorectal  "Maternal Uncle      Glaucoma No family hx of      Mother: Lupus    Physical Exam     Temp Readings from Last 3 Encounters:   09/28/18 97.3  F (36.3  C) (Tympanic)   09/22/18 97.7  F (36.5  C) (Oral)   09/12/18 97.6  F (36.4  C) (Oral)     BP Readings from Last 5 Encounters:   01/08/20 105/64   09/04/19 95/57   03/08/19 106/66   01/10/19 100/58   09/28/18 120/73     Pulse Readings from Last 1 Encounters:   01/08/20 69     Resp Readings from Last 1 Encounters:   07/27/18 16     Estimated body mass index is 22.48 kg/m  as calculated from the following:    Height as of 1/8/20: 1.664 m (5' 5.5\").    Weight as of 1/8/20: 62.2 kg (137 lb 3.2 oz).      GEN: NAD  HEENT: MMM.  Anicteric, noninjected sclera  PULM: No increased work of breathing  MSK:  MCPs, PIPs, DIPs, wrists without swelling.   PSYCH: Alert. Appropriate.  .      Labs / Imaging (select studies)   RF/CCP  Recent Labs   Lab Test 08/29/13  1413   CCPABY <20 Interpretation:  Negative     CBC  Recent Labs   Lab Test 03/09/20  1513 02/10/20  1503 01/08/20  1548   WBC 4.5 5.0 5.4   RBC 4.07 4.07 3.98   HGB 12.2 12.3 11.9   HCT 37.9 38.6 36.3   MCV 93 95 91   RDW 12.6 12.7 12.3    325 343   MCH 30.0 30.2 29.9   MCHC 32.2 31.9 32.8   NEUTROPHIL 53.8 54.5 55.3   LYMPH 27.7 26.3 26.4   MONOCYTE 14.8 16.0 14.8   EOSINOPHIL 2.4 2.6 2.6   BASOPHIL 1.3 0.6 0.9   ANEU 2.4 2.7 3.0   ALYM 1.3 1.3 1.4   ELIGIO 0.7 0.8 0.8   AEOS 0.1 0.1 0.1   ABAS 0.1 0.0 0.1     CMP  Recent Labs   Lab Test 03/09/20  1513 02/10/20  1503 01/08/20  1548  04/12/19  1509  06/20/17  1437  05/24/16  0940  04/07/15  1322   NA  --   --   --   --  139  --  141  --  137  --  139   POTASSIUM  --   --   --   --  4.3  --  3.8  --  4.4  --  3.9   CHLORIDE  --   --   --   --  103  --  105  --  105  --  104   CO2  --   --   --   --  29  --  29  --  29  --  29   ANIONGAP  --   --   --   --  7  --  7  --  3  --  6   GLC  --   --   --   --   --   --  84  --  85  --  88   BUN  --   --   --   --   --   --  22  " --  18  --  16   CR 0.98 0.83 0.92   < > 0.86   < > 0.84   < > 0.92  --  0.96   GFRESTIMATED 65 79 69   < > 76   < > 71   < > 64  --  61   GFRESTBLACK 75 >90 80   < > 88   < > 85   < > 77  --  74   VALERIA  --   --  9.4  --  8.7  --  9.1  --  9.2  --  9.1   BILITOTAL 0.3 0.3 0.3   < >  --    < > 0.2   < > 0.4   < > 0.3   ALBUMIN 4.5 4.3 4.1   < >  --    < > 4.0   < > 4.4   < > 3.9   PROTTOTAL 7.5 7.6 7.0   < >  --    < > 6.8   < > 7.3   < > 7.3   ALKPHOS 50 55 58   < >  --    < > 40   < > 42   < > 34*   AST 28 24 23   < >  --    < > 21   < > 30   < > 34   ALT 27 25 27   < >  --    < > 21   < > 24   < > 29    < > = values in this interval not displayed.     Calcium/VitaminD  Recent Labs   Lab Test 01/08/20  1548 04/12/19  1509 06/20/17  1437  07/13/15  1036   VALERIA 9.4 8.7 9.1   < >  --    VITDT 38  --   --   --  48    < > = values in this interval not displayed.     ESR/CRP  Recent Labs   Lab Test 01/08/20  1548 10/02/19  1504 09/04/19  1552   SED 9 8 7   CRP <2.9 <2.9 <2.9     Lipid Panel  Recent Labs   Lab Test 07/13/15  1036   CHOL 181   TRIG 132   HDL 74   LDL 81   VLDL 26   CHOLHDLRATIO 2.4     Hepatitis B  Recent Labs   Lab Test 09/04/19  1552   HBCAB Nonreactive   HEPBANG Nonreactive     Hepatitis C  Recent Labs   Lab Test 06/20/17  1437   HCVAB Nonreactive   Assay performance characteristics have not been established for newborns,   infants, and children       Immunization History     Immunization History   Administered Date(s) Administered     Influenza (H1N1) 12/03/2009     Influenza (IIV3) PF 11/17/2005, 11/17/2006, 10/25/2010, 01/09/2013     Influenza Vaccine IM > 6 months Valent IIV4 10/10/2013, 09/14/2016, 09/13/2017, 01/10/2019     Pneumo Conj 13-V (2010&after) 09/13/2017     Pneumococcal 23 valent 09/12/2018     TD (ADULT, 7+) 02/01/2005     TDAP Vaccine (Adacel) 01/09/2013          Chart documentation done in part with Dragon Voice recognition Software. Although reviewed after completion, some word and  grammatical error may remain.      Video-Visit Details    Type of service:  Video Visit    Video Start Time: 2:53 PM    Video End Time (time video stopped): 3:12 PM     Originating Location (pt. Location): Home    Distant Location (provider location):  Home    Mode of Communication:  Video Conference via [facility]      Rome Hardy MD

## 2020-05-14 ENCOUNTER — TELEPHONE (OUTPATIENT)
Dept: FAMILY MEDICINE | Facility: OTHER | Age: 55
End: 2020-05-14

## 2020-05-14 NOTE — TELEPHONE ENCOUNTER
Reason for Call:  Other call back    Detailed comments: Patient had abnormal pap results a year ago and she would like to know if she needs to be seen for another pap as soon as possible, as she is already overdue, or if it should wait until July. Please advise.    Phone Number Patient can be reached at: Home number on file [phone] (home)    Best Time: any    Can we leave a detailed message on this number? YES    Call taken on 5/14/2020 at 2:01 PM by Nabil Limon

## 2020-05-14 NOTE — PROGRESS NOTES
"   SUBJECTIVE:   CC: Zena Quintana is an 55 year old woman who presents for preventive health visit.     HPI  {Add if <65 person on Medicare  - Required Questions (Optional):480861}  {Outside tests to abstract? :655334}    {additional problems to add (Optional):652784}    Today's PHQ-2 Score:   PHQ-2 ( 1999 [employer]) 7/27/2018   Q1: Little interest or pleasure in doing things 0   Q2: Feeling down, depressed or hopeless 0   PHQ-2 Score 0   Q1: Little interest or pleasure in doing things Not at all   Q2: Feeling down, depressed or hopeless Not at all   PHQ-2 Score 0       Abuse: Current or Past(Physical, Sexual or Emotional)- { :961746}  Do you feel safe in your environment? { :821478}    Have you ever done Advance Care Planning? (For example, a Health Directive, POLST, or a discussion with a medical provider or your loved ones about your wishes): { :901261}    Social History     Tobacco Use     Smoking status: Never Smoker     Smokeless tobacco: Never Used     Tobacco comment: no smokers in household   Substance Use Topics     Alcohol use: Yes     Alcohol/week: 1.7 standard drinks     Comment: has a couple drinks now and again     {Rooming Staff- Complete this question if Prescreen response is not shown below for today's visit. If you drink alcohol do you typically have >3 drinks per day or >7 drinks per week? (Optional):465494}    No flowsheet data found.{add AUDIT responses (Optional) (A score of 7 for adult men is an indication of hazardous drinking; a score of 8 or more is an indication of an alcohol use disorder.  A score of 7 or more for adult women is an indication of hazardous drinking or an alchohol use disorder):200444}    Reviewed orders with patient.  Reviewed health maintenance and updated orders accordingly - { :928294::\"Yes\"}  {Chronicprobdata (optional):741453}    {Mammo Decision Support (Optional):752350}    Pertinent mammograms are reviewed under the imaging tab.  History of abnormal Pap smear: { " ":041567}  PAP / HPV Latest Ref Rng & Units 3/8/2019 8/8/2016 7/13/2015   PAP - NIL NIL UNSAT   HPV 16 DNA NEG:Negative Negative Negative -   HPV 18 DNA NEG:Negative Negative Negative -   OTHER HR HPV NEG:Negative Positive(A) Negative -     Reviewed and updated as needed this visit by clinical staff         Reviewed and updated as needed this visit by Provider        {HISTORY OPTIONS (Optional):181095}    Review of Systems  {FEMALE ROS (Optional):316558}     OBJECTIVE:   There were no vitals taken for this visit.  Physical Exam  {Exam Choices (Optional):481736}    {Diagnostic Test Results (Optional):967996::\"Diagnostic Test Results:\",\"Labs reviewed in Epic\"}    ASSESSMENT/PLAN:   {Diag Picklist:232883}    COUNSELING:  {FEMALE COUNSELING MESSAGES:578498::\"Reviewed preventive health counseling, as reflected in patient instructions\"}    Estimated body mass index is 22.48 kg/m  as calculated from the following:    Height as of 1/8/20: 1.664 m (5' 5.5\").    Weight as of 1/8/20: 62.2 kg (137 lb 3.2 oz).    {Weight Management Plan (ACO) Complete if BMI is abnormal-  Ages 18-64  BMI >24.9.  Age 65+ with BMI <23 or >30 (Optional):706144}     reports that she has never smoked. She has never used smokeless tobacco.  {Tobacco Cessation -- Complete if patient is a smoker (Optional):516921}    Counseling Resources:  ATP IV Guidelines  Pooled Cohorts Equation Calculator  Breast Cancer Risk Calculator  FRAX Risk Assessment  ICSI Preventive Guidelines  Dietary Guidelines for Americans, 2010  [employer]'s MyPlate  ASA Prophylaxis  Lung CA Screening    Chana Armenta MD  [facility]"

## 2020-05-14 NOTE — TELEPHONE ENCOUNTER
Scheduled.   Next 5 appointments (look out 90 days)    May 15, 2020 11:00 AM CDT  [facility] Visit with Chana [facility]menta MD  [facility] [address]  [place] [facility] [address]  [phone]   Jul 30, 2020  8:40 AM CDT  Return Visit with Rome Hardy MD  [facility] ([facility]) [address] [address]  [phone]        Liz Moreno MA

## 2020-05-15 ENCOUNTER — OFFICE VISIT (OUTPATIENT)
Dept: FAMILY MEDICINE | Facility: OTHER | Age: 55
End: 2020-05-15
Payer: COMMERCIAL

## 2020-05-15 VITALS
DIASTOLIC BLOOD PRESSURE: 64 MMHG | TEMPERATURE: 98.3 F | WEIGHT: 138 LBS | BODY MASS INDEX: 22.18 KG/M2 | RESPIRATION RATE: 20 BRPM | HEART RATE: 80 BPM | HEIGHT: 66 IN | SYSTOLIC BLOOD PRESSURE: 108 MMHG

## 2020-05-15 DIAGNOSIS — Z00.00 ROUTINE GENERAL MEDICAL EXAMINATION AT A HEALTH CARE FACILITY: Primary | ICD-10-CM

## 2020-05-15 DIAGNOSIS — R87.618 OTHER ABNORMAL CYTOLOGICAL FINDING OF SPECIMEN FROM CERVIX: ICD-10-CM

## 2020-05-15 PROCEDURE — 87624 HPV HI-RISK TYP POOLED RSLT: CPT | Performed by: FAMILY MEDICINE

## 2020-05-15 PROCEDURE — G0123 SCREEN CERV/VAG THIN LAYER: HCPCS | Performed by: FAMILY MEDICINE

## 2020-05-15 PROCEDURE — 99213 OFFICE O/P EST LOW 20 MIN: CPT | Performed by: FAMILY MEDICINE

## 2020-05-15 ASSESSMENT — PAIN SCALES - GENERAL: PAINLEVEL: NO PAIN (0)

## 2020-05-15 ASSESSMENT — MIFFLIN-ST. JEOR: SCORE: 1232.71

## 2020-05-15 NOTE — PROGRESS NOTES
Subjective     Zena Quintana is a 55 year old female who presents to clinic today for the following health issues:    HPI     Repeat annual pap smear. She had a positive HPV last year and was advised to have cotest done in 1 yr. No other concerns    Pt has no concerns today.    -------------------------------------    Patient Active Problem List   Diagnosis     Internal derangement of knee     Iron deficiency anemia     Allergic rhinitis     Stomatitis and mucositis (ulcerative)     Pure hypercholesterolemia     Arthropathy     HYPERLIPIDEMIA LDL GOAL <160     High risk medication use     RA (rheumatoid arthritis) (H)     Endometriosis     Ovarian cyst     Disturbance of salivary secretion (XEROSTOMIA)     Impingement syndrome of right shoulder     Sjogren's syndrome (H)     Palpitations     Cervical high risk HPV (human papillomavirus) test positive     Osteoporosis with current pathological fracture, unspecified osteoporosis type, initial encounter     Oral bisphosphonates causing adverse effect in therapeutic use, initial encounter     Past Surgical History:   Procedure Laterality Date     C NONSPECIFIC PROCEDURE  1995, 1997    laparoscopy for endometrial fulguration     COLONOSCOPY WITH CO2 INSUFFLATION N/A 8/7/2017    Procedure: COLONOSCOPY WITH CO2 INSUFFLATION;  Colonoscopy, Screen for colon cancer.  BMI  22.17  [facility] [phone];  Surgeon: Berny Pedro MD;  Location: [facility] OR     HC COLONOSCOPY W/WO BRUSH/WASH  5/25/2005     HC CREATE EARDRUM OPENING,GEN ANESTH      P.E. Tubes     HC KNEE SCOPE, DIAGNOSTIC       left knee, ruptured ACL     HC REMOVE TONSILS/ADENOIDS,12+ Y/O      T & A 11+y.o.     ORTHOPEDIC SURGERY      rotator cuff repair, left       Social History     Tobacco Use     Smoking status: Never Smoker     Smokeless tobacco: Never Used     Tobacco comment: no smokers in household   Substance Use Topics     Alcohol use: Yes     Alcohol/week: 1.7 standard drinks     Comment: has a  couple drinks now and again     Family History   Problem Relation Age of Onset     Osteoporosis Mother      Arthritis Mother         lupus     Connective Tissue Disorder Mother         Lupus     Cataracts Mother      Macular Degeneration Mother      Celiac Disease Mother      Heart Disease Father         aortic aneurysm     Gastrointestinal Disease Father         abdominal anurysm     Hypertension Father      Cataracts Father      Gastrointestinal Disease Sister         IBS, colon problems     Alzheimer Disease Maternal Grandmother      Osteoporosis Maternal Grandmother      Cerebrovascular Disease Maternal Grandfather      Cancer Paternal Grandmother      Cerebrovascular Disease Paternal Grandfather      Circulatory Sister         carotid stenosis     Cancer - colorectal Maternal Uncle      Cancer - colorectal Maternal Uncle      Glaucoma No family hx of          Current Outpatient Medications   Medication Sig Dispense Refill     acetaminophen (TYLENOL) 500 MG tablet Take 1-2 tablets by mouth every 6 hours as needed for pain.       ASPIRIN 81 MG OR TABS 1 TABLET DAILY       bimatoprost (LATISSE) 0.03 % SOLN Externally apply topically At Bedtime       bimatoprost (LATISSE) 0.03 % SOLN Externally apply topically At Bedtime 1 Bottle 3     hydroxychloroquine (PLAQUENIL) 200 MG tablet Hydroxychloroquine 200mg daily; and an additional 200mg every other day. 135 tablet 3     ibuprofen (ADVIL) 200 MG capsule Take 200 mg by mouth as needed for fever       leflunomide (ARAVA) 10 MG tablet Take 1 tablet (10 mg) by mouth daily 90 tablet 2     loratadine 10 MG capsule Take 10 mg by mouth as needed for allergies       MULTIPLE VITAMINS/WOMENS OR None Entered       albuterol (PROAIR HFA/PROVENTIL HFA/VENTOLIN HFA) 108 (90 Base) MCG/ACT inhaler Inhale 2 puffs into the lungs every 4 hours as needed for shortness of breath / dyspnea or wheezing (Patient not taking: Reported on 3/8/2019) 1 Inhaler 0     Fexofenadine HCl (MUCINEX  "ALLERGY PO)        fluticasone (FLONASE) 50 MCG/ACT spray Spray 2 sprays into both nostrils daily (Patient not taking: Reported on 3/8/2019) 1 Bottle 0     OMEGA-3 FATTY ACIDS 600 MG OR CAPS Reported on 3/15/2017 30 0     ondansetron (ZOFRAN ODT) 4 MG ODT tab Take 1-2 tablet 30 min before prednisone. (Patient not taking: Reported on 3/8/2019) 10 tablet 0     Allergies   Allergen Reactions     Penicillins Hives     hives     BP Readings from Last 3 Encounters:   05/15/20 108/64   01/08/20 105/64   09/04/19 95/57    Wt Readings from Last 3 Encounters:   05/15/20 62.6 kg (138 lb)   01/08/20 62.2 kg (137 lb 3.2 oz)   09/04/19 63.6 kg (140 lb 3.2 oz)                    Reviewed and updated as needed this visit by Provider         Review of Systems   Constitutional, HEENT, cardiovascular, pulmonary, gi and gu systems are negative, except as otherwise noted.      Objective    /64   Pulse 80   Temp 98.3  F (36.8  C) (Temporal)   Resp 20   Ht 1.676 m (5' 6\")   Wt 62.6 kg (138 lb)   BMI 22.27 kg/m    Body mass index is 22.27 kg/m .  Physical Exam  Constitutional:       Appearance: Normal appearance.   HENT:      Head: Normocephalic and atraumatic.   Genitourinary:     General: Normal vulva.      Vagina: No vaginal discharge.   Neurological:      General: No focal deficit present.      Mental Status: She is alert and oriented to person, place, and time.            Diagnostic Test Results:  Labs reviewed in Epic        Assessment & Plan   Problem List Items Addressed This Visit     None      Visit Diagnoses     Routine general medical examination at a health care facility    -  Primary    Other abnormal cytological finding of specimen from cervix        Relevant Orders    Pap imaged thin layer screen with HPV - recommended age 30 - 65 years (select HPV order below)    HPV High Risk Types DNA Cervical       pap completed. Await results before further recommendations        See Patient Instructions  No follow-ups on " file.    Chana Armenta MD  [facility]

## 2020-05-19 LAB
COPATH REPORT: NORMAL
PAP: NORMAL

## 2020-06-09 ENCOUNTER — OFFICE VISIT (OUTPATIENT)
Dept: OBGYN | Facility: CLINIC | Age: 55
End: 2020-06-09
Payer: COMMERCIAL

## 2020-06-09 VITALS
WEIGHT: 135.8 LBS | HEART RATE: 70 BPM | BODY MASS INDEX: 21.92 KG/M2 | DIASTOLIC BLOOD PRESSURE: 71 MMHG | SYSTOLIC BLOOD PRESSURE: 107 MMHG

## 2020-06-09 DIAGNOSIS — R87.810 CERVICAL HIGH RISK HPV (HUMAN PAPILLOMAVIRUS) TEST POSITIVE: Primary | ICD-10-CM

## 2020-06-09 DIAGNOSIS — M05.79 RHEUMATOID ARTHRITIS INVOLVING MULTIPLE SITES WITH POSITIVE RHEUMATOID FACTOR (H): ICD-10-CM

## 2020-06-09 DIAGNOSIS — Z79.899 HIGH RISK MEDICATION USE: ICD-10-CM

## 2020-06-09 LAB
ALBUMIN SERPL-MCNC: 3.8 G/DL (ref 3.4–5)
ALP SERPL-CCNC: 39 U/L (ref 40–150)
ALT SERPL W P-5'-P-CCNC: 21 U/L (ref 0–50)
AST SERPL W P-5'-P-CCNC: 25 U/L (ref 0–45)
BASOPHILS # BLD AUTO: 0 10E9/L (ref 0–0.2)
BASOPHILS NFR BLD AUTO: 1 %
BILIRUB DIRECT SERPL-MCNC: 0.1 MG/DL (ref 0–0.2)
BILIRUB SERPL-MCNC: 0.4 MG/DL (ref 0.2–1.3)
CREAT SERPL-MCNC: 0.9 MG/DL (ref 0.52–1.04)
DIFFERENTIAL METHOD BLD: ABNORMAL
EOSINOPHIL # BLD AUTO: 0.1 10E9/L (ref 0–0.7)
EOSINOPHIL NFR BLD AUTO: 2.2 %
ERYTHROCYTE [DISTWIDTH] IN BLOOD BY AUTOMATED COUNT: 12.4 % (ref 10–15)
GFR SERPL CREATININE-BSD FRML MDRD: 71 ML/MIN/{1.73_M2}
HCT VFR BLD AUTO: 34.8 % (ref 35–47)
HGB BLD-MCNC: 11.5 G/DL (ref 11.7–15.7)
LYMPHOCYTES # BLD AUTO: 1.3 10E9/L (ref 0.8–5.3)
LYMPHOCYTES NFR BLD AUTO: 33.2 %
MCH RBC QN AUTO: 30 PG (ref 26.5–33)
MCHC RBC AUTO-ENTMCNC: 33 G/DL (ref 31.5–36.5)
MCV RBC AUTO: 91 FL (ref 78–100)
MONOCYTES # BLD AUTO: 0.6 10E9/L (ref 0–1.3)
MONOCYTES NFR BLD AUTO: 15.8 %
NEUTROPHILS # BLD AUTO: 1.9 10E9/L (ref 1.6–8.3)
NEUTROPHILS NFR BLD AUTO: 47.8 %
PLATELET # BLD AUTO: 256 10E9/L (ref 150–450)
PROT SERPL-MCNC: 6.9 G/DL (ref 6.8–8.8)
RBC # BLD AUTO: 3.83 10E12/L (ref 3.8–5.2)
WBC # BLD AUTO: 4 10E9/L (ref 4–11)

## 2020-06-09 PROCEDURE — 82565 ASSAY OF CREATININE: CPT | Performed by: INTERNAL MEDICINE

## 2020-06-09 PROCEDURE — 88305 TISSUE EXAM BY PATHOLOGIST: CPT | Performed by: OBSTETRICS & GYNECOLOGY

## 2020-06-09 PROCEDURE — 57456 ENDOCERV CURETTAGE W/SCOPE: CPT | Performed by: OBSTETRICS & GYNECOLOGY

## 2020-06-09 PROCEDURE — 85025 COMPLETE CBC W/AUTO DIFF WBC: CPT | Performed by: INTERNAL MEDICINE

## 2020-06-09 PROCEDURE — 80076 HEPATIC FUNCTION PANEL: CPT | Performed by: INTERNAL MEDICINE

## 2020-06-09 PROCEDURE — 36415 COLL VENOUS BLD VENIPUNCTURE: CPT | Performed by: INTERNAL MEDICINE

## 2020-06-09 PROCEDURE — 99242 OFF/OP CONSLTJ NEW/EST SF 20: CPT | Mod: 25 | Performed by: OBSTETRICS & GYNECOLOGY

## 2020-06-09 NOTE — PROGRESS NOTES
Patient Name: Zena Quintana              Date: 6/9/2020   YOB: 1965                         Age: 55 year old   Phone: [phone] (home) NONE (work)  ________________________________________________________________________  I have been asked to see Zena in consultation by Dr. Chana Armenta  to discuss the pap smear, findings and possible further evaluation.  The patient's pap smear history is as noted:  2/13/14 NIL pap, neg HR HPV.  7/13/15 Unsat pap  8/8/16 NIL pap, neg HR HPV.  3/8/19 NIL pap, + HR HPV (not 16 or 18). Plan: cotest in 1 yr.  4/7/20 COVID 19 interim plan updated to: Plan unchanged     5/15/20 NIL Pap, + HR HPV (not 16 or 18). Plan: colp bef 8/15/20  5/27/20 Pt notified by phone.     I attempted to ensure that the patient was educated regarding the nature of her findings and implications to date.  We reviewed the role of HPV, incidence in the population and the natural history of the infection, and its transmission.  We also reviewed ways to minimize her future risk, the effect of HPV on the cervix and treatment options available, should they be indicated.    The pathophysiology of the cervix, including a discussion of the squamous and columnar cells, metaplasia and dysplasia have been reviewed, drawings, sketches and the pamphlets were reviewed with her.      Patient's last menstrual period was 07/17/2017 (approximate).    Past Medical History:   Diagnosis Date     Allergic rhinitis, cause unspecified     Allergic rhinitis     Cervical high risk HPV (human papillomavirus) test positive 3/8/2019     Endometriosis, site unspecified     Endometriosis     Female infertility of other specified origin      Iron deficiency anemia, unspecified     Anemia, iron def.     RA (rheumatoid arthritis) (H)      Sjogren's syndrome (H)      Stomatitis and mucositis (ulcerative)     chronic - non-specific       Past Surgical History:   Procedure Laterality Date     C NONSPECIFIC PROCEDURE  1995, 1997     laparoscopy for endometrial fulguration     COLONOSCOPY WITH CO2 INSUFFLATION N/A 8/7/2017    Procedure: COLONOSCOPY WITH CO2 INSUFFLATION;  Colonoscopy, Screen for colon cancer.  BMI  22.17  [facility] [phone];  Surgeon: Berny Pedro MD;  Location: [facility]     HC COLONOSCOPY W/WO BRUSH/WASH  5/25/2005     HC CREATE EARDRUM OPENING,GEN ANESTH      P.E. Tubes     HC KNEE SCOPE, DIAGNOSTIC       left knee, ruptured ACL     HC REMOVE TONSILS/ADENOIDS,12+ Y/O      T & A 11+y.o.     ORTHOPEDIC SURGERY      rotator cuff repair, left        Outpatient Encounter Medications as of 6/9/2020   Medication Sig Dispense Refill     acetaminophen (TYLENOL) 500 MG tablet Take 1-2 tablets by mouth every 6 hours as needed for pain.       ASPIRIN 81 MG OR TABS 1 TABLET DAILY       bimatoprost (LATISSE) 0.03 % SOLN Externally apply topically At Bedtime       bimatoprost (LATISSE) 0.03 % SOLN Externally apply topically At Bedtime 1 Bottle 3     hydroxychloroquine (PLAQUENIL) 200 MG tablet Hydroxychloroquine 200mg daily; and an additional 200mg every other day. 135 tablet 3     ibuprofen (ADVIL) 200 MG capsule Take 200 mg by mouth as needed for fever       leflunomide (ARAVA) 10 MG tablet Take 1 tablet (10 mg) by mouth daily 90 tablet 2     loratadine 10 MG capsule Take 10 mg by mouth as needed for allergies       MULTIPLE VITAMINS/WOMENS OR None Entered       albuterol (PROAIR HFA/PROVENTIL HFA/VENTOLIN HFA) 108 (90 Base) MCG/ACT inhaler Inhale 2 puffs into the lungs every 4 hours as needed for shortness of breath / dyspnea or wheezing (Patient not taking: Reported on 3/8/2019) 1 Inhaler 0     Fexofenadine HCl (MUCINEX ALLERGY PO)        fluticasone (FLONASE) 50 MCG/ACT spray Spray 2 sprays into both nostrils daily (Patient not taking: Reported on 3/8/2019) 1 Bottle 0     OMEGA-3 FATTY ACIDS 600 MG OR CAPS Reported on 3/15/2017 30 0     ondansetron (ZOFRAN ODT) 4 MG ODT tab Take 1-2 tablet 30 min before prednisone.  (Patient not taking: Reported on 3/8/2019) 10 tablet 0     No facility-administered encounter medications on file as of 6/9/2020.         Allergies as of 06/09/2020 - Reviewed 06/09/2020   Allergen Reaction Noted     Penicillins Hives 04/30/2002       Social History     Socioeconomic History     Marital status:      Spouse name: Wolf     Number of children: 2     Years of education: 12     Highest education level: None   Occupational History     Occupation: homemaker     Employer: [employer]OMEM[employer]     Employer:    Social Needs     Financial resource strain: None     Food insecurity     Worry: None     Inability: None     Transportation needs     Medical: None     Non-medical: None   Tobacco Use     Smoking status: Never Smoker     Smokeless tobacco: Never Used     Tobacco comment: no smokers in household   Substance and Sexual Activity     Alcohol use: Yes     Alcohol/week: 1.7 standard drinks     Comment: has a couple drinks now and again     Drug use: No     Sexual activity: Yes     Partners: Male     Birth control/protection: None     Comment: Infertility   Lifestyle     Physical activity     Days per week: None     Minutes per session: None     Stress: None   Relationships     Social connections     Talks on phone: None     Gets together: None     Attends [employer] service: None     Active member of club or organization: None     Attends meetings of clubs or organizations: None     Relationship status: None     Intimate partner violence     Fear of current or ex partner: None     Emotionally abused: None     Physically abused: None     Forced sexual activity: None   Other Topics Concern      Service No     Blood Transfusions No     Caffeine Concern No     Occupational Exposure No     Hobby Hazards No     Sleep Concern No     Stress Concern No     Weight Concern No     Special Diet No     Back Care No     Exercise Yes     Bike Helmet No     Seat Belt Yes     Self-Exams Yes     Parent/sibling w/  CABG, MI or angioplasty before 65F 55M? Not Asked   Social History Narrative    Adopted son at 7wk and dtr at 4mo, both from [place]        Family History   Problem Relation Age of Onset     Osteoporosis Mother      Arthritis Mother         lupus     Connective Tissue Disorder Mother         Lupus     Cataracts Mother      Macular Degeneration Mother      Celiac Disease Mother      Heart Disease Father         aortic aneurysm     Gastrointestinal Disease Father         abdominal anurysm     Hypertension Father      Cataracts Father      Gastrointestinal Disease Sister         IBS, colon problems     Alzheimer Disease Maternal Grandmother      Osteoporosis Maternal Grandmother      Cerebrovascular Disease Maternal Grandfather      Cancer Paternal Grandmother      Cerebrovascular Disease Paternal Grandfather      Circulatory Sister         carotid stenosis     Cancer - colorectal Maternal Uncle      Cancer - colorectal Maternal Uncle      Glaucoma No family hx of          Review Of Systems  10 point ROS of systems including Constitutional, Eyes, Respiratory, Cardiovascular, Gastroenterology, Genitourinary, Integumentary, Muscularskeletal, Psychiatric were all negative except for pertinent positives noted in my HPI and in the PMH.      Exam:   /71   Pulse 70   Wt 61.6 kg (135 lb 12.8 oz)   LMP 07/17/2017 (Approximate)   Breastfeeding No   BMI 21.92 kg/m    GENERAL:  WNWD female NAD  HEENT: NC/AT, EOMI  Lungs:  Good respiratory effort   SKIN: normal skin turgor  GAIT: Normal  NECK: Symmetrical, no masses noted   VULVA: Normal Genitalia  BUS: Normal  URETHRA:  No hypermobility noted  URETHRAL MEATUS:  No masses noted  VAGINA: Normal mucosa, no discharge  CERVIX: Closed, mobile, no discharge  PERIANAL:  No masses or lesions seen  EXTREMITIES: no clubbing, cyanosis, or edema      Plan:  Recommend to Proceed with Colpo  The details of the colposcopic procedure were reviewed, the risks of missed diagnoses, pain,  infection, and bleeding.          Procedure:  Procedure for colposcopy and biopsy has been explained to the patient and consent obtained.    Before the procedure, it was ensured that the patient was educated regarding the nature of her findings and implications to date.  We reviewed the role of HPV and the natural history of the infection.  We also reviewed ways to minimize her future risk, the effect of HPV on the cervix and treatment options available, should they be indicated.    The pathophysiology of the cervix, including a discussion of the squamous and columnar cells, metaplasia and dysplasia have been reviewed, drawings, sketches and the pamphlets were reviewed with her.  The details of the colposcopic procedure were reviewed, the risks of missed diagnoses, pain, infection, and bleeding.  Questions seemed to be answered before proceeding and the patient then consented to the procedure.     Speculum placed in vagina and excellent visualization of cervix achieved, cervix swabbed  with acetic acid solution.    ECC done       Findings:    Cervix: no visible lesions  Vaginal inspection: no visible lesions.  Procedure Summary: Patient tolerated procedure well.      Assessment:     ICD-10-CM    1. Cervical high risk HPV (human papillomavirus) test positive  R87.810 COLP CERVIX/UPPER VAGINA W BX CERVIX/ENDOCERV CURETT     Surgical pathology exam         Plan:  Specimens labelled and sent to pathology.  Will base further treatment on pathology findings.  Post biopsy instructions given to patient and call to discuss Pathology results.  TT 30 min, in addition to the time for the procedure  CT greater than 50%, as noted above in the HPI and in the Plan.   CEPHAS AGBEH, MD.

## 2020-06-09 NOTE — PATIENT INSTRUCTIONS
If you have any questions regarding your visit, Please contact your care team.  [facility]: [phone]  [facility] CLINIC HOURS TELEPHONE NUMBER   Cephas Agbeh, M.D.      Emma Evans-  Deborah-         Monday-[place]    8:00a.m-4:45 p.m    Tuesday--[place]     8:00a.m-4:45 p.m.    Thursday-[place]    8:00a.m-4:45 p.m.    Friday-[place]    8:00a.m-4:45 p.m    [facility]   [address] [place] [address]   [phone]-Ask for [facility]   Fax [phone]   [phone]     [facility]   [address] [place] [address] [place] [address]   [phone]    [facility]  [address] [facility] [address]  [phone]  [phone]   Urgent Care locations:    [facility] Monday-Friday  5 pm - 9 pm  Saturday and Sunday   9 am - 5 pm   Monday-Friday   5 pm - 9 pm  Saturday and Sunday  9 am - 5 pm    [phone]   If you need a medication refill, please contact your pharmacy. Please allow 3 business days for your refill to be completed.  As always, Thank you for trusting us with your healthcare needs!

## 2020-06-11 LAB — COPATH REPORT: NORMAL

## 2020-06-15 ENCOUNTER — PATIENT OUTREACH (OUTPATIENT)
Dept: OBGYN | Facility: CLINIC | Age: 55
End: 2020-06-15

## 2020-06-15 DIAGNOSIS — R87.810 CERVICAL HIGH RISK HPV (HUMAN PAPILLOMAVIRUS) TEST POSITIVE: ICD-10-CM

## 2020-06-15 NOTE — TELEPHONE ENCOUNTER
Notes recorded by Agbeh, Cephas Mawuena, MD on 6/12/2020 at 10:24 AM CDT   Your colposcopy/biopsy results  Were normal. NO CANCER.   Return to clinic for pap in 12 months. CO-TEST.   CEPHAS AGBEH, MD.     Pt viewed result on [employer] on 6/12/20.     Mary Ellis RN  Pap Tracking

## 2020-06-16 ENCOUNTER — INFUSION THERAPY VISIT (OUTPATIENT)
Dept: INFUSION THERAPY | Facility: CLINIC | Age: 55
End: 2020-06-16
Payer: COMMERCIAL

## 2020-06-16 VITALS
WEIGHT: 140.6 LBS | HEART RATE: 64 BPM | OXYGEN SATURATION: 98 % | RESPIRATION RATE: 16 BRPM | DIASTOLIC BLOOD PRESSURE: 54 MMHG | SYSTOLIC BLOOD PRESSURE: 84 MMHG | TEMPERATURE: 97.6 F | BODY MASS INDEX: 22.69 KG/M2

## 2020-06-16 DIAGNOSIS — T45.8X5A ORAL BISPHOSPHONATES CAUSING ADVERSE EFFECT IN THERAPEUTIC USE, INITIAL ENCOUNTER: ICD-10-CM

## 2020-06-16 DIAGNOSIS — M80.00XA OSTEOPOROSIS WITH CURRENT PATHOLOGICAL FRACTURE, UNSPECIFIED OSTEOPOROSIS TYPE, INITIAL ENCOUNTER: Primary | ICD-10-CM

## 2020-06-16 LAB
CALCIUM SERPL-MCNC: 8.8 MG/DL (ref 8.5–10.1)
CREAT SERPL-MCNC: 0.98 MG/DL (ref 0.52–1.04)
GFR SERPL CREATININE-BSD FRML MDRD: 65 ML/MIN/{1.73_M2}

## 2020-06-16 PROCEDURE — 82310 ASSAY OF CALCIUM: CPT | Performed by: NURSE PRACTITIONER

## 2020-06-16 PROCEDURE — 96365 THER/PROPH/DIAG IV INF INIT: CPT | Performed by: NURSE PRACTITIONER

## 2020-06-16 PROCEDURE — 82565 ASSAY OF CREATININE: CPT | Performed by: NURSE PRACTITIONER

## 2020-06-16 PROCEDURE — 99207 ZZC NO CHARGE LOS: CPT

## 2020-06-16 RX ORDER — ZOLEDRONIC ACID 5 MG/100ML
5 INJECTION, SOLUTION INTRAVENOUS ONCE
Status: COMPLETED | OUTPATIENT
Start: 2020-06-16 | End: 2020-06-16

## 2020-06-16 RX ORDER — HEPARIN SODIUM,PORCINE 10 UNIT/ML
5 VIAL (ML) INTRAVENOUS
Status: CANCELLED | OUTPATIENT
Start: 2020-06-16

## 2020-06-16 RX ORDER — HEPARIN SODIUM (PORCINE) LOCK FLUSH IV SOLN 100 UNIT/ML 100 UNIT/ML
5 SOLUTION INTRAVENOUS
Status: CANCELLED | OUTPATIENT
Start: 2020-06-16

## 2020-06-16 RX ORDER — ZOLEDRONIC ACID 5 MG/100ML
5 INJECTION, SOLUTION INTRAVENOUS ONCE
Status: CANCELLED
Start: 2020-06-16

## 2020-06-16 RX ADMIN — Medication 250 ML: at 15:51

## 2020-06-16 RX ADMIN — ZOLEDRONIC ACID 5 MG: 0.05 INJECTION, SOLUTION INTRAVENOUS at 15:53

## 2020-06-16 ASSESSMENT — PAIN SCALES - GENERAL: PAINLEVEL: NO PAIN (0)

## 2020-06-16 NOTE — PROGRESS NOTES
Infusion Nursing Note:  Zena Quintana presents today for Reclast.    Patient seen by provider today: No   present during visit today: Not Applicable.    Note: Patient here for first dose of Reclast. Hx of Osteoporosis and pathological fracture last year. Has some routine aches and pains r/t carpal tunnel syndrome, RA and rotator cuff arthropathy. VSS. B/P on the lower side. Patient states this is normal for her, asymptomatic. She is active and otherwise fairly healthy.     Intravenous Access:  Peripheral IV placed.    Treatment Conditions:  Lab Results   Component Value Date     04/12/2019                   Lab Results   Component Value Date    POTASSIUM 4.3 04/12/2019           No results found for: MAG         Lab Results   Component Value Date    CR 0.98 06/16/2020                   Lab Results   Component Value Date    VALERIA 8.8 06/16/2020                Lab Results   Component Value Date    BILITOTAL 0.4 06/09/2020           Lab Results   Component Value Date    ALBUMIN 3.8 06/09/2020                    Lab Results   Component Value Date    ALT 21 06/09/2020           Lab Results   Component Value Date    AST 25 06/09/2020       Results reviewed, labs MET treatment parameters, ok to proceed with treatment.  Corrected Calcium level 8.96 today.      Post Infusion Assessment:  Patient tolerated infusion without incident.  Blood return noted pre and post infusion.  Site patent and intact, free from redness, edema or discomfort.  No evidence of extravasations.  Access discontinued per protocol.       Discharge Plan:   Patient and/or family verbalized understanding of discharge instructions and all questions answered. Possible side effects of Reclast reviewed.   Patient discharged in stable condition accompanied by: self.  Departure Mode: Ambulatory.    Rachael Lima RN

## 2020-07-27 ENCOUNTER — OFFICE VISIT (OUTPATIENT)
Dept: NEUROLOGY | Facility: CLINIC | Age: 55
End: 2020-07-27
Attending: INTERNAL MEDICINE
Payer: COMMERCIAL

## 2020-07-27 DIAGNOSIS — M05.79 RHEUMATOID ARTHRITIS INVOLVING MULTIPLE SITES WITH POSITIVE RHEUMATOID FACTOR (H): ICD-10-CM

## 2020-07-27 DIAGNOSIS — G56.03 BILATERAL CARPAL TUNNEL SYNDROME: ICD-10-CM

## 2020-07-27 PROCEDURE — 95885 MUSC TST DONE W/NERV TST LIM: CPT | Performed by: PSYCHIATRY & NEUROLOGY

## 2020-07-27 PROCEDURE — 95913 NRV CNDJ TEST 13/> STUDIES: CPT | Performed by: PSYCHIATRY & NEUROLOGY

## 2020-07-27 NOTE — PROGRESS NOTES
[facility]   EMG Laboratory      Nerve Conduction & EMG Report          Patient:       Zena Quintana  Patient ID:    [id number]  Gender:        Female  YOB: 1965  Age:           55 Years 2 Months      History and Examination:  Zena Quintana is a 55 year old woman with rheumatoid arthritis who is referred for evaluation of possible median neuropathy at the wrist. She reports continuous numbness in the thumb, index, and middle fingers, as well as wrist pain, bilaterally but worse on the right. Examination demonstrates normal strength of intrinsic hand muscles.     Techniques:  Motor conduction studies were done with surface recording electrodes. Sensory conduction studies were performed with surface electrodes, unless indicated otherwise by (n), designating the use of subdermal recording electrodes. Temperature was monitored and recorded throughout the study. Upper extremities were maintained at a temperature of 32 degrees Centigrade or higher.  EMG was done with a concentric needle electrode.     Results:  Median sensory and mixed nerve conduction studies demonstrated moderately severe attenuation of amplitude and slowing of conduction bilaterally. Bilateral median and ulnar motor conduction studies demonstrated mild prolongation of right median distal latency to abductor pollicis brevis, relative prolongation of left median distal latency to abductor pollicis brevis, and prolongation of bilateral median distal latencies to the second lumbrical muscle. Electromyography of selected right upper limb muscles was normal.     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       Luis Gross MD        Sensory NCS      Nerve / Sites Rec. Site Onset Peak Ref. NP Amp Ref. PP Amp Dist Joesph Ref. Temp     ms ms ms  V  V  V cm m/s m/s  C   R MEDIAN - Dig II      Dig II Wrist 3.91 5.26   6.5 10.0 3.6 14 35.8 48.0 32   L MEDIAN - Dig II      Dig II Wrist 3.33 4.22  4.0 10.0 5.2 14 42.0 48.0 33.3   R ULNAR - Dig V      Dig V Wrist 2.50 3.18  10.1 8.0 6.3 12.5 50.0 48.0 32.5      Palm Wrist 2.60 3.23  10.8  5.9 12.5 48.0 48.0 32.5   L ULNAR - Dig V      Dig V Wrist 2.55 3.23  8.1 8.0 13.2 12.5 49.0 48.0 33      Dig V Wrist 2.50 3.18  8.2  14.3 12.5 50.0 48.0 32.9   R RADIAL - Snuff      Forearm Snuff 2.03 2.66  23.6 15.0 21.3 12.5 61.5 48.0 32.1   R MEDIAN - Ulnar - Palmar      Median Wrist 2.03 2.92 2.40 16.9  19.0 8 39.4  32.4      Ulnar Wrist 1.56 1.98 2.40 28.0  36.3 8 51.2  32.4   L MEDIAN - Ulnar - Palmar      Median Wrist 2.24 2.81 2.40 12.5  25.4 8 35.7  32.4      Ulnar Wrist 1.61 2.14 2.40 47.5  55.5 8 49.5  32.4       Motor NCS      Nerve / Sites Rec. Site Lat Ref. Amp Ref. Rel Amp Dist Joesph Ref. Dur. Area Temp.     ms ms mV mV % cm m/s m/s ms %  C   R MEDIAN - APB      Wrist APB 5.57 4.40 6.6 5.0 100 8   8.28 100 32.5      Elbow APB 10.10  6.7  101 22 48.6 48.0 7.97 99 32.2   L MEDIAN - APB      Wrist APB 4.17 4.40 10.4 5.0 100 8   6.56 100 32.2      Elbow APB 7.92  10.2  98.1 20 53.3 48.0 6.82 98.1 32.1   R ULNAR - ADM      Wrist ADM 3.33 3.50 13.3 5.0 100 8   5.89 100 32      B.Elbow ADM 6.30  11.8  89 19 64.0 48.0 6.20 103 32      A.Elbow ADM 7.71  11.5  86.4 9 64.0 48.0 6.41 102 32   L ULNAR - ADM      Wrist ADM 2.29 3.50 12.9 5.0 100 8   6.72 100 32      B.Elbow ADM 5.83  11.9  92.2 19 53.6 48.0 6.93 97 32      A.Elbow ADM 7.34  11.6  90.3 8 53.0 48.0 7.03 95.2 32   R MEDIAN - II Lumb      Median II Lumb 4.95  1.3  100 10   5.94 100 32.2      Ulnar Palm Int 2.92  3.4  270 10   5.21 162 32.2   L MEDIAN - II Lumb      Median II Lumb 4.22  1.1  100 10   6.35 100 32.6      Ulnar Palm Int 3.02  4.2  381 10   5.36 271 32.5       EMG Summary Table     Spontaneous MUAP Recruitment    IA Fib PSW Fasc H.F. Amp Dur. PPP Pattern   R. PRON TERES N None None None None N N N N   R. FIRST D INTERNILSA N  None None None None N N N N   R. ABD POLL BREVIS N None None None None N N N N

## 2020-07-27 NOTE — LETTER
7/27/2020         RE: Zena Quintana  [address] [facility] [address]        Dear Colleague,    Thank you for referring your patient, Zena Quintana, to the [facility]. Please see a copy of my visit note below.    [facility]   EMG Laboratory      Nerve Conduction & EMG Report          Patient:       Zena Quintana  Patient ID:    [id number]  Gender:        Female  YOB: 1965  Age:           55 Years 2 Months      History and Examination:  Zena Quintana is a 55 year old woman with rheumatoid arthritis who is referred for evaluation of possible median neuropathy at the wrist. She reports continuous numbness in the thumb, index, and middle fingers, as well as wrist pain, bilaterally but worse on the right. Examination demonstrates normal strength of intrinsic hand muscles.     Techniques:  Motor conduction studies were done with surface recording electrodes. Sensory conduction studies were performed with surface electrodes, unless indicated otherwise by (n), designating the use of subdermal recording electrodes. Temperature was monitored and recorded throughout the study. Upper extremities were maintained at a temperature of 32 degrees Centigrade or higher.  EMG was done with a concentric needle electrode.     Results:  Median sensory and mixed nerve conduction studies demonstrated moderately severe attenuation of amplitude and slowing of conduction bilaterally. Bilateral median and ulnar motor conduction studies demonstrated mild prolongation of right median distal latency to abductor pollicis brevis, relative prolongation of left median distal latency to abductor pollicis brevis, and prolongation of bilateral median distal latencies to the second lumbrical muscle. Electromyography of selected right upper limb muscles was normal.     Interpretation:  This is an abnormal study, demonstrating electrophysiologic evidence of mild to moderate median neuropathy  at the wrist bilaterally, worse on the right.       Luis Gross MD        Sensory NCS      Nerve / Sites Rec. Site Onset Peak Ref. NP Amp Ref. PP Amp Dist Joesph Ref. Temp     ms ms ms  V  V  V cm m/s m/s  C   R MEDIAN - Dig II      Dig II Wrist 3.91 5.26  6.5 10.0 3.6 14 35.8 48.0 32   L MEDIAN - Dig II      Dig II Wrist 3.33 4.22  4.0 10.0 5.2 14 42.0 48.0 33.3   R ULNAR - Dig V      Dig V Wrist 2.50 3.18  10.1 8.0 6.3 12.5 50.0 48.0 32.5      Palm Wrist 2.60 3.23  10.8  5.9 12.5 48.0 48.0 32.5   L ULNAR - Dig V      Dig V Wrist 2.55 3.23  8.1 8.0 13.2 12.5 49.0 48.0 33      Dig V Wrist 2.50 3.18  8.2  14.3 12.5 50.0 48.0 32.9   R RADIAL - Snuff      Forearm Snuff 2.03 2.66  23.6 15.0 21.3 12.5 61.5 48.0 32.1   R MEDIAN - Ulnar - Palmar      Median Wrist 2.03 2.92 2.40 16.9  19.0 8 39.4  32.4      Ulnar Wrist 1.56 1.98 2.40 28.0  36.3 8 51.2  32.4   L MEDIAN - Ulnar - Palmar      Median Wrist 2.24 2.81 2.40 12.5  25.4 8 35.7  32.4      Ulnar Wrist 1.61 2.14 2.40 47.5  55.5 8 49.5  32.4       Motor NCS      Nerve / Sites Rec. Site Lat Ref. Amp Ref. Rel Amp Dist Joesph Ref. Dur. Area Temp.     ms ms mV mV % cm m/s m/s ms %  C   R MEDIAN - APB      Wrist APB 5.57 4.40 6.6 5.0 100 8   8.28 100 32.5      Elbow APB 10.10  6.7  101 22 48.6 48.0 7.97 99 32.2   L MEDIAN - APB      Wrist APB 4.17 4.40 10.4 5.0 100 8   6.56 100 32.2      Elbow APB 7.92  10.2  98.1 20 53.3 48.0 6.82 98.1 32.1   R ULNAR - ADM      Wrist ADM 3.33 3.50 13.3 5.0 100 8   5.89 100 32      B.Elbow ADM 6.30  11.8  89 19 64.0 48.0 6.20 103 32      A.Elbow ADM 7.71  11.5  86.4 9 64.0 48.0 6.41 102 32   L ULNAR - ADM      Wrist ADM 2.29 3.50 12.9 5.0 100 8   6.72 100 32      B.Elbow ADM 5.83  11.9  92.2 19 53.6 48.0 6.93 97 32      A.Elbow ADM 7.34  11.6  90.3 8 53.0 48.0 7.03 95.2 32   R MEDIAN - II Lumb      Median II Lumb 4.95  1.3  100 10   5.94 100 32.2      Ulnar Palm Int 2.92  3.4  270 10   5.21 162 32.2   L MEDIAN - II Lumb      Median II Lumb 4.22   1.1  100 10   6.35 100 32.6      Ulnar Palm Int 3.02  4.2  381 10   5.36 271 32.5       EMG Summary Table     Spontaneous MUAP Recruitment    IA Fib PSW Fasc H.F. Amp Dur. PPP Pattern   R. PRON TERES N None None None None N N N N   R. FIRST D INTEROSS N None None None None N N N N   R. ABD POLL BREVIS N None None None None N N N N                                      Again, thank you for allowing me to participate in the care of your patient.        Sincerely,        Luis Gross MD

## 2020-07-29 NOTE — PROGRESS NOTES
"Zena Quintana is a 55 year old female who is being evaluated via a billable video visit.      The patient has been notified of following:     \"This video visit will be conducted via a call between you and your physician/provider. We have found that certain health care needs can be provided without the need for an in-person physical exam.  This service lets us provide the care you need with a video conversation.  If a prescription is necessary we can send it directly to your pharmacy.  If lab work is needed we can place an order for that and you can then stop by our lab to have the test done at a later time.    Video visits are billed at different rates depending on your insurance coverage.  Please reach out to your insurance provider with any questions.    If during the course of the call the physician/provider feels a video visit is not appropriate, you will not be charged for this service.\"    Patient has given verbal consent for Video visit? Yes  How would you like to obtain your AVS? MyChart  If you are dropped from the video visit, the video invite should be resent to: Text to cell phone: [phone]  Will anyone else be joining your video visit? No    \Rheumatology Video Visit      Zena Quintana MRN# [id number]   YOB: 1965 Age: 55 year old      Date of visit: 7/30/20   PCP: Dr. Chana Armenta    Chief Complaint   Patient presents with:  Arthritis: RA. Hands are really bad, had a EMG test done on Monday 7/27/2020    Assessment and Plan     1. Seropositive nonerosive rheumatoid arthritis (RF low positive, CCP negative): Reportedly at the time of diagnosis she had synovitis in a symmetric distribution that was steroid responsive. Previously on MTX (effective, anemia, LFT elevation). Currently on hydroxychloroquine 300 mg daily on average (started in approximately 2007) and leflunomide 10mg daily.  Doing well with regard to RA.   - Continue leflunomide 10mg daily   - Continue hydroxychloroquine " 200mg daily with an additional 200mg every other day (last eye exam was on 6/4/2019 by Dr. Julito Garcia)  - Labs every 8-12 weeks: CBC, creatinine, hepatic panel, ESR, CRP     2. Osteoporosis with current pathological fracture, unspecified osteoporosis type, initial encounter: s/p hip fx from ground level fall. Note that her mother has a hx of vertebral compression fx; Ms. Quintana is post-menopausal. 1/13/2020 DEXA showed osteoporosis; L-spine T score of -2.6, Left femoral neck T-score of -2.4. Alendronate was started 1/2020 but stopped in early April 2020 by Ms. Quintana due to hair loss and GERD.  She then received Reclast on 6/16/2020 and it was well-tolerated.  Plan to continue Reclast on a yearly basis.   - Start reclast 5mg IV once yearly; next infusion to on or shortly after 6/16/2021 (not yet ordered)  - Continue calcium 1200mg daily  - Continue vitamin D 1000 IU daily    3. Bilateral carpal tunnel syndrome: doesn't tolerate NSAIDs per patient.  Using wrist splints bilaterally with significant improvement, and was intermittent symptoms.  Symptoms then worsened and she had the nerve conduction study performed, showing bilateral carpal tunnel syndrome.  She says that she had discussed surgeons with her shoulder surgeon, and plans to [facility]; she provided verbal consent for me to send the referral to his office and the EMG results.   - Orthopedics referral    4. 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 S/p surgery.  Doing okay at this time.    5. Sjogren's syndrome: NJ by EIA negative but positive by IF, SSA and SSB negative, and minor salivary gland biopsy negative. She is currently following with ophthalmology who has given her eyedrops that have been beneficial.  Dry mouth  is currently treated with frequent sips of water; she has good oral hygiene, follows with a dentist regularly, and does not have frequent cavities. Pilocarpine was previously Rx'd but she has not started it and does not plan to; will remove from the medication list.     6. History of oral sores / family history of lupus / polyarthritis / recurrent sinus infections: She is not having oral sores or recurrent sinus infections for several years now. Retrospectively, there would be concern for potential ANCA associated sinus issues and this could be checked if she had recurrent sinusitis again. Oral sores have improved since she started Plaquenil, that argues it could be connective tissue disease related. NJ was negative by EIA, but complement was on the low end of the normal range so NJ was rechecked and positive; further testing was negative.    # Relevant labs and imaging were reviewed with the patient    # High risk medication toxicity monitoring: discussion and labs reviewed; appropriate labs ordered. See above.  Instructed that if confirmed to have COVID-19 or exposure to someone with confirmed COVID-19 to call this clinic for directions on DMARD management.    # Note that this is a virtual visit to reduce the risk of COVID-19 exposure during this current pandemic.      # Considered to be at high risk of complications from the COVID-19 virus.  It is recommended to limit contact with other people and if possible to work remotely or provide a leave of absence to reduce the risk for COVID-19.        Ms. Quintana verbalized agreement with and understanding of the rational for the diagnosis and treatment plan.  All questions were answered to best of my ability and the patient's satisfaction. Ms. Quintana was advised to contact the clinic with any questions that may arise after the clinic visit.      Thank you for involving me in the care of the patient    Return to clinic: 6 months      HPI   Zena Quintana is a 55  year old female with medical history significant for iron deficiency anemia, nephrolithiasis, hyperlipidemia, xerostomia, dry eyes, and rheumatoid arthritis who presents for follow-up of rheumatoid arthritis.    Today, Ms. Quintana reports that she is doing okay with regard to rheumatoid arthritis.  However, she has numbness and tingling in her hands and worsening carpal tunnel syndrome symptoms.  She had a nerve conduction study and does not know the results yet.  We reviewed the nerve conduction study results.  She says that she discussed her carpal tunnel syndrome with her shoulder surgeon and her shoulder surgeon recommended that she see Dr. Morillo, who is a hand surgeon.  She had Reclast in June 2020 and tolerated it well.    Denies fevers, chills, nausea, vomiting, constipation, diarrhea. No abdominal pain. No chest pain/pressure, palpitations, or shortness of breath. No neck pain. No rash. No LE swelling.  No photosensitivity or photophobia.  No sicca symptoms.     Tobacco: None  EtOH: Occasional; no more than 2 drinks per day when she does drink  Drugs: None  Occupation: Works at a school    ROS   GEN: No fevers, chills, night sweats, or weight change  SKIN: No itching, rashes, sores  HEENT: No oral or nasal ulcers.  CV: No chest pain, pressure, palpitations, or dyspnea on exertion.  PULM: No SOB, wheeze, cough.  GI:  No nausea, vomiting, constipation, diarrhea. No blood in stool. No abdominal pain.  : No blood in urine.  MSK: See HPI.  NEURO: See HPI  EXT: No LE swelling  PSYCH: Negative    Active Problem List     Patient Active Problem List   Diagnosis     Internal derangement of knee     Iron deficiency anemia     Allergic rhinitis     Stomatitis and mucositis (ulcerative)     Pure hypercholesterolemia     Arthropathy     HYPERLIPIDEMIA LDL GOAL <160     High risk medication use     RA (rheumatoid arthritis) (H)     Endometriosis     Ovarian cyst     Disturbance of salivary secretion (XEROSTOMIA)      Impingement syndrome of right shoulder     Sjogren's syndrome (H)     Palpitations     Cervical high risk HPV (human papillomavirus) test positive     Osteoporosis with current pathological fracture, unspecified osteoporosis type, initial encounter     Oral bisphosphonates causing adverse effect in therapeutic use, initial encounter     Past Medical History     Past Medical History:   Diagnosis Date     Allergic rhinitis, cause unspecified     Allergic rhinitis     Cervical high risk HPV (human papillomavirus) test positive 03/08/2019    see problem list     Endometriosis, site unspecified     Endometriosis     Female infertility of other specified origin      Iron deficiency anemia, unspecified     Anemia, iron def.     RA (rheumatoid arthritis) (H)      Sjogren's syndrome (H)      Stomatitis and mucositis (ulcerative)     chronic - non-specific     Past Surgical History     Past Surgical History:   Procedure Laterality Date     C NONSPECIFIC PROCEDURE  1995, 1997    laparoscopy for endometrial fulguration     COLONOSCOPY WITH CO2 INSUFFLATION N/A 8/7/2017    Procedure: COLONOSCOPY WITH CO2 INSUFFLATION;  Colonoscopy, Screen for colon cancer.  BMI  22.17  [facility] [place] [phone];  Surgeon: Berny Pedro MD;  Location: [facility] OR     HC COLONOSCOPY W/WO BRUSH/WASH  5/25/2005     HC CREATE EARDRUM OPENING,GEN ANESTH      P.E. Tubes     HC KNEE SCOPE, DIAGNOSTIC       left knee, ruptured ACL     HC REMOVE TONSILS/ADENOIDS,12+ Y/O      T & A 12+y.o.     ORTHOPEDIC SURGERY      rotator cuff repair, left     Allergy     Allergies   Allergen Reactions     Penicillins Hives     hives     Current Medication List     Current Outpatient Medications   Medication Sig     acetaminophen (TYLENOL) 500 MG tablet Take 1-2 tablets by mouth every 6 hours as needed for pain.     ASPIRIN 81 MG OR TABS 1 TABLET DAILY     bimatoprost (LATISSE) 0.03 % SOLN Externally apply topically At Bedtime     bimatoprost (LATISSE) 0.03 % SOLN  Externally apply topically At Bedtime     Fexofenadine HCl (MUCINEX ALLERGY PO)      hydroxychloroquine (PLAQUENIL) 200 MG tablet Hydroxychloroquine 200mg daily; and an additional 200mg every other day.     ibuprofen (ADVIL) 200 MG capsule Take 200 mg by mouth as needed for fever     leflunomide (ARAVA) 10 MG tablet Take 1 tablet (10 mg) by mouth daily     loratadine 10 MG capsule Take 10 mg by mouth as needed for allergies     MULTIPLE VITAMINS/WOMENS OR None Entered     albuterol (PROAIR HFA/PROVENTIL HFA/VENTOLIN HFA) 108 (90 Base) MCG/ACT inhaler Inhale 2 puffs into the lungs every 4 hours as needed for shortness of breath / dyspnea or wheezing (Patient not taking: Reported on 3/8/2019)     fluticasone (FLONASE) 50 MCG/ACT spray Spray 2 sprays into both nostrils daily (Patient not taking: Reported on 3/8/2019)     OMEGA-3 FATTY ACIDS 600 MG OR CAPS Reported on 3/15/2017     ondansetron (ZOFRAN ODT) 4 MG ODT tab Take 1-2 tablet 30 min before prednisone. (Patient not taking: Reported on 3/8/2019)     No current facility-administered medications for this visit.          Social History   See HPI    Family History     Family History   Problem Relation Age of Onset     Osteoporosis Mother      Arthritis Mother         lupus     Connective Tissue Disorder Mother         Lupus     Cataracts Mother      Macular Degeneration Mother      Celiac Disease Mother      Heart Disease Father         aortic aneurysm     Gastrointestinal Disease Father         abdominal anurysm     Hypertension Father      Cataracts Father      Gastrointestinal Disease Sister         IBS, colon problems     Alzheimer Disease Maternal Grandmother      Osteoporosis Maternal Grandmother      Cerebrovascular Disease Maternal Grandfather      Cancer Paternal Grandmother      Cerebrovascular Disease Paternal Grandfather      Circulatory Sister         carotid stenosis     Cancer - colorectal Maternal Uncle      Cancer - colorectal Maternal Uncle       "Glaucoma No family hx of      Mother: Lupus    Physical Exam     Temp Readings from Last 3 Encounters:   06/16/20 97.6  F (36.4  C) (Oral)   05/15/20 98.3  F (36.8  C) (Temporal)   09/28/18 97.3  F (36.3  C) (Tympanic)     BP Readings from Last 5 Encounters:   06/16/20 (!) 84/54   06/09/20 107/71   05/15/20 108/64   01/08/20 105/64   09/04/19 95/57     Pulse Readings from Last 1 Encounters:   06/16/20 64     Resp Readings from Last 1 Encounters:   06/16/20 16     Estimated body mass index is 22.69 kg/m  as calculated from the following:    Height as of 5/15/20: 1.676 m (5' 6\").    Weight as of 6/16/20: 63.8 kg (140 lb 9.6 oz).    GEN: NAD. Healthy appearing adult.   HEENT: MMM.  Anicteric, noninjected sclera. No obvious external lesions of the ear and nose. Hearing intact.  PULM: No increased work of breathing; no use of accessory muscles.  MSK:  Hands and wrists without swelling. Elbows without swelling.   SKIN: No rash or jaundice seen  PSYCH: Alert. Appropriate.     Labs / Imaging (select studies)     RF/CCP  Recent Labs   Lab Test 08/29/13  1413   CCPABY <20 Interpretation:  Negative     CBC  Recent Labs   Lab Test 06/09/20  1000 03/09/20  1513 02/10/20  1503   WBC 4.0 4.5 5.0   RBC 3.83 4.07 4.07   HGB 11.5* 12.2 12.3   HCT 34.8* 37.9 38.6   MCV 91 93 95   RDW 12.4 12.6 12.7    283 325   MCH 30.0 30.0 30.2   MCHC 33.0 32.2 31.9   NEUTROPHIL 47.8 53.8 54.5   LYMPH 33.2 27.7 26.3   MONOCYTE 15.8 14.8 16.0   EOSINOPHIL 2.2 2.4 2.6   BASOPHIL 1.0 1.3 0.6   ANEU 1.9 2.4 2.7   ALYM 1.3 1.3 1.3   ELIGIO 0.6 0.7 0.8   AEOS 0.1 0.1 0.1   ABAS 0.0 0.1 0.0     CMP  Recent Labs   Lab Test 06/16/20  1507 06/09/20  1000 03/09/20  1513 02/10/20  1503 01/08/20  1548  04/12/19  1509  06/20/17  1437  05/24/16  0940  04/07/15  1322   NA  --   --   --   --   --   --  139  --  141  --  137  --  139   POTASSIUM  --   --   --   --   --   --  4.3  --  3.8  --  4.4  --  3.9   CHLORIDE  --   --   --   --   --   --  103  --  105  -- "  105  --  104   CO2  --   --   --   --   --   --  29  --  29  --  29  --  29   ANIONGAP  --   --   --   --   --   --  7  --  7  --  3  --  6   GLC  --   --   --   --   --   --   --   --  84  --  85  --  88   BUN  --   --   --   --   --   --   --   --  22  --  18  --  16   CR 0.98 0.90 0.98 0.83 0.92   < > 0.86   < > 0.84   < > 0.92  --  0.96   GFRESTIMATED 65 71 65 79 69   < > 76   < > 71   < > 64  --  61   GFRESTBLACK 75 83 75 >90 80   < > 88   < > 85   < > 77  --  74   VALERIA 8.8  --   --   --  9.4  --  8.7  --  9.1  --  9.2  --  9.1   BILITOTAL  --  0.4 0.3 0.3 0.3   < >  --    < > 0.2   < > 0.4   < > 0.3   ALBUMIN  --  3.8 4.5 4.3 4.1   < >  --    < > 4.0   < > 4.4   < > 3.9   PROTTOTAL  --  6.9 7.5 7.6 7.0   < >  --    < > 6.8   < > 7.3   < > 7.3   ALKPHOS  --  39* 50 55 58   < >  --    < > 40   < > 42   < > 34*   AST  --  25 28 24 23   < >  --    < > 21   < > 30   < > 34   ALT  --  21 27 25 27   < >  --    < > 21   < > 24   < > 29    < > = values in this interval not displayed.     Calcium/VitaminD  Recent Labs   Lab Test 06/16/20  1507 01/08/20  1548 04/12/19  1509  07/13/15  1036   VALERIA 8.8 9.4 8.7   < >  --    VITDT  --  38  --   --  48    < > = values in this interval not displayed.     ESR/CRP  Recent Labs   Lab Test 01/08/20  1548 10/02/19  1504 09/04/19  1552   SED 9 8 7   CRP <2.9 <2.9 <2.9     Lipid Panel  Recent Labs   Lab Test 07/13/15  1036   CHOL 181   TRIG 132   HDL 74   LDL 81   VLDL 26   CHOLHDLRATIO 2.4     Hepatitis B  Recent Labs   Lab Test 09/04/19  1552   HBCAB Nonreactive   HEPBANG Nonreactive     Hepatitis C  Recent Labs   Lab Test 06/20/17  1437   HCVAB Nonreactive   Assay performance characteristics have not been established for newborns,   infants, and children       Immunization History     Immunization History   Administered Date(s) Administered     FLU 6-35 months 10/12/2019     Influenza (H1N1) 12/03/2009     Influenza (IIV3) PF 11/17/2005, 11/17/2006, 10/25/2010, 01/09/2013      Influenza Vaccine IM > 6 months Valent IIV4 10/10/2013, 09/14/2016, 09/13/2017, 01/10/2019     Pneumo Conj 13-V (2010&after) 09/13/2017     Pneumococcal 23 valent 09/12/2018     TD (ADULT, 7+) 02/01/2005     TDAP Vaccine (Adacel) 01/09/2013          Chart documentation done in part with Dragon Voice recognition Software. Although reviewed after completion, some word and grammatical error may remain.    Video-Visit Details    Type of service:  Video Visit    Video Start Time: 8:40 AM  Video End Time: 9:00 AM    Originating Location (pt. Location): Home in [place]    Distant Location (provider location):  Home    Platform used for Video Visit: Gita Hardy MD

## 2020-07-30 ENCOUNTER — VIRTUAL VISIT (OUTPATIENT)
Dept: RHEUMATOLOGY | Facility: CLINIC | Age: 55
End: 2020-07-30
Payer: COMMERCIAL

## 2020-07-30 DIAGNOSIS — G56.03 BILATERAL CARPAL TUNNEL SYNDROME: ICD-10-CM

## 2020-07-30 DIAGNOSIS — M05.79 RHEUMATOID ARTHRITIS INVOLVING MULTIPLE SITES WITH POSITIVE RHEUMATOID FACTOR (H): Primary | ICD-10-CM

## 2020-07-30 DIAGNOSIS — Z79.899 HIGH RISK MEDICATION USE: ICD-10-CM

## 2020-07-30 DIAGNOSIS — M80.00XA OSTEOPOROSIS WITH CURRENT PATHOLOGICAL FRACTURE, UNSPECIFIED OSTEOPOROSIS TYPE, INITIAL ENCOUNTER: ICD-10-CM

## 2020-07-30 PROCEDURE — 99213 OFFICE O/P EST LOW 20 MIN: CPT | Mod: 95 | Performed by: INTERNAL MEDICINE

## 2020-07-30 RX ORDER — HYDROXYCHLOROQUINE SULFATE 200 MG/1
TABLET, FILM COATED ORAL
Qty: 135 TABLET | Refills: 3 | Status: SHIPPED | OUTPATIENT
Start: 2020-07-30 | End: 2021-01-19

## 2020-07-30 NOTE — Clinical Note
Genesis,  Please mail the orthopedics referral and the EMG results to the patient, and fax the same to Dr. Morillo  [facility] [place]    Rome Hardy MD  7/30/2020 9:03 AM

## 2020-07-31 NOTE — PROGRESS NOTES
mailed the orthopedics referral and the EMG results to the patient, and faxed the same to Dr. Morillo   [facility] [place]    Gneesis [facility]  7/31/2020 9:32 AM

## 2020-09-22 DIAGNOSIS — Z79.899 HIGH RISK MEDICATION USE: ICD-10-CM

## 2020-09-22 DIAGNOSIS — M05.79 RHEUMATOID ARTHRITIS INVOLVING MULTIPLE SITES WITH POSITIVE RHEUMATOID FACTOR (H): ICD-10-CM

## 2020-09-22 LAB
ALBUMIN SERPL-MCNC: 4.2 G/DL (ref 3.4–5)
ALP SERPL-CCNC: 42 U/L (ref 40–150)
ALT SERPL W P-5'-P-CCNC: 22 U/L (ref 0–50)
AST SERPL W P-5'-P-CCNC: 27 U/L (ref 0–45)
BASOPHILS # BLD AUTO: 0 10E9/L (ref 0–0.2)
BASOPHILS NFR BLD AUTO: 0.5 %
BILIRUB DIRECT SERPL-MCNC: <0.1 MG/DL (ref 0–0.2)
BILIRUB SERPL-MCNC: 0.3 MG/DL (ref 0.2–1.3)
CREAT SERPL-MCNC: 0.92 MG/DL (ref 0.52–1.04)
CRP SERPL-MCNC: <2.9 MG/L (ref 0–8)
DIFFERENTIAL METHOD BLD: ABNORMAL
EOSINOPHIL # BLD AUTO: 0.2 10E9/L (ref 0–0.7)
EOSINOPHIL NFR BLD AUTO: 3 %
ERYTHROCYTE [DISTWIDTH] IN BLOOD BY AUTOMATED COUNT: 12.2 % (ref 10–15)
ERYTHROCYTE [SEDIMENTATION RATE] IN BLOOD BY WESTERGREN METHOD: 10 MM/H (ref 0–30)
GFR SERPL CREATININE-BSD FRML MDRD: 69 ML/MIN/{1.73_M2}
HCT VFR BLD AUTO: 35 % (ref 35–47)
HGB BLD-MCNC: 11.5 G/DL (ref 11.7–15.7)
LYMPHOCYTES # BLD AUTO: 1.5 10E9/L (ref 0.8–5.3)
LYMPHOCYTES NFR BLD AUTO: 26.7 %
MCH RBC QN AUTO: 30.4 PG (ref 26.5–33)
MCHC RBC AUTO-ENTMCNC: 32.9 G/DL (ref 31.5–36.5)
MCV RBC AUTO: 93 FL (ref 78–100)
MONOCYTES # BLD AUTO: 0.9 10E9/L (ref 0–1.3)
MONOCYTES NFR BLD AUTO: 15.6 %
NEUTROPHILS # BLD AUTO: 3.1 10E9/L (ref 1.6–8.3)
NEUTROPHILS NFR BLD AUTO: 54.2 %
PLATELET # BLD AUTO: 284 10E9/L (ref 150–450)
PROT SERPL-MCNC: 7.6 G/DL (ref 6.8–8.8)
RBC # BLD AUTO: 3.78 10E12/L (ref 3.8–5.2)
WBC # BLD AUTO: 5.7 10E9/L (ref 4–11)

## 2020-09-22 PROCEDURE — 82565 ASSAY OF CREATININE: CPT | Performed by: INTERNAL MEDICINE

## 2020-09-22 PROCEDURE — 85652 RBC SED RATE AUTOMATED: CPT | Performed by: INTERNAL MEDICINE

## 2020-09-22 PROCEDURE — 36415 COLL VENOUS BLD VENIPUNCTURE: CPT | Performed by: INTERNAL MEDICINE

## 2020-09-22 PROCEDURE — 85025 COMPLETE CBC W/AUTO DIFF WBC: CPT | Performed by: INTERNAL MEDICINE

## 2020-09-22 PROCEDURE — 86140 C-REACTIVE PROTEIN: CPT | Performed by: INTERNAL MEDICINE

## 2020-09-22 PROCEDURE — 80076 HEPATIC FUNCTION PANEL: CPT | Performed by: INTERNAL MEDICINE

## 2020-09-28 ENCOUNTER — TRANSFERRED RECORDS (OUTPATIENT)
Dept: HEALTH INFORMATION MANAGEMENT | Facility: CLINIC | Age: 55
End: 2020-09-28

## 2020-10-28 ENCOUNTER — TRANSFERRED RECORDS (OUTPATIENT)
Dept: HEALTH INFORMATION MANAGEMENT | Facility: CLINIC | Age: 55
End: 2020-10-28

## 2020-11-09 ENCOUNTER — OFFICE VISIT (OUTPATIENT)
Dept: OPHTHALMOLOGY | Facility: CLINIC | Age: 55
End: 2020-11-09
Payer: COMMERCIAL

## 2020-11-09 DIAGNOSIS — Z79.899 HIGH RISK MEDICATION USE: ICD-10-CM

## 2020-11-09 DIAGNOSIS — Z79.899 LONG-TERM USE OF PLAQUENIL: Primary | ICD-10-CM

## 2020-11-09 DIAGNOSIS — M05.79 RHEUMATOID ARTHRITIS INVOLVING MULTIPLE SITES WITH POSITIVE RHEUMATOID FACTOR (H): ICD-10-CM

## 2020-11-09 PROCEDURE — 92002 INTRM OPH EXAM NEW PATIENT: CPT | Performed by: STUDENT IN AN ORGANIZED HEALTH CARE EDUCATION/TRAINING PROGRAM

## 2020-11-09 PROCEDURE — 92134 CPTRZ OPH DX IMG PST SGM RTA: CPT | Performed by: STUDENT IN AN ORGANIZED HEALTH CARE EDUCATION/TRAINING PROGRAM

## 2020-11-09 PROCEDURE — 92082 INTERMEDIATE VISUAL FIELD XM: CPT | Performed by: STUDENT IN AN ORGANIZED HEALTH CARE EDUCATION/TRAINING PROGRAM

## 2020-11-09 ASSESSMENT — TONOMETRY
IOP_METHOD: APPLANATION
OS_IOP_MMHG: 16
OD_IOP_MMHG: 16

## 2020-11-09 ASSESSMENT — REFRACTION_MANIFEST
OD_CYLINDER: +0.50
OD_AXIS: 175
OS_CYLINDER: +0.50
OS_AXIS: 030
OS_SPHERE: -2.00
OD_SPHERE: -2.00

## 2020-11-09 ASSESSMENT — EXTERNAL EXAM - LEFT EYE: OS_EXAM: NORMAL

## 2020-11-09 ASSESSMENT — CUP TO DISC RATIO
OS_RATIO: 0.1
OD_RATIO: 0.1

## 2020-11-09 ASSESSMENT — VISUAL ACUITY
CORRECTION_TYPE: GLASSES
OD_CC: 20/20
METHOD: SNELLEN - LINEAR
OS_CC: 20/20

## 2020-11-09 ASSESSMENT — SLIT LAMP EXAM - LIDS
COMMENTS: 1+ UPPER LID DERMATOCHALASIS
COMMENTS: 1+ UPPER LID DERMATOCHALASIS

## 2020-11-09 ASSESSMENT — EXTERNAL EXAM - RIGHT EYE: OD_EXAM: NORMAL

## 2020-11-09 NOTE — PATIENT INSTRUCTIONS
Normal Plaquenil eye tests today.    Continue Latisse at bedtime     Ladonna Mirza MD  [phone]

## 2020-11-09 NOTE — LETTER
11/9/2020         RE: Zena Quintana  [address] [facility] [address]        Dear Colleague,    Thank you for referring your patient, Zena Quintana, to the [facility]. Please see a copy of my visit note below.     Current Eye Medications: bimatoprost applied to eye lids nightly.     Subjective:  Plaquenil eye exam  Pt reports that she is seeing well and reports no other eye problems. Pt has been on plaquenil for about 13 years.    No previous eye injuries or surgeries.      Objective:  See Ophthalmology Exam.      Assessment:  Zena Quintana is a 55 year old female who presents with:   Encounter Diagnoses   Name Primary?     Long-term use of Plaquenil Per notes, started Plaquenil around 2007. Follows with Dr. Hardy.    Macular SD-OCT within normal limits both eyes.    Moe visual field (HVF) 10-2 within normal limits both eyes.    No signs of Plaquenil retinal toxicity at present.        High risk medication use      Rheumatoid arthritis involving multiple sites with positive rheumatoid factor (H)        Plan:  Normal Plaquenil eye tests today.    Continue Latisse at bedtime     Ladonna Mirza MD  [phone]          Again, thank you for allowing me to participate in the care of your patient.        Sincerely,        Ladonna Mirza MD

## 2020-11-09 NOTE — PROGRESS NOTES
Current Eye Medications: bimatoprost applied to eye lids nightly.     Subjective:  Plaquenil eye exam  Pt reports that she is seeing well and reports no other eye problems. Pt has been on plaquenil for about 13 years.    No previous eye injuries or surgeries.      Objective:  See Ophthalmology Exam.      Assessment:  Zena Quintana is a 55 year old female who presents with:   Encounter Diagnoses   Name Primary?     Long-term use of Plaquenil Per notes, started Plaquenil around 2007. Follows with Dr. Hardy.    Macular SD-OCT within normal limits both eyes.    Moe visual field (HVF) 10-2 within normal limits both eyes.    No signs of Plaquenil retinal toxicity at present.        High risk medication use      Rheumatoid arthritis involving multiple sites with positive rheumatoid factor (H)        Plan:  Normal Plaquenil eye tests today.    Continue Latisse at bedtime     Ladonna Mirza MD  [phone]

## 2020-11-28 NOTE — PROGRESS NOTES
[address]  [facility] [address]  [phone]  Dept: [phone]    PRE-OP EVALUATION:  Today's date: 2020    Zena Quintana (: 1965) presents for pre-operative evaluation assessment as requested by Dr. Pierce.  She requires evaluation and anesthesia risk assessment prior to undergoing surgery/procedure for treatment of Right Hip replacement .    Fax number for surgical facility:   Primary Physician: Chana Armenta  Type of Anesthesia Anticipated: to be determined    Preop Questionnnaire:  Pre-op Questionnaire 2020   Surgery Location: -[facility]   Surgeon: -Dr Pierce   Surgery/Procedure: -Right Hip replacement   Surgery Date: -20   Time of Surgery: -TBD   1. Have you ever had a heart attack or stroke? No   2. Have you ever had surgery on your heart or blood vessels, such as a stent placement, a coronary artery bypass, or surgery on an artery in your head, neck, heart, or legs? No   3. Do you have chest pain with activity? No   4. Do you have a history of  heart failure? No   5. Do you currently have a cold, bronchitis or symptoms of other infection? No   6. Do you have a cough, shortness of breath, or wheezing? No   7. Do you or anyone in your family have previous history of blood clots? No   8. Do you or does anyone in your family have a serious bleeding problem such as prolonged bleeding following surgeries or cuts? No   9. Have you ever had problems with anemia or been told to take iron pills? No   10. Have you had any abnormal blood loss such as black, tarry or bloody stools, or abnormal vaginal bleeding? No   11. Have you ever had a blood transfusion? No   12. Are you willing to have a blood transfusion if it is medically needed before, during, or after your surgery? Yes   13. Have you or any of your relatives ever had problems with anesthesia? No   14. Do you have sleep apnea, excessive snoring or daytime drowsiness? No    15. Do you have any artifical heart valves or other implanted medical devices like a pacemaker, defibrillator, or continuous glucose monitor? No   16. Do you have artificial joints? No   17. Are you allergic to latex? No   18. Is there any chance that you may be pregnant? No         HPI:     HPI related to upcoming procedure: History of ORIF of right hip with recent pain and limping. Plan for revision of hip replacement      See problem list for active medical problems.  Problems all longstanding and stable, except as noted/documented.  See ROS for pertinent symptoms related to these conditions.    RA -has been well controlled on current medications.    MEDICAL HISTORY:     Patient Active Problem List    Diagnosis Date Noted     Disturbance of salivary secretion (XEROSTOMIA) 10/08/2014     Priority: High     RA (rheumatoid arthritis) (H) 10/10/2013     Priority: High     Osteoporosis with current pathological fracture, unspecified osteoporosis type, initial encounter 04/22/2020     Priority: Medium     Oral bisphosphonates causing adverse effect in therapeutic use, initial encounter 04/22/2020     Priority: Medium     Cervical high risk HPV (human papillomavirus) test positive 03/08/2019     Priority: Medium     2/13/14 NIL pap, neg HR HPV.  7/13/15 Unsat pap  8/8/16 NIL pap, neg HR HPV.  3/8/19 NIL pap, + HR HPV (not 16 or 18). Plan: cotest in 1 yr.  4/7/20 COVID 19 interim plan updated to: Plan unchanged   5/15/20 NIL Pap, + HR HPV (not 16 or 18). Plan: colp bef 8/15/20    5/27/20 Pt notified by phone.   6/9/20 [facility] E[facility] - Negative. Plan cotest in 1 year due 6/9/21.   6/12/20 Pt viewed result on [employer]. CCT Tracking.       Palpitations 06/20/2017     Priority: Medium     Impingement syndrome of right shoulder 05/24/2016     Priority: Medium     Sjogren's syndrome (H) 05/24/2016     Priority: Medium     Endometriosis 07/18/2014     Priority: Medium     Ovarian cyst 07/18/2014     Priority: Medium     Problem list  name updated by automated process. Provider to review       High risk medication use 09/16/2013     Priority: Medium     HYPERLIPIDEMIA LDL GOAL <160 10/31/2010     Priority: Medium     Arthropathy 08/27/2007     Priority: Medium     Problem list name updated by automated process. Provider to review       Pure hypercholesterolemia 07/06/2006     Priority: Medium     Stomatitis and mucositis (ulcerative) 05/06/2005     Priority: Medium     chronic - non-specific  IMO update changed this record. Please review for accuracy       Allergic rhinitis 01/30/2004     Priority: Medium     Problem list name updated by automated process. Provider to review       Internal derangement of knee 04/30/2002     Priority: Medium     Problem list name updated by automated process. Provider to review       Iron deficiency anemia 04/30/2002     Priority: Medium     Problem list name updated by automated process. Provider to review        Past Medical History:   Diagnosis Date     Allergic rhinitis, cause unspecified     Allergic rhinitis     Cervical high risk HPV (human papillomavirus) test positive 03/08/2019    see problem list     Endometriosis, site unspecified     Endometriosis     Female infertility of other specified origin      Iron deficiency anemia, unspecified     Anemia, iron def.     RA (rheumatoid arthritis) (H)      Sjogren's syndrome (H)      Stomatitis and mucositis (ulcerative)     chronic - non-specific     Past Surgical History:   Procedure Laterality Date     COLONOSCOPY WITH CO2 INSUFFLATION N/A 8/7/2017    Procedure: COLONOSCOPY WITH CO2 INSUFFLATION;  Colonoscopy, Screen for colon cancer.  BMI  22.17  [facility] [phone];  Surgeon: Berny Pedro MD;  Location:  OR     HC COLONOSCOPY W/WO BRUSH/WASH  5/25/2005     HC CREATE EARDRUM OPENING,GEN ANESTH      P.E. Tubes     HC KNEE SCOPE, DIAGNOSTIC       left knee, ruptured ACL     HC REMOVE TONSILS/ADENOIDS,11+ Y/O      T & A 11+y.o.     ORTHOPEDIC  SURGERY      rotator cuff repair, left     ZZC NONSPECIFIC PROCEDURE  1995, 1997    laparoscopy for endometrial fulguration     Current Outpatient Medications   Medication Sig Dispense Refill     acetaminophen (TYLENOL) 500 MG tablet Take 1-2 tablets by mouth every 6 hours as needed for pain.       albuterol (PROAIR HFA/PROVENTIL HFA/VENTOLIN HFA) 108 (90 Base) MCG/ACT inhaler Inhale 2 puffs into the lungs every 4 hours as needed for shortness of breath / dyspnea or wheezing 1 Inhaler 0     ASPIRIN 81 MG OR TABS 1 TABLET DAILY       bimatoprost (LATISSE) 0.03 % SOLN Externally apply topically At Bedtime 1 Bottle 3     Fexofenadine HCl (MUCINEX ALLERGY PO)        fluticasone (FLONASE) 50 MCG/ACT spray Spray 2 sprays into both nostrils daily 1 Bottle 0     hydroxychloroquine (PLAQUENIL) 200 MG tablet Hydroxychloroquine 200mg daily; and an additional 200mg every other day. 135 tablet 3     ibuprofen (ADVIL) 200 MG capsule Take 200 mg by mouth as needed for fever       leflunomide (ARAVA) 10 MG tablet Take 1 tablet (10 mg) by mouth daily 90 tablet 2     loratadine 10 MG capsule Take 10 mg by mouth as needed for allergies       MULTIPLE VITAMINS/WOMENS OR None Entered       OTC products: NSAIDS    Allergies   Allergen Reactions     Penicillins Hives     hives      Latex Allergy: NO    Social History     Tobacco Use     Smoking status: Never Smoker     Smokeless tobacco: Never Used     Tobacco comment: no smokers in household   Substance Use Topics     Alcohol use: Yes     Alcohol/week: 1.7 standard drinks     Comment: has a couple drinks now and again     History   Drug Use No       REVIEW OF SYSTEMS:   CONSTITUTIONAL: NEGATIVE for fever, chills, change in weight  INTEGUMENTARY/SKIN: NEGATIVE for worrisome rashes, moles or lesions  EYES: NEGATIVE for vision changes or irritation  ENT/MOUTH: NEGATIVE for ear, mouth and throat problems  RESP: NEGATIVE for significant cough or SOB  BREAST: NEGATIVE for masses, tenderness or  "discharge  CV: NEGATIVE for chest pain, palpitations or peripheral edema  GI: NEGATIVE for nausea, abdominal pain, heartburn, or change in bowel habits  : NEGATIVE for frequency, dysuria, or hematuria  MUSCULOSKELETAL: POSITIVE for right hip pain  NEURO: NEGATIVE for weakness, dizziness or paresthesias  ENDOCRINE: NEGATIVE for temperature intolerance, skin/hair changes  HEME: NEGATIVE for bleeding problems  PSYCHIATRIC: NEGATIVE for changes in mood or affect    EXAM:   BP (!) 88/60 (BP Location: Right arm, Patient Position: Sitting, Cuff Size: Adult Regular)   Pulse 71   Temp 98  F (36.7  C) (Temporal)   Ht 1.676 m (5' 6\")   Wt 58.9 kg (129 lb 12 oz)   LMP 07/17/2017 (Approximate)   SpO2 98%   Breastfeeding No   BMI 20.94 kg/m      GENERAL APPEARANCE: healthy, alert and no distress     EYES: EOMI, PERRL     HENT: ear canals and TM's normal and nose and mouth without ulcers or lesions     NECK: no adenopathy, no asymmetry, masses, or scars and thyroid normal to palpation     RESP: lungs clear to auscultation - no rales, rhonchi or wheezes     CV: regular rates and rhythm, normal S1 S2, no S3 or S4 and no murmur, click or rub     ABDOMEN:  soft, nontender, no HSM or masses and bowel sounds normal     MS: extremities normal- no gross deformities noted, no evidence of inflammation in joints, limping gait      SKIN: no suspicious lesions or rashes     NEURO: Normal strength and tone, sensory exam grossly normal, mentation intact and speech normal     PSYCH: mentation appears normal. and affect normal/bright     LYMPHATICS: No cervical adenopathy    DIAGNOSTICS:   EKG: appears normal, NSR, normal axis, normal intervals, no acute ST/T changes c/w ischemia, no LVH by voltage criteria, unchanged from previous tracings    Recent Labs   Lab Test 09/22/20  1517 06/16/20  1507 06/09/20  1000 04/12/19  1509 04/12/19  1509 06/20/17  1437 06/20/17  1437   HGB 11.5*  --  11.5*   < >  --    < > 12.3     --  256   < > "  --    < > 324   NA  --   --   --   --  139  --  141   POTASSIUM  --   --   --   --  4.3  --  3.8   CR 0.92 0.98 0.90   < > 0.86   < > 0.84    < > = values in this interval not displayed.      Last Comprehensive Metabolic Panel:  Sodium   Date Value Ref Range Status   12/10/2020 137 133 - 144 mmol/L Final     Potassium   Date Value Ref Range Status   12/10/2020 3.9 3.4 - 5.3 mmol/L Final     Chloride   Date Value Ref Range Status   12/10/2020 104 94 - 109 mmol/L Final     Carbon Dioxide   Date Value Ref Range Status   12/10/2020 31 20 - 32 mmol/L Final     Anion Gap   Date Value Ref Range Status   12/10/2020 2 (L) 3 - 14 mmol/L Final     Glucose   Date Value Ref Range Status   12/10/2020 85 70 - 99 mg/dL Final     Urea Nitrogen   Date Value Ref Range Status   12/10/2020 10 7 - 30 mg/dL Final     Creatinine   Date Value Ref Range Status   12/10/2020 0.70 0.52 - 1.04 mg/dL Final     GFR Estimate   Date Value Ref Range Status   12/10/2020 >90 >60 mL/min/[1.73_m2] Final     Comment:     Non  GFR Calc  Starting 12/18/2018, serum creatinine based estimated GFR (eGFR) will be   calculated using the Chronic Kidney Disease Epidemiology Collaboration   (CKD-EPI) equation.       Calcium   Date Value Ref Range Status   12/10/2020 9.6 8.5 - 10.1 mg/dL Final     Lab Results   Component Value Date    WBC 5.9 12/10/2020     Lab Results   Component Value Date    RBC 4.07 12/10/2020     Lab Results   Component Value Date    HGB 12.3 12/10/2020     Lab Results   Component Value Date    HCT 37.9 12/10/2020     No components found for: MCT  Lab Results   Component Value Date    MCV 93 12/10/2020     Lab Results   Component Value Date    MCH 30.2 12/10/2020     Lab Results   Component Value Date    MCHC 32.5 12/10/2020     Lab Results   Component Value Date    RDW 11.8 12/10/2020     Lab Results   Component Value Date     12/10/2020     Recent Labs   Lab Test 12/10/20  0951 07/13/15  1036   CHOL 203* 181   HDL 75  74   * 81   TRIG 90 132   CHOLHDLRATIO  --  2.4       IMPRESSION:   Reason for surgery/procedure: revision right hip replacement  Diagnosis/reason for consult: preop    The proposed surgical procedure is considered INTERMEDIATE risk.    REVISED CARDIAC RISK INDEX  The patient has the following serious cardiovascular risks for perioperative complications such as (MI, PE, VFib and 3  AV Block):  No serious cardiac risks  INTERPRETATION: 0 risks: Class I (very low risk - 0.4% complication rate)    The patient has the following additional risks for perioperative complications:  No identified additional risks      ICD-10-CM    1. Preop general physical exam  Z01.818 EKG 12-lead complete w/read - Clinics   2. Hip pain, right  M25.551    3. Mixed hyperlipidemia  E78.2 Lipid panel reflex to direct LDL Fasting   4. Screening for diabetes mellitus  Z13.1 Basic metabolic panel  (Ca, Cl, CO2, Creat, Gluc, K, Na, BUN)   5. Anemia, unspecified type  D64.9 CBC with platelets       RECOMMENDATIONS:       --Patient is to take all scheduled medications on the day of surgery EXCEPT for modifications listed below.  --Will hold NSAIDs 7 days prior. Does not need to stop hydroxychloroquine and leflunomide prior to surgery.        APPROVAL GIVEN to proceed with proposed procedure, without further diagnostic evaluation     Roxie Rehman NP-C     Copy of this evaluation report is provided to requesting physician.    [facility] Preop Guidelines    Revised Cardiac Risk Index

## 2020-11-28 NOTE — PATIENT INSTRUCTIONS

## 2020-12-09 ENCOUNTER — TELEPHONE (OUTPATIENT)
Dept: FAMILY MEDICINE | Facility: OTHER | Age: 55
End: 2020-12-09

## 2020-12-09 ENCOUNTER — OFFICE VISIT (OUTPATIENT)
Dept: FAMILY MEDICINE | Facility: OTHER | Age: 55
End: 2020-12-09
Payer: COMMERCIAL

## 2020-12-09 VITALS
OXYGEN SATURATION: 98 % | HEIGHT: 66 IN | WEIGHT: 129.75 LBS | DIASTOLIC BLOOD PRESSURE: 60 MMHG | SYSTOLIC BLOOD PRESSURE: 88 MMHG | BODY MASS INDEX: 20.85 KG/M2 | HEART RATE: 71 BPM | TEMPERATURE: 98 F

## 2020-12-09 DIAGNOSIS — M25.551 HIP PAIN, RIGHT: ICD-10-CM

## 2020-12-09 DIAGNOSIS — D64.9 ANEMIA, UNSPECIFIED TYPE: ICD-10-CM

## 2020-12-09 DIAGNOSIS — Z13.1 SCREENING FOR DIABETES MELLITUS: ICD-10-CM

## 2020-12-09 DIAGNOSIS — Z01.818 PREOP GENERAL PHYSICAL EXAM: Primary | ICD-10-CM

## 2020-12-09 DIAGNOSIS — E78.2 MIXED HYPERLIPIDEMIA: ICD-10-CM

## 2020-12-09 PROCEDURE — 93000 ELECTROCARDIOGRAM COMPLETE: CPT | Performed by: STUDENT IN AN ORGANIZED HEALTH CARE EDUCATION/TRAINING PROGRAM

## 2020-12-09 PROCEDURE — 99214 OFFICE O/P EST MOD 30 MIN: CPT | Performed by: STUDENT IN AN ORGANIZED HEALTH CARE EDUCATION/TRAINING PROGRAM

## 2020-12-09 ASSESSMENT — MIFFLIN-ST. JEOR: SCORE: 1195.29

## 2020-12-10 DIAGNOSIS — E78.2 MIXED HYPERLIPIDEMIA: ICD-10-CM

## 2020-12-10 DIAGNOSIS — D64.9 ANEMIA, UNSPECIFIED TYPE: ICD-10-CM

## 2020-12-10 DIAGNOSIS — Z13.1 SCREENING FOR DIABETES MELLITUS: ICD-10-CM

## 2020-12-10 LAB
ANION GAP SERPL CALCULATED.3IONS-SCNC: 2 MMOL/L (ref 3–14)
BUN SERPL-MCNC: 10 MG/DL (ref 7–30)
CALCIUM SERPL-MCNC: 9.6 MG/DL (ref 8.5–10.1)
CHLORIDE SERPL-SCNC: 104 MMOL/L (ref 94–109)
CHOLEST SERPL-MCNC: 203 MG/DL
CO2 SERPL-SCNC: 31 MMOL/L (ref 20–32)
CREAT SERPL-MCNC: 0.7 MG/DL (ref 0.52–1.04)
ERYTHROCYTE [DISTWIDTH] IN BLOOD BY AUTOMATED COUNT: 11.8 % (ref 10–15)
GFR SERPL CREATININE-BSD FRML MDRD: >90 ML/MIN/{1.73_M2}
GLUCOSE SERPL-MCNC: 85 MG/DL (ref 70–99)
HCT VFR BLD AUTO: 37.9 % (ref 35–47)
HDLC SERPL-MCNC: 75 MG/DL
HGB BLD-MCNC: 12.3 G/DL (ref 11.7–15.7)
LDLC SERPL CALC-MCNC: 110 MG/DL
MCH RBC QN AUTO: 30.2 PG (ref 26.5–33)
MCHC RBC AUTO-ENTMCNC: 32.5 G/DL (ref 31.5–36.5)
MCV RBC AUTO: 93 FL (ref 78–100)
NONHDLC SERPL-MCNC: 128 MG/DL
PLATELET # BLD AUTO: 299 10E9/L (ref 150–450)
POTASSIUM SERPL-SCNC: 3.9 MMOL/L (ref 3.4–5.3)
RBC # BLD AUTO: 4.07 10E12/L (ref 3.8–5.2)
SODIUM SERPL-SCNC: 137 MMOL/L (ref 133–144)
TRIGL SERPL-MCNC: 90 MG/DL
WBC # BLD AUTO: 5.9 10E9/L (ref 4–11)

## 2020-12-10 PROCEDURE — 85027 COMPLETE CBC AUTOMATED: CPT | Performed by: STUDENT IN AN ORGANIZED HEALTH CARE EDUCATION/TRAINING PROGRAM

## 2020-12-10 PROCEDURE — 80061 LIPID PANEL: CPT | Performed by: STUDENT IN AN ORGANIZED HEALTH CARE EDUCATION/TRAINING PROGRAM

## 2020-12-10 PROCEDURE — 80048 BASIC METABOLIC PNL TOTAL CA: CPT | Performed by: STUDENT IN AN ORGANIZED HEALTH CARE EDUCATION/TRAINING PROGRAM

## 2020-12-10 PROCEDURE — 36415 COLL VENOUS BLD VENIPUNCTURE: CPT | Performed by: STUDENT IN AN ORGANIZED HEALTH CARE EDUCATION/TRAINING PROGRAM

## 2020-12-10 NOTE — TELEPHONE ENCOUNTER
Samantha- I printed preop- but all that printed off was preparing for your surgery.. not the actual pre op

## 2020-12-11 NOTE — TELEPHONE ENCOUNTER
"I looked at it and the note was marked as \"sensitive\" and must have been pressed on accident. I unclicked it so hopefully it will print now.  Samantha Rehman, CNP    "

## 2020-12-30 ENCOUNTER — TRANSFERRED RECORDS (OUTPATIENT)
Dept: HEALTH INFORMATION MANAGEMENT | Facility: CLINIC | Age: 55
End: 2020-12-30

## 2021-01-14 ENCOUNTER — HEALTH MAINTENANCE LETTER (OUTPATIENT)
Age: 56
End: 2021-01-14

## 2021-01-19 ENCOUNTER — OFFICE VISIT (OUTPATIENT)
Dept: FAMILY MEDICINE | Facility: OTHER | Age: 56
End: 2021-01-19
Payer: COMMERCIAL

## 2021-01-19 VITALS
HEART RATE: 86 BPM | TEMPERATURE: 98.5 F | WEIGHT: 132.5 LBS | DIASTOLIC BLOOD PRESSURE: 66 MMHG | SYSTOLIC BLOOD PRESSURE: 90 MMHG | BODY MASS INDEX: 21.29 KG/M2 | OXYGEN SATURATION: 99 % | HEIGHT: 66 IN

## 2021-01-19 DIAGNOSIS — K13.0 CHEILITIS: Primary | ICD-10-CM

## 2021-01-19 DIAGNOSIS — Z12.31 ENCOUNTER FOR SCREENING MAMMOGRAM FOR BREAST CANCER: ICD-10-CM

## 2021-01-19 DIAGNOSIS — D64.9 ANEMIA, UNSPECIFIED TYPE: ICD-10-CM

## 2021-01-19 LAB
ERYTHROCYTE [DISTWIDTH] IN BLOOD BY AUTOMATED COUNT: 12.8 % (ref 10–15)
HCT VFR BLD AUTO: 35.4 % (ref 35–47)
HGB BLD-MCNC: 11.2 G/DL (ref 11.7–15.7)
MCH RBC QN AUTO: 30.1 PG (ref 26.5–33)
MCHC RBC AUTO-ENTMCNC: 31.6 G/DL (ref 31.5–36.5)
MCV RBC AUTO: 95 FL (ref 78–100)
PLATELET # BLD AUTO: 302 10E9/L (ref 150–450)
RBC # BLD AUTO: 3.72 10E12/L (ref 3.8–5.2)
WBC # BLD AUTO: 5.7 10E9/L (ref 4–11)

## 2021-01-19 PROCEDURE — 99213 OFFICE O/P EST LOW 20 MIN: CPT | Performed by: FAMILY MEDICINE

## 2021-01-19 PROCEDURE — 85027 COMPLETE CBC AUTOMATED: CPT | Performed by: FAMILY MEDICINE

## 2021-01-19 PROCEDURE — 36415 COLL VENOUS BLD VENIPUNCTURE: CPT | Performed by: FAMILY MEDICINE

## 2021-01-19 RX ORDER — FERROUS SULFATE 325(65) MG
325 TABLET ORAL DAILY
COMMUNITY
Start: 2020-12-18 | End: 2022-07-18

## 2021-01-19 RX ORDER — HYDROXYCHLOROQUINE SULFATE 200 MG/1
200 TABLET, FILM COATED ORAL
COMMUNITY
End: 2021-04-08

## 2021-01-19 RX ORDER — ZINC SULFATE 50(220)MG
1 CAPSULE ORAL DAILY
COMMUNITY

## 2021-01-19 RX ORDER — PHENOL 1.4 %
2 AEROSOL, SPRAY (ML) MUCOUS MEMBRANE DAILY
COMMUNITY

## 2021-01-19 RX ORDER — NYSTATIN AND TRIAMCINOLONE ACETONIDE 100000; 1 [USP'U]/G; MG/G
CREAM TOPICAL 2 TIMES DAILY
Qty: 15 G | Refills: 0 | Status: SHIPPED | OUTPATIENT
Start: 2021-01-19 | End: 2021-01-26

## 2021-01-19 RX ORDER — ASPIRIN 325 MG
325 TABLET ORAL DAILY
COMMUNITY
Start: 2020-12-17

## 2021-01-19 ASSESSMENT — MIFFLIN-ST. JEOR: SCORE: 1210.02

## 2021-01-19 NOTE — PROGRESS NOTES
"  Subjective     Zena Quintana is a 55 year old female who presents to clinic today for the following health issues accompanied by her :    History of Present Illness       She eats 2-3 servings of fruits and vegetables daily.She consumes 0 sweetened beverage(s) daily.She exercises with enough effort to increase her heart rate 10 to 19 minutes per day.    She is taking medications regularly.     Sore and chapped on the lower lip for the past 3 weeks, cracks. Has tried  Different emolients with no relief. Would like some prescription to help symptoms. She does have history Sjogrens.    Review of Systems   Constitutional, HEENT, cardiovascular, pulmonary, GI, , musculoskeletal, neuro, skin, endocrine and psych systems are negative, except as otherwise noted.      Objective    BP 90/66   Pulse 86   Temp 98.5  F (36.9  C) (Temporal)   Ht 1.68 m (5' 6.14\")   Wt 60.1 kg (132 lb 8 oz)   LMP 07/17/2017 (Approximate)   SpO2 99%   BMI 21.29 kg/m    Body mass index is 21.29 kg/m .  Physical Exam   GENERAL: healthy, alert and no distress  NECK: no adenopathy, no asymmetry, masses, or scars and thyroid normal to palpation  RESP: lungs clear to auscultation - no rales, rhonchi or wheezes  SKIN: skin around the lower lip is inflammed , erythematous and chapped consistent with chelitis.    1. Cheilitis  Trial topical antifungal and steroid as prescribed  Discussed home care  Reportable signs and symptoms discussed  RTC if symptoms persist or fail to improve    - nystatin-triamcinolone (MYCOLOG II) 324123-2.1 UNIT/GM-% external cream; Apply topically 2 times daily for 7 days  Dispense: 15 g; Refill: 0    2. Encounter for screening mammogram for breast cancer  - *MA Screening Digital Bilateral; Future    3. Anemia, unspecified type  Recheck blood counts due to history of anemia. Follow results closely and treat accordingly  - CBC with platelets    "

## 2021-01-20 ENCOUNTER — TRANSFERRED RECORDS (OUTPATIENT)
Dept: HEALTH INFORMATION MANAGEMENT | Facility: CLINIC | Age: 56
End: 2021-01-20

## 2021-02-24 DIAGNOSIS — M05.79 RHEUMATOID ARTHRITIS INVOLVING MULTIPLE SITES WITH POSITIVE RHEUMATOID FACTOR (H): ICD-10-CM

## 2021-02-24 DIAGNOSIS — Z79.899 HIGH RISK MEDICATION USE: ICD-10-CM

## 2021-02-24 LAB
ALBUMIN SERPL-MCNC: 4.1 G/DL (ref 3.4–5)
ALP SERPL-CCNC: 57 U/L (ref 40–150)
ALT SERPL W P-5'-P-CCNC: 31 U/L (ref 0–50)
AST SERPL W P-5'-P-CCNC: 76 U/L (ref 0–45)
BASOPHILS # BLD AUTO: 0 10E9/L (ref 0–0.2)
BASOPHILS NFR BLD AUTO: 0.6 %
BILIRUB DIRECT SERPL-MCNC: <0.1 MG/DL (ref 0–0.2)
BILIRUB SERPL-MCNC: 0.2 MG/DL (ref 0.2–1.3)
CREAT SERPL-MCNC: 0.72 MG/DL (ref 0.52–1.04)
DIFFERENTIAL METHOD BLD: ABNORMAL
EOSINOPHIL # BLD AUTO: 0.2 10E9/L (ref 0–0.7)
EOSINOPHIL NFR BLD AUTO: 3.6 %
ERYTHROCYTE [DISTWIDTH] IN BLOOD BY AUTOMATED COUNT: 12.4 % (ref 10–15)
GFR SERPL CREATININE-BSD FRML MDRD: >90 ML/MIN/{1.73_M2}
HCT VFR BLD AUTO: 36.5 % (ref 35–47)
HGB BLD-MCNC: 11.6 G/DL (ref 11.7–15.7)
LYMPHOCYTES # BLD AUTO: 1.4 10E9/L (ref 0.8–5.3)
LYMPHOCYTES NFR BLD AUTO: 26.4 %
MCH RBC QN AUTO: 29.8 PG (ref 26.5–33)
MCHC RBC AUTO-ENTMCNC: 31.8 G/DL (ref 31.5–36.5)
MCV RBC AUTO: 94 FL (ref 78–100)
MONOCYTES # BLD AUTO: 0.9 10E9/L (ref 0–1.3)
MONOCYTES NFR BLD AUTO: 16.8 %
NEUTROPHILS # BLD AUTO: 2.8 10E9/L (ref 1.6–8.3)
NEUTROPHILS NFR BLD AUTO: 52.6 %
PLATELET # BLD AUTO: 309 10E9/L (ref 150–450)
PROT SERPL-MCNC: 7.2 G/DL (ref 6.8–8.8)
RBC # BLD AUTO: 3.89 10E12/L (ref 3.8–5.2)
WBC # BLD AUTO: 5.3 10E9/L (ref 4–11)

## 2021-02-24 PROCEDURE — 82565 ASSAY OF CREATININE: CPT | Performed by: INTERNAL MEDICINE

## 2021-02-24 PROCEDURE — 80076 HEPATIC FUNCTION PANEL: CPT | Performed by: INTERNAL MEDICINE

## 2021-02-24 PROCEDURE — 85025 COMPLETE CBC W/AUTO DIFF WBC: CPT | Performed by: INTERNAL MEDICINE

## 2021-02-24 PROCEDURE — 36415 COLL VENOUS BLD VENIPUNCTURE: CPT | Performed by: INTERNAL MEDICINE

## 2021-03-10 ENCOUNTER — ANCILLARY PROCEDURE (OUTPATIENT)
Dept: MAMMOGRAPHY | Facility: OTHER | Age: 56
End: 2021-03-10
Attending: FAMILY MEDICINE
Payer: COMMERCIAL

## 2021-03-10 DIAGNOSIS — Z12.31 ENCOUNTER FOR SCREENING MAMMOGRAM FOR BREAST CANCER: ICD-10-CM

## 2021-03-10 PROCEDURE — 77067 SCR MAMMO BI INCL CAD: CPT | Mod: TC | Performed by: RADIOLOGY

## 2021-03-10 PROCEDURE — 77063 BREAST TOMOSYNTHESIS BI: CPT | Mod: TC | Performed by: RADIOLOGY

## 2021-03-16 ENCOUNTER — MYC MEDICAL ADVICE (OUTPATIENT)
Dept: RHEUMATOLOGY | Facility: CLINIC | Age: 56
End: 2021-03-16

## 2021-04-06 DIAGNOSIS — R79.89 LFT ELEVATION: ICD-10-CM

## 2021-04-06 DIAGNOSIS — Z79.899 HIGH RISK MEDICATION USE: ICD-10-CM

## 2021-04-06 LAB
ALT SERPL W P-5'-P-CCNC: 23 U/L (ref 0–50)
AST SERPL W P-5'-P-CCNC: 21 U/L (ref 0–45)

## 2021-04-06 PROCEDURE — 36415 COLL VENOUS BLD VENIPUNCTURE: CPT | Performed by: INTERNAL MEDICINE

## 2021-04-06 PROCEDURE — 84460 ALANINE AMINO (ALT) (SGPT): CPT | Performed by: INTERNAL MEDICINE

## 2021-04-06 PROCEDURE — 84450 TRANSFERASE (AST) (SGOT): CPT | Performed by: INTERNAL MEDICINE

## 2021-04-08 ENCOUNTER — OFFICE VISIT (OUTPATIENT)
Dept: RHEUMATOLOGY | Facility: CLINIC | Age: 56
End: 2021-04-08
Payer: COMMERCIAL

## 2021-04-08 VITALS
DIASTOLIC BLOOD PRESSURE: 67 MMHG | TEMPERATURE: 98 F | WEIGHT: 133.8 LBS | BODY MASS INDEX: 21.5 KG/M2 | SYSTOLIC BLOOD PRESSURE: 102 MMHG | HEART RATE: 68 BPM | OXYGEN SATURATION: 98 % | HEIGHT: 66 IN

## 2021-04-08 DIAGNOSIS — M35.01 SJOGREN'S SYNDROME WITH KERATOCONJUNCTIVITIS SICCA (H): ICD-10-CM

## 2021-04-08 DIAGNOSIS — M05.79 RHEUMATOID ARTHRITIS INVOLVING MULTIPLE SITES WITH POSITIVE RHEUMATOID FACTOR (H): Primary | ICD-10-CM

## 2021-04-08 DIAGNOSIS — Z79.899 HIGH RISK MEDICATION USE: ICD-10-CM

## 2021-04-08 DIAGNOSIS — M80.00XA OSTEOPOROSIS WITH CURRENT PATHOLOGICAL FRACTURE, UNSPECIFIED OSTEOPOROSIS TYPE, INITIAL ENCOUNTER: ICD-10-CM

## 2021-04-08 DIAGNOSIS — Z11.59 SCREENING FOR VIRAL DISEASE: ICD-10-CM

## 2021-04-08 PROCEDURE — 99214 OFFICE O/P EST MOD 30 MIN: CPT | Performed by: INTERNAL MEDICINE

## 2021-04-08 RX ORDER — LEFLUNOMIDE 10 MG/1
10 TABLET ORAL DAILY
Qty: 90 TABLET | Refills: 2 | Status: SHIPPED | OUTPATIENT
Start: 2021-04-08 | End: 2021-09-09

## 2021-04-08 RX ORDER — HYDROXYCHLOROQUINE SULFATE 200 MG/1
TABLET, FILM COATED ORAL
Qty: 135 TABLET | Refills: 1 | Status: SHIPPED | OUTPATIENT
Start: 2021-04-08 | End: 2021-09-09

## 2021-04-08 RX ORDER — HEPARIN SODIUM,PORCINE 10 UNIT/ML
5 VIAL (ML) INTRAVENOUS
Status: CANCELLED | OUTPATIENT
Start: 2021-06-16

## 2021-04-08 RX ORDER — HEPARIN SODIUM (PORCINE) LOCK FLUSH IV SOLN 100 UNIT/ML 100 UNIT/ML
5 SOLUTION INTRAVENOUS
Status: CANCELLED | OUTPATIENT
Start: 2021-06-16

## 2021-04-08 RX ORDER — ZOLEDRONIC ACID 5 MG/100ML
5 INJECTION, SOLUTION INTRAVENOUS ONCE
Status: CANCELLED
Start: 2021-06-16 | End: 2021-06-16

## 2021-04-08 ASSESSMENT — MIFFLIN-ST. JEOR: SCORE: 1215.88

## 2021-04-08 NOTE — NURSING NOTE
RAPID3 (0-30) Cumulative Score  1.3          RAPID3 Weighted Score (divide #4 by 3 and that is the weighted score)  0.4

## 2021-04-08 NOTE — PROGRESS NOTES
Rheumatology Clinic Visit      Zena Quintana MRN# [id number]   YOB: 1965 Age: 55 year old      Date of visit: 4/08/21   PCP: Dr. Chana Armenta    Chief Complaint   Patient presents with:  Arthritis: RA, patient is doing good    Assessment and Plan     1. Seropositive nonerosive rheumatoid arthritis (RF low positive, CCP negative): Reportedly at the time of diagnosis she had synovitis in a symmetric distribution that was steroid responsive. Previously on MTX (effective, anemia, LFT elevation). Currently on hydroxychloroquine 300 mg daily on average (started in approximately 2007) and leflunomide 10mg daily.  Doing well with regard to RA.  Chronic illness, stable.    - Continue leflunomide 10mg daily   - Continue hydroxychloroquine 200mg daily with an additional 200mg every other day (last eye exam was on 11/9/2020 by Dr. Mirza)   - Labs in early June: CBC, creatinine, hepatic panel  - Labs in early September: CBC, creatinine, hepatic panel, ESR, CRP    High risk medication requiring intensive toxicity monitoring at least quarterly: labs ordered include CBC, Creatinine, Hepatic panel to monitor for cytopenia and hepatotoxicity; checking creatinine as it affects clearance of medication.      2. Osteoporosis with current pathological fracture, unspecified osteoporosis type, initial encounter: s/p hip fx from ground level fall and is now status-post right HUY. Note that her mother has a hx of vertebral compression fx; Ms. Quintana is post-menopausal. 1/13/2020 DEXA showed osteoporosis; L-spine T score of -2.6, Left femoral neck T-score of -2.4. Alendronate was started 1/2020 but stopped in early April 2020 by Ms. Quintana due to hair loss and GERD.  She then received Reclast on 6/16/2020 and it was well-tolerated.  Plan to continue Reclast on a yearly basis.  Chronic illness, stable.    - Continue reclast 5mg IV once yearly; next infusion to on or shortly after 6/16/2021; phone number provided so that  she may call to schedule the infusion appointment  - Continue calcium 1200mg daily  - Continue vitamin D 1000 IU daily  - Labs in early June: Creatinine, calcium, vitamin D  - Lab: covid19 PCR to be done prior to reclast infusion    3. Bilateral carpal tunnel syndrome: doesn't tolerate NSAIDs per patient.  Using wrist splints bilaterally with significant improvement, and was intermittent symptoms.  Symptoms then worsened and she had the nerve conduction study performed, showing bilateral carpal tunnel syndrome.  She was then seen by orthopedics and had surgery on the right with resolution of symptoms with did not have surgery on the left; left carpal tunnel syndrome symptoms have improved over time and she is not symptomatic today.     4. 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 S/p surgery.  Doing okay at this time.    5. Sjogren's syndrome: NJ by EIA negative but positive by IF, SSA and SSB negative, and minor salivary gland biopsy negative. She is currently following with ophthalmology who has given her eyedrops that have been beneficial.  Dry mouth is currently treated with frequent sips of water; she has good oral hygiene, follows with a dentist regularly, and does not have frequent cavities. Pilocarpine was previously Rx'd but never used and has been removed from her medication list previously.    6. History of oral sores / family history of lupus / polyarthritis / recurrent sinus infections: She is not having oral sores or recurrent sinus infections for several years now. Retrospectively, there would be concern for potential ANCA associated sinus issues and this could be checked if she had recurrent sinusitis again. Oral sores have improved since she started Plaquenil, that argues it could be connective tissue disease related. NJ was  negative by EIA, but complement was on the low end of the normal range so NJ was rechecked and positive; further testing was negative.    # At this time the COVID-19 vaccine (currently available from Pfizer, Moderna, and Fanergies) is recommended based on our knowledge of this vaccine and vaccines in the past.  Based on our current knowledge there is no preference for one COVID-19 vaccine over another. Note that there is no data currently available to specifically establish safety and efficacy for these vaccines in immunocompromised people.    Total minutes spent in evaluation with patient, documentation, , and review of pertinent studies and chart notes: 20     Ms. Quintana verbalized agreement with and understanding of the rational for the diagnosis and treatment plan.  All questions were answered to best of my ability and the patient's satisfaction. Ms. Quintana was advised to contact the clinic with any questions that may arise after the clinic visit.      Thank you for involving me in the care of the patient    Return to clinic: 6 months      HPI   Zena Quintana is a 55 year old female with medical history significant for iron deficiency anemia, nephrolithiasis, hyperlipidemia, xerostomia, dry eyes, status-post right HUY, and rheumatoid arthritis who presents for follow-up of rheumatoid arthritis.    Today, Ms. Quintana reports that she is doing well with regard to rheumatoid arthritis.  Tolerating medications well.  Had her right carpal tunnel operated on but did not do her left yet because then she broke her hip and had to have 2 surgeries for that.  Left hand numbness and tingling has mostly resolved though.  Due for Reclast this summer.  Taking hydroxychloroquine 300 mg daily on average and leflunomide 10 mg daily.  Morning stiffness for less than 10 minutes.  No gelling phenomenon.    Denies fevers, chills, nausea, vomiting, constipation, diarrhea. No abdominal pain. No chest  pain/pressure, palpitations, or shortness of breath. No neck pain. No rash. No LE swelling.  No photosensitivity or photophobia.  No sicca symptoms.     Tobacco: None  EtOH: Occasional; no more than 2 drinks per day when she does drink  Drugs: None  Occupation: Works at a school    ROS   12 point review of system was completed and negative except as noted in the HPI     Active Problem List     Patient Active Problem List   Diagnosis     Internal derangement of knee     Iron deficiency anemia     Allergic rhinitis     Stomatitis and mucositis (ulcerative)     Pure hypercholesterolemia     Arthropathy     HYPERLIPIDEMIA LDL GOAL <160     High risk medication use     RA (rheumatoid arthritis) (H)     Endometriosis     Ovarian cyst     Disturbance of salivary secretion (XEROSTOMIA)     Impingement syndrome of right shoulder     Sjogren's syndrome (H)     Palpitations     Cervical high risk HPV (human papillomavirus) test positive     Osteoporosis with current pathological fracture, unspecified osteoporosis type, initial encounter     Oral bisphosphonates causing adverse effect in therapeutic use, initial encounter     Past Medical History     Past Medical History:   Diagnosis Date     Allergic rhinitis, cause unspecified     Allergic rhinitis     Cervical high risk HPV (human papillomavirus) test positive 03/08/2019    see problem list     Endometriosis, site unspecified     Endometriosis     Female infertility of other specified origin      Iron deficiency anemia, unspecified     Anemia, iron def.     RA (rheumatoid arthritis) (H)      Sjogren's syndrome (H)      Stomatitis and mucositis (ulcerative)     chronic - non-specific     Past Surgical History     Past Surgical History:   Procedure Laterality Date     COLONOSCOPY WITH CO2 INSUFFLATION N/A 8/7/2017    Procedure: COLONOSCOPY WITH CO2 INSUFFLATION;  Colonoscopy, Screen for colon cancer.  BMI  22.17  [facility] [place] [phone];  Surgeon: Berny Pedro MD;   Location: [facility] OR     HC COLONOSCOPY W/WO BRUSH/WASH  5/25/2005     HC CREATE EARDRUM OPENING,GEN ANESTH      P.E. Tubes     HC KNEE SCOPE, DIAGNOSTIC       left knee, ruptured ACL     HC REMOVE TONSILS/ADENOIDS,12+ Y/O      T & A 11+y.o.     ORTHOPEDIC SURGERY      rotator cuff repair, left     ZZC NONSPECIFIC PROCEDURE  1995, 1997    laparoscopy for endometrial fulguration     Allergy     Allergies   Allergen Reactions     Alendronic Acid Other (See Comments)     Hair loss     Penicillins Hives     hives     Current Medication List     Current Outpatient Medications   Medication Sig     aspirin (ASA) 325 MG tablet Take 325 mg by mouth daily     bimatoprost (LATISSE) 0.03 % SOLN Externally apply topically At Bedtime     calcium carbonate (OS-VALERIA) 600 MG tablet Take 2 tablets by mouth daily     ferrous sulfate (FEROSUL) 325 (65 Fe) MG tablet Take 325 mg by mouth daily     hydroxychloroquine (PLAQUENIL) 200 MG tablet Take 200 mg by mouth every 48 hours     ibuprofen (ADVIL) 200 MG capsule Take 200 mg by mouth as needed for fever     leflunomide (ARAVA) 10 MG tablet Take 1 tablet (10 mg) by mouth daily     MULTIPLE VITAMINS/WOMENS OR None Entered     zinc sulfate (ZINCATE) 220 (50 Zn) MG capsule Take 1 capsule by mouth daily     acetaminophen (TYLENOL) 500 MG tablet Take 1-2 tablets by mouth every 6 hours as needed for pain. (Patient not taking: Reported on 1/19/2021)     albuterol (PROAIR HFA/PROVENTIL HFA/VENTOLIN HFA) 108 (90 Base) MCG/ACT inhaler Inhale 2 puffs into the lungs every 4 hours as needed for shortness of breath / dyspnea or wheezing (Patient not taking: Reported on 1/19/2021)     Fexofenadine HCl (MUCINEX ALLERGY PO)      fluticasone (FLONASE) 50 MCG/ACT spray Spray 2 sprays into both nostrils daily     loratadine 10 MG capsule Take 10 mg by mouth as needed for allergies     No current facility-administered medications for this visit.          Social History   See HPI    Family History     Family History  "  Problem Relation Age of Onset     Osteoporosis Mother      Arthritis Mother         lupus     Connective Tissue Disorder Mother         Lupus     Cataracts Mother      Macular Degeneration Mother      Celiac Disease Mother      Heart Disease Father         aortic aneurysm     Gastrointestinal Disease Father         abdominal anurysm     Hypertension Father      Cataracts Father      Gastrointestinal Disease Sister         IBS, colon problems     Alzheimer Disease Maternal Grandmother      Osteoporosis Maternal Grandmother      Cerebrovascular Disease Maternal Grandfather      Cancer Paternal Grandmother      Cerebrovascular Disease Paternal Grandfather      Circulatory Sister         carotid stenosis     Cancer - colorectal Maternal Uncle      Cancer - colorectal Maternal Uncle      Glaucoma No family hx of      Mother: Lupus    Physical Exam     Temp Readings from Last 3 Encounters:   04/08/21 98  F (36.7  C) (Oral)   01/19/21 98.5  F (36.9  C) (Temporal)   12/09/20 98  F (36.7  C) (Temporal)     BP Readings from Last 5 Encounters:   04/08/21 102/67   01/19/21 90/66   12/09/20 (!) 88/60   06/16/20 (!) 84/54   06/09/20 107/71     Pulse Readings from Last 1 Encounters:   04/08/21 68     Resp Readings from Last 1 Encounters:   06/16/20 16     Estimated body mass index is 21.5 kg/m  as calculated from the following:    Height as of this encounter: 1.68 m (5' 6.14\").    Weight as of this encounter: 60.7 kg (133 lb 12.8 oz).      GEN: NAD. Healthy appearing adult.   HEENT:  Anicteric, noninjected sclera. No obvious external lesions of the ear and nose. Hearing intact.  PULM: No increased work of breathing  MSK: MCPs, PIPs, DIPs without swelling or tenderness to palpation.  Wrists without swelling or tenderness to palpation.  Elbows and shoulders without swelling or tenderness to palpation.  Shoulders with normal range of motion.  Knees, ankles, and MTPs without swelling or tenderness to palpation.    SKIN: No rash or " jaundice seen  PSYCH: Alert. Appropriate.        Labs / Imaging (select studies)     RF/CCP  Recent Labs   Lab Test 08/29/13  1413   CCPABY <20 Interpretation:  Negative     CBC  Recent Labs   Lab Test 06/09/20  1000 03/09/20  1513 02/10/20  1503   WBC 4.0 4.5 5.0   RBC 3.83 4.07 4.07   HGB 11.5* 12.2 12.3   HCT 34.8* 37.9 38.6   MCV 91 93 95   RDW 12.4 12.6 12.7    283 325   MCH 30.0 30.0 30.2   MCHC 33.0 32.2 31.9   NEUTROPHIL 47.8 53.8 54.5   LYMPH 33.2 27.7 26.3   MONOCYTE 15.8 14.8 16.0   EOSINOPHIL 2.2 2.4 2.6   BASOPHIL 1.0 1.3 0.6   ANEU 1.9 2.4 2.7   ALYM 1.3 1.3 1.3   ELIGIO 0.6 0.7 0.8   AEOS 0.1 0.1 0.1   ABAS 0.0 0.1 0.0     CMP  Recent Labs   Lab Test 06/16/20  1507 06/09/20  1000 03/09/20  1513 02/10/20  1503 01/08/20  1548  04/12/19  1509  06/20/17  1437  05/24/16  0940  04/07/15  1322   NA  --   --   --   --   --   --  139  --  141  --  137  --  139   POTASSIUM  --   --   --   --   --   --  4.3  --  3.8  --  4.4  --  3.9   CHLORIDE  --   --   --   --   --   --  103  --  105  --  105  --  104   CO2  --   --   --   --   --   --  29  --  29  --  29  --  29   ANIONGAP  --   --   --   --   --   --  7  --  7  --  3  --  6   GLC  --   --   --   --   --   --   --   --  84  --  85  --  88   BUN  --   --   --   --   --   --   --   --  22  --  18  --  16   CR 0.98 0.90 0.98 0.83 0.92   < > 0.86   < > 0.84   < > 0.92  --  0.96   GFRESTIMATED 65 71 65 79 69   < > 76   < > 71   < > 64  --  61   GFRESTBLACK 75 83 75 >90 80   < > 88   < > 85   < > 77  --  74   VALERIA 8.8  --   --   --  9.4  --  8.7  --  9.1  --  9.2  --  9.1   BILITOTAL  --  0.4 0.3 0.3 0.3   < >  --    < > 0.2   < > 0.4   < > 0.3   ALBUMIN  --  3.8 4.5 4.3 4.1   < >  --    < > 4.0   < > 4.4   < > 3.9   PROTTOTAL  --  6.9 7.5 7.6 7.0   < >  --    < > 6.8   < > 7.3   < > 7.3   ALKPHOS  --  39* 50 55 58   < >  --    < > 40   < > 42   < > 34*   AST  --  25 28 24 23   < >  --    < > 21   < > 30   < > 34   ALT  --  21 27 25 27   < >  --    < > 21   <  > 24   < > 29    < > = values in this interval not displayed.     Calcium/VitaminD  Recent Labs   Lab Test 06/16/20  1507 01/08/20  1548 04/12/19  1509  07/13/15  1036   VALERIA 8.8 9.4 8.7   < >  --    VITDT  --  38  --   --  48    < > = values in this interval not displayed.     ESR/CRP  Recent Labs   Lab Test 01/08/20  1548 10/02/19  1504 09/04/19  1552   SED 9 8 7   CRP <2.9 <2.9 <2.9     Lipid Panel  Recent Labs   Lab Test 07/13/15  1036   CHOL 181   TRIG 132   HDL 74   LDL 81   VLDL 26   CHOLHDLRATIO 2.4     Hepatitis B  Recent Labs   Lab Test 09/04/19  1552   HBCAB Nonreactive   HEPBANG Nonreactive     Hepatitis C  Recent Labs   Lab Test 06/20/17  1437   HCVAB Nonreactive   Assay performance characteristics have not been established for newborns,   infants, and children       Immunization History     Immunization History   Administered Date(s) Administered     FLU 6-35 months 10/12/2019     Influenza (H1N1) 12/03/2009     Influenza (IIV3) PF 11/17/2005, 11/17/2006, 10/25/2010, 01/09/2013     Influenza Vaccine IM > 6 months Valent IIV4 10/10/2013, 09/14/2016, 09/13/2017, 01/10/2019     Pneumo Conj 13-V (2010&after) 09/13/2017     Pneumococcal 23 valent 09/12/2018     TD (ADULT, 7+) 02/01/2005     TDAP Vaccine (Adacel) 01/09/2013          Chart documentation done in part with Dragon Voice recognition Software. Although reviewed after completion, some word and grammatical error may remain.    Rome Hardy MD

## 2021-04-08 NOTE — PATIENT INSTRUCTIONS
RHEUMATOLOGY    Dr. Rome Hardy    [facility]  [address] [place] [address]    Our new phone number for Rheumatology is [phone], this number will be able to help you schedule appointments for Dr. Hardy or if you have any message you would like sent to us.    Thank you for choosing [facility]!    Genesis Medley CMA Rheumatology        --------------------    Please call to schedule the zoledronic acid (reclast) infusion to be on 6/16/2021 or shortly thereafter  [facility]  [id number] [place] [address]  [phone]    Please call to schedule labs to be in early June, about 1 week before the zoledronic acid infusion.

## 2021-05-27 ENCOUNTER — PATIENT OUTREACH (OUTPATIENT)
Dept: FAMILY MEDICINE | Facility: OTHER | Age: 56
End: 2021-05-27

## 2021-05-27 DIAGNOSIS — R87.810 CERVICAL HIGH RISK HPV (HUMAN PAPILLOMAVIRUS) TEST POSITIVE: ICD-10-CM

## 2021-06-02 DIAGNOSIS — M05.79 RHEUMATOID ARTHRITIS INVOLVING MULTIPLE SITES WITH POSITIVE RHEUMATOID FACTOR (H): ICD-10-CM

## 2021-06-02 DIAGNOSIS — M80.00XA OSTEOPOROSIS WITH CURRENT PATHOLOGICAL FRACTURE, UNSPECIFIED OSTEOPOROSIS TYPE, INITIAL ENCOUNTER: ICD-10-CM

## 2021-06-02 DIAGNOSIS — Z79.899 HIGH RISK MEDICATION USE: ICD-10-CM

## 2021-06-02 LAB
ALBUMIN SERPL-MCNC: 4.4 G/DL (ref 3.4–5)
ALP SERPL-CCNC: 46 U/L (ref 40–150)
ALT SERPL W P-5'-P-CCNC: 22 U/L (ref 0–50)
AST SERPL W P-5'-P-CCNC: 27 U/L (ref 0–45)
BASOPHILS # BLD AUTO: 0 10E9/L (ref 0–0.2)
BASOPHILS NFR BLD AUTO: 0.6 %
BILIRUB DIRECT SERPL-MCNC: <0.1 MG/DL (ref 0–0.2)
BILIRUB SERPL-MCNC: 0.3 MG/DL (ref 0.2–1.3)
CALCIUM SERPL-MCNC: 9.5 MG/DL (ref 8.5–10.1)
CREAT SERPL-MCNC: 0.9 MG/DL (ref 0.52–1.04)
CRP SERPL-MCNC: <2.9 MG/L (ref 0–8)
DIFFERENTIAL METHOD BLD: NORMAL
EOSINOPHIL # BLD AUTO: 0.1 10E9/L (ref 0–0.7)
EOSINOPHIL NFR BLD AUTO: 3 %
ERYTHROCYTE [DISTWIDTH] IN BLOOD BY AUTOMATED COUNT: 13 % (ref 10–15)
ERYTHROCYTE [SEDIMENTATION RATE] IN BLOOD BY WESTERGREN METHOD: 8 MM/H (ref 0–30)
GFR SERPL CREATININE-BSD FRML MDRD: 71 ML/MIN/{1.73_M2}
HCT VFR BLD AUTO: 36.4 % (ref 35–47)
HGB BLD-MCNC: 11.7 G/DL (ref 11.7–15.7)
LYMPHOCYTES # BLD AUTO: 1.2 10E9/L (ref 0.8–5.3)
LYMPHOCYTES NFR BLD AUTO: 24.5 %
MCH RBC QN AUTO: 30 PG (ref 26.5–33)
MCHC RBC AUTO-ENTMCNC: 32.1 G/DL (ref 31.5–36.5)
MCV RBC AUTO: 93 FL (ref 78–100)
MONOCYTES # BLD AUTO: 0.8 10E9/L (ref 0–1.3)
MONOCYTES NFR BLD AUTO: 16.7 %
NEUTROPHILS # BLD AUTO: 2.6 10E9/L (ref 1.6–8.3)
NEUTROPHILS NFR BLD AUTO: 55.2 %
PLATELET # BLD AUTO: 308 10E9/L (ref 150–450)
PROT SERPL-MCNC: 7.4 G/DL (ref 6.8–8.8)
RBC # BLD AUTO: 3.9 10E12/L (ref 3.8–5.2)
WBC # BLD AUTO: 4.7 10E9/L (ref 4–11)

## 2021-06-02 PROCEDURE — 86140 C-REACTIVE PROTEIN: CPT | Performed by: INTERNAL MEDICINE

## 2021-06-02 PROCEDURE — 85025 COMPLETE CBC W/AUTO DIFF WBC: CPT | Performed by: INTERNAL MEDICINE

## 2021-06-02 PROCEDURE — 82565 ASSAY OF CREATININE: CPT | Performed by: INTERNAL MEDICINE

## 2021-06-02 PROCEDURE — 36415 COLL VENOUS BLD VENIPUNCTURE: CPT | Performed by: INTERNAL MEDICINE

## 2021-06-02 PROCEDURE — 82310 ASSAY OF CALCIUM: CPT | Performed by: INTERNAL MEDICINE

## 2021-06-02 PROCEDURE — 82306 VITAMIN D 25 HYDROXY: CPT | Performed by: INTERNAL MEDICINE

## 2021-06-02 PROCEDURE — 85652 RBC SED RATE AUTOMATED: CPT | Performed by: INTERNAL MEDICINE

## 2021-06-02 PROCEDURE — 80076 HEPATIC FUNCTION PANEL: CPT | Performed by: INTERNAL MEDICINE

## 2021-06-03 LAB — DEPRECATED CALCIDIOL+CALCIFEROL SERPL-MC: 61 UG/L (ref 20–75)

## 2021-06-13 DIAGNOSIS — Z11.59 SCREENING FOR VIRAL DISEASE: ICD-10-CM

## 2021-06-13 PROCEDURE — U0003 INFECTIOUS AGENT DETECTION BY NUCLEIC ACID (DNA OR RNA); SEVERE ACUTE RESPIRATORY SYNDROME CORONAVIRUS 2 (SARS-COV-2) (CORONAVIRUS DISEASE [COVID-19]), AMPLIFIED PROBE TECHNIQUE, MAKING USE OF HIGH THROUGHPUT TECHNOLOGIES AS DESCRIBED BY CMS-2020-01-R: HCPCS | Performed by: INTERNAL MEDICINE

## 2021-06-13 PROCEDURE — U0005 INFEC AGEN DETEC AMPLI PROBE: HCPCS | Performed by: INTERNAL MEDICINE

## 2021-06-14 ENCOUNTER — OFFICE VISIT (OUTPATIENT)
Dept: FAMILY MEDICINE | Facility: OTHER | Age: 56
End: 2021-06-14
Payer: COMMERCIAL

## 2021-06-14 VITALS
WEIGHT: 135 LBS | TEMPERATURE: 97.8 F | OXYGEN SATURATION: 100 % | BODY MASS INDEX: 21.7 KG/M2 | DIASTOLIC BLOOD PRESSURE: 64 MMHG | RESPIRATION RATE: 12 BRPM | SYSTOLIC BLOOD PRESSURE: 98 MMHG | HEART RATE: 68 BPM

## 2021-06-14 DIAGNOSIS — Z12.4 SCREENING FOR MALIGNANT NEOPLASM OF CERVIX: Primary | ICD-10-CM

## 2021-06-14 LAB
LABORATORY COMMENT REPORT: NORMAL
SARS-COV-2 RNA RESP QL NAA+PROBE: NEGATIVE
SARS-COV-2 RNA RESP QL NAA+PROBE: NORMAL
SPECIMEN SOURCE: NORMAL
SPECIMEN SOURCE: NORMAL

## 2021-06-14 PROCEDURE — 87624 HPV HI-RISK TYP POOLED RSLT: CPT | Performed by: FAMILY MEDICINE

## 2021-06-14 PROCEDURE — 99213 OFFICE O/P EST LOW 20 MIN: CPT | Performed by: FAMILY MEDICINE

## 2021-06-14 PROCEDURE — G0145 SCR C/V CYTO,THINLAYER,RESCR: HCPCS | Performed by: FAMILY MEDICINE

## 2021-06-14 NOTE — PROGRESS NOTES
Assessment & Plan   Problem List Items Addressed This Visit     None      Visit Diagnoses     Screening for malignant neoplasm of cervix    -  Primary    Relevant Orders    Pap imaged thin layer screen with HPV - recommended age 30 - 65 years (select HPV order below)    HPV High Risk Types DNA Cervical                See Patient Instructions    No follow-ups on file.    Chana Armenta MD  [facility] WADE Freitas is a 56 year old who presents for the following health issues     HPI     Pt presents for follow up pap  Last pap done in May 2020 showed normal cytology with positive HPV. Colposcopy was negative. Here for recheck . Denies any symptoms .  Non smoker. Nulliparous.       Review of Systems   Constitutional, HEENT, cardiovascular, pulmonary, gi and gu systems are negative, except as otherwise noted.      Objective    BP 98/64   Pulse 68   Temp 97.8  F (36.6  C) (Temporal)   Resp 12   Wt 61.2 kg (135 lb)   LMP 07/17/2017 (Approximate)   SpO2 100%   BMI 21.70 kg/m    Body mass index is 21.7 kg/m .  Physical Exam   GENERAL: healthy, alert and no distress  NECK: no adenopathy, no asymmetry, masses, or scars and thyroid normal to palpation  RESP: lungs clear to auscultation - no rales, rhonchi or wheezes   (female): normal female external genitalia, normal urethral meatus, vaginal mucosa, normal cervix/adnexa/uterus without masses or discharge

## 2021-06-17 ENCOUNTER — INFUSION THERAPY VISIT (OUTPATIENT)
Dept: INFUSION THERAPY | Facility: CLINIC | Age: 56
End: 2021-06-17
Payer: COMMERCIAL

## 2021-06-17 VITALS
TEMPERATURE: 98.1 F | HEART RATE: 68 BPM | OXYGEN SATURATION: 99 % | BODY MASS INDEX: 21.7 KG/M2 | RESPIRATION RATE: 14 BRPM | WEIGHT: 135 LBS | DIASTOLIC BLOOD PRESSURE: 59 MMHG | SYSTOLIC BLOOD PRESSURE: 99 MMHG

## 2021-06-17 DIAGNOSIS — T45.8X5A ORAL BISPHOSPHONATES CAUSING ADVERSE EFFECT IN THERAPEUTIC USE, INITIAL ENCOUNTER: ICD-10-CM

## 2021-06-17 DIAGNOSIS — M80.00XA OSTEOPOROSIS WITH CURRENT PATHOLOGICAL FRACTURE, UNSPECIFIED OSTEOPOROSIS TYPE, INITIAL ENCOUNTER: Primary | ICD-10-CM

## 2021-06-17 LAB
CALCIUM SERPL-MCNC: 8.7 MG/DL (ref 8.5–10.1)
COPATH REPORT: NORMAL
CREAT SERPL-MCNC: 0.9 MG/DL (ref 0.52–1.04)
GFR SERPL CREATININE-BSD FRML MDRD: 71 ML/MIN/{1.73_M2}
PAP: NORMAL

## 2021-06-17 PROCEDURE — 99207 PR NO CHARGE LOS: CPT

## 2021-06-17 PROCEDURE — 82310 ASSAY OF CALCIUM: CPT | Performed by: INTERNAL MEDICINE

## 2021-06-17 PROCEDURE — 82565 ASSAY OF CREATININE: CPT | Performed by: INTERNAL MEDICINE

## 2021-06-17 PROCEDURE — 36415 COLL VENOUS BLD VENIPUNCTURE: CPT | Performed by: INTERNAL MEDICINE

## 2021-06-17 PROCEDURE — 96365 THER/PROPH/DIAG IV INF INIT: CPT | Performed by: NURSE PRACTITIONER

## 2021-06-17 RX ORDER — ZOLEDRONIC ACID 5 MG/100ML
5 INJECTION, SOLUTION INTRAVENOUS ONCE
Status: CANCELLED
Start: 2021-06-17 | End: 2021-06-17

## 2021-06-17 RX ORDER — HEPARIN SODIUM (PORCINE) LOCK FLUSH IV SOLN 100 UNIT/ML 100 UNIT/ML
5 SOLUTION INTRAVENOUS
Status: CANCELLED | OUTPATIENT
Start: 2021-06-17

## 2021-06-17 RX ORDER — HEPARIN SODIUM,PORCINE 10 UNIT/ML
5 VIAL (ML) INTRAVENOUS
Status: CANCELLED | OUTPATIENT
Start: 2021-06-17

## 2021-06-17 RX ORDER — ZOLEDRONIC ACID 5 MG/100ML
5 INJECTION, SOLUTION INTRAVENOUS ONCE
Status: COMPLETED | OUTPATIENT
Start: 2021-06-17 | End: 2021-06-17

## 2021-06-17 RX ADMIN — ZOLEDRONIC ACID 5 MG: 0.05 INJECTION, SOLUTION INTRAVENOUS at 12:57

## 2021-06-17 RX ADMIN — Medication 250 ML: at 12:56

## 2021-06-17 ASSESSMENT — PAIN SCALES - GENERAL: PAINLEVEL: MILD PAIN (2)

## 2021-06-17 NOTE — PROGRESS NOTES
Infusion Nursing Note:  Zena Quintana presents today for Reclast.    Patient seen by provider today: No   present during visit today: Not Applicable.    Note:   Calcium: 8.7  Creatinine: 0.90.  Patient did not meet criteria for an asymptomatic covid-19 PCR test in infusion today. Patient declined the covid-19 test.    Intravenous Access:  Peripheral IV placed.    Treatment Conditions:  Results reviewed, labs MET treatment parameters, ok to proceed with treatment.      Post Infusion Assessment:  Patient tolerated infusion without incident.  Site patent and intact, free from redness, edema or discomfort.  No evidence of extravasations.  Access discontinued per protocol.       Discharge Plan:   Patient discharged in stable condition accompanied by: self.  Departure Mode: Ambulatory.      Archana Paz RN

## 2021-06-21 ENCOUNTER — PATIENT OUTREACH (OUTPATIENT)
Dept: FAMILY MEDICINE | Facility: OTHER | Age: 56
End: 2021-06-21

## 2021-09-01 DIAGNOSIS — M05.79 RHEUMATOID ARTHRITIS INVOLVING MULTIPLE SITES WITH POSITIVE RHEUMATOID FACTOR (H): Primary | ICD-10-CM

## 2021-09-01 DIAGNOSIS — Z79.899 HIGH RISK MEDICATION USE: ICD-10-CM

## 2021-09-01 NOTE — PROGRESS NOTES
This patient has an appointment for lab work but does not have any orders. Please place some future orders as needed.    Thank You,  Cora Mccoy MLT (ASCP)

## 2021-09-02 ENCOUNTER — LAB (OUTPATIENT)
Dept: LAB | Facility: OTHER | Age: 56
End: 2021-09-02
Payer: COMMERCIAL

## 2021-09-02 DIAGNOSIS — Z79.899 HIGH RISK MEDICATION USE: ICD-10-CM

## 2021-09-02 DIAGNOSIS — M05.79 RHEUMATOID ARTHRITIS INVOLVING MULTIPLE SITES WITH POSITIVE RHEUMATOID FACTOR (H): ICD-10-CM

## 2021-09-02 LAB
ALBUMIN SERPL-MCNC: 4.2 G/DL (ref 3.4–5)
ALP SERPL-CCNC: 34 U/L (ref 40–150)
ALT SERPL W P-5'-P-CCNC: 21 U/L (ref 0–50)
AST SERPL W P-5'-P-CCNC: 18 U/L (ref 0–45)
BASOPHILS # BLD AUTO: 0.1 10E3/UL (ref 0–0.2)
BASOPHILS NFR BLD AUTO: 2 %
BILIRUB DIRECT SERPL-MCNC: <0.1 MG/DL (ref 0–0.2)
BILIRUB SERPL-MCNC: 0.3 MG/DL (ref 0.2–1.3)
CREAT SERPL-MCNC: 0.88 MG/DL (ref 0.52–1.04)
CRP SERPL-MCNC: <2.9 MG/L (ref 0–8)
EOSINOPHIL # BLD AUTO: 0.1 10E3/UL (ref 0–0.7)
EOSINOPHIL NFR BLD AUTO: 4 %
ERYTHROCYTE [DISTWIDTH] IN BLOOD BY AUTOMATED COUNT: 11.9 % (ref 10–15)
ERYTHROCYTE [SEDIMENTATION RATE] IN BLOOD BY WESTERGREN METHOD: 6 MM/HR (ref 0–30)
GFR SERPL CREATININE-BSD FRML MDRD: 73 ML/MIN/1.73M2
HCT VFR BLD AUTO: 36.9 % (ref 35–47)
HGB BLD-MCNC: 12.2 G/DL (ref 11.7–15.7)
IMM GRANULOCYTES # BLD: 0 10E3/UL
IMM GRANULOCYTES NFR BLD: 0 %
LYMPHOCYTES # BLD AUTO: 1.1 10E3/UL (ref 0.8–5.3)
LYMPHOCYTES NFR BLD AUTO: 35 %
MCH RBC QN AUTO: 30.4 PG (ref 26.5–33)
MCHC RBC AUTO-ENTMCNC: 33.1 G/DL (ref 31.5–36.5)
MCV RBC AUTO: 92 FL (ref 78–100)
MONOCYTES # BLD AUTO: 0.5 10E3/UL (ref 0–1.3)
MONOCYTES NFR BLD AUTO: 16 %
NEUTROPHILS # BLD AUTO: 1.4 10E3/UL (ref 1.6–8.3)
NEUTROPHILS NFR BLD AUTO: 43 %
NRBC # BLD AUTO: 0 10E3/UL
NRBC BLD AUTO-RTO: 0 /100
PLATELET # BLD AUTO: 278 10E3/UL (ref 150–450)
PROT SERPL-MCNC: 6.8 G/DL (ref 6.8–8.8)
RBC # BLD AUTO: 4.01 10E6/UL (ref 3.8–5.2)
WBC # BLD AUTO: 3.1 10E3/UL (ref 4–11)

## 2021-09-02 PROCEDURE — 86140 C-REACTIVE PROTEIN: CPT

## 2021-09-02 PROCEDURE — 36415 COLL VENOUS BLD VENIPUNCTURE: CPT

## 2021-09-02 PROCEDURE — 82565 ASSAY OF CREATININE: CPT

## 2021-09-02 PROCEDURE — 80076 HEPATIC FUNCTION PANEL: CPT

## 2021-09-02 PROCEDURE — 85025 COMPLETE CBC W/AUTO DIFF WBC: CPT

## 2021-09-02 PROCEDURE — 85652 RBC SED RATE AUTOMATED: CPT

## 2021-09-09 ENCOUNTER — LAB (OUTPATIENT)
Dept: LAB | Facility: CLINIC | Age: 56
End: 2021-09-09

## 2021-09-09 ENCOUNTER — ANCILLARY PROCEDURE (OUTPATIENT)
Dept: GENERAL RADIOLOGY | Facility: CLINIC | Age: 56
End: 2021-09-09
Attending: INTERNAL MEDICINE
Payer: COMMERCIAL

## 2021-09-09 ENCOUNTER — OFFICE VISIT (OUTPATIENT)
Dept: RHEUMATOLOGY | Facility: CLINIC | Age: 56
End: 2021-09-09
Payer: COMMERCIAL

## 2021-09-09 VITALS
BODY MASS INDEX: 21.79 KG/M2 | OXYGEN SATURATION: 99 % | WEIGHT: 135.6 LBS | SYSTOLIC BLOOD PRESSURE: 104 MMHG | HEART RATE: 69 BPM | DIASTOLIC BLOOD PRESSURE: 70 MMHG | HEIGHT: 66 IN

## 2021-09-09 DIAGNOSIS — M05.79 RHEUMATOID ARTHRITIS INVOLVING MULTIPLE SITES WITH POSITIVE RHEUMATOID FACTOR (H): ICD-10-CM

## 2021-09-09 DIAGNOSIS — M05.79 RHEUMATOID ARTHRITIS INVOLVING MULTIPLE SITES WITH POSITIVE RHEUMATOID FACTOR (H): Primary | ICD-10-CM

## 2021-09-09 DIAGNOSIS — Z79.899 HIGH RISK MEDICATION USE: ICD-10-CM

## 2021-09-09 LAB
BASOPHILS # BLD AUTO: 0 10E3/UL (ref 0–0.2)
BASOPHILS NFR BLD AUTO: 1 %
EOSINOPHIL # BLD AUTO: 0.1 10E3/UL (ref 0–0.7)
EOSINOPHIL NFR BLD AUTO: 3 %
ERYTHROCYTE [DISTWIDTH] IN BLOOD BY AUTOMATED COUNT: 12.4 % (ref 10–15)
HCT VFR BLD AUTO: 35.4 % (ref 35–47)
HGB BLD-MCNC: 11.5 G/DL (ref 11.7–15.7)
LYMPHOCYTES # BLD AUTO: 1 10E3/UL (ref 0.8–5.3)
LYMPHOCYTES NFR BLD AUTO: 31 %
MCH RBC QN AUTO: 30.3 PG (ref 26.5–33)
MCHC RBC AUTO-ENTMCNC: 32.5 G/DL (ref 31.5–36.5)
MCV RBC AUTO: 93 FL (ref 78–100)
MONOCYTES # BLD AUTO: 0.6 10E3/UL (ref 0–1.3)
MONOCYTES NFR BLD AUTO: 18 %
NEUTROPHILS # BLD AUTO: 1.6 10E3/UL (ref 1.6–8.3)
NEUTROPHILS NFR BLD AUTO: 48 %
PLATELET # BLD AUTO: 272 10E3/UL (ref 150–450)
RBC # BLD AUTO: 3.8 10E6/UL (ref 3.8–5.2)
WBC # BLD AUTO: 3.4 10E3/UL (ref 4–11)

## 2021-09-09 PROCEDURE — 99214 OFFICE O/P EST MOD 30 MIN: CPT | Performed by: INTERNAL MEDICINE

## 2021-09-09 PROCEDURE — 85025 COMPLETE CBC W/AUTO DIFF WBC: CPT

## 2021-09-09 PROCEDURE — 36415 COLL VENOUS BLD VENIPUNCTURE: CPT

## 2021-09-09 PROCEDURE — 71046 X-RAY EXAM CHEST 2 VIEWS: CPT | Performed by: RADIOLOGY

## 2021-09-09 PROCEDURE — 86481 TB AG RESPONSE T-CELL SUSP: CPT

## 2021-09-09 RX ORDER — HYDROXYCHLOROQUINE SULFATE 200 MG/1
TABLET, FILM COATED ORAL
Qty: 135 TABLET | Refills: 1 | Status: SHIPPED | OUTPATIENT
Start: 2021-09-09 | End: 2022-01-13

## 2021-09-09 ASSESSMENT — MIFFLIN-ST. JEOR: SCORE: 1219.05

## 2021-09-09 NOTE — NURSING NOTE
RAPID3 (0-30) Cumulative Score  3.0          RAPID3 Weighted Score (divide #4 by 3 and that is the weighted score)  1.0

## 2021-09-09 NOTE — PROGRESS NOTES
Rheumatology Clinic Visit      Zena Quintana MRN# [id number]   YOB: 1965 Age: 56 year old      Date of visit: 9/09/21   PCP: Dr. Chana Armenta    Chief Complaint   Patient presents with:  Rheumatoid Arthritis    Assessment and Plan     1. Seropositive nonerosive rheumatoid arthritis (RF low positive, CCP negative): Reportedly at the time of diagnosis she had synovitis in a symmetric distribution that was steroid responsive. Previously on MTX (effective, anemia, LFT elevation). Currently on hydroxychloroquine 300 mg daily on average (started in approximately 2007) and leflunomide 10mg daily.  Active synovitis on exam today and worsening inflammatory joint symptoms for the past 1-2 months.  White blood cell count and ANC are low and likely secondary to leflunomide.  Discontinue leflunomide and start Humira if tuberculosis screening is negative.   Chronic illness, progressive, side effect from treatment  - Discontinue leflunomide 10mg daily   - Continue hydroxychloroquine 200mg daily with an additional 200mg every other day (last eye exam was on 11/9/2020 by Dr. Mirza)   - Start Humira 40 mg SQ every 14 days if tuberculosis screening is negative  - Chest x-ray today  - Labs today: QuantiFERON-TB gold plus, CBC  - Labs in 3-4 months: CBC, Creatinine, Hepatic Panel, ESR, CRP    High risk medication requiring intensive toxicity monitoring at least quarterly: labs ordered include CBC, Creatinine, Hepatic panel to monitor for cytopenia and hepatotoxicity; checking creatinine as it affects clearance of medication.      2. Osteoporosis with current pathological fracture, unspecified osteoporosis type, initial encounter: s/p hip fx from ground level fall and is now status-post right HUY. Note that her mother has a hx of vertebral compression fx; Ms. Quintana is post-menopausal. 1/13/2020 DEXA showed osteoporosis; L-spine T score of -2.6, Left femoral neck T-score of -2.4. Alendronate was started 1/2020 but  stopped in early April 2020 by Ms. Quintana due to hair loss and GERD.  She then received Reclast on 6/16/2020 and 6/17/2021.  Plan to continue Reclast on a yearly basis until 2025.  Chronic illness, stable.    - Continue reclast 5mg IV once yearly; next infusion to on or shortly after 6/17/2022  - Continue calcium 1200mg daily  - Continue vitamin D 1000 IU daily    3.  History of bilateral carpal tunnel syndrome: doesn't tolerate NSAIDs per patient.  Using wrist splints bilaterally with significant improvement, and was intermittent symptoms.  Symptoms then worsened and she had the nerve conduction study performed, showing bilateral carpal tunnel syndrome.  She was then seen by orthopedics and had surgery on the right with resolution of symptoms with did not have surgery on the left; left carpal tunnel syndrome symptoms have improved over time and she is not symptomatic today with regard to carpal tunnel syndrome.     4. 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 S/p surgery.  Doing okay at this time.    5. Sjogren's syndrome: NJ by EIA negative but positive by IF, SSA and SSB negative, and minor salivary gland biopsy negative. She is currently following with ophthalmology who has given her eyedrops that have been beneficial.  Dry mouth is currently treated with frequent sips of water; she has good oral hygiene, follows with a dentist regularly, and does not have frequent cavities. Pilocarpine was previously Rx'd but never used and has been removed from her medication list previously.    6. History of oral sores / family history of lupus / polyarthritis / recurrent sinus infections: She is not having oral sores or recurrent sinus infections for several years now. Retrospectively, there would be concern for potential ANCA associated sinus issues and  this could be checked if she had recurrent sinusitis again. Oral sores have improved since she started Plaquenil, that argues it could be connective tissue disease related. NJ was negative by EIA, but complement was on the low end of the normal range so NJ was rechecked and positive; further testing was negative.    - COVID-19: has received the Moderna COVID-19 vaccine on 2/3/2021 and 3/3/2021    A single additional dose of the Pfizer or Moderna COVID-19 vaccine is recommended at least 28 days after the completion of the 2-dose mRNA vaccine series for patients receiving any immunosuppressive or immunomodulatory therapy. Attempts should be made to match the additional mRNA dose type to the type given in the mRNA primary series; however, if that is not feasible, a booster dose with the alternative mRNA vaccine is permitted.    Based on our current understanding (and this may change over time as we learn more), medications should be adjusted as noted below, if disease activity allows:  - NSAIDs and Acetaminophen: hold for 24 hours prior to vaccination if able to do so    Total minutes spent in evaluation with patient, documentation, , and review of pertinent studies and chart notes: 29      Ms. Quintana verbalized agreement with and understanding of the rational for the diagnosis and treatment plan.  All questions were answered to best of my ability and the patient's satisfaction. Ms. Quintana was advised to contact the clinic with any questions that may arise after the clinic visit.      Thank you for involving me in the care of the patient    Return to clinic: 6 months      HPI   Zena Quintana is a 56 year old female with medical history significant for iron deficiency anemia, nephrolithiasis, hyperlipidemia, xerostomia, dry eyes, status-post right HUY, and rheumatoid arthritis who presents for follow-up of rheumatoid arthritis.    Today, 9/9/2021: Pain, stiffness, and swelling of the MCPs and PIPs that  is worse in the morning and improves with time and activity.  Morning stiffness for approximately 60-90 minutes.  Positive gelling phenomenon.  Tolerating medications well.  Also has some pain across the MCPs that is worse in the morning and improves with time and activity.  Tolerated Reclast well.  Tolerating medications well.    Denies fevers, chills, nausea, vomiting, constipation, diarrhea. No abdominal pain. No chest pain/pressure, palpitations, or shortness of breath. No neck pain. No rash. No LE swelling.  No photosensitivity or photophobia.  No sicca symptoms.     Tobacco: None  EtOH: Occasional; no more than 2 drinks per day when she does drink  Drugs: None  Occupation: Works at a school    ROS   12 point review of system was completed and negative except as noted in the HPI     Active Problem List     Patient Active Problem List   Diagnosis     Internal derangement of knee     Iron deficiency anemia     Allergic rhinitis     Stomatitis and mucositis (ulcerative)     Pure hypercholesterolemia     Arthropathy     HYPERLIPIDEMIA LDL GOAL <160     High risk medication use     RA (rheumatoid arthritis) (H)     Endometriosis     Ovarian cyst     Disturbance of salivary secretion (XEROSTOMIA)     Impingement syndrome of right shoulder     Sjogren's syndrome (H)     Palpitations     Cervical high risk HPV (human papillomavirus) test positive     Osteoporosis with current pathological fracture, unspecified osteoporosis type, initial encounter     Oral bisphosphonates causing adverse effect in therapeutic use, initial encounter     Past Medical History     Past Medical History:   Diagnosis Date     Allergic rhinitis, cause unspecified     Allergic rhinitis     Cervical high risk HPV (human papillomavirus) test positive 03/08/2019    see problem list     Endometriosis, site unspecified     Endometriosis     Female infertility of other specified origin      Iron deficiency anemia, unspecified     Anemia, iron def.      RA (rheumatoid arthritis) (H)      Sjogren's syndrome (H)      Stomatitis and mucositis (ulcerative)     chronic - non-specific     Past Surgical History     Past Surgical History:   Procedure Laterality Date     COLONOSCOPY WITH CO2 INSUFFLATION N/A 8/7/2017    Procedure: COLONOSCOPY WITH CO2 INSUFFLATION;  Colonoscopy, Screen for colon cancer.  BMI  22.17  [facility] [phone];  Surgeon: Berny Pedro MD;  Location: [facility] OR     HC KNEE SCOPE, DIAGNOSTIC       left knee, ruptured ACL     HC REMOVE TONSILS/ADENOIDS,12+ Y/O      T & A 11+y.o.     ORTHOPEDIC SURGERY      rotator cuff repair, left     ZZC NONSPECIFIC PROCEDURE  1995, 1997    laparoscopy for endometrial fulguration     ZZHC COLONOSCOPY W/WO BRUSH/WASH  5/25/2005     ZZHC CREATE EARDRUM OPENING,GEN ANESTH      P.E. Tubes     Allergy     Allergies   Allergen Reactions     Alendronic Acid Other (See Comments)     Hair loss     Penicillins Hives     hives     Current Medication List     Current Outpatient Medications   Medication Sig     acetaminophen (TYLENOL) 500 MG tablet Take 1-2 tablets by mouth every 6 hours as needed for pain. (Patient not taking: Reported on 1/19/2021)     albuterol (PROAIR HFA/PROVENTIL HFA/VENTOLIN HFA) 108 (90 Base) MCG/ACT inhaler Inhale 2 puffs into the lungs every 4 hours as needed for shortness of breath / dyspnea or wheezing (Patient not taking: Reported on 1/19/2021)     aspirin (ASA) 325 MG tablet Take 325 mg by mouth daily     bimatoprost (LATISSE) 0.03 % SOLN Externally apply topically At Bedtime     calcium carbonate (OS-VALERIA) 600 MG tablet Take 2 tablets by mouth daily     ferrous sulfate (FEROSUL) 325 (65 Fe) MG tablet Take 325 mg by mouth daily     Fexofenadine HCl (MUCINEX ALLERGY PO)      fluticasone (FLONASE) 50 MCG/ACT spray Spray 2 sprays into both nostrils daily (Patient not taking: Reported on 6/14/2021)     hydroxychloroquine (PLAQUENIL) 200 MG tablet Hydroxychloroquine 200mg daily; and an  "additional 200mg every other day.     ibuprofen (ADVIL) 200 MG capsule Take 200 mg by mouth as needed for fever     leflunomide (ARAVA) 10 MG tablet Take 1 tablet (10 mg) by mouth daily     loratadine 10 MG capsule Take 10 mg by mouth as needed for allergies     MULTIPLE VITAMINS/WOMENS OR None Entered     zinc sulfate (ZINCATE) 220 (50 Zn) MG capsule Take 1 capsule by mouth daily     No current facility-administered medications for this visit.         Social History   See HPI    Family History     Family History   Problem Relation Age of Onset     Osteoporosis Mother      Arthritis Mother         lupus     Connective Tissue Disorder Mother         Lupus     Cataracts Mother      Macular Degeneration Mother      Celiac Disease Mother      Heart Disease Father         aortic aneurysm     Gastrointestinal Disease Father         abdominal anurysm     Hypertension Father      Cataracts Father      Gastrointestinal Disease Sister         IBS, colon problems     Alzheimer Disease Maternal Grandmother      Osteoporosis Maternal Grandmother      Cerebrovascular Disease Maternal Grandfather      Cancer Paternal Grandmother      Cerebrovascular Disease Paternal Grandfather      Circulatory Sister         carotid stenosis     Cancer - colorectal Maternal Uncle      Cancer - colorectal Maternal Uncle      Glaucoma No family hx of      Mother: Lupus    Physical Exam     Temp Readings from Last 3 Encounters:   06/17/21 98.1  F (36.7  C) (Oral)   06/14/21 97.8  F (36.6  C) (Temporal)   04/08/21 98  F (36.7  C) (Oral)     BP Readings from Last 5 Encounters:   06/17/21 99/59   06/14/21 98/64   04/08/21 102/67   01/19/21 90/66   12/09/20 (!) 88/60     Pulse Readings from Last 1 Encounters:   06/17/21 68     Resp Readings from Last 1 Encounters:   06/17/21 14     Estimated body mass index is 21.7 kg/m  as calculated from the following:    Height as of 4/8/21: 1.68 m (5' 6.14\").    Weight as of 6/17/21: 61.2 kg (135 lb).      GEN: NAD. " Healthy appearing adult.   HEENT:  Anicteric, noninjected sclera. No obvious external lesions of the ear and nose. Hearing intact.  CV: S1, S2. RRR. No m/r/g  PULM: No increased work of breathing. CTA bilaterally   MSK: Synovial swelling and tenderness palpation of the bilateral second and third MCPs and second-fourth PIPs.  DIPs without swelling or tenderness to palpation.  Wrists, elbows, shoulders, knees, and ankles without swelling or tenderness palpation.  MTPs diffusely tender to palpation but no swelling, increased warmth, or overlying erythema.    SKIN: No rash or jaundice seen  PSYCH: Alert. Appropriate.        Labs / Imaging (select studies)   RF/CCP  Recent Labs   Lab Test 08/29/13  1413   CCPABY <20 Interpretation:  Negative     CBC  Recent Labs   Lab Test 09/02/21  0857 06/02/21  1508 02/24/21  1521 09/22/20  1517   WBC 3.1* 4.7 5.3 5.7   RBC 4.01 3.90 3.89 3.78*   HGB 12.2 11.7 11.6* 11.5*   HCT 36.9 36.4 36.5 35.0   MCV 92 93 94 93   RDW 11.9 13.0 12.4 12.2    308 309 284   MCH 30.4 30.0 29.8 30.4   MCHC 33.1 32.1 31.8 32.9   NEUTROPHIL 43 55.2 52.6 54.2   LYMPH 35 24.5 26.4 26.7   MONOCYTE 16 16.7 16.8 15.6   EOSINOPHIL 4 3.0 3.6 3.0   BASOPHIL 2 0.6 0.6 0.5   ANEU  --  2.6 2.8 3.1   ALYM  --  1.2 1.4 1.5   ELIGIO  --  0.8 0.9 0.9   AEOS  --  0.1 0.2 0.2   ABAS  --  0.0 0.0 0.0   ANEUTAUTO 1.4*  --   --   --    ALYMPAUTO 1.1  --   --   --    AMONOAUTO 0.5  --   --   --    AEOSAUTO 0.1  --   --   --    ABSBASO 0.1  --   --   --      CMP  Recent Labs   Lab Test 09/02/21  0857 06/17/21  1158 06/02/21  1508 04/06/21  1518 02/24/21  1521 12/10/20  0951 09/22/20  1517 04/26/19  1327 04/12/19  1509 06/20/17  1437 05/24/16  0940   NA  --   --   --   --   --  137  --   --  139 141 137   POTASSIUM  --   --   --   --   --  3.9  --   --  4.3 3.8 4.4   CHLORIDE  --   --   --   --   --  104  --   --  103 105 105   CO2  --   --   --   --   --  31  --   --  29 29 29   ANIONGAP  --   --   --   --   --  2*  --    --  7 7 3   GLC  --   --   --   --   --  85  --   --   --  84 85   BUN  --   --   --   --   --  10  --   --   --  22 18   CR 0.88 0.90 0.90  --  0.72 0.70   < >   < > 0.86 0.84 0.92   GFRESTIMATED 73 71 71  --  >90 >90   < >   < > 76 71 64   GFRESTBLACK  --  82 83  --  >90 >90   < >   < > 88 85 77   VALERIA  --  8.7 9.5  --   --  9.6  --    < > 8.7 9.1 9.2   BILITOTAL 0.3  --  0.3  --  0.2  --   --   --   --  0.2 0.4   ALBUMIN 4.2  --  4.4  --  4.1  --   --   --   --  4.0 4.4   PROTTOTAL 6.8  --  7.4  --  7.2  --   --   --   --  6.8 7.3   ALKPHOS 34*  --  46  --  57  --   --   --   --  40 42   AST 18  --  27 21 76*  --   --   --   --  21 30   ALT 21  --  22 23 31  --   --   --   --  21 24    < > = values in this interval not displayed.     Calcium/VitaminD  Recent Labs   Lab Test 06/17/21  1158 06/02/21  1508 12/10/20  0951 01/08/20  1548 07/13/15  1036   VALERIA 8.7 9.5 9.6 9.4  --    VITDT  --  61  --  38 48     ESR/CRP  Recent Labs   Lab Test 09/02/21  0857 06/02/21  1508 09/22/20  1517   SED 6 8 10   CRP <2.9 <2.9 <2.9     Hepatitis B  Recent Labs   Lab Test 09/04/19  1552   HBCAB Nonreactive   HEPBANG Nonreactive     Hepatitis C  Recent Labs   Lab Test 06/20/17  1437   HCVAB Nonreactive   Assay performance characteristics have not been established for newborns,   infants, and children       Immunization History     Immunization History   Administered Date(s) Administered     COVID-19,PF,Moderna 02/03/2021, 03/03/2021     FLU 6-35 months 10/12/2019     Influenza (H1N1) 12/03/2009     Influenza (IIV3) PF 11/17/2005, 11/17/2006, 10/25/2010, 01/09/2013     Influenza Vaccine IM > 6 months Valent IIV4 (Alfuria,Fluzone) 10/10/2013, 09/14/2016, 09/13/2017, 01/10/2019     Influenza,INJ,MDCK,PF,Quad >4yrs 10/16/2020     Pneumo Conj 13-V (2010&after) 09/13/2017     Pneumococcal 23 valent 09/12/2018     TD (ADULT, 7+) 02/01/2005     TDAP Vaccine (Adacel) 01/09/2013          Chart documentation done in part with Dragon Voice  recognition Software. Although reviewed after completion, some word and grammatical error may remain.    Rome Hardy MD

## 2021-09-09 NOTE — PATIENT INSTRUCTIONS
RHEUMATOLOGY    Dr. Rome Hardy    [address] [place] [address]  Phone number: [phone]  Fax number: [phone]    Thank you for choosing [facility]!    Genesis Medley CMA Rheumatology

## 2021-09-11 LAB
GAMMA INTERFERON BACKGROUND BLD IA-ACNC: 0.03 IU/ML
M TB IFN-G BLD-IMP: NEGATIVE
M TB IFN-G CD4+ BCKGRND COR BLD-ACNC: 9.97 IU/ML
MITOGEN IGNF BCKGRD COR BLD-ACNC: 0.02 IU/ML
MITOGEN IGNF BCKGRD COR BLD-ACNC: 0.02 IU/ML
QUANTIFERON MITOGEN: 10 IU/ML
QUANTIFERON NIL TUBE: 0.03 IU/ML
QUANTIFERON TB1 TUBE: 0.05 IU/ML
QUANTIFERON TB2 TUBE: 0.05

## 2021-10-10 DIAGNOSIS — M05.79 RHEUMATOID ARTHRITIS INVOLVING MULTIPLE SITES WITH POSITIVE RHEUMATOID FACTOR (H): Primary | ICD-10-CM

## 2021-10-11 ENCOUNTER — TELEPHONE (OUTPATIENT)
Dept: RHEUMATOLOGY | Facility: CLINIC | Age: 56
End: 2021-10-11

## 2021-10-13 NOTE — TELEPHONE ENCOUNTER
Prior Authorization Approval    Authorization Effective Date: 10/13/2021  Authorization Expiration Date: 10/12/2022  Medication: humira  Approved Dose/Quantity: 2/28ds  Reference #: [id number]   Insurance Company: [employer] - Phone [phone] Fax [phone]  Expected CoPay: na     CoPay Card Available: Yes    Foundation Assistance Needed: na  Which Pharmacy is filling the prescription (Not needed for infusion/clinic administered): [facility] - [place], [place] - [facility] [address]

## 2021-10-24 ENCOUNTER — HEALTH MAINTENANCE LETTER (OUTPATIENT)
Age: 56
End: 2021-10-24

## 2022-01-04 NOTE — PROGRESS NOTES
"Patient presents for event heart monitor placement ordered by MD.  Patient was hooked up and patient instructions were given regarding electrode placement and how to use phone.  Patient was instructed on how long to wear monitor, how to send a \"manual event\", and how to properly send the unit back. [employer] contact information was provided to patient. Patient education provided about cardiology interpretation and primary provider will be notified of results.    Diagnosis taken from MD order.    Sylvia Abdi, CCT, Cardiac CMA    "
Implemented All Universal Safety Interventions:  [place] to call system. Call bell, personal items and telephone within reach. Instruct patient to call for assistance. Room bathroom lighting operational. Non-slip footwear when patient is off stretcher. Physically safe environment: no spills, clutter or unnecessary equipment. Stretcher in lowest position, wheels locked, appropriate side rails in place.

## 2022-01-10 ENCOUNTER — LAB (OUTPATIENT)
Dept: LAB | Facility: OTHER | Age: 57
End: 2022-01-10
Payer: COMMERCIAL

## 2022-01-10 DIAGNOSIS — Z79.899 HIGH RISK MEDICATION USE: ICD-10-CM

## 2022-01-10 DIAGNOSIS — M05.79 RHEUMATOID ARTHRITIS INVOLVING MULTIPLE SITES WITH POSITIVE RHEUMATOID FACTOR (H): ICD-10-CM

## 2022-01-10 LAB
ALBUMIN SERPL-MCNC: 4.2 G/DL (ref 3.4–5)
ALP SERPL-CCNC: 37 U/L (ref 40–150)
ALT SERPL W P-5'-P-CCNC: 24 U/L (ref 0–50)
AST SERPL W P-5'-P-CCNC: 26 U/L (ref 0–45)
BASOPHILS # BLD AUTO: 0 10E3/UL (ref 0–0.2)
BASOPHILS NFR BLD AUTO: 1 %
BILIRUB DIRECT SERPL-MCNC: <0.1 MG/DL (ref 0–0.2)
BILIRUB SERPL-MCNC: 0.2 MG/DL (ref 0.2–1.3)
CREAT SERPL-MCNC: 0.82 MG/DL (ref 0.52–1.04)
CRP SERPL-MCNC: <2.9 MG/L (ref 0–8)
EOSINOPHIL # BLD AUTO: 0.4 10E3/UL (ref 0–0.7)
EOSINOPHIL NFR BLD AUTO: 6 %
ERYTHROCYTE [DISTWIDTH] IN BLOOD BY AUTOMATED COUNT: 12.2 % (ref 10–15)
ERYTHROCYTE [SEDIMENTATION RATE] IN BLOOD BY WESTERGREN METHOD: 9 MM/HR (ref 0–30)
GFR SERPL CREATININE-BSD FRML MDRD: 83 ML/MIN/1.73M2
HCT VFR BLD AUTO: 37.4 % (ref 35–47)
HGB BLD-MCNC: 12 G/DL (ref 11.7–15.7)
LYMPHOCYTES # BLD AUTO: 2.3 10E3/UL (ref 0.8–5.3)
LYMPHOCYTES NFR BLD AUTO: 37 %
MCH RBC QN AUTO: 30.5 PG (ref 26.5–33)
MCHC RBC AUTO-ENTMCNC: 32.1 G/DL (ref 31.5–36.5)
MCV RBC AUTO: 95 FL (ref 78–100)
MONOCYTES # BLD AUTO: 0.9 10E3/UL (ref 0–1.3)
MONOCYTES NFR BLD AUTO: 15 %
NEUTROPHILS # BLD AUTO: 2.5 10E3/UL (ref 1.6–8.3)
NEUTROPHILS NFR BLD AUTO: 41 %
PLATELET # BLD AUTO: 304 10E3/UL (ref 150–450)
PROT SERPL-MCNC: 7.8 G/DL (ref 6.8–8.8)
RBC # BLD AUTO: 3.93 10E6/UL (ref 3.8–5.2)
WBC # BLD AUTO: 6.1 10E3/UL (ref 4–11)

## 2022-01-10 PROCEDURE — 36415 COLL VENOUS BLD VENIPUNCTURE: CPT

## 2022-01-10 PROCEDURE — 85025 COMPLETE CBC W/AUTO DIFF WBC: CPT

## 2022-01-10 PROCEDURE — 80076 HEPATIC FUNCTION PANEL: CPT

## 2022-01-10 PROCEDURE — 85652 RBC SED RATE AUTOMATED: CPT

## 2022-01-10 PROCEDURE — 82565 ASSAY OF CREATININE: CPT

## 2022-01-10 PROCEDURE — 86140 C-REACTIVE PROTEIN: CPT

## 2022-01-13 ENCOUNTER — OFFICE VISIT (OUTPATIENT)
Dept: RHEUMATOLOGY | Facility: CLINIC | Age: 57
End: 2022-01-13
Payer: COMMERCIAL

## 2022-01-13 VITALS
DIASTOLIC BLOOD PRESSURE: 70 MMHG | HEIGHT: 66 IN | OXYGEN SATURATION: 100 % | HEART RATE: 58 BPM | BODY MASS INDEX: 22.24 KG/M2 | SYSTOLIC BLOOD PRESSURE: 111 MMHG | WEIGHT: 138.4 LBS

## 2022-01-13 DIAGNOSIS — M05.79 RHEUMATOID ARTHRITIS INVOLVING MULTIPLE SITES WITH POSITIVE RHEUMATOID FACTOR (H): Primary | ICD-10-CM

## 2022-01-13 DIAGNOSIS — Z79.899 HIGH RISK MEDICATION USE: ICD-10-CM

## 2022-01-13 DIAGNOSIS — M80.00XA OSTEOPOROSIS WITH CURRENT PATHOLOGICAL FRACTURE, UNSPECIFIED OSTEOPOROSIS TYPE, INITIAL ENCOUNTER: ICD-10-CM

## 2022-01-13 PROCEDURE — 99214 OFFICE O/P EST MOD 30 MIN: CPT | Performed by: INTERNAL MEDICINE

## 2022-01-13 RX ORDER — ALBUTEROL SULFATE 0.83 MG/ML
2.5 SOLUTION RESPIRATORY (INHALATION)
Status: CANCELLED | OUTPATIENT
Start: 2022-06-17

## 2022-01-13 RX ORDER — MEPERIDINE HYDROCHLORIDE 25 MG/ML
25 INJECTION INTRAMUSCULAR; INTRAVENOUS; SUBCUTANEOUS EVERY 30 MIN PRN
Status: CANCELLED | OUTPATIENT
Start: 2022-06-17

## 2022-01-13 RX ORDER — NALOXONE HYDROCHLORIDE 0.4 MG/ML
0.2 INJECTION, SOLUTION INTRAMUSCULAR; INTRAVENOUS; SUBCUTANEOUS
Status: CANCELLED | OUTPATIENT
Start: 2022-06-17

## 2022-01-13 RX ORDER — HEPARIN SODIUM,PORCINE 10 UNIT/ML
5 VIAL (ML) INTRAVENOUS
Status: CANCELLED | OUTPATIENT
Start: 2022-06-17

## 2022-01-13 RX ORDER — ALBUTEROL SULFATE 90 UG/1
1-2 AEROSOL, METERED RESPIRATORY (INHALATION)
Status: CANCELLED
Start: 2022-06-17

## 2022-01-13 RX ORDER — DIPHENHYDRAMINE HYDROCHLORIDE 50 MG/ML
50 INJECTION INTRAMUSCULAR; INTRAVENOUS
Status: CANCELLED
Start: 2022-06-17

## 2022-01-13 RX ORDER — HEPARIN SODIUM (PORCINE) LOCK FLUSH IV SOLN 100 UNIT/ML 100 UNIT/ML
5 SOLUTION INTRAVENOUS
Status: CANCELLED | OUTPATIENT
Start: 2022-06-17

## 2022-01-13 RX ORDER — EPINEPHRINE 1 MG/ML
0.3 INJECTION, SOLUTION, CONCENTRATE INTRAVENOUS EVERY 5 MIN PRN
Status: CANCELLED | OUTPATIENT
Start: 2022-06-17

## 2022-01-13 RX ORDER — METHYLPREDNISOLONE SODIUM SUCCINATE 125 MG/2ML
125 INJECTION, POWDER, LYOPHILIZED, FOR SOLUTION INTRAMUSCULAR; INTRAVENOUS
Status: CANCELLED
Start: 2022-06-17

## 2022-01-13 RX ORDER — ZOLEDRONIC ACID 5 MG/100ML
5 INJECTION, SOLUTION INTRAVENOUS ONCE
Status: CANCELLED
Start: 2022-06-17 | End: 2022-06-17

## 2022-01-13 RX ORDER — HYDROXYCHLOROQUINE SULFATE 200 MG/1
200 TABLET, FILM COATED ORAL DAILY
Qty: 90 TABLET | Refills: 1 | Status: SHIPPED | OUTPATIENT
Start: 2022-01-13 | End: 2022-05-05

## 2022-01-13 ASSESSMENT — MIFFLIN-ST. JEOR: SCORE: 1231.75

## 2022-01-13 NOTE — NURSING NOTE
RAPID3 (0-30) Cumulative Score  0.5          RAPID3 Weighted Score (divide #4 by 3 and that is the weighted score)  0.1

## 2022-01-13 NOTE — PATIENT INSTRUCTIONS
RHEUMATOLOGY    Dr. Rome Hardy    [address] [place] [address]  Phone number: [phone]  Fax number: [phone]      Thank you for choosing [facility]!    Genesis Medley CMA Rheumatology

## 2022-01-13 NOTE — PROGRESS NOTES
Rheumatology Clinic Visit      Zena Quintana MRN# [id number]   YOB: 1965 Age: 56 year old      Date of visit: 1/13/22   PCP: Dr. Chana Armenta    Chief Complaint   Patient presents with:  Rheumatoid Arthritis    Assessment and Plan     1. Seropositive nonerosive rheumatoid arthritis (RF low positive, CCP negative): Reportedly at the time of diagnosis she had synovitis in a symmetric distribution that was steroid responsive. Previously on MTX (effective, anemia, LFT elevation), leflunomide (neutropenia). Currently on hydroxychloroquine 300 mg daily on average (started in approximately 2007) and Humira 40mg SQ every 14 days (started 10/10/2021). Significant improvement with addition of Humira. RA controlled.  Reduce hydroxychloroquine in an effort to get to a lower effective dose.  Chronic illness, stable.    - Reduce hydroxychloroquine from 300mg daily (avg) to 200mg daily (last eye exam was on 11/9/2020 by Dr. Mirza; yearly eye exam advised/required)   - Continue Humira 40 mg SQ every 14 days   - Labs in 3 months: CBC, Creatinine, Hepatic Panel, ESR, CRP     2. Osteoporosis: s/p hip fx from ground level fall and is now status-post right HUY. Note that her mother has a hx of vertebral compression fx; Ms. Quintana is post-menopausal. 1/13/2020 DEXA showed osteoporosis; L-spine T score of -2.6, Left femoral neck T-score of -2.4. Alendronate was started 1/2020 but stopped in early April 2020 by Ms. Quintana due to hair loss and GERD.  She then received Reclast on 6/16/2020 and 6/17/2021.  Plan to continue Reclast on a yearly basis until 2025.  Chronic illness, stable.    - Continue reclast 5mg IV once yearly; orders placed and she is to call to schedule  - Continue calcium 1200mg daily  - Continue vitamin D 1000 IU daily  - Labs in 3 months: Ca, Vitamin D    3.  History of bilateral carpal tunnel syndrome: doesn't tolerate NSAIDs per patient.  Using wrist splints bilaterally with significant  improvement, and was intermittent symptoms.  Symptoms then worsened and she had the nerve conduction study performed, showing bilateral carpal tunnel syndrome.  She was then seen by orthopedics and had surgery on the right with resolution of symptoms with did not have surgery on the left; left carpal tunnel syndrome symptoms have improved over time and she is not symptomatic today with regard to carpal tunnel syndrome.     4. 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 S/p surgery.  Doing okay at this time.    5. Sjogren's syndrome: NJ by EIA negative but positive by IF, SSA and SSB negative, and minor salivary gland biopsy negative. She is currently following with ophthalmology who has given her eyedrops that have been beneficial.  Dry mouth is currently treated with frequent sips of water; she has good oral hygiene, follows with a dentist regularly, and does not have frequent cavities. Pilocarpine was previously Rx'd but never used and has been removed from her medication list previously.    6. History of oral sores / family history of lupus / polyarthritis / recurrent sinus infections: She is not having oral sores or recurrent sinus infections for several years now. Retrospectively, there would be concern for potential ANCA associated sinus issues and this could be checked if she had recurrent sinusitis again. Oral sores have improved since she started Plaquenil, that argues it could be connective tissue disease related. NJ was negative by EIA, but complement was on the low end of the normal range so NJ was rechecked and positive; further testing was negative.    - COVID-19: has received the Moderna COVID-19 vaccine on 2/3/2021 and 3/3/2021 and 12/7/2021; 4th mRNA COVID19 vaccine dose due on or shortly after 5/7/2022 based on today's information      Total minutes spent in evaluation with patient, documentation, , and review of pertinent studies and chart notes: 11     Ms. Quintana verbalized agreement with and understanding of the rational for the diagnosis and treatment plan.  All questions were answered to best of my ability and the patient's satisfaction. Ms. Quintana was advised to contact the clinic with any questions that may arise after the clinic visit.      Thank you for involving me in the care of the patient    Return to clinic: 3-4 months      HPI   Zena Quintana is a 56 year old female with medical history significant for iron deficiency anemia, nephrolithiasis, hyperlipidemia, xerostomia, dry eyes, status-post right HUY, and rheumatoid arthritis who presents for follow-up of rheumatoid arthritis.    Today: doing well. Significant improvement with addition of Humira. No joint pain/swelling/stiffness.  Tolerating medications well. Arthritis is not limiting any of her daily activities.     Denies fevers, chills, nausea, vomiting, constipation, diarrhea. No abdominal pain. No chest pain/pressure, palpitations, or shortness of breath. No neck pain. No rash. No LE swelling.  No photosensitivity or photophobia.  No sicca symptoms.     Tobacco: None  EtOH: Occasional; no more than 2 drinks per day when she does drink  Drugs: None  Occupation: Works at a school    ROS   12 point review of system was completed and negative except as noted in the HPI     Active Problem List     Patient Active Problem List   Diagnosis     Internal derangement of knee     Iron deficiency anemia     Allergic rhinitis     Stomatitis and mucositis (ulcerative)     Pure hypercholesterolemia     Arthropathy     HYPERLIPIDEMIA LDL GOAL <160     High risk medication use     RA (rheumatoid arthritis) (H)     Endometriosis     Ovarian cyst     Disturbance of salivary secretion (XEROSTOMIA)     Impingement syndrome of right shoulder     Sjogren's syndrome (H)      Palpitations     Cervical high risk HPV (human papillomavirus) test positive     Osteoporosis with current pathological fracture, unspecified osteoporosis type, initial encounter     Oral bisphosphonates causing adverse effect in therapeutic use, initial encounter     Past Medical History     Past Medical History:   Diagnosis Date     Allergic rhinitis, cause unspecified     Allergic rhinitis     Cervical high risk HPV (human papillomavirus) test positive 03/08/2019    see problem list     Endometriosis, site unspecified     Endometriosis     Female infertility of other specified origin      Iron deficiency anemia, unspecified     Anemia, iron def.     RA (rheumatoid arthritis) (H)      Sjogren's syndrome (H)      Stomatitis and mucositis (ulcerative)     chronic - non-specific     Past Surgical History     Past Surgical History:   Procedure Laterality Date     COLONOSCOPY WITH CO2 INSUFFLATION N/A 8/7/2017    Procedure: COLONOSCOPY WITH CO2 INSUFFLATION;  Colonoscopy, Screen for colon cancer.  BMI  22.17  [facility] [phone];  Surgeon: Berny Pedro MD;  Location: [facility] OR     HC KNEE SCOPE, DIAGNOSTIC       left knee, ruptured ACL     HC REMOVE TONSILS/ADENOIDS,12+ Y/O      T & A 11+y.o.     ORTHOPEDIC SURGERY      rotator cuff repair, left     ZZ NONSPECIFIC PROCEDURE  1995, 1997    laparoscopy for endometrial fulguration     ZZHC COLONOSCOPY W/WO BRUSH/WASH  5/25/2005     ZZ CREATE EARDRUM OPENING,GEN ANESTH      P.E. Tubes     Allergy     Allergies   Allergen Reactions     Alendronic Acid Other (See Comments)     Hair loss     Penicillins Hives     hives     Current Medication List     Current Outpatient Medications   Medication Sig     adalimumab (HUMIRA *CF*) 40 MG/0.4ML pen kit Inject 0.4 mLs (40 mg) Subcutaneous every 14 days . Hold for signs of infection, then seek medical attention.     aspirin (ASA) 325 MG tablet Take 325 mg by mouth daily     bimatoprost (LATISSE) 0.03 % SOLN Externally  apply topically At Bedtime     calcium carbonate (OS-VALERIA) 600 MG tablet Take 2 tablets by mouth daily     hydroxychloroquine (PLAQUENIL) 200 MG tablet Hydroxychloroquine 200mg daily; and an additional 200mg every other day.     ibuprofen (ADVIL) 200 MG capsule Take 200 mg by mouth as needed for fever     loratadine 10 MG capsule Take 10 mg by mouth as needed for allergies     MULTIPLE VITAMINS/WOMENS OR None Entered     zinc sulfate (ZINCATE) 220 (50 Zn) MG capsule Take 1 capsule by mouth daily     acetaminophen (TYLENOL) 500 MG tablet Take 1-2 tablets by mouth every 6 hours as needed for pain. (Patient not taking: Reported on 1/19/2021)     albuterol (PROAIR HFA/PROVENTIL HFA/VENTOLIN HFA) 108 (90 Base) MCG/ACT inhaler Inhale 2 puffs into the lungs every 4 hours as needed for shortness of breath / dyspnea or wheezing (Patient not taking: Reported on 1/19/2021)     ferrous sulfate (FEROSUL) 325 (65 Fe) MG tablet Take 325 mg by mouth daily (Patient not taking: Reported on 1/13/2022)     Fexofenadine HCl (MUCINEX ALLERGY PO)  (Patient not taking: Reported on 1/13/2022)     fluticasone (FLONASE) 50 MCG/ACT spray Spray 2 sprays into both nostrils daily (Patient not taking: Reported on 6/14/2021)     No current facility-administered medications for this visit.         Social History   See HPI    Family History     Family History   Problem Relation Age of Onset     Osteoporosis Mother      Arthritis Mother         lupus     Connective Tissue Disorder Mother         Lupus     Cataracts Mother      Macular Degeneration Mother      Celiac Disease Mother      Heart Disease Father         aortic aneurysm     Gastrointestinal Disease Father         abdominal anurysm     Hypertension Father      Cataracts Father      Gastrointestinal Disease Sister         IBS, colon problems     Alzheimer Disease Maternal Grandmother      Osteoporosis Maternal Grandmother      Cerebrovascular Disease Maternal Grandfather      Cancer Paternal  "Grandmother      Cerebrovascular Disease Paternal Grandfather      Circulatory Sister         carotid stenosis     Cancer - colorectal Maternal Uncle      Cancer - colorectal Maternal Uncle      Glaucoma No family hx of      Mother: Lupus    Physical Exam     Temp Readings from Last 3 Encounters:   06/17/21 98.1  F (36.7  C) (Oral)   06/14/21 97.8  F (36.6  C) (Temporal)   04/08/21 98  F (36.7  C) (Oral)     BP Readings from Last 5 Encounters:   01/13/22 111/70   09/09/21 104/70   06/17/21 99/59   06/14/21 98/64   04/08/21 102/67     Pulse Readings from Last 1 Encounters:   01/13/22 58     Resp Readings from Last 1 Encounters:   06/17/21 14     Estimated body mass index is 22.24 kg/m  as calculated from the following:    Height as of this encounter: 1.68 m (5' 6.14\").    Weight as of this encounter: 62.8 kg (138 lb 6.4 oz).      GEN: NAD. Healthy appearing adult.   HEENT:  Anicteric, noninjected sclera. No obvious external lesions of the ear and nose. Hearing intact.  PULM: No increased work of breathing.   MSK: MCPs, PIPs, DIPs without swelling or tenderness to palpation.  Wrists without swelling or tenderness to palpation.  Elbows and shoulders without swelling or tenderness to palpation.  Knees, ankles, and MTPs without swelling or tenderness to palpation.    SKIN: No rash or jaundice seen  PSYCH: Alert. Appropriate.           Labs / Imaging (select studies)     CBC  Recent Labs   Lab Test 01/10/22  1526 09/09/21  1502 09/02/21  0857 06/02/21  1508 02/24/21  1521 12/10/20  0951 09/22/20  1517   WBC 6.1 3.4* 3.1* 4.7 5.3   < > 5.7   RBC 3.93 3.80 4.01 3.90 3.89   < > 3.78*   HGB 12.0 11.5* 12.2 11.7 11.6*   < > 11.5*   HCT 37.4 35.4 36.9 36.4 36.5   < > 35.0   MCV 95 93 92 93 94   < > 93   RDW 12.2 12.4 11.9 13.0 12.4   < > 12.2    272 278 308 309   < > 284   MCH 30.5 30.3 30.4 30.0 29.8   < > 30.4   MCHC 32.1 32.5 33.1 32.1 31.8   < > 32.9   NEUTROPHIL 41 48 43 55.2 52.6  --  54.2   LYMPH 37 31 35 24.5 " 26.4  --  26.7   MONOCYTE 15 18 16 16.7 16.8  --  15.6   EOSINOPHIL 6 3 4 3.0 3.6  --  3.0   BASOPHIL 1 1 2 0.6 0.6  --  0.5   ANEU  --   --   --  2.6 2.8  --  3.1   ALYM  --   --   --  1.2 1.4  --  1.5   ELIGIO  --   --   --  0.8 0.9  --  0.9   AEOS  --   --   --  0.1 0.2  --  0.2   ABAS  --   --   --  0.0 0.0  --  0.0   ANEUTAUTO 2.5 1.6 1.4*  --   --   --   --    ALYMPAUTO 2.3 1.0 1.1  --   --   --   --    AMONOAUTO 0.9 0.6 0.5  --   --   --   --    AEOSAUTO 0.4 0.1 0.1  --   --   --   --    ABSBASO 0.0 0.0 0.1  --   --   --   --     < > = values in this interval not displayed.     CMP  Recent Labs   Lab Test 01/10/22  1526 09/02/21  0857 06/17/21  1158 06/02/21  1508 04/06/21  1518 02/24/21  1521 12/10/20  0951 04/26/19  1327 04/12/19  1509 09/07/17  1322 06/20/17  1437 03/06/17  1300 05/24/16  0940   NA  --   --   --   --   --   --  137  --  139  --  141  --  137   POTASSIUM  --   --   --   --   --   --  3.9  --  4.3  --  3.8  --  4.4   CHLORIDE  --   --   --   --   --   --  104  --  103  --  105  --  105   CO2  --   --   --   --   --   --  31  --  29  --  29  --  29   ANIONGAP  --   --   --   --   --   --  2*  --  7  --  7  --  3   GLC  --   --   --   --   --   --  85  --   --   --  84  --  85   BUN  --   --   --   --   --   --  10  --   --   --  22  --  18   CR 0.82 0.88 0.90 0.90  --  0.72 0.70   < > 0.86   < > 0.84   < > 0.92   GFRESTIMATED 83 73 71 71  --  >90 >90   < > 76   < > 71   < > 64   GFRESTBLACK  --   --  82 83  --  >90 >90   < > 88   < > 85   < > 77   VALERIA  --   --  8.7 9.5  --   --  9.6   < > 8.7  --  9.1  --  9.2   BILITOTAL 0.2 0.3  --  0.3  --  0.2  --    < >  --    < > 0.2   < > 0.4   ALBUMIN 4.2 4.2  --  4.4  --  4.1  --    < >  --    < > 4.0   < > 4.4   PROTTOTAL 7.8 6.8  --  7.4  --  7.2  --    < >  --    < > 6.8   < > 7.3   ALKPHOS 37* 34*  --  46  --  57  --    < >  --    < > 40   < > 42   AST 26 18  --  27   < > 76*  --    < >  --    < > 21   < > 30   ALT 24 21  --  22   < > 31  --    < >   --    < > 21   < > 24    < > = values in this interval not displayed.     Calcium/VitaminD  Recent Labs   Lab Test 06/17/21  1158 06/02/21  1508 12/10/20  0951 06/16/20  1507 01/08/20  1548 05/24/16  0940 07/13/15  1036   VALERIA 8.7 9.5 9.6   < > 9.4   < >  --    VITDT  --  61  --   --  38  --  48    < > = values in this interval not displayed.     ESR/CRP  Recent Labs   Lab Test 01/10/22  1526 09/02/21  0857 06/02/21  1508   SED 9 6 8   CRP <2.9 <2.9 <2.9     Lipid Panel  Recent Labs   Lab Test 12/10/20  0951 07/13/15  1036   CHOL 203* 181   TRIG 90 132   HDL 75 74   * 81   VLDL  --  26   CHOLHDLRATIO  --  2.4   NHDL 128  --      Hepatitis B  Recent Labs   Lab Test 09/04/19  1552   HBCAB Nonreactive   HEPBANG Nonreactive     Hepatitis C  Recent Labs   Lab Test 06/20/17  1437   HCVAB Nonreactive   Assay performance characteristics have not been established for newborns,   infants, and children       Tuberculosis Screening  Recent Labs   Lab Test 09/09/21  1502   TBRES Negative     Immunization History     Immunization History   Administered Date(s) Administered     COVID-19,PF,Moderna 02/03/2021, 03/03/2021, 12/07/2021     FLU 6-35 months 10/12/2019     Influenza (H1N1) 12/03/2009     Influenza (IIV3) PF 11/17/2005, 11/17/2006, 10/25/2010, 01/09/2013     Influenza Vaccine IM > 6 months Valent IIV4 (Alfuria,Fluzone) 10/10/2013, 09/14/2016, 09/13/2017, 01/10/2019     Influenza,INJ,MDCK,PF,Quad >4yrs 10/16/2020     Pneumo Conj 13-V (2010&after) 09/13/2017     Pneumococcal 23 valent 09/12/2018     TD (ADULT, 7+) 02/01/2005     TDAP Vaccine (Adacel) 01/09/2013          Chart documentation done in part with Dragon Voice recognition Software. Although reviewed after completion, some word and grammatical error may remain.    Rome Hardy MD

## 2022-02-13 ENCOUNTER — HEALTH MAINTENANCE LETTER (OUTPATIENT)
Age: 57
End: 2022-02-13

## 2022-04-13 ENCOUNTER — ANCILLARY PROCEDURE (OUTPATIENT)
Dept: BONE DENSITY | Facility: CLINIC | Age: 57
End: 2022-04-13
Attending: INTERNAL MEDICINE
Payer: COMMERCIAL

## 2022-04-13 PROCEDURE — 77081 DXA BONE DENSITY APPENDICULR: CPT | Mod: 59 | Performed by: RADIOLOGY

## 2022-04-13 PROCEDURE — 77080 DXA BONE DENSITY AXIAL: CPT | Performed by: RADIOLOGY

## 2022-05-02 ENCOUNTER — LAB (OUTPATIENT)
Dept: LAB | Facility: OTHER | Age: 57
End: 2022-05-02
Payer: COMMERCIAL

## 2022-05-02 DIAGNOSIS — Z79.899 HIGH RISK MEDICATION USE: ICD-10-CM

## 2022-05-02 DIAGNOSIS — M05.79 RHEUMATOID ARTHRITIS INVOLVING MULTIPLE SITES WITH POSITIVE RHEUMATOID FACTOR (H): ICD-10-CM

## 2022-05-02 DIAGNOSIS — M80.00XA OSTEOPOROSIS WITH CURRENT PATHOLOGICAL FRACTURE, UNSPECIFIED OSTEOPOROSIS TYPE, INITIAL ENCOUNTER: ICD-10-CM

## 2022-05-02 LAB
ALBUMIN SERPL-MCNC: 4.4 G/DL (ref 3.4–5)
ALP SERPL-CCNC: 41 U/L (ref 40–150)
ALT SERPL W P-5'-P-CCNC: 23 U/L (ref 0–50)
AST SERPL W P-5'-P-CCNC: 27 U/L (ref 0–45)
BASOPHILS # BLD AUTO: 0 10E3/UL (ref 0–0.2)
BASOPHILS NFR BLD AUTO: 1 %
BILIRUB DIRECT SERPL-MCNC: 0.1 MG/DL (ref 0–0.2)
BILIRUB SERPL-MCNC: 0.3 MG/DL (ref 0.2–1.3)
CALCIUM SERPL-MCNC: 9.7 MG/DL (ref 8.5–10.1)
CREAT SERPL-MCNC: 0.96 MG/DL (ref 0.52–1.04)
CRP SERPL-MCNC: <2.9 MG/L (ref 0–8)
EOSINOPHIL # BLD AUTO: 0.4 10E3/UL (ref 0–0.7)
EOSINOPHIL NFR BLD AUTO: 5 %
ERYTHROCYTE [DISTWIDTH] IN BLOOD BY AUTOMATED COUNT: 12.4 % (ref 10–15)
ERYTHROCYTE [SEDIMENTATION RATE] IN BLOOD BY WESTERGREN METHOD: 7 MM/HR (ref 0–30)
GFR SERPL CREATININE-BSD FRML MDRD: 69 ML/MIN/1.73M2
HCT VFR BLD AUTO: 38 % (ref 35–47)
HGB BLD-MCNC: 12.5 G/DL (ref 11.7–15.7)
LYMPHOCYTES # BLD AUTO: 2 10E3/UL (ref 0.8–5.3)
LYMPHOCYTES NFR BLD AUTO: 27 %
MCH RBC QN AUTO: 31 PG (ref 26.5–33)
MCHC RBC AUTO-ENTMCNC: 32.9 G/DL (ref 31.5–36.5)
MCV RBC AUTO: 94 FL (ref 78–100)
MONOCYTES # BLD AUTO: 1 10E3/UL (ref 0–1.3)
MONOCYTES NFR BLD AUTO: 14 %
NEUTROPHILS # BLD AUTO: 3.9 10E3/UL (ref 1.6–8.3)
NEUTROPHILS NFR BLD AUTO: 53 %
PLATELET # BLD AUTO: 307 10E3/UL (ref 150–450)
PROT SERPL-MCNC: 7.1 G/DL (ref 6.8–8.8)
RBC # BLD AUTO: 4.03 10E6/UL (ref 3.8–5.2)
WBC # BLD AUTO: 7.2 10E3/UL (ref 4–11)

## 2022-05-02 PROCEDURE — 36415 COLL VENOUS BLD VENIPUNCTURE: CPT

## 2022-05-02 PROCEDURE — 86140 C-REACTIVE PROTEIN: CPT

## 2022-05-02 PROCEDURE — 82306 VITAMIN D 25 HYDROXY: CPT

## 2022-05-02 PROCEDURE — 85652 RBC SED RATE AUTOMATED: CPT

## 2022-05-02 PROCEDURE — 82565 ASSAY OF CREATININE: CPT

## 2022-05-02 PROCEDURE — 80076 HEPATIC FUNCTION PANEL: CPT

## 2022-05-02 PROCEDURE — 82310 ASSAY OF CALCIUM: CPT

## 2022-05-02 PROCEDURE — 85025 COMPLETE CBC W/AUTO DIFF WBC: CPT

## 2022-05-03 LAB — DEPRECATED CALCIDIOL+CALCIFEROL SERPL-MC: 59 UG/L (ref 20–75)

## 2022-05-05 ENCOUNTER — OFFICE VISIT (OUTPATIENT)
Dept: RHEUMATOLOGY | Facility: CLINIC | Age: 57
End: 2022-05-05
Payer: COMMERCIAL

## 2022-05-05 ENCOUNTER — TELEPHONE (OUTPATIENT)
Dept: RHEUMATOLOGY | Facility: CLINIC | Age: 57
End: 2022-05-05
Payer: COMMERCIAL

## 2022-05-05 VITALS
DIASTOLIC BLOOD PRESSURE: 63 MMHG | OXYGEN SATURATION: 98 % | BODY MASS INDEX: 22.5 KG/M2 | SYSTOLIC BLOOD PRESSURE: 97 MMHG | HEART RATE: 85 BPM | WEIGHT: 140 LBS

## 2022-05-05 DIAGNOSIS — M05.79 RHEUMATOID ARTHRITIS INVOLVING MULTIPLE SITES WITH POSITIVE RHEUMATOID FACTOR (H): Primary | ICD-10-CM

## 2022-05-05 DIAGNOSIS — Z79.899 HIGH RISK MEDICATION USE: ICD-10-CM

## 2022-05-05 DIAGNOSIS — M80.00XA OSTEOPOROSIS WITH CURRENT PATHOLOGICAL FRACTURE, UNSPECIFIED OSTEOPOROSIS TYPE, INITIAL ENCOUNTER: ICD-10-CM

## 2022-05-05 PROCEDURE — 99214 OFFICE O/P EST MOD 30 MIN: CPT | Performed by: INTERNAL MEDICINE

## 2022-05-05 RX ORDER — HYDROXYCHLOROQUINE SULFATE 200 MG/1
TABLET, FILM COATED ORAL
Qty: 135 TABLET | Refills: 2 | Status: SHIPPED | OUTPATIENT
Start: 2022-05-05 | End: 2022-08-24

## 2022-05-05 NOTE — PROGRESS NOTES
Rheumatology Clinic Visit      Zena Quintana MRN# [id number]   YOB: 1965 Age: 57 year old      Date of visit: 5/05/22   PCP: Dr. Chana Armenta    Chief Complaint   Patient presents with:  Rheumatoid Arthritis    Assessment and Plan     1. Seropositive nonerosive rheumatoid arthritis (RF low positive, CCP negative): Reportedly at the time of diagnosis she had synovitis in a symmetric distribution that was steroid responsive. Previously on MTX (effective, anemia, LFT elevation), leflunomide (neutropenia). Currently on hydroxychloroquine 300 mg daily on average (started in approximately 2007) and Humira 40mg SQ every 14 days (started 10/10/2021). Significant improvement with addition of Humira. RA controlled when on humira 40mg [id number] and hydroxychloroquine 300mg daily (avg). Hydroxychloroquine was reduced in an effort to get to a lower effective dose but has more arthralgia and longer duration of morning stiffness so will increase hydroxychloroquine back to the effective dose.  Eye exam for hydroxychloroquine toxicity monitoring is due.  Chronic illness    - Increase hydroxychloroquine from 200mg daily;  to 200mg daily with an additional 200mg every other day (last eye exam was on 11/9/2020 by Dr. Mirza; yearly eye exam needed)   - Continue Humira 40 mg SQ every 14 days   - Ophthalmology referral for hydroxychloroquine toxicity monitoring   - No rheumatology labs needed prior to 4 mo rheumatology follow-up      2. Osteoporosis: s/p hip fx from ground level fall and is now status-post right HUY. Note that her mother has a hx of vertebral compression fx; Ms. Quintana is post-menopausal. 1/13/2020 DEXA showed osteoporosis; L-spine T score of -2.6, Left femoral neck T-score of -2.4. Alendronate was started 1/2020 but stopped in early April 2020 by Ms. Quintana due to hair loss and GERD.  She then received Reclast on 6/16/2020, 6/17/2021.  Plan to continue Reclast on a yearly basis until 2025.   4/12/2022 DEXA showed improvement of the lumbar spine and stable at the radius and left femoral neck.  Chronic illness, stable.    - Continue reclast 5mg IV once yearly, next due 6/17/2022 or shortly thereafter  - Continue calcium 1200mg daily  - Continue vitamin D 1000 IU daily    3.  History of bilateral carpal tunnel syndrome: doesn't tolerate NSAIDs per patient.  Using wrist splints bilaterally with significant improvement, and was intermittent symptoms.  Symptoms then worsened and she had the nerve conduction study performed, showing bilateral carpal tunnel syndrome.  She was then seen by orthopedics and had surgery on the right with resolution of symptoms with did not have surgery on the left; left carpal tunnel syndrome symptoms have improved over time and she is not symptomatic today with regard to carpal tunnel syndrome.     4. 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 S/p surgery.  Doing okay at this time.    5. Sjogren's syndrome: NJ by EIA negative but positive by IF, SSA and SSB negative, and minor salivary gland biopsy negative. She is currently following with ophthalmology who has given her eyedrops that have been beneficial.  Dry mouth is currently treated with frequent sips of water; she has good oral hygiene, follows with a dentist regularly, and does not have frequent cavities. Pilocarpine was previously Rx'd but never used and has been removed from her medication list previously.    6. History of oral sores / family history of lupus / polyarthritis / recurrent sinus infections: She is not having oral sores or recurrent sinus infections for several years now. Retrospectively, there would be concern for potential ANCA associated sinus issues and this could be checked if she had recurrent sinusitis again. Oral sores have improved  since she started Plaquenil, that argues it could be connective tissue disease related. NJ was negative by EIA, but complement was on the low end of the normal range so NJ was rechecked and positive; further testing was negative.    6. COVID-19 vaccination:  - COVID-19: has received the Moderna COVID-19 vaccine on 2/3/2021 and 3/3/2021 and 12/7/2021. A 4th dose (1st booster) of the mRNA COVID-19 vaccination is recommended to be received 3 months after the third mRNA COVID-19 vaccination was received.  A 5th mRNA vaccine (2nd booster) may be received at least 4 months after the 4th dose.     Total minutes spent in evaluation with patient, documentation, , and review of pertinent studies and chart notes: 20     Ms. Quintana verbalized agreement with and understanding of the rational for the diagnosis and treatment plan.  All questions were answered to best of my ability and the patient's satisfaction. Ms. Quintana was advised to contact the clinic with any questions that may arise after the clinic visit.      Thank you for involving me in the care of the patient    Return to clinic: 3-4 months      HPI   Zena Quintana is a 57 year old female with medical history significant for iron deficiency anemia, nephrolithiasis, hyperlipidemia, xerostomia, dry eyes, status-post right HUY, and rheumatoid arthritis who presents for follow-up of rheumatoid arthritis.    1/13/22: doing well. Significant improvement with addition of Humira. No joint pain/swelling/stiffness.  Tolerating medications well. Arthritis is not limiting any of her daily activities.    Today, 5/5/2022: felt more aches and increased morning stiffness since reducing hydroxychloroquine. Taking hydroxychloroquine 200mg daily and humira 40mg SQ every 14 days.  Morning stiffness for about 30 min.  Feels worse with weather changes. No joint swelling. States that she is due for an eye exam for hydroxychloroquine toxicity monitoring.      Denies fevers,  chills, nausea, vomiting, constipation, diarrhea. No abdominal pain. No chest pain/pressure, palpitations, or shortness of breath. No neck pain. No rash. No LE swelling.  No photosensitivity or photophobia.  No sicca symptoms.     Tobacco: None  EtOH: Occasional; no more than 2 drinks per day when she does drink  Drugs: None  Occupation: Works at a school    ROS   12 point review of system was completed and negative except as noted in the HPI     Active Problem List     Patient Active Problem List   Diagnosis     Internal derangement of knee     Iron deficiency anemia     Allergic rhinitis     Stomatitis and mucositis (ulcerative)     Pure hypercholesterolemia     Arthropathy     HYPERLIPIDEMIA LDL GOAL <160     High risk medication use     RA (rheumatoid arthritis) (H)     Endometriosis     Ovarian cyst     Disturbance of salivary secretion (XEROSTOMIA)     Impingement syndrome of right shoulder     Sjogren's syndrome (H)     Palpitations     Cervical high risk HPV (human papillomavirus) test positive     Osteoporosis with current pathological fracture, unspecified osteoporosis type, initial encounter     Oral bisphosphonates causing adverse effect in therapeutic use, initial encounter     Past Medical History     Past Medical History:   Diagnosis Date     Allergic rhinitis, cause unspecified     Allergic rhinitis     Cervical high risk HPV (human papillomavirus) test positive 03/08/2019    see problem list     Endometriosis, site unspecified     Endometriosis     Female infertility of other specified origin      Iron deficiency anemia, unspecified     Anemia, iron def.     RA (rheumatoid arthritis) (H)      Sjogren's syndrome (H)      Stomatitis and mucositis (ulcerative)     chronic - non-specific     Past Surgical History     Past Surgical History:   Procedure Laterality Date     COLONOSCOPY WITH CO2 INSUFFLATION N/A 8/7/2017    Procedure: COLONOSCOPY WITH CO2 INSUFFLATION;  Colonoscopy, Screen for colon cancer.   BMI  22.17  [facility] [phone];  Surgeon: Berny Pedro MD;  Location: [facility] OR     HC KNEE SCOPE, DIAGNOSTIC       left knee, ruptured ACL     HC REMOVE TONSILS/ADENOIDS,12+ Y/O      T & A 11+y.o.     ORTHOPEDIC SURGERY      rotator cuff repair, left     ZZC NONSPECIFIC PROCEDURE  1995, 1997    laparoscopy for endometrial fulguration     ZZHC COLONOSCOPY W/WO BRUSH/WASH  5/25/2005     ZZHC CREATE EARDRUM OPENING,GEN ANESTH      P.E. Tubes     Allergy     Allergies   Allergen Reactions     Alendronic Acid Other (See Comments)     Hair loss     Penicillins Hives     hives     Current Medication List     Current Outpatient Medications   Medication Sig     acetaminophen (TYLENOL) 500 MG tablet Take 1-2 tablets by mouth every 6 hours as needed for pain. (Patient not taking: Reported on 1/19/2021)     adalimumab (HUMIRA *CF*) 40 MG/0.4ML pen kit Inject 0.4 mLs (40 mg) Subcutaneous every 14 days . Hold for signs of infection, then seek medical attention.     albuterol (PROAIR HFA/PROVENTIL HFA/VENTOLIN HFA) 108 (90 Base) MCG/ACT inhaler Inhale 2 puffs into the lungs every 4 hours as needed for shortness of breath / dyspnea or wheezing (Patient not taking: Reported on 1/19/2021)     aspirin (ASA) 325 MG tablet Take 325 mg by mouth daily     bimatoprost (LATISSE) 0.03 % SOLN Externally apply topically At Bedtime     calcium carbonate (OS-VALERIA) 600 MG tablet Take 2 tablets by mouth daily     ferrous sulfate (FEROSUL) 325 (65 Fe) MG tablet Take 325 mg by mouth daily (Patient not taking: Reported on 1/13/2022)     Fexofenadine HCl (MUCINEX ALLERGY PO)  (Patient not taking: Reported on 1/13/2022)     fluticasone (FLONASE) 50 MCG/ACT spray Spray 2 sprays into both nostrils daily (Patient not taking: Reported on 6/14/2021)     hydroxychloroquine (PLAQUENIL) 200 MG tablet Take 1 tablet (200 mg) by mouth daily     ibuprofen (ADVIL) 200 MG capsule Take 200 mg by mouth as needed for fever     loratadine 10 MG capsule  "Take 10 mg by mouth as needed for allergies     MULTIPLE VITAMINS/WOMENS OR None Entered     zinc sulfate (ZINCATE) 220 (50 Zn) MG capsule Take 1 capsule by mouth daily     No current facility-administered medications for this visit.         Social History   See HPI    Family History     Family History   Problem Relation Age of Onset     Osteoporosis Mother      Arthritis Mother         lupus     Connective Tissue Disorder Mother         Lupus     Cataracts Mother      Macular Degeneration Mother      Celiac Disease Mother      Heart Disease Father         aortic aneurysm     Gastrointestinal Disease Father         abdominal anurysm     Hypertension Father      Cataracts Father      Gastrointestinal Disease Sister         IBS, colon problems     Alzheimer Disease Maternal Grandmother      Osteoporosis Maternal Grandmother      Cerebrovascular Disease Maternal Grandfather      Cancer Paternal Grandmother      Cerebrovascular Disease Paternal Grandfather      Circulatory Sister         carotid stenosis     Cancer - colorectal Maternal Uncle      Cancer - colorectal Maternal Uncle      Glaucoma No family hx of      Mother: Lupus    Physical Exam     Temp Readings from Last 3 Encounters:   06/17/21 98.1  F (36.7  C) (Oral)   06/14/21 97.8  F (36.6  C) (Temporal)   04/08/21 98  F (36.7  C) (Oral)     BP Readings from Last 5 Encounters:   01/13/22 111/70   09/09/21 104/70   06/17/21 99/59   06/14/21 98/64   04/08/21 102/67     Pulse Readings from Last 1 Encounters:   01/13/22 58     Resp Readings from Last 1 Encounters:   06/17/21 14     Estimated body mass index is 22.24 kg/m  as calculated from the following:    Height as of 1/13/22: 1.68 m (5' 6.14\").    Weight as of 1/13/22: 62.8 kg (138 lb 6.4 oz).    GEN: NAD. Healthy appearing adult.   HEENT:  Anicteric, noninjected sclera. No obvious external lesions of the ear and nose. Hearing intact.  CV: S1, S2. RRR. No m/r/g.  PULM: No increased work of breathing. CTA " bilaterally  MSK: MCPs, PIPs, DIPs without swelling or tenderness to palpation.  Wrists without swelling or tenderness to palpation.  Elbows and shoulders without swelling or tenderness to palpation.  Knees, ankles, and MTPs without swelling or tenderness to palpation.    SKIN: No rash or jaundice seen  PSYCH: Alert. Appropriate.        Labs / Imaging (select studies)     CBC  Recent Labs   Lab Test 05/02/22  1526 01/10/22  1526 09/09/21  1502 09/02/21  0857 06/02/21  1508 02/24/21  1521 12/10/20  0951 09/22/20  1517   WBC 7.2 6.1 3.4*   < > 4.7 5.3   < > 5.7   RBC 4.03 3.93 3.80   < > 3.90 3.89   < > 3.78*   HGB 12.5 12.0 11.5*   < > 11.7 11.6*   < > 11.5*   HCT 38.0 37.4 35.4   < > 36.4 36.5   < > 35.0   MCV 94 95 93   < > 93 94   < > 93   RDW 12.4 12.2 12.4   < > 13.0 12.4   < > 12.2    304 272   < > 308 309   < > 284   MCH 31.0 30.5 30.3   < > 30.0 29.8   < > 30.4   MCHC 32.9 32.1 32.5   < > 32.1 31.8   < > 32.9   NEUTROPHIL 53 41 48   < > 55.2 52.6  --  54.2   LYMPH 27 37 31   < > 24.5 26.4  --  26.7   MONOCYTE 14 15 18   < > 16.7 16.8  --  15.6   EOSINOPHIL 5 6 3   < > 3.0 3.6  --  3.0   BASOPHIL 1 1 1   < > 0.6 0.6  --  0.5   ANEU  --   --   --   --  2.6 2.8  --  3.1   ALYM  --   --   --   --  1.2 1.4  --  1.5   ELIGIO  --   --   --   --  0.8 0.9  --  0.9   AEOS  --   --   --   --  0.1 0.2  --  0.2   ABAS  --   --   --   --  0.0 0.0  --  0.0   ANEUTAUTO 3.9 2.5 1.6   < >  --   --   --   --    ALYMPAUTO 2.0 2.3 1.0   < >  --   --   --   --    AMONOAUTO 1.0 0.9 0.6   < >  --   --   --   --    AEOSAUTO 0.4 0.4 0.1   < >  --   --   --   --    ABSBASO 0.0 0.0 0.0   < >  --   --   --   --     < > = values in this interval not displayed.     CMP  Recent Labs   Lab Test 05/02/22  1526 01/10/22  1526 09/02/21  0857 06/17/21  1158 06/02/21  1508 04/06/21  1518 02/24/21  1521 12/10/20  0951 04/26/19  1327 04/12/19  1509 09/07/17  1322 06/20/17  1437 03/06/17  1300 05/24/16  0940   NA  --   --   --   --   --   --    --  137  --  139  --  141  --  137   POTASSIUM  --   --   --   --   --   --   --  3.9  --  4.3  --  3.8  --  4.4   CHLORIDE  --   --   --   --   --   --   --  104  --  103  --  105  --  105   CO2  --   --   --   --   --   --   --  31  --  29  --  29  --  29   ANIONGAP  --   --   --   --   --   --   --  2*  --  7  --  7  --  3   GLC  --   --   --   --   --   --   --  85  --   --   --  84  --  85   BUN  --   --   --   --   --   --   --  10  --   --   --  22  --  18   CR 0.96 0.82 0.88 0.90 0.90  --  0.72 0.70   < > 0.86   < > 0.84   < > 0.92   GFRESTIMATED 69 83 73 71 71  --  >90 >90   < > 76   < > 71   < > 64   GFRESTBLACK  --   --   --  82 83  --  >90 >90   < > 88   < > 85   < > 77   VALERIA 9.7  --   --  8.7 9.5  --   --  9.6   < > 8.7  --  9.1  --  9.2   BILITOTAL 0.3 0.2 0.3  --  0.3  --  0.2  --    < >  --    < > 0.2   < > 0.4   ALBUMIN 4.4 4.2 4.2  --  4.4  --  4.1  --    < >  --    < > 4.0   < > 4.4   PROTTOTAL 7.1 7.8 6.8  --  7.4  --  7.2  --    < >  --    < > 6.8   < > 7.3   ALKPHOS 41 37* 34*  --  46  --  57  --    < >  --    < > 40   < > 42   AST 27 26 18  --  27   < > 76*  --    < >  --    < > 21   < > 30   ALT 23 24 21  --  22   < > 31  --    < >  --    < > 21   < > 24    < > = values in this interval not displayed.     Calcium/VitaminD  Recent Labs   Lab Test 05/02/22  1526 06/17/21  1158 06/02/21  1508 06/16/20  1507 01/08/20  1548   VALERIA 9.7 8.7 9.5   < > 9.4   VITDT 59  --  61  --  38    < > = values in this interval not displayed.     ESR/CRP  Recent Labs   Lab Test 05/02/22  1526 01/10/22  1526 09/02/21  0857   SED 7 9 6   CRP <2.9 <2.9 <2.9     Hepatitis B  Recent Labs   Lab Test 09/04/19  1552   HBCAB Nonreactive   HEPBANG Nonreactive     Hepatitis C  Recent Labs   Lab Test 06/20/17  1437   HCVAB Nonreactive   Assay performance characteristics have not been established for newborns,   infants, and children         Tuberculosis Screening  Recent Labs   Lab Test 09/09/21  1502   TBRES Negative      Immunization History     Immunization History   Administered Date(s) Administered     COVID-19,PF,Moderna 02/03/2021, 03/03/2021, 12/07/2021     FLU 6-35 months 10/12/2019     Influenza (H1N1) 12/03/2009     Influenza (IIV3) PF 11/17/2005, 11/17/2006, 10/25/2010, 01/09/2013     Influenza Vaccine IM > 6 months Valent IIV4 (Alfuria,Fluzone) 10/10/2013, 09/14/2016, 09/13/2017, 01/10/2019     Influenza,INJ,MDCK,PF,Quad >4yrs 10/16/2020     Pneumo Conj 13-V (2010&after) 09/13/2017     Pneumococcal 23 valent 09/12/2018     TD (ADULT, 7+) 02/01/2005     TDAP Vaccine (Adacel) 01/09/2013          Chart documentation done in part with Dragon Voice recognition Software. Although reviewed after completion, some word and grammatical error may remain.    Rome Hardy MD

## 2022-05-05 NOTE — TELEPHONE ENCOUNTER
Patient has an appointment today at 2:40. Prescription will be refilled if patient is kept on it.  Genesis Medley CMA Rheumatology  5/5/2022

## 2022-05-05 NOTE — PATIENT INSTRUCTIONS
RHEUMATOLOGY    Dr. Rome Hardy    [address]  [place] [address]  Phone number: [phone]  Fax number: [phone]      Thank you for choosing [facility]!    Genesis Medley CMA Rheumatology  ------------------    COVID-19 vaccination:  A 4th dose (1st booster) of the mRNA COVID-19 vaccination is recommended to be received 3 months after the third mRNA COVID-19 vaccination was received.  A 5th mRNA vaccine (2nd booster) may be received at least 4 months after the 4th dose.

## 2022-05-24 ENCOUNTER — PATIENT OUTREACH (OUTPATIENT)
Dept: FAMILY MEDICINE | Facility: OTHER | Age: 57
End: 2022-05-24
Payer: COMMERCIAL

## 2022-06-20 ENCOUNTER — INFUSION THERAPY VISIT (OUTPATIENT)
Dept: INFUSION THERAPY | Facility: CLINIC | Age: 57
End: 2022-06-20
Payer: COMMERCIAL

## 2022-06-20 ENCOUNTER — LAB (OUTPATIENT)
Dept: LAB | Facility: CLINIC | Age: 57
End: 2022-06-20

## 2022-06-20 VITALS
BODY MASS INDEX: 22.32 KG/M2 | TEMPERATURE: 98 F | HEART RATE: 57 BPM | RESPIRATION RATE: 16 BRPM | SYSTOLIC BLOOD PRESSURE: 102 MMHG | OXYGEN SATURATION: 100 % | DIASTOLIC BLOOD PRESSURE: 65 MMHG | WEIGHT: 138.9 LBS

## 2022-06-20 DIAGNOSIS — M80.00XA OSTEOPOROSIS WITH CURRENT PATHOLOGICAL FRACTURE, UNSPECIFIED OSTEOPOROSIS TYPE, INITIAL ENCOUNTER: ICD-10-CM

## 2022-06-20 DIAGNOSIS — T45.8X5A ORAL BISPHOSPHONATES CAUSING ADVERSE EFFECT IN THERAPEUTIC USE, INITIAL ENCOUNTER: ICD-10-CM

## 2022-06-20 DIAGNOSIS — T45.8X5A ORAL BISPHOSPHONATES CAUSING ADVERSE EFFECT IN THERAPEUTIC USE, INITIAL ENCOUNTER: Primary | ICD-10-CM

## 2022-06-20 DIAGNOSIS — M80.00XA OSTEOPOROSIS WITH CURRENT PATHOLOGICAL FRACTURE, UNSPECIFIED OSTEOPOROSIS TYPE, INITIAL ENCOUNTER: Primary | ICD-10-CM

## 2022-06-20 LAB
CALCIUM SERPL-MCNC: 10.2 MG/DL (ref 8.5–10.1)
CREAT SERPL-MCNC: 0.79 MG/DL (ref 0.52–1.04)
GFR SERPL CREATININE-BSD FRML MDRD: 86 ML/MIN/1.73M2
HOLD SPECIMEN: NORMAL
HOLD SPECIMEN: NORMAL

## 2022-06-20 PROCEDURE — 82565 ASSAY OF CREATININE: CPT | Performed by: INTERNAL MEDICINE

## 2022-06-20 PROCEDURE — 96365 THER/PROPH/DIAG IV INF INIT: CPT | Performed by: NURSE PRACTITIONER

## 2022-06-20 PROCEDURE — 99207 PR NO CHARGE LOS: CPT

## 2022-06-20 PROCEDURE — 36415 COLL VENOUS BLD VENIPUNCTURE: CPT | Performed by: INTERNAL MEDICINE

## 2022-06-20 PROCEDURE — 82310 ASSAY OF CALCIUM: CPT | Performed by: INTERNAL MEDICINE

## 2022-06-20 RX ORDER — ALBUTEROL SULFATE 90 UG/1
1-2 AEROSOL, METERED RESPIRATORY (INHALATION)
Status: CANCELLED
Start: 2022-06-20

## 2022-06-20 RX ORDER — NALOXONE HYDROCHLORIDE 0.4 MG/ML
0.2 INJECTION, SOLUTION INTRAMUSCULAR; INTRAVENOUS; SUBCUTANEOUS
Status: CANCELLED | OUTPATIENT
Start: 2022-06-20

## 2022-06-20 RX ORDER — HEPARIN SODIUM (PORCINE) LOCK FLUSH IV SOLN 100 UNIT/ML 100 UNIT/ML
5 SOLUTION INTRAVENOUS
Status: CANCELLED | OUTPATIENT
Start: 2022-06-20

## 2022-06-20 RX ORDER — ALBUTEROL SULFATE 0.83 MG/ML
2.5 SOLUTION RESPIRATORY (INHALATION)
Status: CANCELLED | OUTPATIENT
Start: 2022-06-20

## 2022-06-20 RX ORDER — MEPERIDINE HYDROCHLORIDE 25 MG/ML
25 INJECTION INTRAMUSCULAR; INTRAVENOUS; SUBCUTANEOUS EVERY 30 MIN PRN
Status: CANCELLED | OUTPATIENT
Start: 2022-06-20

## 2022-06-20 RX ORDER — DIPHENHYDRAMINE HYDROCHLORIDE 50 MG/ML
50 INJECTION INTRAMUSCULAR; INTRAVENOUS
Status: CANCELLED
Start: 2022-06-20

## 2022-06-20 RX ORDER — ZOLEDRONIC ACID 5 MG/100ML
5 INJECTION, SOLUTION INTRAVENOUS ONCE
Status: CANCELLED
Start: 2022-06-20 | End: 2022-06-20

## 2022-06-20 RX ORDER — HEPARIN SODIUM,PORCINE 10 UNIT/ML
5 VIAL (ML) INTRAVENOUS
Status: CANCELLED | OUTPATIENT
Start: 2022-06-20

## 2022-06-20 RX ORDER — EPINEPHRINE 1 MG/ML
0.3 INJECTION, SOLUTION, CONCENTRATE INTRAVENOUS EVERY 5 MIN PRN
Status: CANCELLED | OUTPATIENT
Start: 2022-06-20

## 2022-06-20 RX ORDER — ZOLEDRONIC ACID 5 MG/100ML
5 INJECTION, SOLUTION INTRAVENOUS ONCE
Status: COMPLETED | OUTPATIENT
Start: 2022-06-20 | End: 2022-06-20

## 2022-06-20 RX ORDER — EPINEPHRINE 1 MG/ML
0.3 INJECTION, SOLUTION INTRAMUSCULAR; SUBCUTANEOUS EVERY 5 MIN PRN
Status: CANCELLED | OUTPATIENT
Start: 2022-06-20

## 2022-06-20 RX ORDER — METHYLPREDNISOLONE SODIUM SUCCINATE 125 MG/2ML
125 INJECTION, POWDER, LYOPHILIZED, FOR SOLUTION INTRAMUSCULAR; INTRAVENOUS
Status: CANCELLED
Start: 2022-06-20

## 2022-06-20 RX ADMIN — Medication 250 ML: at 12:36

## 2022-06-20 RX ADMIN — ZOLEDRONIC ACID 5 MG: 0.05 INJECTION, SOLUTION INTRAVENOUS at 12:37

## 2022-06-20 NOTE — PROGRESS NOTES
Infusion Nursing Note:  Zena Quintana presents today for Reclast.    Patient seen by provider today: No   present during visit today: Not Applicable.    Note: Patient has had reclast in the past without complications. No new concerns to report.    Intravenous Access:  Peripheral IV placed.    Treatment Conditions:  Not Applicable.    Post Infusion Assessment:  Patient tolerated infusion without incident.  Site patent and intact, free from redness, edema or discomfort.  No evidence of extravasations.  Access discontinued per protocol.     Discharge Plan:   AVS to patient via MYCHART.  Patient will call for next appointment.   Patient discharged in stable condition accompanied by: self.  Departure Mode: Ambulatory.      Dipti Saucedo RN                     No

## 2022-07-18 ENCOUNTER — OFFICE VISIT (OUTPATIENT)
Dept: FAMILY MEDICINE | Facility: OTHER | Age: 57
End: 2022-07-18
Payer: COMMERCIAL

## 2022-07-18 VITALS
HEIGHT: 66 IN | TEMPERATURE: 98.5 F | RESPIRATION RATE: 14 BRPM | DIASTOLIC BLOOD PRESSURE: 72 MMHG | SYSTOLIC BLOOD PRESSURE: 96 MMHG | OXYGEN SATURATION: 100 % | HEART RATE: 70 BPM | WEIGHT: 135.5 LBS | BODY MASS INDEX: 21.78 KG/M2

## 2022-07-18 DIAGNOSIS — Z00.00 ROUTINE GENERAL MEDICAL EXAMINATION AT A HEALTH CARE FACILITY: Primary | ICD-10-CM

## 2022-07-18 DIAGNOSIS — M80.00XA OSTEOPOROSIS WITH CURRENT PATHOLOGICAL FRACTURE, UNSPECIFIED OSTEOPOROSIS TYPE, INITIAL ENCOUNTER: ICD-10-CM

## 2022-07-18 DIAGNOSIS — Z13.220 SCREENING FOR LIPOID DISORDERS: ICD-10-CM

## 2022-07-18 DIAGNOSIS — Z12.4 CERVICAL CANCER SCREENING: ICD-10-CM

## 2022-07-18 DIAGNOSIS — Z12.31 ENCOUNTER FOR SCREENING MAMMOGRAM FOR BREAST CANCER: ICD-10-CM

## 2022-07-18 DIAGNOSIS — M05.79 RHEUMATOID ARTHRITIS INVOLVING MULTIPLE SITES WITH POSITIVE RHEUMATOID FACTOR (H): ICD-10-CM

## 2022-07-18 DIAGNOSIS — Z12.11 SCREEN FOR COLON CANCER: ICD-10-CM

## 2022-07-18 LAB
CHOLEST SERPL-MCNC: 212 MG/DL
FASTING STATUS PATIENT QL REPORTED: YES
HDLC SERPL-MCNC: 100 MG/DL
LDLC SERPL CALC-MCNC: 97 MG/DL
NONHDLC SERPL-MCNC: 112 MG/DL
TRIGL SERPL-MCNC: 76 MG/DL

## 2022-07-18 PROCEDURE — 99396 PREV VISIT EST AGE 40-64: CPT | Performed by: FAMILY MEDICINE

## 2022-07-18 PROCEDURE — 80061 LIPID PANEL: CPT | Performed by: FAMILY MEDICINE

## 2022-07-18 PROCEDURE — G0123 SCREEN CERV/VAG THIN LAYER: HCPCS | Performed by: FAMILY MEDICINE

## 2022-07-18 PROCEDURE — 87624 HPV HI-RISK TYP POOLED RSLT: CPT | Performed by: FAMILY MEDICINE

## 2022-07-18 PROCEDURE — 36415 COLL VENOUS BLD VENIPUNCTURE: CPT | Performed by: FAMILY MEDICINE

## 2022-07-18 ASSESSMENT — ENCOUNTER SYMPTOMS
JOINT SWELLING: 1
HEMATURIA: 0
SHORTNESS OF BREATH: 0
MYALGIAS: 1
CHILLS: 0
PARESTHESIAS: 0
ARTHRALGIAS: 1
HEMATOCHEZIA: 0
BREAST MASS: 0
NERVOUS/ANXIOUS: 0
SORE THROAT: 0
EYE PAIN: 0
COUGH: 0
HEARTBURN: 0
DIZZINESS: 0
FEVER: 0
ABDOMINAL PAIN: 0
DIARRHEA: 0
DYSURIA: 0
NAUSEA: 0
HEADACHES: 0
CONSTIPATION: 1
PALPITATIONS: 0
FREQUENCY: 0

## 2022-07-18 ASSESSMENT — PAIN SCALES - GENERAL: PAINLEVEL: NO PAIN (0)

## 2022-07-18 NOTE — ASSESSMENT & PLAN NOTE
Currently followed by Rheumatology at [facility]  Currently on Humira and plaquenil  Last eye exam about a yr ago.  Stable.

## 2022-07-18 NOTE — PROGRESS NOTES
SUBJECTIVE:   CC: Zena Quintana is an 57 year old woman who presents for preventive health visit.       Patient has been advised of split billing requirements and indicates understanding: Yes  Healthy Habits:     Getting at least 3 servings of Calcium per day:  Yes    Bi-annual eye exam:  Yes    Dental care twice a year:  Yes    Sleep apnea or symptoms of sleep apnea:  None    Diet:  Gluten-free/reduced    Frequency of exercise:  4-5 days/week    Duration of exercise:  Greater than 60 minutes    Taking medications regularly:  Yes    PHQ-2 Total Score: 0    Additional concerns today:  No    Today's PHQ-2 Score:   PHQ-2 ( 1999 [employer]) 7/18/2022   Q1: Little interest or pleasure in doing things 0   Q2: Feeling down, depressed or hopeless 0   PHQ-2 Score 0   PHQ-2 Total Score (12-17 Years)- Positive if 3 or more points; Administer PHQ-A if positive -   Q1: Little interest or pleasure in doing things Not at all   Q2: Feeling down, depressed or hopeless Not at all   PHQ-2 Score 0       Abuse: Current or Past (Physical, Sexual or Emotional) - No  Do you feel safe in your environment? Yes    Have you ever done Advance Care Planning? (For example, a Health Directive, POLST, or a discussion with a medical provider or your loved ones about your wishes): No, advance care planning information given to patient to review.  Patient declined advance care planning discussion at this time.    Social History     Tobacco Use     Smoking status: Never Smoker     Smokeless tobacco: Never Used     Tobacco comment: no smokers in household   Substance Use Topics     Alcohol use: Yes     Alcohol/week: 1.7 standard drinks     Comment: has a couple drinks now and again     If you drink alcohol do you typically have >3 drinks per day or >7 drinks per week? No    Alcohol Use 7/18/2022   Prescreen: >3 drinks/day or >7 drinks/week? No   Prescreen: >3 drinks/day or >7 drinks/week? -       Reviewed orders with patient.  Reviewed health  maintenance and updated orders accordingly - Yes  Labs reviewed in EPIC  BP Readings from Last 3 Encounters:   07/18/22 96/72   06/20/22 102/65   05/05/22 97/63    Wt Readings from Last 3 Encounters:   07/18/22 61.5 kg (135 lb 8 oz)   06/20/22 63 kg (138 lb 14.4 oz)   05/05/22 63.5 kg (140 lb)                  Patient Active Problem List   Diagnosis     Allergic rhinitis     Stomatitis and mucositis (ulcerative)     Pure hypercholesterolemia     HYPERLIPIDEMIA LDL GOAL <160     High risk medication use     RA (rheumatoid arthritis) (H)     Disturbance of salivary secretion (XEROSTOMIA)     Impingement syndrome of right shoulder     Sjogren's syndrome (H)     Palpitations     Cervical high risk HPV (human papillomavirus) test positive     Osteoporosis with current pathological fracture, unspecified osteoporosis type, initial encounter     Oral bisphosphonates causing adverse effect in therapeutic use, initial encounter     Past Surgical History:   Procedure Laterality Date     COLONOSCOPY WITH CO2 INSUFFLATION N/A 8/7/2017    Procedure: COLONOSCOPY WITH CO2 INSUFFLATION;  Colonoscopy, Screen for colon cancer.  BMI  22.17  [facility] [place] [phone];  Surgeon: Berny Pedro MD;  Location: [facility] OR     HC KNEE SCOPE, DIAGNOSTIC       left knee, ruptured ACL     HC REMOVE TONSILS/ADENOIDS,12+ Y/O      T & A 11+y.o.     ORTHOPEDIC SURGERY      rotator cuff repair, left     Z NONSPECIFIC PROCEDURE  1995, 1997    laparoscopy for endometrial fulguration     ZZ COLONOSCOPY W/WO BRUSH/WASH  5/25/2005     ZZ CREATE EARDRUM OPENING,GEN ANESTH      P.E. Tubes       Social History     Tobacco Use     Smoking status: Never Smoker     Smokeless tobacco: Never Used     Tobacco comment: no smokers in household   Substance Use Topics     Alcohol use: Yes     Alcohol/week: 1.7 standard drinks     Comment: has a couple drinks now and again     Family History   Problem Relation Age of Onset     Osteoporosis Mother       Arthritis Mother         lupus     Connective Tissue Disorder Mother         Lupus     Cataracts Mother      Macular Degeneration Mother      Celiac Disease Mother      Heart Disease Father         aortic aneurysm     Gastrointestinal Disease Father         abdominal anurysm     Hypertension Father      Cataracts Father      Other - See Comments Maternal Grandmother         atherosclerosis heart disease     Alzheimer Disease Maternal Grandmother      Osteoporosis Maternal Grandmother      Cerebrovascular Disease Maternal Grandfather      Cancer Paternal Grandmother      Cerebrovascular Disease Paternal Grandfather      Gastrointestinal Disease Sister         IBS, colon problems     Circulatory Sister         carotid stenosis     Cancer - colorectal Maternal Uncle      Cancer - colorectal Maternal Uncle      Glaucoma No family hx of          Current Outpatient Medications   Medication Sig Dispense Refill     adalimumab (HUMIRA *CF*) 40 MG/0.4ML pen kit Inject 0.4 mLs (40 mg) Subcutaneous every 14 days . Hold for signs of infection, then seek medical attention. 0.8 mL 5     aspirin (ASA) 325 MG tablet Take 325 mg by mouth daily       bimatoprost (LATISSE) 0.03 % SOLN Externally apply topically At Bedtime 1 Bottle 3     calcium carbonate (OS-VALERIA) 600 MG tablet Take 2 tablets by mouth daily       fluticasone (FLONASE) 50 MCG/ACT spray Spray 2 sprays into both nostrils daily 1 Bottle 0     hydroxychloroquine (PLAQUENIL) 200 MG tablet Hydroxychloroquine 200mg daily; and an additional 200mg every other day. 135 tablet 2     ibuprofen (ADVIL/MOTRIN) 200 MG capsule Take 200 mg by mouth as needed for fever       loratadine 10 MG capsule Take 10 mg by mouth as needed for allergies       MULTIPLE VITAMINS/WOMENS OR None Entered       zinc sulfate (ZINCATE) 220 (50 Zn) MG capsule Take 1 capsule by mouth daily       Allergies   Allergen Reactions     Alendronic Acid Other (See Comments)     Hair loss     Penicillins Hives      hospitals     Recent Labs   Lab Test 06/20/22  1139 05/02/22  1526 01/10/22  1526 09/02/21  0857 09/02/21  0857 06/17/21  1158 06/02/21  1508 02/24/21  1521 12/10/20  0951 04/26/19  1327 04/12/19  1509 09/07/17  1322 06/20/17  1437 11/11/15  1327 07/13/15  1036   LDL  --   --   --   --   --   --   --   --  110*  --   --   --   --   --  81   HDL  --   --   --   --   --   --   --   --  75  --   --   --   --   --  74   TRIG  --   --   --   --   --   --   --   --  90  --   --   --   --   --  132   ALT  --  23 24  --  21  --  22   < >  --    < >  --    < > 21   < >  --    CR 0.79 0.96 0.82   < > 0.88 0.90 0.90   < > 0.70   < > 0.86   < > 0.84   < >  --    GFRESTIMATED 86 69 83   < > 73 71 71   < > >90   < > 76   < > 71   < >  --    GFRESTBLACK  --   --   --   --   --  82 83   < > >90   < > 88   < > 85   < >  --    POTASSIUM  --   --   --   --   --   --   --   --  3.9  --  4.3  --  3.8   < >  --    TSH  --   --   --   --   --   --   --   --   --   --   --   --  1.67  --  2.95    < > = values in this interval not displayed.        Breast Cancer Screening:    FHS-7:   Breast CA Risk Assessment (FHS-7) 7/18/2022   Did any of your first-degree relatives have breast or ovarian cancer? No   Did any of your relatives have bilateral breast cancer? No   Did any man in your family have breast cancer? No   Did any woman in your family have breast and ovarian cancer? No   Did any woman in your family have breast cancer before age 50 y? No   Do you have 2 or more relatives with breast and/or ovarian cancer? No   Do you have 2 or more relatives with breast and/or bowel cancer? No       Mammogram Screening: Recommended mammography every 1-2 years with patient discussion and risk factor consideration  Pertinent mammograms are reviewed under the imaging tab.    History of abnormal Pap smear:   Last 3 Pap and HPV Results:   PAP / HPV Latest Ref Rng & Units 6/14/2021 5/15/2020 3/8/2019   PAP (Historical) - NIL NIL NIL   HPV16 NEG:Negative  Negative Negative Negative   HPV18 NEG:Negative Negative Negative Negative   HRHPV NEG:Negative Positive(A) Positive(A) Positive(A)     PAP / HPV Latest Ref Rng & Units 6/14/2021 5/15/2020 3/8/2019   PAP (Historical) - NIL NIL NIL   HPV16 NEG:Negative Negative Negative Negative   HPV18 NEG:Negative Negative Negative Negative   HRHPV NEG:Negative Positive(A) Positive(A) Positive(A)     Reviewed and updated as needed this visit by clinical staff   Tobacco  Allergies  Meds  Problems  Med Hx  Surg Hx  Fam Hx  Soc   Hx          Reviewed and updated as needed this visit by Provider     Meds  Problems                Past Medical History:   Diagnosis Date     Allergic rhinitis, cause unspecified     Allergic rhinitis     Cervical high risk HPV (human papillomavirus) test positive 03/08/2019    see problem list     Endometriosis, site unspecified     Endometriosis     Female infertility of other specified origin      Iron deficiency anemia, unspecified     Anemia, iron def.     RA (rheumatoid arthritis) (H)      Sjogren's syndrome (H)      Stomatitis and mucositis (ulcerative)     chronic - non-specific      Past Surgical History:   Procedure Laterality Date     COLONOSCOPY WITH CO2 INSUFFLATION N/A 8/7/2017    Procedure: COLONOSCOPY WITH CO2 INSUFFLATION;  Colonoscopy, Screen for colon cancer.  BMI  22.17  [facility] [place] [phone];  Surgeon: Berny Pedro MD;  Location: [facility] OR     HC KNEE SCOPE, DIAGNOSTIC       left knee, ruptured ACL     HC REMOVE TONSILS/ADENOIDS,12+ Y/O      T & A 11+y.o.     ORTHOPEDIC SURGERY      rotator cuff repair, left     ZZC NONSPECIFIC PROCEDURE  1995, 1997    laparoscopy for endometrial fulguration     ZZ COLONOSCOPY W/WO BRUSH/WASH  5/25/2005     [facility]      P.E. Tubes       Review of Systems   Constitutional: Negative for chills and fever.   HENT: Negative for congestion, ear pain, hearing loss and sore throat.    Eyes: Negative for pain  "and visual disturbance.   Respiratory: Negative for cough and shortness of breath.    Cardiovascular: Negative for chest pain, palpitations and peripheral edema.   Gastrointestinal: Positive for constipation. Negative for abdominal pain, diarrhea, heartburn, hematochezia and nausea.   Breasts:  Negative for tenderness, breast mass and discharge.   Genitourinary: Negative for dysuria, frequency, genital sores, hematuria, pelvic pain, urgency, vaginal bleeding and vaginal discharge.   Musculoskeletal: Positive for arthralgias, joint swelling and myalgias.   Skin: Negative for rash.   Neurological: Negative for dizziness, headaches and paresthesias.   Psychiatric/Behavioral: Negative for mood changes. The patient is not nervous/anxious.      OBJECTIVE:   BP 96/72   Pulse 70   Temp 98.5  F (36.9  C) (Temporal)   Resp 14   Ht 1.664 m (5' 5.5\")   Wt 61.5 kg (135 lb 8 oz)   LMP 07/17/2017 (Approximate)   SpO2 100%   BMI 22.21 kg/m    Physical Exam  GENERAL: healthy, alert and no distress  EYES: Eyes grossly normal to inspection, PERRL and conjunctivae and sclerae normal  HENT: ear canals and TM's normal, nose and mouth without ulcers or lesions  NECK: no adenopathy, no asymmetry, masses, or scars and thyroid normal to palpation  RESP: lungs clear to auscultation - no rales, rhonchi or wheezes  BREAST: normal without masses, tenderness or nipple discharge and no palpable axillary masses or adenopathy  CV: regular rate and rhythm, normal S1 S2, no S3 or S4, no murmur, click or rub, no peripheral edema and peripheral pulses strong  ABDOMEN: soft, nontender, no hepatosplenomegaly, no masses and bowel sounds normal  MS: no gross musculoskeletal defects noted, no edema  SKIN: no suspicious lesions or rashes  NEURO: Normal strength and tone, mentation intact and speech normal  PSYCH: mentation appears normal, affect normal/bright    Diagnostic Test Results:  Labs reviewed in Epic    ASSESSMENT/PLAN:     Problem List Items " "Addressed This Visit     RA (rheumatoid arthritis) (H)     Currently followed by Rheumatology at [facility]  Currently on Humira and plaquenil  Last eye exam about a yr ago.  Stable.            Osteoporosis with current pathological fracture, unspecified osteoporosis type, initial encounter     Currently on yrly reclast infusion. Started 2 yrs ago.             Other Visit Diagnoses     Routine general medical examination at a health care facility    -  Primary    Cervical cancer screening        Relevant Orders    Pap Screen with HPV - recommended age 30 - 65 years    Screen for colon cancer        Relevant Orders    Colonscopy Screening  Referral    Screening for lipoid disorders        Relevant Orders    Lipid panel    Encounter for screening mammogram for breast cancer        Relevant Orders    *MA Screening Digital Bilateral          Patient has been advised of split billing requirements and indicates understanding: Yes    COUNSELING:  Reviewed preventive health counseling, as reflected in patient instructions       Regular exercise       Healthy diet/nutrition    Estimated body mass index is 22.21 kg/m  as calculated from the following:    Height as of this encounter: 1.664 m (5' 5.5\").    Weight as of this encounter: 61.5 kg (135 lb 8 oz).        She reports that she has never smoked. She has never used smokeless tobacco.      Counseling Resources:  ATP IV Guidelines  Pooled Cohorts Equation Calculator  Breast Cancer Risk Calculator  BRCA-Related Cancer Risk Assessment: FHS-7 Tool  FRAX Risk Assessment  ICSI Preventive Guidelines  Dietary Guidelines for Americans, 2010  USDA's MyPlate  ASA Prophylaxis  Lung CA Screening    Chana Armenta MD  [facility]  "

## 2022-07-19 NOTE — RESULT ENCOUNTER NOTE
Ms. Quintana    Your recent test results are attached. Marginally elevated cholesterol which is essentially stable compared to last yr.Advise low fat diet and regular exercise to help improve this.     If you have any questions or concerns please contact me via My Chart or call the clinic at [phone]     Thank You  Chana Armenta MD.

## 2022-07-21 ENCOUNTER — TELEPHONE (OUTPATIENT)
Dept: GASTROENTEROLOGY | Facility: CLINIC | Age: 57
End: 2022-07-21

## 2022-07-21 LAB
BKR LAB AP GYN ADEQUACY: NORMAL
BKR LAB AP GYN INTERPRETATION: NORMAL
BKR LAB AP HPV REFLEX: NORMAL
BKR LAB AP PREVIOUS ABNL DX: NORMAL
BKR LAB AP PREVIOUS ABNORMAL: NORMAL
PATH REPORT.COMMENTS IMP SPEC: NORMAL
PATH REPORT.COMMENTS IMP SPEC: NORMAL
PATH REPORT.RELEVANT HX SPEC: NORMAL

## 2022-07-21 NOTE — TELEPHONE ENCOUNTER
Screening Questions    BlueKIND OF PREP RedLOCATION [review exclusion criteria] GreenSEDATION TYPE      1. Are you active on mychart? Y    2. What insurance is in the chart? [facility]     3.   Ordering/Referring Provider: Chana Armenta MD     4. BMI   (If greater than 40 review exclusion criteria [PAC APPT IF [MAC] @ [facility])  22.5  [If yes, BMI OVER 40-EXTENDED PREP]      **(Sedation review/consideration needed)**  Do you have a legal guardian or Medical Power of    and/or are you able to give consent for your medical care?     Y    5. Have you had a positive covid test in the last 90 days?   N -     6.  Are you currently on dialysis?   N [ If yes, G-PREP & HOSPITAL setting ONLY]     7.  Do you have chronic kidney disease?  N [ If yes, G-PREP ]    8.   Do you have a diagnosis of diabetes?   N   [ If yes, G-PREP ]    9.  On a regular basis do you go 3-5 days between bowel movements?   N   [ If yes, EXTENDED PREP]    10.  Are you taking any prescription pain medications on a routine schedule?    N -  [ If yes, EXTENDED PREP] [If yes, MAC]      11.   Do you have any chemical dependencies such as alcohol, street drugs, or methadone?    N [If yes, MAC]    12.   Do you have any history of post-traumatic stress syndrome, severe anxiety or history of psychosis?    N  [If yes, MAC]    13.  [FEMALES] Are you currently pregnant? N    If yes, how many weeks?       Respiratory/Heart Screening:  [If yes to any of the following HOSPITAL setting only]     14. Do you have Pulmonary Hypertension [Lungs]?   N       15. Do you have UNCONTROLLED asthma?   N     16.  Do you use daily home oxygen?  N      17. Do you have mod to severe Obstructive Sleep Apnea?         (OKAY @ [facility]  [facility]  [facility]  [facility]  [facility]  [facility] - if pt is not on OXYGEN)  N      18.   Have you had a heart or lung transplant?   N      19.   Have you had a stroke or Transient ischemic attack (TIA - aka  mini stroke ) within 6 months?  (If yes, please review exclusion  criteria)  N     20.   In the past 6 months, have you had any heart related issues including cardiomyopathy or heart attack?   N           If yes, did it require cardiac stenting or other implantable device?   NA      21.   Do you have any implantable devices in your body (pacemaker, defib, LVAD)? (If yes, please review exclusion criteria)  Y, hip replacement     22. Do you take nitroglycerin?   N           If yes, how often? NA  (if yes, HOSPITAL setting ONLY)    23.  Are you currently taking any blood thinners?    [If yes, INFORM patient to follw up w/ ORDERING PROVIDER FOR BRIDGING INSTRUCTIONS]     Y    24.   Do you transfer independently?                (If NO, please HOSPITAL setting ONLY)  Y    25.   Preferred LOCAL Pharmacy for Pre Prescription:         [id number] [place], [place] - [address] AT [facility] OF  [address]      Scheduling Details  (Please ask for phone number if not scheduled by patient)      Caller : Zena Quintana    Date of Procedure: 8/30  Surgeon: Gino  Location: [facility]        Sedation Type: CS l Per Protocol  Conscious Sedation- Needs  for 6 hours after the procedure  MAC/General-Needs  for 24 hours after procedure    N :[Pre-op Required] at [facility]  [facility] and OR for MAC sedation   (advise patient they will need a pre-op WITH IN 30 DAYS of procedure date)     Type of Procedure Scheduled:   Lower Endoscopy [Colonoscopy]    Which Colonoscopy Prep was Sent?:   Miralax - Per Protocol    INES CF PATIENTS & GROEN'S PATIENTS NEEDS EXTENDED PREP       Informed patient they will need an adult  Y  Cannot take any type of public or medical transportation alone    Pre-Procedure Covid test to be completed at [facility] or Externally: Y  **INFORMED OF HOME TESTING & LAB OPTION**        Confirmed Nurse will call to complete assessment Y    Additional comments:

## 2022-07-25 LAB
HUMAN PAPILLOMA VIRUS 16 DNA: NEGATIVE
HUMAN PAPILLOMA VIRUS 18 DNA: NEGATIVE
HUMAN PAPILLOMA VIRUS FINAL DIAGNOSIS: NORMAL
HUMAN PAPILLOMA VIRUS OTHER HR: NEGATIVE

## 2022-07-28 ENCOUNTER — OFFICE VISIT (OUTPATIENT)
Dept: OPHTHALMOLOGY | Facility: CLINIC | Age: 57
End: 2022-07-28
Payer: COMMERCIAL

## 2022-07-28 DIAGNOSIS — M05.79 RHEUMATOID ARTHRITIS INVOLVING MULTIPLE SITES WITH POSITIVE RHEUMATOID FACTOR (H): ICD-10-CM

## 2022-07-28 DIAGNOSIS — Z79.899 HIGH RISK MEDICATION USE: Primary | ICD-10-CM

## 2022-07-28 PROCEDURE — 92134 CPTRZ OPH DX IMG PST SGM RTA: CPT | Performed by: STUDENT IN AN ORGANIZED HEALTH CARE EDUCATION/TRAINING PROGRAM

## 2022-07-28 PROCEDURE — 92083 EXTENDED VISUAL FIELD XM: CPT | Performed by: STUDENT IN AN ORGANIZED HEALTH CARE EDUCATION/TRAINING PROGRAM

## 2022-07-28 PROCEDURE — 92012 INTRM OPH EXAM EST PATIENT: CPT | Performed by: STUDENT IN AN ORGANIZED HEALTH CARE EDUCATION/TRAINING PROGRAM

## 2022-07-28 ASSESSMENT — CUP TO DISC RATIO
OD_RATIO: 0.1
OS_RATIO: 0.1

## 2022-07-28 ASSESSMENT — VISUAL ACUITY
CORRECTION_TYPE: CONTACTS
OD_CC: 20/20
METHOD: SNELLEN - LINEAR
OS_CC: 20/20

## 2022-07-28 ASSESSMENT — TONOMETRY
OS_IOP_MMHG: 11
OD_IOP_MMHG: 14
IOP_METHOD: ICARE

## 2022-07-28 ASSESSMENT — SLIT LAMP EXAM - LIDS
COMMENTS: 1+ UPPER LID DERMATOCHALASIS
COMMENTS: 1+ UPPER LID DERMATOCHALASIS

## 2022-07-28 ASSESSMENT — EXTERNAL EXAM - RIGHT EYE: OD_EXAM: NORMAL

## 2022-07-28 ASSESSMENT — EXTERNAL EXAM - LEFT EYE: OS_EXAM: NORMAL

## 2022-07-28 NOTE — PROGRESS NOTES
Current Eye Medications: Latisse - along lash line both eyes at bedtime.      Subjective:  Referred by Dr. Hardy for HVF, OCT for high-risk medication use. Vision is stable - follows outside optometrist for routine eye exams.     Hydroxychloroquine 200mg - 1 tab alt 2 tab every other day for Rheumatoid Arthritis.      Objective:  See Ophthalmology Exam.      Assessment:  Zena Quintana is a 57 year old female who presents with:   Encounter Diagnoses   Name Primary?     High risk medication use Yes    Plaquenil since around 2007.     Macular SD-OCT within normal limits both eyes.    Moe visual field (HVF) 10-2 within normal limits both eyes.    No signs of Plaquenil retinal toxicity at present.        Rheumatoid arthritis involving multiple sites with positive rheumatoid factor (H)        Plan:  Normal Plaquenil eye tests today.    Ladonna Mirza MD  [phone]

## 2022-07-28 NOTE — LETTER
7/28/2022         RE: Zena Quintana  [address] [facility] [address]        Dear Colleague,    Thank you for referring your patient, Zena Quintana, to the [facility]. Please see a copy of my visit note below.     Current Eye Medications: Latisse - along lash line both eyes at bedtime.      Subjective:  Referred by Dr. Hardy for HVF, OCT for high-risk medication use. Vision is stable - follows outside optometrist for routine eye exams.     Hydroxychloroquine 200mg - 1 tab alt 2 tab every other day for Rheumatoid Arthritis.      Objective:  See Ophthalmology Exam.      Assessment:  Zena Quintana is a 57 year old female who presents with:   Encounter Diagnoses   Name Primary?     High risk medication use Yes    Plaquenil since around 2007.     Macular SD-OCT within normal limits both eyes.    Moe visual field (HVF) 10-2 within normal limits both eyes.    No signs of Plaquenil retinal toxicity at present.        Rheumatoid arthritis involving multiple sites with positive rheumatoid factor (H)        Plan:  Normal Plaquenil eye tests today.    Ladonna Mirza MD  [phone]          Again, thank you for allowing me to participate in the care of your patient.        Sincerely,        Ladonna Mirza MD

## 2022-08-18 ENCOUNTER — ANCILLARY PROCEDURE (OUTPATIENT)
Dept: MAMMOGRAPHY | Facility: OTHER | Age: 57
End: 2022-08-18
Attending: FAMILY MEDICINE
Payer: COMMERCIAL

## 2022-08-18 DIAGNOSIS — Z12.31 ENCOUNTER FOR SCREENING MAMMOGRAM FOR BREAST CANCER: ICD-10-CM

## 2022-08-18 PROCEDURE — 77067 SCR MAMMO BI INCL CAD: CPT | Mod: TC | Performed by: RADIOLOGY

## 2022-08-18 PROCEDURE — 77063 BREAST TOMOSYNTHESIS BI: CPT | Mod: TC | Performed by: RADIOLOGY

## 2022-08-24 ENCOUNTER — OFFICE VISIT (OUTPATIENT)
Dept: RHEUMATOLOGY | Facility: CLINIC | Age: 57
End: 2022-08-24
Payer: COMMERCIAL

## 2022-08-24 VITALS
HEART RATE: 72 BPM | SYSTOLIC BLOOD PRESSURE: 97 MMHG | BODY MASS INDEX: 22.65 KG/M2 | DIASTOLIC BLOOD PRESSURE: 60 MMHG | WEIGHT: 138.2 LBS | OXYGEN SATURATION: 99 %

## 2022-08-24 DIAGNOSIS — M80.00XA OSTEOPOROSIS WITH CURRENT PATHOLOGICAL FRACTURE, UNSPECIFIED OSTEOPOROSIS TYPE, INITIAL ENCOUNTER: ICD-10-CM

## 2022-08-24 DIAGNOSIS — T45.8X5A ORAL BISPHOSPHONATES CAUSING ADVERSE EFFECT IN THERAPEUTIC USE, INITIAL ENCOUNTER: ICD-10-CM

## 2022-08-24 DIAGNOSIS — Z23 NEED FOR VACCINATION: ICD-10-CM

## 2022-08-24 DIAGNOSIS — M05.79 RHEUMATOID ARTHRITIS INVOLVING MULTIPLE SITES WITH POSITIVE RHEUMATOID FACTOR (H): Primary | ICD-10-CM

## 2022-08-24 PROCEDURE — 90471 IMMUNIZATION ADMIN: CPT | Performed by: INTERNAL MEDICINE

## 2022-08-24 PROCEDURE — 99214 OFFICE O/P EST MOD 30 MIN: CPT | Mod: 25 | Performed by: INTERNAL MEDICINE

## 2022-08-24 PROCEDURE — 90715 TDAP VACCINE 7 YRS/> IM: CPT | Performed by: INTERNAL MEDICINE

## 2022-08-24 RX ORDER — HEPARIN SODIUM (PORCINE) LOCK FLUSH IV SOLN 100 UNIT/ML 100 UNIT/ML
5 SOLUTION INTRAVENOUS
Status: CANCELLED | OUTPATIENT
Start: 2023-06-20

## 2022-08-24 RX ORDER — DIPHENHYDRAMINE HYDROCHLORIDE 50 MG/ML
50 INJECTION INTRAMUSCULAR; INTRAVENOUS
Status: CANCELLED
Start: 2023-06-20

## 2022-08-24 RX ORDER — MEPERIDINE HYDROCHLORIDE 25 MG/ML
25 INJECTION INTRAMUSCULAR; INTRAVENOUS; SUBCUTANEOUS EVERY 30 MIN PRN
Status: CANCELLED | OUTPATIENT
Start: 2023-06-20

## 2022-08-24 RX ORDER — ALBUTEROL SULFATE 90 UG/1
1-2 AEROSOL, METERED RESPIRATORY (INHALATION)
Status: CANCELLED
Start: 2023-06-20

## 2022-08-24 RX ORDER — HYDROXYCHLOROQUINE SULFATE 200 MG/1
TABLET, FILM COATED ORAL
Qty: 135 TABLET | Refills: 2 | Status: SHIPPED | OUTPATIENT
Start: 2022-08-24 | End: 2023-01-25

## 2022-08-24 RX ORDER — ZOLEDRONIC ACID 5 MG/100ML
5 INJECTION, SOLUTION INTRAVENOUS ONCE
Status: CANCELLED
Start: 2023-06-20

## 2022-08-24 RX ORDER — EPINEPHRINE 1 MG/ML
0.3 INJECTION, SOLUTION, CONCENTRATE INTRAVENOUS EVERY 5 MIN PRN
Status: CANCELLED | OUTPATIENT
Start: 2023-06-20

## 2022-08-24 RX ORDER — METHYLPREDNISOLONE SODIUM SUCCINATE 125 MG/2ML
125 INJECTION, POWDER, LYOPHILIZED, FOR SOLUTION INTRAMUSCULAR; INTRAVENOUS
Status: CANCELLED
Start: 2023-06-20

## 2022-08-24 RX ORDER — ALBUTEROL SULFATE 0.83 MG/ML
2.5 SOLUTION RESPIRATORY (INHALATION)
Status: CANCELLED | OUTPATIENT
Start: 2023-06-20

## 2022-08-24 RX ORDER — HEPARIN SODIUM,PORCINE 10 UNIT/ML
5 VIAL (ML) INTRAVENOUS
Status: CANCELLED | OUTPATIENT
Start: 2023-06-20

## 2022-08-24 NOTE — PATIENT INSTRUCTIONS
RHEUMATOLOGY    Dr. Rome Hardy    [address] [place] [address]  Phone number: [phone]  Fax number: [phone]      Thank you for choosing [facility]!    Genesis Medley CMA Rheumatology

## 2022-08-24 NOTE — PROGRESS NOTES
Rheumatology Clinic Visit      Zena Quintana MRN# [id number]   YOB: 1965 Age: 57 year old      Date of visit: 8/24/22   PCP: Dr. Chana Armenta    Chief Complaint   Patient presents with:  Rheumatoid Arthritis    Assessment and Plan     1. Seropositive nonerosive rheumatoid arthritis (RF low positive, CCP negative): Reportedly at the time of diagnosis she had synovitis in a symmetric distribution that was steroid responsive. Previously on MTX (effective, anemia, LFT elevation), leflunomide (neutropenia). Currently on hydroxychloroquine 300 mg daily on average (started in approximately 2007) and Humira 40mg SQ every 14 days (started 10/10/2021). Significant improvement with addition of Humira. RA controlled when on humira 40mg [id number] and hydroxychloroquine 300mg daily (avg). Hydroxychloroquine was reduced in an effort to get to a lower effective dose but has more arthralgia and longer duration of morning stiffness so will increase hydroxychloroquine back to the effective dose.  Eye exam for hydroxychloroquine toxicity monitoring is due.  Chronic illness    - Increase hydroxychloroquine from 200mg daily;  to 200mg daily with an additional 200mg every other day (last eye exam was on 7/28/2022 with Dr. Mirza)  - Continue Humira 40 mg SQ every 14 days   - Labs in 3-5 months: CBC, Creatinine, Hepatic Panel, ESR, CRP, QuantiFERON-TB gold plus, calcium, vitamin D    2. Osteoporosis: s/p hip fx from ground level fall and is now status-post right HUY. Note that her mother has a hx of vertebral compression fx; Ms. Quintana is post-menopausal. 1/13/2020 DEXA showed osteoporosis; L-spine T score of -2.6, Left femoral neck T-score of -2.4. Alendronate was started 1/2020 but stopped in early April 2020 by Ms. Quintana due to hair loss and GERD.  She then received Reclast on 6/16/2020, 6/17/2021, 6/20/2022.  Plan to continue Reclast on a yearly basis until 2025.  4/12/2022 DEXA showed improvement of the lumbar  spine and stable at the radius and left femoral neck.  Chronic illness, stable.    - Continue reclast 5mg IV once yearly, next due 6/20/2023 or shortly thereafter  - Continue calcium 1200mg daily  - Continue vitamin D 1000 IU daily    3.  History of bilateral carpal tunnel syndrome: doesn't tolerate NSAIDs per patient.  Using wrist splints bilaterally with significant improvement, and was intermittent symptoms.  Symptoms then worsened and she had the nerve conduction study performed, showing bilateral carpal tunnel syndrome.  She was then seen by orthopedics and had surgery on the right with resolution of symptoms with did not have surgery on the left; left carpal tunnel syndrome symptoms have improved over time and she is not symptomatic today with regard to carpal tunnel syndrome.     4. Bilateral shoulder issues / rotator cuff pathology: Limited range of motion of the right shoulder because of rotator cuff tear.  S/p surgery.  Doing okay at this time.    5. Sjogren's syndrome: NJ by EIA negative but positive by IF, SSA and SSB negative, and minor salivary gland biopsy negative. She is currently following with ophthalmology who has given her eyedrops that have been beneficial.  Dry mouth is currently treated with frequent sips of water; she has good oral hygiene, follows with a dentist regularly, and does not have frequent cavities. Pilocarpine was previously Rx'd but never used and has been removed from her medication list previously.    6. History of oral sores / family history of lupus / polyarthritis / recurrent sinus infections: She is not having oral sores or recurrent sinus infections for several years now. Retrospectively, there would be concern for potential ANCA associated sinus issues and this could be checked if she had recurrent sinusitis again. Oral sores have improved since she started Plaquenil, that argues it could be connective tissue disease related. NJ was negative by EIA, but complement was  on the low end of the normal range so NJ was rechecked and positive; further testing was negative.    7.  Vaccinations: Vaccinations reviewed with Ms. Quintana.    - Influenza: encouraged yearly vaccination  - [id number]: up to date  - [id number]: up to date  - Shingrix: advised receiving  - TDAP: to receive today    - COVID-19: has received the Moderna COVID-19 vaccine on 2/3/2021 and 3/3/2021 and 12/7/2021 and 6/26/22.  A 5th mRNA vaccine (2nd booster) may be received at least 4 months after the 4th dose.   Based on our current understanding (and this may change over time as we learn more), medications should be adjusted as noted below, if disease activity allows:  - NSAIDs and Acetaminophen: hold for 24 hours prior to vaccination if able to do so     Total minutes spent in evaluation with patient, documentation, , and review of pertinent studies and chart notes: 20     Ms. Quintana verbalized agreement with and understanding of the rational for the diagnosis and treatment plan.  All questions were answered to best of my ability and the patient's satisfaction. Ms. Quintana was advised to contact the clinic with any questions that may arise after the clinic visit.      Thank you for involving me in the care of the patient    Return to clinic: 4-5 months      HPI   Zena Quintana is a 57 year old female with medical history significant for iron deficiency anemia, nephrolithiasis, hyperlipidemia, xerostomia, dry eyes, status-post right HUY, and rheumatoid arthritis who presents for follow-up of rheumatoid arthritis.    1/13/22: doing well. Significant improvement with addition of Humira. No joint pain/swelling/stiffness.  Tolerating medications well. Arthritis is not limiting any of her daily activities.    5/5/2022: felt more aches and increased morning stiffness since reducing hydroxychloroquine. Taking hydroxychloroquine 200mg daily and humira 40mg SQ every 14 days.  Morning stiffness for about 30 min.   Feels worse with weather changes. No joint swelling. States that she is due for an eye exam for hydroxychloroquine toxicity monitoring.      Today, 8/24/2022: RA well controlled.  Tolerating hydroxychloroquine and Humira.  The higher dose of hydroxychloroquine was effective.  Last Reclast infusion was well-tolerated.  Morning stiffness for no more than 20 minutes.  No gelling.  Arthritis is not limiting daily activities.    Denies fevers, chills, nausea, vomiting, constipation, diarrhea. No abdominal pain. No chest pain/pressure, palpitations, or shortness of breath. No neck pain. No rash. No LE swelling.  No photosensitivity or photophobia.  No sicca symptoms.     Tobacco: None  EtOH: Occasional; no more than 2 drinks per day when she does drink  Drugs: None  Occupation: Works at a school    ROS   12 point review of system was completed and negative except as noted in the HPI     Active Problem List     Patient Active Problem List   Diagnosis     Allergic rhinitis     Stomatitis and mucositis (ulcerative)     Pure hypercholesterolemia     HYPERLIPIDEMIA LDL GOAL <160     High risk medication use     RA (rheumatoid arthritis) (H)     Disturbance of salivary secretion (XEROSTOMIA)     Impingement syndrome of right shoulder     Sjogren's syndrome (H)     Palpitations     Cervical high risk HPV (human papillomavirus) test positive     Osteoporosis with current pathological fracture, unspecified osteoporosis type, initial encounter     Oral bisphosphonates causing adverse effect in therapeutic use, initial encounter     Past Medical History     Past Medical History:   Diagnosis Date     Allergic rhinitis, cause unspecified     Allergic rhinitis     Cervical high risk HPV (human papillomavirus) test positive 03/08/2019    see problem list     Endometriosis, site unspecified     Endometriosis     Female infertility of other specified origin      Iron deficiency anemia, unspecified     Anemia, iron def.     RA (rheumatoid  arthritis) (H)      Sjogren's syndrome (H)      Stomatitis and mucositis (ulcerative)     chronic - non-specific     Past Surgical History     Past Surgical History:   Procedure Laterality Date     COLONOSCOPY WITH CO2 INSUFFLATION N/A 8/7/2017    Procedure: COLONOSCOPY WITH CO2 INSUFFLATION;  Colonoscopy, Screen for colon cancer.  BMI  22.17  [facility] [place] [phone];  Surgeon: Berny Pedro MD;  Location: [facility] OR     HC KNEE SCOPE, DIAGNOSTIC       left knee, ruptured ACL     HC REMOVE TONSILS/ADENOIDS,12+ Y/O      T & A 11+y.o.     ORTHOPEDIC SURGERY      rotator cuff repair, left     ZZC NONSPECIFIC PROCEDURE  1995, 1997    laparoscopy for endometrial fulguration     ZZHC COLONOSCOPY W/WO BRUSH/WASH  5/25/2005     ZZHC CREATE EARDRUM OPENING,GEN ANESTH      P.E. Tubes     Allergy     Allergies   Allergen Reactions     Alendronic Acid Other (See Comments)     Hair loss     Dust Mites      Mold      Penicillins Hives     hives     Current Medication List     Current Outpatient Medications   Medication Sig     adalimumab (HUMIRA *CF*) 40 MG/0.4ML pen kit Inject 0.4 mLs (40 mg) Subcutaneous every 14 days . Hold for signs of infection, then seek medical attention.     aspirin (ASA) 325 MG tablet Take 325 mg by mouth daily     bimatoprost (LATISSE) 0.03 % SOLN Externally apply topically At Bedtime     calcium carbonate (OS-VALERIA) 600 MG tablet Take 2 tablets by mouth daily     hydroxychloroquine (PLAQUENIL) 200 MG tablet Hydroxychloroquine 200mg daily; and an additional 200mg every other day.     ibuprofen (ADVIL/MOTRIN) 200 MG capsule Take 200 mg by mouth as needed for fever     loratadine 10 MG capsule Take 10 mg by mouth as needed for allergies     MULTIPLE VITAMINS/WOMENS OR None Entered     zinc sulfate (ZINCATE) 220 (50 Zn) MG capsule Take 1 capsule by mouth daily     fluticasone (FLONASE) 50 MCG/ACT spray Spray 2 sprays into both nostrils daily     No current facility-administered medications for this  "visit.         Social History   See HPI    Family History     Family History   Problem Relation Age of Onset     Osteoporosis Mother      Arthritis Mother         lupus     Connective Tissue Disorder Mother         Lupus     Cataracts Mother      Macular Degeneration Mother      Celiac Disease Mother      Heart Disease Father         aortic aneurysm     Gastrointestinal Disease Father         abdominal anurysm     Hypertension Father      Cataracts Father      Other - See Comments Maternal Grandmother         atherosclerosis heart disease     Alzheimer Disease Maternal Grandmother      Osteoporosis Maternal Grandmother      Cerebrovascular Disease Maternal Grandfather      Cancer Paternal Grandmother      Cerebrovascular Disease Paternal Grandfather      Gastrointestinal Disease Sister         IBS, colon problems     Circulatory Sister         carotid stenosis     Cancer - colorectal Maternal Uncle      Cancer - colorectal Maternal Uncle      Glaucoma No family hx of      Mother: Lupus    Physical Exam     Temp Readings from Last 3 Encounters:   07/18/22 98.5  F (36.9  C) (Temporal)   06/20/22 98  F (36.7  C) (Oral)   06/17/21 98.1  F (36.7  C) (Oral)     BP Readings from Last 5 Encounters:   08/24/22 97/60   07/18/22 96/72   06/20/22 102/65   05/05/22 97/63   01/13/22 111/70     Pulse Readings from Last 1 Encounters:   08/24/22 72     Resp Readings from Last 1 Encounters:   07/18/22 14     Estimated body mass index is 22.65 kg/m  as calculated from the following:    Height as of 7/18/22: 1.664 m (5' 5.5\").    Weight as of this encounter: 62.7 kg (138 lb 3.2 oz).    GEN: NAD. Healthy appearing adult.   HEENT:  Anicteric, noninjected sclera. No obvious external lesions of the ear and nose. Hearing intact.  CV: S1, S2. RRR. No m/r/g.  PULM: No increased work of breathing. CTA bilaterally  MSK: MCPs, PIPs, DIPs without swelling or tenderness to palpation.  Wrists without swelling or tenderness to palpation.  Elbows and " shoulders without swelling or tenderness to palpation.  Knees, ankles, and MTPs without swelling or tenderness to palpation.    SKIN: No rash or jaundice seen  PSYCH: Alert. Appropriate.        Labs / Imaging (select studies)     CBC  Recent Labs   Lab Test 05/02/22  1526 01/10/22  1526 09/09/21  1502 09/02/21  0857 06/02/21  1508 02/24/21  1521 12/10/20  0951 09/22/20  1517   WBC 7.2 6.1 3.4*   < > 4.7 5.3   < > 5.7   RBC 4.03 3.93 3.80   < > 3.90 3.89   < > 3.78*   HGB 12.5 12.0 11.5*   < > 11.7 11.6*   < > 11.5*   HCT 38.0 37.4 35.4   < > 36.4 36.5   < > 35.0   MCV 94 95 93   < > 93 94   < > 93   RDW 12.4 12.2 12.4   < > 13.0 12.4   < > 12.2    304 272   < > 308 309   < > 284   MCH 31.0 30.5 30.3   < > 30.0 29.8   < > 30.4   MCHC 32.9 32.1 32.5   < > 32.1 31.8   < > 32.9   NEUTROPHIL 53 41 48   < > 55.2 52.6  --  54.2   LYMPH 27 37 31   < > 24.5 26.4  --  26.7   MONOCYTE 14 15 18   < > 16.7 16.8  --  15.6   EOSINOPHIL 5 6 3   < > 3.0 3.6  --  3.0   BASOPHIL 1 1 1   < > 0.6 0.6  --  0.5   ANEU  --   --   --   --  2.6 2.8  --  3.1   ALYM  --   --   --   --  1.2 1.4  --  1.5   ELIGIO  --   --   --   --  0.8 0.9  --  0.9   AEOS  --   --   --   --  0.1 0.2  --  0.2   ABAS  --   --   --   --  0.0 0.0  --  0.0   ANEUTAUTO 3.9 2.5 1.6   < >  --   --   --   --    ALYMPAUTO 2.0 2.3 1.0   < >  --   --   --   --    AMONOAUTO 1.0 0.9 0.6   < >  --   --   --   --    AEOSAUTO 0.4 0.4 0.1   < >  --   --   --   --    ABSBASO 0.0 0.0 0.0   < >  --   --   --   --     < > = values in this interval not displayed.     CMP  Recent Labs   Lab Test 06/20/22  1139 05/02/22  1526 01/10/22  1526 09/02/21  0857 09/02/21  0857 06/17/21  1158 06/02/21  1508 04/06/21  1518 02/24/21  1521 12/10/20  0951 04/26/19  1327 04/12/19  1509 09/07/17  1322 06/20/17  1437 03/06/17  1300 05/24/16  0940   NA  --   --   --   --   --   --   --   --   --  137  --  139  --  141  --  137   POTASSIUM  --   --   --   --   --   --   --   --   --  3.9  --  4.3   --  3.8  --  4.4   CHLORIDE  --   --   --   --   --   --   --   --   --  104  --  103  --  105  --  105   CO2  --   --   --   --   --   --   --   --   --  31  --  29  --  29  --  29   ANIONGAP  --   --   --   --   --   --   --   --   --  2*  --  7  --  7  --  3   GLC  --   --   --   --   --   --   --   --   --  85  --   --   --  84  --  85   BUN  --   --   --   --   --   --   --   --   --  10  --   --   --  22  --  18   CR 0.79 0.96 0.82   < > 0.88 0.90 0.90  --  0.72 0.70   < > 0.86   < > 0.84   < > 0.92   GFRESTIMATED 86 69 83   < > 73 71 71  --  >90 >90   < > 76   < > 71   < > 64   GFRESTBLACK  --   --   --   --   --  82 83  --  >90 >90   < > 88   < > 85   < > 77   VALERIA 10.2* 9.7  --   --   --  8.7 9.5  --   --  9.6   < > 8.7  --  9.1  --  9.2   BILITOTAL  --  0.3 0.2  --  0.3  --  0.3  --  0.2  --    < >  --    < > 0.2   < > 0.4   ALBUMIN  --  4.4 4.2  --  4.2  --  4.4  --  4.1  --    < >  --    < > 4.0   < > 4.4   PROTTOTAL  --  7.1 7.8  --  6.8  --  7.4  --  7.2  --    < >  --    < > 6.8   < > 7.3   ALKPHOS  --  41 37*  --  34*  --  46  --  57  --    < >  --    < > 40   < > 42   AST  --  27 26  --  18  --  27   < > 76*  --    < >  --    < > 21   < > 30   ALT  --  23 24  --  21  --  22   < > 31  --    < >  --    < > 21   < > 24    < > = values in this interval not displayed.     Calcium/VitaminD  Recent Labs   Lab Test 06/20/22  1139 05/02/22  1526 06/17/21  1158 06/02/21  1508 06/16/20  1507 01/08/20  1548   VALERIA 10.2* 9.7 8.7 9.5   < > 9.4   VITDT  --  59  --  61  --  38    < > = values in this interval not displayed.     ESR/CRP  Recent Labs   Lab Test 05/02/22  1526 01/10/22  1526 09/02/21  0857   SED 7 9 6   CRP <2.9 <2.9 <2.9     Lipid Panel  Recent Labs   Lab Test 07/18/22  1046 12/10/20  0951 07/13/15  1036   CHOL 212* 203* 181   TRIG 76 90 132    75 74   LDL 97 110* 81   VLDL  --   --  26   CHOLHDLRATIO  --   --  2.4   NHDL 112 128  --      Hepatitis B  Recent Labs   Lab Test 09/04/19  1550    HBCAB Nonreactive   HEPBANG Nonreactive     Hepatitis C  Recent Labs   Lab Test 06/20/17  1437   HCVAB Nonreactive   Assay performance characteristics have not been established for newborns,   infants, and children       Tuberculosis Screening  Recent Labs   Lab Test 09/09/21  1502   TBRES Negative     Immunization History     Immunization History   Administered Date(s) Administered     COVID-19,PF,Moderna 02/03/2021, 03/03/2021, 12/07/2021, 06/26/2022     FLU 6-35 months 10/12/2019     Influenza (H1N1) 12/03/2009     Influenza (IIV3) PF 11/17/2005, 11/17/2006, 10/25/2010, 01/09/2013     Influenza Vaccine IM > 6 months Valent IIV4 (Alfuria,Fluzone) 10/10/2013, 09/14/2016, 09/13/2017, 01/10/2019     Influenza,INJ,MDCK,PF,Quad >4yrs 10/16/2020     Pneumo Conj 13-V (2010&after) 09/13/2017     Pneumococcal 23 valent 09/12/2018     TD (ADULT, 7+) 02/01/2005     TDAP Vaccine (Adacel) 01/09/2013          Chart documentation done in part with Dragon Voice recognition Software. Although reviewed after completion, some word and grammatical error may remain.    Rome Hardy MD

## 2022-09-13 RX ORDER — BISACODYL 5 MG
TABLET, DELAYED RELEASE (ENTERIC COATED) ORAL
Qty: 4 TABLET | Refills: 0 | Status: SHIPPED | OUTPATIENT
Start: 2022-09-13 | End: 2023-01-11

## 2022-09-20 ENCOUNTER — HOSPITAL ENCOUNTER (OUTPATIENT)
Facility: AMBULATORY SURGERY CENTER | Age: 57
Discharge: HOME | End: 2022-09-20
Attending: SPECIALIST | Admitting: SPECIALIST
Payer: COMMERCIAL

## 2022-09-20 VITALS
TEMPERATURE: 98.4 F | SYSTOLIC BLOOD PRESSURE: 95 MMHG | DIASTOLIC BLOOD PRESSURE: 67 MMHG | RESPIRATION RATE: 20 BRPM | HEART RATE: 60 BPM | OXYGEN SATURATION: 100 %

## 2022-09-20 DIAGNOSIS — Z12.11 SCREENING FOR COLON CANCER: Primary | ICD-10-CM

## 2022-09-20 LAB — COLONOSCOPY: NORMAL

## 2022-09-20 PROCEDURE — 45378 DIAGNOSTIC COLONOSCOPY: CPT

## 2022-09-20 PROCEDURE — G8918 PT W/O PREOP ORDER IV AB PRO: HCPCS

## 2022-09-20 PROCEDURE — G8907 PT DOC NO EVENTS ON DISCHARG: HCPCS

## 2022-09-20 RX ORDER — ONDANSETRON 2 MG/ML
4 INJECTION INTRAMUSCULAR; INTRAVENOUS
Status: COMPLETED | OUTPATIENT
Start: 2022-09-20 | End: 2022-09-20

## 2022-09-20 RX ORDER — FENTANYL CITRATE 50 UG/ML
INJECTION, SOLUTION INTRAMUSCULAR; INTRAVENOUS PRN
Status: DISCONTINUED | OUTPATIENT
Start: 2022-09-20 | End: 2022-09-20 | Stop reason: HOSPADM

## 2022-09-20 RX ORDER — LIDOCAINE 40 MG/G
CREAM TOPICAL
Status: DISCONTINUED | OUTPATIENT
Start: 2022-09-20 | End: 2022-09-21 | Stop reason: HOSPADM

## 2022-09-20 RX ADMIN — ONDANSETRON 4 MG: 2 INJECTION INTRAMUSCULAR; INTRAVENOUS at 07:25

## 2022-09-20 NOTE — H&P
Pre-Endoscopy History and Physical     Zena Quintana MRN# [id number]   YOB: 1965 Age: 57 year old     Date of Procedure: 9/20/2022  Primary care provider: Chana Armenta  Type of Endoscopy: colonoscopy  Reason for Procedure: family history of colon cancer, screening  Type of Anesthesia Anticipated: Moderate sedation    HPI:    Zena is a 57 year old female who will be undergoing the above procedure.      A history and physical has been performed. The patient's medications and allergies have been reviewed. The risks and benefits of the procedure and the sedation options and risks were discussed with the patient.  All questions were answered and informed consent was obtained.      She denies a personal or family history of anesthesia complications or bleeding disorders.     Allergies   Allergen Reactions     Alendronic Acid Other (See Comments)     Hair loss     Dust Mites      Mold      Penicillins Hives     hives        Current Outpatient Medications   Medication     adalimumab (HUMIRA *CF*) 40 MG/0.4ML pen kit     aspirin (ASA) 325 MG tablet     bimatoprost (LATISSE) 0.03 % SOLN     bisacodyl (DULCOLAX) 5 MG EC tablet     calcium carbonate (OS-VALERIA) 600 MG tablet     hydroxychloroquine (PLAQUENIL) 200 MG tablet     ibuprofen (ADVIL/MOTRIN) 200 MG capsule     loratadine 10 MG capsule     MULTIPLE VITAMINS/WOMENS OR     polyethylene glycol (GOLYTELY) 236 g suspension     zinc sulfate (ZINCATE) 220 (50 Zn) MG capsule     fluticasone (FLONASE) 50 MCG/ACT spray     Current Facility-Administered Medications   Medication     lidocaine (LMX4) kit     lidocaine 1 % 0.1-1 mL     sodium chloride (PF) 0.9% PF flush 3 mL     sodium chloride (PF) 0.9% PF flush 3 mL       Patient Active Problem List   Diagnosis     Allergic rhinitis     Stomatitis and mucositis (ulcerative)     Pure hypercholesterolemia     HYPERLIPIDEMIA LDL GOAL <160     High risk medication use     RA (rheumatoid arthritis) (H)     Disturbance  of salivary secretion (XEROSTOMIA)     Impingement syndrome of right shoulder     Sjogren's syndrome (H)     Palpitations     Cervical high risk HPV (human papillomavirus) test positive     Osteoporosis with current pathological fracture, unspecified osteoporosis type, initial encounter     Oral bisphosphonates causing adverse effect in therapeutic use, initial encounter        Past Medical History:   Diagnosis Date     Allergic rhinitis, cause unspecified     Allergic rhinitis     Cervical high risk HPV (human papillomavirus) test positive 03/08/2019    see problem list     Endometriosis, site unspecified     Endometriosis     Female infertility of other specified origin      Iron deficiency anemia, unspecified     Anemia, iron def.     PONV (postoperative nausea and vomiting)      RA (rheumatoid arthritis) (H)      Sjogren's syndrome (H)      Stomatitis and mucositis (ulcerative)     chronic - non-specific        Past Surgical History:   Procedure Laterality Date     COLONOSCOPY WITH CO2 INSUFFLATION N/A 8/7/2017    Procedure: COLONOSCOPY WITH CO2 INSUFFLATION;  Colonoscopy, Screen for colon cancer.  BMI  22.17  [facility] [place] [phone];  Surgeon: Berny Pedro MD;  Location: [facility] OR     HC KNEE SCOPE, DIAGNOSTIC       left knee, ruptured ACL     HC REMOVE TONSILS/ADENOIDS,12+ Y/O      T & A 11+y.o.     ORTHOPEDIC SURGERY      rotator cuff repair, left     ZZC NONSPECIFIC PROCEDURE  1995, 1997    laparoscopy for endometrial fulguration     ZZHC COLONOSCOPY W/WO BRUSH/WASH  5/25/2005     ZZHC CREATE EARDRUM OPENING,GEN ANESTH      P.E. Tubes       Social History     Tobacco Use     Smoking status: Never Smoker     Smokeless tobacco: Never Used     Tobacco comment: no smokers in household   Substance Use Topics     Alcohol use: Yes     Alcohol/week: 1.7 standard drinks     Comment: has a couple drinks now and again       Family History   Problem Relation Age of Onset     Osteoporosis Mother      Arthritis  "Mother         lupus     Connective Tissue Disorder Mother         Lupus     Cataracts Mother      Macular Degeneration Mother      Celiac Disease Mother      Heart Disease Father         aortic aneurysm     Gastrointestinal Disease Father         abdominal anurysm     Hypertension Father      Cataracts Father      Other - See Comments Maternal Grandmother         atherosclerosis heart disease     Alzheimer Disease Maternal Grandmother      Osteoporosis Maternal Grandmother      Cerebrovascular Disease Maternal Grandfather      Cancer Paternal Grandmother      Cerebrovascular Disease Paternal Grandfather      Gastrointestinal Disease Sister         IBS, colon problems     Circulatory Sister         carotid stenosis     Cancer - colorectal Maternal Uncle      Cancer - colorectal Maternal Uncle      Glaucoma No family hx of        REVIEW OF SYSTEMS:   5 point ROS negative except as noted above in HPI, including Gen., Resp., CV, GI &  system review.    PHYSICAL EXAM:   /67   Temp 98.4  F (36.9  C) (Temporal)   Resp 16   LMP 07/17/2017 (Approximate)   SpO2 100%  Estimated body mass index is 22.65 kg/m  as calculated from the following:    Height as of 7/18/22: 1.664 m (5' 5.5\").    Weight as of 8/24/22: 62.7 kg (138 lb 3.2 oz).   GENERAL APPEARANCE: healthy  MENTAL STATUS: alert  AIRWAY EXAM: Mallampatti Class I (visualization of the soft palate, fauces, uvula, anterior and posterior pillars)  RESP: lungs clear to auscultation - no rales, rhonchi or wheezes  CV: regular rates and rhythm, normal S1 S2, no S3 or S4 and no murmur, click or rub    DIAGNOSTICS:    Not indicated    IMPRESSION   ASA Class 2 - Mild systemic disease    PLAN:   colonoscopy    The above has been forwarded to the consulting provider.      Signed Electronically by: Berny Pedro MD  September 20, 2022        "

## 2022-09-20 NOTE — RESULT ENCOUNTER NOTE
Ms. Quintana    Your recent test results are attached.  Colonoscopy revealed increased tortuosity of the colon with some diverticuli noted in the sigmoid colon.  No active inflammation noted.  No polyps noted.  Needs recheck in 5 years for follow-up.    If you have any questions or concerns please contact me via My Chart or call the clinic at [phone]     Thank You  Chana Armenta MD.

## 2022-10-06 ENCOUNTER — TELEPHONE (OUTPATIENT)
Dept: RHEUMATOLOGY | Facility: CLINIC | Age: 57
End: 2022-10-06

## 2022-10-06 NOTE — TELEPHONE ENCOUNTER
PA Initiation    Medication: Humira- PA Initiated  Insurance Company: [employer] - Phone [phone] Fax [phone]  Pharmacy Filling the Rx: [facility] - [place], [place] - [address]  Filling Pharmacy Phone:    Filling Pharmacy Fax:    Start Date: 10/6/2022    Faxed PA renewal request to [facility]

## 2022-10-10 ENCOUNTER — HEALTH MAINTENANCE LETTER (OUTPATIENT)
Age: 57
End: 2022-10-10

## 2022-11-09 NOTE — TELEPHONE ENCOUNTER
Prior Authorization Approval    Authorization Effective Date: 9/18/2022  Authorization Expiration Date: 10/18/2023  Medication: Humira- PA Approved  Approved Dose/Quantity: 2 pens per 28 days  Reference #:     Insurance Company: [employer] - Phone [phone] Fax [phone]  Expected CoPay:       CoPay Card Available:      Foundation Assistance Needed:    Which Pharmacy is filling the prescription (Not needed for infusion/clinic administered): [facility] - [place], [place] - [address] [place] [address]  Pharmacy Notified:    Patient Notified:    david Kim [facility] Liaison  [email or web address]  Phone: [phone]  Fax: [phone]

## 2023-01-06 NOTE — PROGRESS NOTES
"ENT Consultation    Zena Quintana who is a 57 year old female seen in consultation at the request of self.      History of Present Illness - Zena Quintana is a 57 year old female presents for evaluation of her left ear.  Few months ago started feeling \"echo\" occasional pressure in the left ear no \"popping\".  Especially loud environment she feels reverberation and more echoing in the ear.  Denies any dizziness vertigo.  Does experience some sound distortion.  Apparently her father had vertigo but was never worked up.  She only had 1 episode of vertigo few years back but it appears to be positional as she describes it.  Denies any other headaches any light or sound sensitivity.          BP Readings from Last 1 Encounters:   01/11/23 102/60       BP noted to be well controlled today in office.     Zena IS NOT a smoker/uses chewing tobacco.     Past Medical History -   Past Medical History:   Diagnosis Date     Allergic rhinitis, cause unspecified     Allergic rhinitis     Cervical high risk HPV (human papillomavirus) test positive 03/08/2019    see problem list     Endometriosis, site unspecified     Endometriosis     Female infertility of other specified origin      Iron deficiency anemia, unspecified     Anemia, iron def.     PONV (postoperative nausea and vomiting)      RA (rheumatoid arthritis) (H)      Sjogren's syndrome (H)      Stomatitis and mucositis (ulcerative)     chronic - non-specific       Current Medications -   Current Outpatient Medications:      adalimumab (HUMIRA *CF*) 40 MG/0.4ML pen kit, Inject 0.4 mLs (40 mg) Subcutaneous every 14 days . Hold for signs of infection, then seek medical attention., Disp: 0.8 mL, Rfl: 5     aspirin (ASA) 325 MG tablet, Take 325 mg by mouth daily, Disp: , Rfl:      bimatoprost (LATISSE) 0.03 % SOLN, Externally apply topically At Bedtime, Disp: 1 Bottle, Rfl: 3     calcium carbonate (OS-VALERIA) 600 MG tablet, Take 2 tablets by mouth daily, Disp: , Rfl:      " fluticasone (FLONASE) 50 MCG/ACT spray, Spray 2 sprays into both nostrils daily, Disp: 1 Bottle, Rfl: 0     hydroxychloroquine (PLAQUENIL) 200 MG tablet, Hydroxychloroquine 200mg daily; and an additional 200mg every other day., Disp: 135 tablet, Rfl: 2     ibuprofen (ADVIL/MOTRIN) 200 MG capsule, Take 200 mg by mouth as needed for fever, Disp: , Rfl:      loratadine 10 MG capsule, Take 10 mg by mouth as needed for allergies, Disp: , Rfl:      MULTIPLE VITAMINS/WOMENS OR, None Entered, Disp: , Rfl:      zinc sulfate (ZINCATE) 220 (50 Zn) MG capsule, Take 1 capsule by mouth daily, Disp: , Rfl:     Allergies -   Allergies   Allergen Reactions     Alendronic Acid Other (See Comments)     Hair loss     Dust Mites      Mold      Penicillins Hives     hives       Social History -   Social History     Socioeconomic History     Marital status:      Spouse name: Wolf     Number of children: 2     Years of education: 12   Occupational History     Occupation: homemaker     Employer: [employer]     Employer:    Tobacco Use     Smoking status: Never     Smokeless tobacco: Never     Tobacco comments:     no smokers in household   Vaping Use     Vaping Use: Never used   Substance and Sexual Activity     Alcohol use: Yes     Alcohol/week: 1.7 standard drinks     Comment: has a couple drinks now and again     Drug use: No     Sexual activity: Yes     Partners: Male     Birth control/protection: None     Comment: Infertility   Other Topics Concern      Service No     Blood Transfusions No     Caffeine Concern No     Occupational Exposure No     Hobby Hazards No     Sleep Concern No     Stress Concern No     Weight Concern No     Special Diet No     Back Care No     Exercise Yes     Bike Helmet No     Seat Belt Yes     Self-Exams Yes   Social History Narrative    Adopted son at 7wk and dtr at 4mo, both from [place]       Family History -   Family History   Problem Relation Age of Onset     Osteoporosis Mother   "    Arthritis Mother         lupus     Connective Tissue Disorder Mother         Lupus     Cataracts Mother      Macular Degeneration Mother      Celiac Disease Mother      Heart Disease Father         aortic aneurysm     Gastrointestinal Disease Father         abdominal anurysm     Hypertension Father      Cataracts Father      Other - See Comments Maternal Grandmother         atherosclerosis heart disease     Alzheimer Disease Maternal Grandmother      Osteoporosis Maternal Grandmother      Cerebrovascular Disease Maternal Grandfather      Cancer Paternal Grandmother      Cerebrovascular Disease Paternal Grandfather      Gastrointestinal Disease Sister         IBS, colon problems     Circulatory Sister         carotid stenosis     Cancer - colorectal Maternal Uncle      Cancer - colorectal Maternal Uncle      Glaucoma No family hx of        Review of Systems - As per HPI and PMHx, otherwise review of system review of the head and neck negative. Otherwise 10+ review of system is negative    Physical Exam  /60   Temp 97.8  F (36.6  C) (Temporal)   Ht 1.664 m (5' 5.5\")   Wt 60.8 kg (134 lb)   LMP 07/17/2017 (Approximate)   BMI 21.96 kg/m    BMI: Body mass index is 21.96 kg/m .    General - The patient is well nourished and well developed, and appears to have good nutritional status.  Alert and oriented to person and place, answers questions and cooperates with examination appropriately.    SKIN - No suspicious lesions or rashes.  Respiration - No respiratory distress.  Head and Face - Normocephalic and atraumatic, with no gross asymmetry noted of the contour of the facial features.  The facial nerve is intact, with strong symmetric movements.    Voice and Breathing - The patient was breathing comfortably without the use of accessory muscles. The patients voice was clear and strong, and had appropriate pitch and quality.    Ears - Bilateral pinna and EACs with normal appearing overlying skin. Tympanic " membrane intact with good mobility on pneumatic otoscopy bilaterally. Bony landmarks of the ossicular chain are normal. The tympanic membranes are normal in appearance. No retraction, perforation, or masses.  No fluid or purulence was seen in the external canal or the middle ear.     Eyes - Extraocular movements intact.  Sclera were not icteric or injected, conjunctiva were pink and moist.    Mouth - Examination of the oral cavity showed pink, healthy oral mucosa. No lesions or ulcerations noted.  The tongue was mobile and midline, and the dentition were in good condition.      Throat - The walls of the oropharynx were smooth, pink, moist, symmetric, and had no lesions or ulcerations.  The tonsillar pillars and soft palate were symmetric.  The uvula was midline on elevation.    Neck - Normal midline excursion of the laryngotracheal complex during swallowing.  Full range of motion on passive movement.  Palpation of the occipital, submental, submandibular, internal jugular chain, and supraclavicular nodes did not demonstrate any abnormal lymph nodes or masses.  The carotid pulse was palpable bilaterally.  Palpation of the thyroid was soft and smooth, with no nodules or goiter appreciated.  The trachea was mobile and midline.    Nose - External contour is symmetric, no gross deflection or scars.  Nasal mucosa is pink and moist with no abnormal mucus.  The septum was midline and non-obstructive, turbinates of normal size and position.  No polyps, masses, or purulence noted on examination.    Neuro - Nonfocal neuro exam is normal, CN 2 through 12 intact, normal gait and muscle tone.      Performed in clinic today:  Audiologic Studies - An audiogram and tympanogram were performed today as part of the evaluation and personally reviewed. The tympanogram shows Type A curves on the right and Type A curves on the left, with Normal canal volumes and middle ear pressures.  The audiogram showed Normal thresholds on the right and  Mild to moderate low-frequency SNHLon the left.    Word recognition score 100% on the right and 92% on the left.    A/P - Zena Quintana is a 57 year old female patient with a unilateral left-sided symptomatology that involve some distortion pressure discomfort and low-frequency loss possibly manifestation of cochlear Ménière's disease.  Due to this asymmetry would like to get MRI of the internal auditory canals and brain.  We will also discuss potential migraine/Ménière's disease and management with avoidance of salt caffeine and alcohol.  We spent 45 minutes with the patient in counseling and thorough exam.  Patient will follow-up after her MRI to discuss the results.      Jamir Hauser MD

## 2023-01-11 ENCOUNTER — OFFICE VISIT (OUTPATIENT)
Dept: AUDIOLOGY | Facility: OTHER | Age: 58
End: 2023-01-11
Payer: COMMERCIAL

## 2023-01-11 ENCOUNTER — OFFICE VISIT (OUTPATIENT)
Dept: OTOLARYNGOLOGY | Facility: OTHER | Age: 58
End: 2023-01-11
Payer: COMMERCIAL

## 2023-01-11 VITALS
DIASTOLIC BLOOD PRESSURE: 60 MMHG | HEIGHT: 66 IN | TEMPERATURE: 97.8 F | SYSTOLIC BLOOD PRESSURE: 102 MMHG | WEIGHT: 134 LBS | BODY MASS INDEX: 21.53 KG/M2

## 2023-01-11 DIAGNOSIS — H81.02: ICD-10-CM

## 2023-01-11 DIAGNOSIS — H90.3 ASYMMETRICAL SENSORINEURAL HEARING LOSS: Primary | ICD-10-CM

## 2023-01-11 DIAGNOSIS — H90.3 SNHL (SENSORY-NEURAL HEARING LOSS), ASYMMETRICAL: ICD-10-CM

## 2023-01-11 DIAGNOSIS — H93.12 TINNITUS, LEFT: Primary | ICD-10-CM

## 2023-01-11 PROCEDURE — 99207 PR NO CHARGE LOS: CPT | Performed by: AUDIOLOGIST

## 2023-01-11 PROCEDURE — 92550 TYMPANOMETRY & REFLEX THRESH: CPT | Performed by: AUDIOLOGIST

## 2023-01-11 PROCEDURE — 92557 COMPREHENSIVE HEARING TEST: CPT | Performed by: AUDIOLOGIST

## 2023-01-11 PROCEDURE — 99204 OFFICE O/P NEW MOD 45 MIN: CPT | Performed by: OTOLARYNGOLOGY

## 2023-01-11 RX ORDER — PREDNISONE 20 MG/1
20 TABLET ORAL 2 TIMES DAILY
Qty: 12 TABLET | Refills: 0 | Status: SHIPPED | OUTPATIENT
Start: 2023-01-11 | End: 2023-01-17

## 2023-01-11 NOTE — LETTER
"    1/11/2023         RE: Zena Quintana  [address] [facility] [address]        Dear Colleague,    Thank you for referring your patient, Zena Quintana, to the [facility]. Please see a copy of my visit note below.    ENT Consultation    Zena Quintana who is a 57 year old female seen in consultation at the request of self.      History of Present Illness - Zena Quintana is a 57 year old female presents for evaluation of her left ear.  Few months ago started feeling \"echo\" occasional pressure in the left ear no \"popping\".  Especially loud environment she feels reverberation and more echoing in the ear.  Denies any dizziness vertigo.  Does experience some sound distortion.  Apparently her father had vertigo but was never worked up.  She only had 1 episode of vertigo few years back but it appears to be positional as she describes it.  Denies any other headaches any light or sound sensitivity.          BP Readings from Last 1 Encounters:   01/11/23 102/60       BP noted to be well controlled today in office.     Zena IS NOT a smoker/uses chewing tobacco.     Past Medical History -   Past Medical History:   Diagnosis Date     Allergic rhinitis, cause unspecified     Allergic rhinitis     Cervical high risk HPV (human papillomavirus) test positive 03/08/2019    see problem list     Endometriosis, site unspecified     Endometriosis     Female infertility of other specified origin      Iron deficiency anemia, unspecified     Anemia, iron def.     PONV (postoperative nausea and vomiting)      RA (rheumatoid arthritis) (H)      Sjogren's syndrome (H)      Stomatitis and mucositis (ulcerative)     chronic - non-specific       Current Medications -   Current Outpatient Medications:      adalimumab (HUMIRA *CF*) 40 MG/0.4ML pen kit, Inject 0.4 mLs (40 mg) Subcutaneous every 14 days . Hold for signs of infection, then seek medical attention., Disp: 0.8 mL, Rfl: 5     aspirin (ASA) 325 " MG tablet, Take 325 mg by mouth daily, Disp: , Rfl:      bimatoprost (LATISSE) 0.03 % SOLN, Externally apply topically At Bedtime, Disp: 1 Bottle, Rfl: 3     calcium carbonate (OS-VALERIA) 600 MG tablet, Take 2 tablets by mouth daily, Disp: , Rfl:      fluticasone (FLONASE) 50 MCG/ACT spray, Spray 2 sprays into both nostrils daily, Disp: 1 Bottle, Rfl: 0     hydroxychloroquine (PLAQUENIL) 200 MG tablet, Hydroxychloroquine 200mg daily; and an additional 200mg every other day., Disp: 135 tablet, Rfl: 2     ibuprofen (ADVIL/MOTRIN) 200 MG capsule, Take 200 mg by mouth as needed for fever, Disp: , Rfl:      loratadine 10 MG capsule, Take 10 mg by mouth as needed for allergies, Disp: , Rfl:      MULTIPLE VITAMINS/WOMENS OR, None Entered, Disp: , Rfl:      zinc sulfate (ZINCATE) 220 (50 Zn) MG capsule, Take 1 capsule by mouth daily, Disp: , Rfl:     Allergies -   Allergies   Allergen Reactions     Alendronic Acid Other (See Comments)     Hair loss     Dust Mites      Mold      Penicillins Hives     hives       Social History -   Social History     Socioeconomic History     Marital status:      Spouse name: Wolf     Number of children: 2     Years of education: 12   Occupational History     Occupation: homemaker     Employer: [employer]     Employer:    Tobacco Use     Smoking status: Never     Smokeless tobacco: Never     Tobacco comments:     no smokers in household   Vaping Use     Vaping Use: Never used   Substance and Sexual Activity     Alcohol use: Yes     Alcohol/week: 1.7 standard drinks     Comment: has a couple drinks now and again     Drug use: No     Sexual activity: Yes     Partners: Male     Birth control/protection: None     Comment: Infertility   Other Topics Concern      Service No     Blood Transfusions No     Caffeine Concern No     Occupational Exposure No     Hobby Hazards No     Sleep Concern No     Stress Concern No     Weight Concern No     Special Diet No     Back Care No      "Exercise Yes     Bike Helmet No     Seat Belt Yes     Self-Exams Yes   Social History Narrative    Adopted son at 7wk and dtr at 4mo, both from [place]       Family History -   Family History   Problem Relation Age of Onset     Osteoporosis Mother      Arthritis Mother         lupus     Connective Tissue Disorder Mother         Lupus     Cataracts Mother      Macular Degeneration Mother      Celiac Disease Mother      Heart Disease Father         aortic aneurysm     Gastrointestinal Disease Father         abdominal anurysm     Hypertension Father      Cataracts Father      Other - See Comments Maternal Grandmother         atherosclerosis heart disease     Alzheimer Disease Maternal Grandmother      Osteoporosis Maternal Grandmother      Cerebrovascular Disease Maternal Grandfather      Cancer Paternal Grandmother      Cerebrovascular Disease Paternal Grandfather      Gastrointestinal Disease Sister         IBS, colon problems     Circulatory Sister         carotid stenosis     Cancer - colorectal Maternal Uncle      Cancer - colorectal Maternal Uncle      Glaucoma No family hx of        Review of Systems - As per HPI and PMHx, otherwise review of system review of the head and neck negative. Otherwise 10+ review of system is negative    Physical Exam  /60   Temp 97.8  F (36.6  C) (Temporal)   Ht 1.664 m (5' 5.5\")   Wt 60.8 kg (134 lb)   LMP 07/17/2017 (Approximate)   BMI 21.96 kg/m    BMI: Body mass index is 21.96 kg/m .    General - The patient is well nourished and well developed, and appears to have good nutritional status.  Alert and oriented to person and place, answers questions and cooperates with examination appropriately.    SKIN - No suspicious lesions or rashes.  Respiration - No respiratory distress.  Head and Face - Normocephalic and atraumatic, with no gross asymmetry noted of the contour of the facial features.  The facial nerve is intact, with strong symmetric movements.    Voice and " Breathing - The patient was breathing comfortably without the use of accessory muscles. The patients voice was clear and strong, and had appropriate pitch and quality.    Ears - Bilateral pinna and EACs with normal appearing overlying skin. Tympanic membrane intact with good mobility on pneumatic otoscopy bilaterally. Bony landmarks of the ossicular chain are normal. The tympanic membranes are normal in appearance. No retraction, perforation, or masses.  No fluid or purulence was seen in the external canal or the middle ear.     Eyes - Extraocular movements intact.  Sclera were not icteric or injected, conjunctiva were pink and moist.    Mouth - Examination of the oral cavity showed pink, healthy oral mucosa. No lesions or ulcerations noted.  The tongue was mobile and midline, and the dentition were in good condition.      Throat - The walls of the oropharynx were smooth, pink, moist, symmetric, and had no lesions or ulcerations.  The tonsillar pillars and soft palate were symmetric.  The uvula was midline on elevation.    Neck - Normal midline excursion of the laryngotracheal complex during swallowing.  Full range of motion on passive movement.  Palpation of the occipital, submental, submandibular, internal jugular chain, and supraclavicular nodes did not demonstrate any abnormal lymph nodes or masses.  The carotid pulse was palpable bilaterally.  Palpation of the thyroid was soft and smooth, with no nodules or goiter appreciated.  The trachea was mobile and midline.    Nose - External contour is symmetric, no gross deflection or scars.  Nasal mucosa is pink and moist with no abnormal mucus.  The septum was midline and non-obstructive, turbinates of normal size and position.  No polyps, masses, or purulence noted on examination.    Neuro - Nonfocal neuro exam is normal, CN 2 through 12 intact, normal gait and muscle tone.      Performed in clinic today:  Audiologic Studies - An audiogram and tympanogram were  performed today as part of the evaluation and personally reviewed. The tympanogram shows Type A curves on the right and Type A curves on the left, with Normal canal volumes and middle ear pressures.  The audiogram showed Normal thresholds on the right and Mild to moderate low-frequency SNHLon the left.    Word recognition score 100% on the right and 92% on the left.    A/P - Zena Quintana is a 57 year old female patient with a unilateral left-sided symptomatology that involve some distortion pressure discomfort and low-frequency loss possibly manifestation of cochlear Ménière's disease.  Due to this asymmetry would like to get MRI of the internal auditory canals and brain.  We will also discuss potential migraine/Ménière's disease and management with avoidance of salt caffeine and alcohol.  We spent 45 minutes with the patient in counseling and thorough exam.  Patient will follow-up after her MRI to discuss the results.      Jamir Hauser MD      Again, thank you for allowing me to participate in the care of your patient.        Sincerely,        Jamir Hauser MD, MD

## 2023-01-11 NOTE — PROGRESS NOTES
"AUDIOLOGY REPORT:    Patient was referred from ENT by Dr. Hauser for audiology evaluation. The patient reports that a few months ago she woke up with congestion in her left ear and an \"echo\" sound quality. She was diagnosed with an ear infection, which was treated, but the echo did not go away. The patient reports that she had Covid around a month ago and her hearing was worse at that time. She reports that her hearing still seems muffled in the left ear. She also reports a distorted sound quality in the left ear and intermittent tinnitus that is more in the left ear than the right. The patient reports that she had PE tubes when she was in middle school and has had a few ear infections as an adult. She reports that she has not had any other ear surgery. The patient reports that she has been having occasional ear pain that can occur in either ear. She reports that she did not have any hearing concerns before the recent decrease, and she does not have a history of loud noise exposure or a family history of hearing loss.    Testing:    Otoscopy:   Otoscopic exam indicates ears are clear of cerumen bilaterally     Tympanograms:    RIGHT: normal eardrum mobility     LEFT:   normal eardrum mobility    Reflexes (reported by stimulus ear):  RIGHT: Ipsilateral is present at normal levels  RIGHT: Contralateral is absent at frequencies tested  LEFT:   Ipsilateral is absent at frequencies tested  LEFT:   Contralateral is present at elevated levels     Thresholds:   Pure Tone Thresholds assessed using conventional audiometry with good reliability from 250-8000 Hz bilaterally using insert earphones and circumaural headphones     RIGHT:  normal hearing sensitivity through 6000 Hz sloping to mild likely sensorineural hearing loss    LEFT:    mild sensorineural hearing loss at 250-500 Hz rising to normal hearing sensitivity    Speech Reception Threshold:    RIGHT: 15 dB HL    LEFT:   20 dB HL  Results are in agreement with pure " tone average.     Word Recognition Score:     RIGHT: 100% at 55 dB HL using NU-6 recorded word list.    LEFT:   92% at 60 dB HL using NU-6 recorded word list.    Discussed results with the patient.     Patient was returned to ENT for follow up.     Rachel Valentine, CCC-A  Licensed Audiologist #[id number]  1/11/2023

## 2023-01-14 ENCOUNTER — MYC MEDICAL ADVICE (OUTPATIENT)
Dept: OTOLARYNGOLOGY | Facility: OTHER | Age: 58
End: 2023-01-14

## 2023-01-18 ENCOUNTER — HOSPITAL ENCOUNTER (OUTPATIENT)
Dept: MRI IMAGING | Facility: CLINIC | Age: 58
Discharge: HOME | End: 2023-01-18
Attending: OTOLARYNGOLOGY | Admitting: OTOLARYNGOLOGY
Payer: COMMERCIAL

## 2023-01-18 DIAGNOSIS — H90.3 SNHL (SENSORY-NEURAL HEARING LOSS), ASYMMETRICAL: ICD-10-CM

## 2023-01-18 DIAGNOSIS — H93.12 TINNITUS, LEFT: ICD-10-CM

## 2023-01-18 PROCEDURE — 255N000002 HC RX 255 OP 636: Performed by: OTOLARYNGOLOGY

## 2023-01-18 PROCEDURE — 70553 MRI BRAIN STEM W/O & W/DYE: CPT

## 2023-01-18 PROCEDURE — A9585 GADOBUTROL INJECTION: HCPCS | Performed by: OTOLARYNGOLOGY

## 2023-01-18 RX ORDER — GADOBUTROL 604.72 MG/ML
7.5 INJECTION INTRAVENOUS ONCE
Status: COMPLETED | OUTPATIENT
Start: 2023-01-18 | End: 2023-01-18

## 2023-01-18 RX ADMIN — GADOBUTROL 6 ML: 604.72 INJECTION INTRAVENOUS at 08:57

## 2023-01-23 ENCOUNTER — LAB (OUTPATIENT)
Dept: LAB | Facility: OTHER | Age: 58
End: 2023-01-23
Payer: COMMERCIAL

## 2023-01-23 DIAGNOSIS — M05.79 RHEUMATOID ARTHRITIS INVOLVING MULTIPLE SITES WITH POSITIVE RHEUMATOID FACTOR (H): ICD-10-CM

## 2023-01-23 DIAGNOSIS — M80.00XA OSTEOPOROSIS WITH CURRENT PATHOLOGICAL FRACTURE, UNSPECIFIED OSTEOPOROSIS TYPE, INITIAL ENCOUNTER: ICD-10-CM

## 2023-01-23 LAB
ALBUMIN SERPL BCG-MCNC: 4.7 G/DL (ref 3.5–5.2)
ALP SERPL-CCNC: 36 U/L (ref 35–104)
ALT SERPL W P-5'-P-CCNC: 18 U/L (ref 10–35)
AST SERPL W P-5'-P-CCNC: 29 U/L (ref 10–35)
BASOPHILS # BLD AUTO: 0 10E3/UL (ref 0–0.2)
BASOPHILS NFR BLD AUTO: 1 %
BILIRUB DIRECT SERPL-MCNC: <0.2 MG/DL (ref 0–0.3)
BILIRUB SERPL-MCNC: 0.2 MG/DL
CALCIUM SERPL-MCNC: 9.9 MG/DL (ref 8.6–10)
CREAT SERPL-MCNC: 0.81 MG/DL (ref 0.51–0.95)
CRP SERPL-MCNC: <3 MG/L
EOSINOPHIL # BLD AUTO: 0.2 10E3/UL (ref 0–0.7)
EOSINOPHIL NFR BLD AUTO: 4 %
ERYTHROCYTE [DISTWIDTH] IN BLOOD BY AUTOMATED COUNT: 12.5 % (ref 10–15)
ERYTHROCYTE [SEDIMENTATION RATE] IN BLOOD BY WESTERGREN METHOD: 8 MM/HR (ref 0–30)
GFR SERPL CREATININE-BSD FRML MDRD: 84 ML/MIN/1.73M2
HCT VFR BLD AUTO: 35.3 % (ref 35–47)
HGB BLD-MCNC: 11.2 G/DL (ref 11.7–15.7)
LYMPHOCYTES # BLD AUTO: 2.2 10E3/UL (ref 0.8–5.3)
LYMPHOCYTES NFR BLD AUTO: 36 %
MCH RBC QN AUTO: 29.9 PG (ref 26.5–33)
MCHC RBC AUTO-ENTMCNC: 31.7 G/DL (ref 31.5–36.5)
MCV RBC AUTO: 94 FL (ref 78–100)
MONOCYTES # BLD AUTO: 1 10E3/UL (ref 0–1.3)
MONOCYTES NFR BLD AUTO: 17 %
NEUTROPHILS # BLD AUTO: 2.6 10E3/UL (ref 1.6–8.3)
NEUTROPHILS NFR BLD AUTO: 43 %
PLATELET # BLD AUTO: 308 10E3/UL (ref 150–450)
PROT SERPL-MCNC: 6.8 G/DL (ref 6.4–8.3)
RBC # BLD AUTO: 3.75 10E6/UL (ref 3.8–5.2)
WBC # BLD AUTO: 6 10E3/UL (ref 4–11)

## 2023-01-23 PROCEDURE — 86481 TB AG RESPONSE T-CELL SUSP: CPT

## 2023-01-23 PROCEDURE — 85025 COMPLETE CBC W/AUTO DIFF WBC: CPT

## 2023-01-23 PROCEDURE — 80076 HEPATIC FUNCTION PANEL: CPT

## 2023-01-23 PROCEDURE — 82310 ASSAY OF CALCIUM: CPT

## 2023-01-23 PROCEDURE — 86140 C-REACTIVE PROTEIN: CPT

## 2023-01-23 PROCEDURE — 36415 COLL VENOUS BLD VENIPUNCTURE: CPT

## 2023-01-23 PROCEDURE — 82306 VITAMIN D 25 HYDROXY: CPT

## 2023-01-23 PROCEDURE — 85652 RBC SED RATE AUTOMATED: CPT

## 2023-01-23 PROCEDURE — 82565 ASSAY OF CREATININE: CPT

## 2023-01-24 LAB — DEPRECATED CALCIDIOL+CALCIFEROL SERPL-MC: 70 UG/L (ref 20–75)

## 2023-01-25 ENCOUNTER — OFFICE VISIT (OUTPATIENT)
Dept: RHEUMATOLOGY | Facility: CLINIC | Age: 58
End: 2023-01-25
Payer: COMMERCIAL

## 2023-01-25 VITALS
SYSTOLIC BLOOD PRESSURE: 107 MMHG | HEART RATE: 72 BPM | BODY MASS INDEX: 22.68 KG/M2 | OXYGEN SATURATION: 100 % | WEIGHT: 138.4 LBS | DIASTOLIC BLOOD PRESSURE: 67 MMHG

## 2023-01-25 DIAGNOSIS — M05.79 RHEUMATOID ARTHRITIS INVOLVING MULTIPLE SITES WITH POSITIVE RHEUMATOID FACTOR (H): Primary | ICD-10-CM

## 2023-01-25 DIAGNOSIS — Z23 NEED FOR PROPHYLACTIC VACCINATION AND INOCULATION AGAINST INFLUENZA: ICD-10-CM

## 2023-01-25 DIAGNOSIS — M80.00XA OSTEOPOROSIS WITH CURRENT PATHOLOGICAL FRACTURE, UNSPECIFIED OSTEOPOROSIS TYPE, INITIAL ENCOUNTER: ICD-10-CM

## 2023-01-25 LAB — QUANTIFERON TB2 TUBE: 0.03

## 2023-01-25 PROCEDURE — 99214 OFFICE O/P EST MOD 30 MIN: CPT | Mod: 25 | Performed by: INTERNAL MEDICINE

## 2023-01-25 PROCEDURE — 90471 IMMUNIZATION ADMIN: CPT | Performed by: INTERNAL MEDICINE

## 2023-01-25 PROCEDURE — 90682 RIV4 VACC RECOMBINANT DNA IM: CPT | Performed by: INTERNAL MEDICINE

## 2023-01-25 RX ORDER — HYDROXYCHLOROQUINE SULFATE 200 MG/1
TABLET, FILM COATED ORAL
Qty: 135 TABLET | Refills: 2 | Status: SHIPPED | OUTPATIENT
Start: 2023-01-25 | End: 2023-07-25

## 2023-01-25 NOTE — PROGRESS NOTES
Rheumatology Clinic Visit      Zena Quintana MRN# [id number]   YOB: 1965 Age: 57 year old      Date of visit: 1/25/23   PCP: Dr. Chana Armenta    Chief Complaint   Patient presents with:  Rheumatoid Arthritis    Assessment and Plan     1. Seropositive nonerosive rheumatoid arthritis (RF low positive, CCP negative): Reportedly at the time of diagnosis she had synovitis in a symmetric distribution that was steroid responsive. Previously on MTX (effective, anemia, LFT elevation), leflunomide (neutropenia). Currently on hydroxychloroquine 300 mg daily on average (started in approximately 2007, worsened symptoms when reduced to 200mg daily) and Humira 40mg SQ every 14 days (started 10/10/2021).   Chronic illness    - Continue hydroxychloroquine 200mg daily with an additional 200mg every other day (last eye exam was on 7/28/2022 with Dr. Mirza)  - Continue Humira 40 mg SQ every 14 days   - Labs in 6 months: CBC, Creatinine, Hepatic Panel, ESR, CRP    2. Osteoporosis: s/p hip fx from ground level fall and is now status-post right HUY. Note that her mother has a hx of vertebral compression fx; Ms. Quintana is post-menopausal. 1/13/2020 DEXA showed osteoporosis; L-spine T score of -2.6, Left femoral neck T-score of -2.4. Alendronate was started 1/2020 but stopped in early April 2020 by Ms. Quintana due to hair loss and GERD.  She then received Reclast on 6/16/2020, 6/17/2021, 6/20/2022.  Plan to continue Reclast on a yearly basis until 2025.  4/12/2022 DEXA showed improvement of the lumbar spine and stable at the radius and left femoral neck.  Chronic illness, stable.    - Continue reclast 5mg IV once yearly, next due 6/20/2023 or shortly thereafter. Phone number provided so that she may call to schedule.   - Continue calcium 1200mg daily  - Continue vitamin D 1000 IU daily    3.  History of bilateral carpal tunnel syndrome: doesn't tolerate NSAIDs per patient.  Using wrist splints bilaterally with  significant improvement, and was intermittent symptoms.  Symptoms then worsened and she had the nerve conduction study performed, showing bilateral carpal tunnel syndrome.  She was then seen by orthopedics and had surgery on the right with resolution of symptoms with did not have surgery on the left; left carpal tunnel syndrome symptoms have improved over time and she is not symptomatic today with regard to carpal tunnel syndrome.     4. Bilateral shoulder issues / rotator cuff pathology: Limited range of motion of the right shoulder because of rotator cuff tear.  S/p surgery.  Doing okay at this time.    5. Sjogren's syndrome: NJ by EIA negative but positive by IF, SSA and SSB negative, and minor salivary gland biopsy negative. She is currently following with ophthalmology who has given her eyedrops that have been beneficial.  Dry mouth is currently treated with frequent sips of water; she has good oral hygiene, follows with a dentist regularly, and does not have frequent cavities. Pilocarpine was previously Rx'd but never used and has been removed from her medication list previously.    6. History of oral sores / family history of lupus / polyarthritis / recurrent sinus infections: She is not having oral sores or recurrent sinus infections for several years now. Retrospectively, there would be concern for potential ANCA associated sinus issues and this could be checked if she had recurrent sinusitis again. Oral sores have improved since she started Plaquenil, that argues it could be connective tissue disease related. NJ was negative by EIA, but complement was on the low end of the normal range so NJ was rechecked and positive; additional testing was negative.    7.  Vaccinations: Vaccinations reviewed with MsTari Garciakeenan.    - Influenza: encouraged yearly vaccination  - [id number]: up to date  - [id number]: up to date  - Shingrix: advised receiving  - COVID-19: advised updating      Total minutes spent in  evaluation with patient, documentation, , and review of pertinent studies and chart notes: 20     Ms. Quintana verbalized agreement with and understanding of the rational for the diagnosis and treatment plan.  All questions were answered to best of my ability and the patient's satisfaction. Ms. Quintana was advised to contact the clinic with any questions that may arise after the clinic visit.      Thank you for involving me in the care of the patient    Return to clinic: 6 months      HPI   Zena Quintana is a 57 year old female with medical history significant for iron deficiency anemia, nephrolithiasis, hyperlipidemia, xerostomia, dry eyes, status-post right HUY, and rheumatoid arthritis who presents for follow-up of rheumatoid arthritis.    Today, 1/25/2023: RA controlled. No joint pain/swelling. No morning stiffness or gelling. Arthritis is not limiting her daily activities.     Denies fevers, chills, nausea, vomiting, constipation, diarrhea. No abdominal pain. No chest pain/pressure, palpitations, or shortness of breath. No neck pain. No rash. No LE swelling.  No photosensitivity or photophobia.  No sicca symptoms.     Tobacco: None  EtOH: Occasional; no more than 2 drinks per day when she does drink  Drugs: None  Occupation: Works at a school    ROS   12 point review of system was completed and negative except as noted in the HPI     Active Problem List     Patient Active Problem List   Diagnosis     Allergic rhinitis     Stomatitis and mucositis (ulcerative)     Pure hypercholesterolemia     HYPERLIPIDEMIA LDL GOAL <160     High risk medication use     RA (rheumatoid arthritis) (H)     Disturbance of salivary secretion (XEROSTOMIA)     Impingement syndrome of right shoulder     Sjogren's syndrome (H)     Palpitations     Cervical high risk HPV (human papillomavirus) test positive     Osteoporosis with current pathological fracture, unspecified osteoporosis type, initial encounter     Oral  bisphosphonates causing adverse effect in therapeutic use, initial encounter     Past Medical History     Past Medical History:   Diagnosis Date     Allergic rhinitis, cause unspecified     Allergic rhinitis     Cervical high risk HPV (human papillomavirus) test positive 03/08/2019    see problem list     Endometriosis, site unspecified     Endometriosis     Female infertility of other specified origin      Iron deficiency anemia, unspecified     Anemia, iron def.     PONV (postoperative nausea and vomiting)      RA (rheumatoid arthritis) (H)      Sjogren's syndrome (H)      Stomatitis and mucositis (ulcerative)     chronic - non-specific     Past Surgical History     Past Surgical History:   Procedure Laterality Date     COLONOSCOPY WITH CO2 INSUFFLATION N/A 8/7/2017    Procedure: COLONOSCOPY WITH CO2 INSUFFLATION;  Colonoscopy, Screen for colon cancer.  BMI  22.17  [facility] [place] [phone];  Surgeon: Berny Pedro MD;  Location: [facility] OR     COLONOSCOPY WITH CO2 INSUFFLATION N/A 9/20/2022    Procedure: COLONOSCOPY, WITH CO2 INSUFFLATION;  Surgeon: Berny Pedro MD;  Location: [facility] OR     HC KNEE SCOPE, DIAGNOSTIC       left knee, ruptured ACL     HC REMOVE TONSILS/ADENOIDS,12+ Y/O      T & A 11+y.o.     ORTHOPEDIC SURGERY      rotator cuff repair, left     ZZC NONSPECIFIC PROCEDURE  1995, 1997    laparoscopy for endometrial fulguration     ZZHC COLONOSCOPY W/WO BRUSH/WASH  5/25/2005     ZZHC CREATE EARDRUM OPENING,GEN ANESTH      P.E. Tubes     Allergy     Allergies   Allergen Reactions     Alendronic Acid Other (See Comments)     Hair loss     Dust Mites      Mold      Penicillins Hives     hives     Current Medication List     Current Outpatient Medications   Medication Sig     adalimumab (HUMIRA *CF*) 40 MG/0.4ML pen kit Inject 0.4 mLs (40 mg) Subcutaneous every 14 days . Hold for signs of infection, then seek medical attention.     aspirin (ASA) 325 MG tablet Take 325 mg by mouth daily     bimatoprost  (LATISSE) 0.03 % SOLN Externally apply topically At Bedtime     calcium carbonate (OS-VALERIA) 600 MG tablet Take 2 tablets by mouth daily     fluticasone (FLONASE) 50 MCG/ACT spray Spray 2 sprays into both nostrils daily     hydroxychloroquine (PLAQUENIL) 200 MG tablet Hydroxychloroquine 200mg daily; and an additional 200mg every other day.     ibuprofen (ADVIL/MOTRIN) 200 MG capsule Take 200 mg by mouth as needed for fever     loratadine 10 MG capsule Take 10 mg by mouth as needed for allergies     MULTIPLE VITAMINS/WOMENS OR None Entered     zinc sulfate (ZINCATE) 220 (50 Zn) MG capsule Take 1 capsule by mouth daily     No current facility-administered medications for this visit.         Social History   See HPI    Family History     Family History   Problem Relation Age of Onset     Osteoporosis Mother      Arthritis Mother         lupus     Connective Tissue Disorder Mother         Lupus     Cataracts Mother      Macular Degeneration Mother      Celiac Disease Mother      Heart Disease Father         aortic aneurysm     Gastrointestinal Disease Father         abdominal anurysm     Hypertension Father      Cataracts Father      Other - See Comments Maternal Grandmother         atherosclerosis heart disease     Alzheimer Disease Maternal Grandmother      Osteoporosis Maternal Grandmother      Cerebrovascular Disease Maternal Grandfather      Cancer Paternal Grandmother      Cerebrovascular Disease Paternal Grandfather      Gastrointestinal Disease Sister         IBS, colon problems     Circulatory Sister         carotid stenosis     Cancer - colorectal Maternal Uncle      Cancer - colorectal Maternal Uncle      Glaucoma No family hx of      Mother: Lupus    Physical Exam     Temp Readings from Last 3 Encounters:   01/11/23 97.8  F (36.6  C) (Temporal)   09/20/22 98.4  F (36.9  C) (Temporal)   07/18/22 98.5  F (36.9  C) (Temporal)     BP Readings from Last 5 Encounters:   01/25/23 107/67   01/11/23 102/60   09/20/22  "95/67   08/24/22 97/60   07/18/22 96/72     Pulse Readings from Last 1 Encounters:   01/25/23 72     Resp Readings from Last 1 Encounters:   09/20/22 20     Estimated body mass index is 22.68 kg/m  as calculated from the following:    Height as of 1/11/23: 1.664 m (5' 5.5\").    Weight as of this encounter: 62.8 kg (138 lb 6.4 oz).    GEN: NAD. Healthy appearing adult.   HEENT:  Anicteric, noninjected sclera. No obvious external lesions of the ear and nose. Hearing intact.  CV: S1, S2. RRR. No m/r/g.  PULM: No increased work of breathing. CTA bilaterally  MSK: MCPs, PIPs, DIPs without swelling or tenderness to palpation.  Wrists without swelling or tenderness to palpation.  Elbows and shoulders without swelling or tenderness to palpation.  Knees, ankles, and MTPs without swelling or tenderness to palpation.    SKIN: No rash or jaundice seen  PSYCH: Alert. Appropriate.      Labs / Imaging (select studies)     CBC  Recent Labs   Lab Test 01/23/23  1531 05/02/22  1526 01/10/22  1526 09/02/21  0857 06/02/21  1508 02/24/21  1521 12/10/20  0951 09/22/20  1517   WBC 6.0 7.2 6.1   < > 4.7 5.3   < > 5.7   RBC 3.75* 4.03 3.93   < > 3.90 3.89   < > 3.78*   HGB 11.2* 12.5 12.0   < > 11.7 11.6*   < > 11.5*   HCT 35.3 38.0 37.4   < > 36.4 36.5   < > 35.0   MCV 94 94 95   < > 93 94   < > 93   RDW 12.5 12.4 12.2   < > 13.0 12.4   < > 12.2    307 304   < > 308 309   < > 284   MCH 29.9 31.0 30.5   < > 30.0 29.8   < > 30.4   MCHC 31.7 32.9 32.1   < > 32.1 31.8   < > 32.9   NEUTROPHIL 43 53 41   < > 55.2 52.6  --  54.2   LYMPH 36 27 37   < > 24.5 26.4  --  26.7   MONOCYTE 17 14 15   < > 16.7 16.8  --  15.6   EOSINOPHIL 4 5 6   < > 3.0 3.6  --  3.0   BASOPHIL 1 1 1   < > 0.6 0.6  --  0.5   ANEU  --   --   --   --  2.6 2.8  --  3.1   ALYM  --   --   --   --  1.2 1.4  --  1.5   ELIGIO  --   --   --   --  0.8 0.9  --  0.9   AEOS  --   --   --   --  0.1 0.2  --  0.2   ABAS  --   --   --   --  0.0 0.0  --  0.0   ANEUTAUTO 2.6 3.9 2.5   < " >  --   --   --   --    ALYMPAUTO 2.2 2.0 2.3   < >  --   --   --   --    AMONOAUTO 1.0 1.0 0.9   < >  --   --   --   --    AEOSAUTO 0.2 0.4 0.4   < >  --   --   --   --    ABSBASO 0.0 0.0 0.0   < >  --   --   --   --     < > = values in this interval not displayed.     CMP  Recent Labs   Lab Test 01/23/23  1531 06/20/22  1139 05/02/22  1526 01/10/22  1526 09/02/21  0857 06/17/21  1158 06/02/21  1508 04/06/21  1518 02/24/21  1521 12/10/20  0951 04/26/19  1327 04/12/19  1509 09/07/17  1322 06/20/17  1437 03/06/17  1300 05/24/16  0940   NA  --   --   --   --   --   --   --   --   --  137  --  139  --  141  --  137   POTASSIUM  --   --   --   --   --   --   --   --   --  3.9  --  4.3  --  3.8  --  4.4   CHLORIDE  --   --   --   --   --   --   --   --   --  104  --  103  --  105  --  105   CO2  --   --   --   --   --   --   --   --   --  31  --  29  --  29  --  29   ANIONGAP  --   --   --   --   --   --   --   --   --  2*  --  7  --  7  --  3   GLC  --   --   --   --   --   --   --   --   --  85  --   --   --  84  --  85   BUN  --   --   --   --   --   --   --   --   --  10  --   --   --  22  --  18   CR 0.81 0.79 0.96 0.82   < > 0.90 0.90  --  0.72 0.70   < > 0.86   < > 0.84   < > 0.92   GFRESTIMATED 84 86 69 83   < > 71 71  --  >90 >90   < > 76   < > 71   < > 64   GFRESTBLACK  --   --   --   --   --  82 83  --  >90 >90   < > 88   < > 85   < > 77   VALERIA 9.9 10.2* 9.7  --   --  8.7 9.5  --   --  9.6   < > 8.7  --  9.1  --  9.2   BILITOTAL 0.2  --  0.3 0.2   < >  --  0.3  --  0.2  --    < >  --    < > 0.2   < > 0.4   ALBUMIN 4.7  --  4.4 4.2   < >  --  4.4  --  4.1  --    < >  --    < > 4.0   < > 4.4   PROTTOTAL 6.8  --  7.1 7.8   < >  --  7.4  --  7.2  --    < >  --    < > 6.8   < > 7.3   ALKPHOS 36  --  41 37*   < >  --  46  --  57  --    < >  --    < > 40   < > 42   AST 29  --  27 26   < >  --  27   < > 76*  --    < >  --    < > 21   < > 30   ALT 18  --  23 24   < >  --  22   < > 31  --    < >  --    < > 21   < > 24     < > = values in this interval not displayed.     Calcium/VitaminD  Recent Labs   Lab Test 01/23/23  1531 06/20/22  1139 05/02/22  1526 06/17/21  1158 06/02/21  1508   VALERIA 9.9 10.2* 9.7   < > 9.5   VITDT 70  --  59  --  61    < > = values in this interval not displayed.     ESR/CRP  Recent Labs   Lab Test 01/23/23  1531 05/02/22  1526 01/10/22  1526 09/02/21  0857   SED 8 7 9 6   CRP  --  <2.9 <2.9 <2.9     Hepatitis B  Recent Labs   Lab Test 09/04/19  1552   HBCAB Nonreactive   HEPBANG Nonreactive     Hepatitis C  Recent Labs   Lab Test 06/20/17  1437   HCVAB Nonreactive   Assay performance characteristics have not been established for newborns,   infants, and children       Tuberculosis Screening  Recent Labs   Lab Test 09/09/21  1502   TBRES Negative     Immunization History     Immunization History   Administered Date(s) Administered     COVID-19 Vaccine 18+ (Moderna) 02/03/2021, 03/03/2021, 12/07/2021, 06/26/2022     FLU 6-35 months 10/12/2019     Influenza (H1N1) 12/03/2009     Influenza (IIV3) PF 11/17/2005, 11/17/2006, 10/25/2010, 01/09/2013     Influenza Vaccine >6 months (Alfuria,Fluzone) 10/10/2013, 09/14/2016, 09/13/2017, 01/10/2019     Influenza,INJ,MDCK,PF,Quad >6mo(Flucelvax) 10/16/2020     Pneumo Conj 13-V (2010&after) 09/13/2017     Pneumococcal 23 valent 09/12/2018     TD (ADULT, 7+) 02/01/2005     TDAP Vaccine (Adacel) 01/09/2013     Tdap (Adacel,Boostrix) 08/24/2022          Chart documentation done in part with Dragon Voice recognition Software. Although reviewed after completion, some word and grammatical error may remain.    Rome Hardy MD

## 2023-01-25 NOTE — PATIENT INSTRUCTIONS
RHEUMATOLOGY    Dr. Rome Hardy    [facility]  [address] [place] [address]  [place] Ortiz [address]  Phone number: [phone]  Fax number: [phone]    You may schedule your FLU shot by calling [phone] or if you would like to get your shot at a [facility] pharmacy you may schedule online at [email or web address].    Thank you for choosing [facility]!    Genesis Medley CMA Rheumatology    -------------------------    Please call to schedule reclast to be on or shortly after 6/20/2023: [phone]

## 2023-01-26 LAB
GAMMA INTERFERON BACKGROUND BLD IA-ACNC: 0.03 IU/ML
M TB IFN-G BLD-IMP: NEGATIVE
M TB IFN-G CD4+ BCKGRND COR BLD-ACNC: 9.97 IU/ML
MITOGEN IGNF BCKGRD COR BLD-ACNC: 0 IU/ML
MITOGEN IGNF BCKGRD COR BLD-ACNC: 0.03 IU/ML
QUANTIFERON MITOGEN: 10 IU/ML
QUANTIFERON NIL TUBE: 0.03 IU/ML
QUANTIFERON TB1 TUBE: 0.06 IU/ML

## 2023-02-01 ENCOUNTER — OFFICE VISIT (OUTPATIENT)
Dept: OTOLARYNGOLOGY | Facility: OTHER | Age: 58
End: 2023-02-01
Payer: COMMERCIAL

## 2023-02-01 VITALS — TEMPERATURE: 97 F | WEIGHT: 138 LBS | HEIGHT: 66 IN | BODY MASS INDEX: 22.18 KG/M2

## 2023-02-01 DIAGNOSIS — H81.02 MENIERE'S DISEASE, LEFT: Primary | ICD-10-CM

## 2023-02-01 PROCEDURE — 99213 OFFICE O/P EST LOW 20 MIN: CPT | Performed by: OTOLARYNGOLOGY

## 2023-02-01 RX ORDER — TRIAMTERENE AND HYDROCHLOROTHIAZIDE 37.5; 25 MG/1; MG/1
1 CAPSULE ORAL EVERY MORNING
Qty: 30 CAPSULE | Refills: 1 | Status: SHIPPED | OUTPATIENT
Start: 2023-02-01 | End: 2023-03-15

## 2023-02-01 ASSESSMENT — PAIN SCALES - GENERAL: PAINLEVEL: NO PAIN (0)

## 2023-02-01 NOTE — PROGRESS NOTES
History of Present Illness - Zena Quintana is a 57 year old female presenting in clinic today for a recheck on Patient presents with:  RECHECK: ears    Patient initially presented with nonspecific findings on the left side of the ear with mild low-frequency sensorineural loss, feeling of pressure, feeling of distortion of sounds, some nonspecific dizziness possibly short episodes of vertigo but not positional.  She denies any photophobia phonophobia or any other signs of migraine.  No family history of Ménière's disease.  Tinnitus on the left is a low-frequency hum.  It is intermittent.  MRI BRAIN WITHOUT AND WITH CONTRAST  1/18/2023 9:32 AM     HISTORY: Asymmetric SNHL, tinnitus left. Tinnitus, left. SNHL  (sensory-neural hearing loss), asymmetrical.      TECHNIQUE:  Multiplanar, multisequence MRI of the brain without and  with 6mL Gadavist. Dedicated imaging of the bilateral internal  auditory canals was performed.     COMPARISON: Head CT 5/10/2005.     FINDINGS: The cerebral hemispheres, brainstem, cerebellum demonstrate  normal morphology and signal. A few areas of white matter T2  hyperintensity are considered to be within normal limits for age.  Internal auditory canals are unremarkable with normal appearance of  cranial nerves VII and VIII. The membranous labyrinthine structures  are unremarkable. No abnormal enhancement.     Marrow signal is within normal limits. Mild paranasal sinus mucosal  thickening. Small volume fluid signal within bilateral mastoid  cavities.                                                                      IMPRESSION:  1. Unremarkable MRI of the head and internal auditory canals.  2. No evidence of vestibular schwannoma  Present Symptoms include: hearing loss and they are   stable .  Zena denies .      Patient was given empiric a short course of steroids but reacted and had to stop.      BP Readings from Last 1 Encounters:   01/25/23 107/67       BP noted to be well controlled  today in office.     Zena IS NOT a smoker/uses chewing tobacco.    Past Medical History -   Past Medical History:   Diagnosis Date     Allergic rhinitis, cause unspecified     Allergic rhinitis     Cervical high risk HPV (human papillomavirus) test positive 03/08/2019    see problem list     Endometriosis, site unspecified     Endometriosis     Female infertility of other specified origin      Iron deficiency anemia, unspecified     Anemia, iron def.     PONV (postoperative nausea and vomiting)      RA (rheumatoid arthritis) (H)      Sjogren's syndrome (H)      Stomatitis and mucositis (ulcerative)     chronic - non-specific       Current Medications -   Current Outpatient Medications:      adalimumab (HUMIRA *CF*) 40 MG/0.4ML pen kit, Inject 0.4 mLs (40 mg) Subcutaneous every 14 days . Hold for signs of infection, then seek medical attention., Disp: 0.8 mL, Rfl: 6     aspirin (ASA) 325 MG tablet, Take 325 mg by mouth daily, Disp: , Rfl:      bimatoprost (LATISSE) 0.03 % SOLN, Externally apply topically At Bedtime, Disp: 1 Bottle, Rfl: 3     calcium carbonate (OS-VALERIA) 600 MG tablet, Take 2 tablets by mouth daily, Disp: , Rfl:      fluticasone (FLONASE) 50 MCG/ACT spray, Spray 2 sprays into both nostrils daily, Disp: 1 Bottle, Rfl: 0     hydroxychloroquine (PLAQUENIL) 200 MG tablet, Hydroxychloroquine 200mg daily; and an additional 200mg every other day., Disp: 135 tablet, Rfl: 2     ibuprofen (ADVIL/MOTRIN) 200 MG capsule, Take 200 mg by mouth as needed for fever, Disp: , Rfl:      loratadine 10 MG capsule, Take 10 mg by mouth as needed for allergies, Disp: , Rfl:      MULTIPLE VITAMINS/WOMENS OR, None Entered, Disp: , Rfl:      zinc sulfate (ZINCATE) 220 (50 Zn) MG capsule, Take 1 capsule by mouth daily, Disp: , Rfl:     Allergies -   Allergies   Allergen Reactions     Alendronic Acid Other (See Comments)     Hair loss     Dust Mites      Mold      Penicillins Hives     hives       Social History -   Social  History     Socioeconomic History     Marital status:      Spouse name: Wolf     Number of children: 2     Years of education: 12     Highest education level: None   Occupational History     Occupation: homemaker     Employer: [employer]OMEM[employer]ER     Employer:    Tobacco Use     Smoking status: Never     Smokeless tobacco: Never     Tobacco comments:     no smokers in household   Vaping Use     Vaping Use: Never used   Substance and Sexual Activity     Alcohol use: Yes     Alcohol/week: 1.7 standard drinks     Comment: has a couple drinks now and again     Drug use: No     Sexual activity: Yes     Partners: Male     Birth control/protection: None     Comment: Infertility   Other Topics Concern      Service No     Blood Transfusions No     Caffeine Concern No     Occupational Exposure No     Hobby Hazards No     Sleep Concern No     Stress Concern No     Weight Concern No     Special Diet No     Back Care No     Exercise Yes     Bike Helmet No     Seat Belt Yes     Self-Exams Yes   Social History Narrative    Adopted son at 7wk and dtr at 4mo, both from [place]       Family History -   Family History   Problem Relation Age of Onset     Osteoporosis Mother      Arthritis Mother         lupus     Connective Tissue Disorder Mother         Lupus     Cataracts Mother      Macular Degeneration Mother      Celiac Disease Mother      Heart Disease Father         aortic aneurysm     Gastrointestinal Disease Father         abdominal anurysm     Hypertension Father      Cataracts Father      Other - See Comments Maternal Grandmother         atherosclerosis heart disease     Alzheimer Disease Maternal Grandmother      Osteoporosis Maternal Grandmother      Cerebrovascular Disease Maternal Grandfather      Cancer Paternal Grandmother      Cerebrovascular Disease Paternal Grandfather      Gastrointestinal Disease Sister         IBS, colon problems     Circulatory Sister         carotid stenosis     Cancer -  "colorectal Maternal Uncle      Cancer - colorectal Maternal Uncle      Glaucoma No family hx of        Review of Systems - As per HPI and PMHx, otherwise review of system review of the head and neck negative. Otherwise 10+ review of system is negative    Physical Exam  Temp 97  F (36.1  C) (Temporal)   Ht 1.664 m (5' 5.5\")   Wt 62.6 kg (138 lb)   LMP 07/17/2017 (Approximate)   BMI 22.62 kg/m    BMI: Body mass index is 22.62 kg/m .    General - The patient is well nourished and well developed, and appears to have good nutritional status.  Alert and oriented to person and place, answers questions and cooperates with examination appropriately.    SKIN - No suspicious lesions or rashes.  Respiration - No respiratory distress.  Head and Face - Normocephalic and atraumatic, with no gross asymmetry noted of the contour of the facial features.  The facial nerve is intact, with strong symmetric movements.    Voice and Breathing - The patient was breathing comfortably without the use of accessory muscles. The patients voice was clear and strong, and had appropriate pitch and quality.    Ears - Bilateral pinna and EACs with normal appearing overlying skin. Tympanic membrane intact with good mobility on pneumatic otoscopy bilaterally. Bony landmarks of the ossicular chain are normal. The tympanic membranes are normal in appearance. No retraction, perforation, or masses.  No fluid or purulence was seen in the external canal or the middle ear.     Eyes - Extraocular movements intact.  Sclera were not icteric or injected, conjunctiva were pink and moist.      Nose - External contour is symmetric, no gross deflection or scars.  Nasal mucosa is pink and moist with no abnormal mucus.  The septum was midline and non-obstructive, turbinates of normal size and position.  No polyps, masses, or purulence noted on examination.    Neuro - Nonfocal neuro exam is normal, CN 2 through 12 intact, normal gait and muscle tone.      Performed " in clinic today:  No procedures preformed in clinic today      A/P - Zena Quintana is a 57 year old female Patient presents with:  RECHECK: ears    At this point we discussed all her MRI findings showed normal essentially and more more will lead to believe that she does have early cochlear Ménière's disease.  She does not have any other specific signs consistent with semicircular canal dehiscence.  Therefore willing to empirically try diuretic Dyazide 1 pill daily.  We will try it for a month.    Zena should follow up in 1 month.      At Zena next appointment they will not need a hearing test.      Jamir Hauser MD

## 2023-02-01 NOTE — LETTER
2/1/2023         RE: Zena Quintana  [address] [facility] [address]        Dear Colleague,    Thank you for referring your patient, Zena Quintana, to the [facility]. Please see a copy of my visit note below.    History of Present Illness - Zena Quintana is a 57 year old female presenting in clinic today for a recheck on Patient presents with:  RECHECK: ears    Patient initially presented with nonspecific findings on the left side of the ear with mild low-frequency sensorineural loss, feeling of pressure, feeling of distortion of sounds, some nonspecific dizziness possibly short episodes of vertigo but not positional.  She denies any photophobia phonophobia or any other signs of migraine.  No family history of Ménière's disease.  Tinnitus on the left is a low-frequency hum.  It is intermittent.  MRI BRAIN WITHOUT AND WITH CONTRAST  1/18/2023 9:32 AM     HISTORY: Asymmetric SNHL, tinnitus left. Tinnitus, left. SNHL  (sensory-neural hearing loss), asymmetrical.      TECHNIQUE:  Multiplanar, multisequence MRI of the brain without and  with 6mL Gadavist. Dedicated imaging of the bilateral internal  auditory canals was performed.     COMPARISON: Head CT 5/10/2005.     FINDINGS: The cerebral hemispheres, brainstem, cerebellum demonstrate  normal morphology and signal. A few areas of white matter T2  hyperintensity are considered to be within normal limits for age.  Internal auditory canals are unremarkable with normal appearance of  cranial nerves VII and VIII. The membranous labyrinthine structures  are unremarkable. No abnormal enhancement.     Marrow signal is within normal limits. Mild paranasal sinus mucosal  thickening. Small volume fluid signal within bilateral mastoid  cavities.                                                                      IMPRESSION:  1. Unremarkable MRI of the head and internal auditory canals.  2. No evidence of vestibular  schwannoma  Present Symptoms include: hearing loss and they are   stable .  Zena denies .      Patient was given empiric a short course of steroids but reacted and had to stop.      BP Readings from Last 1 Encounters:   01/25/23 107/67       BP noted to be well controlled today in office.     Zena IS NOT a smoker/uses chewing tobacco.    Past Medical History -   Past Medical History:   Diagnosis Date     Allergic rhinitis, cause unspecified     Allergic rhinitis     Cervical high risk HPV (human papillomavirus) test positive 03/08/2019    see problem list     Endometriosis, site unspecified     Endometriosis     Female infertility of other specified origin      Iron deficiency anemia, unspecified     Anemia, iron def.     PONV (postoperative nausea and vomiting)      RA (rheumatoid arthritis) (H)      Sjogren's syndrome (H)      Stomatitis and mucositis (ulcerative)     chronic - non-specific       Current Medications -   Current Outpatient Medications:      adalimumab (HUMIRA *CF*) 40 MG/0.4ML pen kit, Inject 0.4 mLs (40 mg) Subcutaneous every 14 days . Hold for signs of infection, then seek medical attention., Disp: 0.8 mL, Rfl: 6     aspirin (ASA) 325 MG tablet, Take 325 mg by mouth daily, Disp: , Rfl:      bimatoprost (LATISSE) 0.03 % SOLN, Externally apply topically At Bedtime, Disp: 1 Bottle, Rfl: 3     calcium carbonate (OS-VALERIA) 600 MG tablet, Take 2 tablets by mouth daily, Disp: , Rfl:      fluticasone (FLONASE) 50 MCG/ACT spray, Spray 2 sprays into both nostrils daily, Disp: 1 Bottle, Rfl: 0     hydroxychloroquine (PLAQUENIL) 200 MG tablet, Hydroxychloroquine 200mg daily; and an additional 200mg every other day., Disp: 135 tablet, Rfl: 2     ibuprofen (ADVIL/MOTRIN) 200 MG capsule, Take 200 mg by mouth as needed for fever, Disp: , Rfl:      loratadine 10 MG capsule, Take 10 mg by mouth as needed for allergies, Disp: , Rfl:      MULTIPLE VITAMINS/WOMENS OR, None Entered, Disp: , Rfl:      zinc sulfate  (ZINCATE) 220 (50 Zn) MG capsule, Take 1 capsule by mouth daily, Disp: , Rfl:     Allergies -   Allergies   Allergen Reactions     Alendronic Acid Other (See Comments)     Hair loss     Dust Mites      Mold      Penicillins Hives     hives       Social History -   Social History     Socioeconomic History     Marital status:      Spouse name: Wolf     Number of children: 2     Years of education: 12     Highest education level: None   Occupational History     Occupation: homemaker     Employer: HOMEMAKER     Employer:    Tobacco Use     Smoking status: Never     Smokeless tobacco: Never     Tobacco comments:     no smokers in household   Vaping Use     Vaping Use: Never used   Substance and Sexual Activity     Alcohol use: Yes     Alcohol/week: 1.7 standard drinks     Comment: has a couple drinks now and again     Drug use: No     Sexual activity: Yes     Partners: Male     Birth control/protection: None     Comment: Infertility   Other Topics Concern      Service No     Blood Transfusions No     Caffeine Concern No     Occupational Exposure No     Hobby Hazards No     Sleep Concern No     Stress Concern No     Weight Concern No     Special Diet No     Back Care No     Exercise Yes     Bike Helmet No     Seat Belt Yes     Self-Exams Yes   Social History Narrative    Adopted son at 7wk and dtr at 4mo, both from [place]       Family History -   Family History   Problem Relation Age of Onset     Osteoporosis Mother      Arthritis Mother         lupus     Connective Tissue Disorder Mother         Lupus     Cataracts Mother      Macular Degeneration Mother      Celiac Disease Mother      Heart Disease Father         aortic aneurysm     Gastrointestinal Disease Father         abdominal anurysm     Hypertension Father      Cataracts Father      Other - See Comments Maternal Grandmother         atherosclerosis heart disease     Alzheimer Disease Maternal Grandmother      Osteoporosis Maternal  "Grandmother      Cerebrovascular Disease Maternal Grandfather      Cancer Paternal Grandmother      Cerebrovascular Disease Paternal Grandfather      Gastrointestinal Disease Sister         IBS, colon problems     Circulatory Sister         carotid stenosis     Cancer - colorectal Maternal Uncle      Cancer - colorectal Maternal Uncle      Glaucoma No family hx of        Review of Systems - As per HPI and PMHx, otherwise review of system review of the head and neck negative. Otherwise 10+ review of system is negative    Physical Exam  Temp 97  F (36.1  C) (Temporal)   Ht 1.664 m (5' 5.5\")   Wt 62.6 kg (138 lb)   LMP 07/17/2017 (Approximate)   BMI 22.62 kg/m    BMI: Body mass index is 22.62 kg/m .    General - The patient is well nourished and well developed, and appears to have good nutritional status.  Alert and oriented to person and place, answers questions and cooperates with examination appropriately.    SKIN - No suspicious lesions or rashes.  Respiration - No respiratory distress.  Head and Face - Normocephalic and atraumatic, with no gross asymmetry noted of the contour of the facial features.  The facial nerve is intact, with strong symmetric movements.    Voice and Breathing - The patient was breathing comfortably without the use of accessory muscles. The patients voice was clear and strong, and had appropriate pitch and quality.    Ears - Bilateral pinna and EACs with normal appearing overlying skin. Tympanic membrane intact with good mobility on pneumatic otoscopy bilaterally. Bony landmarks of the ossicular chain are normal. The tympanic membranes are normal in appearance. No retraction, perforation, or masses.  No fluid or purulence was seen in the external canal or the middle ear.     Eyes - Extraocular movements intact.  Sclera were not icteric or injected, conjunctiva were pink and moist.      Nose - External contour is symmetric, no gross deflection or scars.  Nasal mucosa is pink and moist " with no abnormal mucus.  The septum was midline and non-obstructive, turbinates of normal size and position.  No polyps, masses, or purulence noted on examination.    Neuro - Nonfocal neuro exam is normal, CN 2 through 12 intact, normal gait and muscle tone.      Performed in clinic today:  No procedures preformed in clinic today      A/P - Zena Quintana is a 57 year old female Patient presents with:  RECHECK: ears    At this point we discussed all her MRI findings showed normal essentially and more more will lead to believe that she does have early cochlear Ménière's disease.  She does not have any other specific signs consistent with semicircular canal dehiscence.  Therefore willing to empirically try diuretic Dyazide 1 pill daily.  We will try it for a month.    Zena should follow up in 1 month.      At Zena next appointment they will not need a hearing test.      Jamir Hauser MD            Again, thank you for allowing me to participate in the care of your patient.        Sincerely,        Jamir Hauser MD, MD

## 2023-02-09 NOTE — PATIENT INSTRUCTIONS
Doxycycline twice daily for 7 days.  Robitussin with codeine as needed for cough before sleep, up to every 4-6 hours. DO NOT take before driving a vehicle.  Rest! Your body needs more rest to heal.  Drink plenty of fluids (warm fluids like tea or soup are soothing and reduce cough)  Sit in the bathroom with a hot shower running and breathe in the steam.  Honey may soothe your sore throat and help manage your cough- may take straight or in warm water with lemon juice.  Avoid smoke from cigarettes, fireplace, bonfire etc.  Take Tylenol or an NSAID such as ibuprofen or naproxen as needed for pain.  Delsym (dextromethorphan) is a 12 hour over the counter cough medication   Mucinex or Robitussin (guiafenesin) thin mucus and may help it to loosen more quickly  Good handwashing is the best way to prevent spread of germs  Present to emergency room if you develop trouble breathing, swallowing or cough-up blood.  Follow up with your primary care provider if symptoms worsen or fail to improve as expected.   3

## 2023-03-13 NOTE — PROGRESS NOTES
History of Present Illness - Zena Quintana is a 57 year old female presenting in clinic today for a recheck on Patient presents with:  Follow Up: Meniere's disease    Patient presented with a history of nonspecific findings of a unilateral left-sided ear pressure nonspecific disequilibrium previously short episodes of vertigo nonpositional and low-frequency sensorineural loss.  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  She had an MRI that was negative.  At that time we discussed not only lifestyle changes that may be related to migraine/early Ménière's disease but also discussed dietary restrictions as well as implemented empirically Dyazide diuretic.  Present Symptoms include:   Her dizziness is improving even though when she leans forward sometimes or reads of the screen on the computer she gets a little disequilibrium but no true vertigo.  Pressure is improved in the ear.  Distortion is improved somewhat.        BP Readings from Last 1 Encounters:   03/15/23 100/70       BP noted to be well controlled today in office.     Zena IS NOT a smoker/uses chewing tobacco.  J    Past Medical History -   Past Medical History:   Diagnosis Date     Allergic rhinitis, cause unspecified     Allergic rhinitis     Cervical high risk HPV (human papillomavirus) test positive 03/08/2019    see problem list     Endometriosis, site unspecified     Endometriosis     Female infertility of other specified origin      Iron deficiency anemia, unspecified     Anemia, iron def.     PONV (postoperative nausea and vomiting)      RA (rheumatoid arthritis) (H)      Sjogren's syndrome (H)      Stomatitis and mucositis (ulcerative)     chronic - non-specific       Current Medications -   Current Outpatient Medications:      adalimumab (HUMIRA *CF*) 40 MG/0.4ML pen kit, Inject 0.4 mLs (40 mg) Subcutaneous every 14 days . Hold for signs of infection, then seek medical attention., Disp: 0.8 mL, Rfl:  6     aspirin (ASA) 325 MG tablet, Take 325 mg by mouth daily, Disp: , Rfl:      bimatoprost (LATISSE) 0.03 % SOLN, Externally apply topically At Bedtime, Disp: 1 Bottle, Rfl: 3     calcium carbonate (OS-VALERIA) 600 MG tablet, Take 2 tablets by mouth daily, Disp: , Rfl:      fluticasone (FLONASE) 50 MCG/ACT spray, Spray 2 sprays into both nostrils daily, Disp: 1 Bottle, Rfl: 0     hydroxychloroquine (PLAQUENIL) 200 MG tablet, Hydroxychloroquine 200mg daily; and an additional 200mg every other day., Disp: 135 tablet, Rfl: 2     ibuprofen (ADVIL/MOTRIN) 200 MG capsule, Take 200 mg by mouth as needed for fever, Disp: , Rfl:      loratadine 10 MG capsule, Take 10 mg by mouth as needed for allergies, Disp: , Rfl:      MULTIPLE VITAMINS/WOMENS OR, None Entered, Disp: , Rfl:      zinc sulfate (ZINCATE) 220 (50 Zn) MG capsule, Take 1 capsule by mouth daily, Disp: , Rfl:      triamterene-HCTZ (DYAZIDE) 37.5-25 MG capsule, Take 1 capsule by mouth every morning for 30 days, Disp: 30 capsule, Rfl: 1    Allergies -   Allergies   Allergen Reactions     Alendronic Acid Other (See Comments)     Hair loss     Dust Mites      Mold      Penicillins Hives     hives       Social History -   Social History     Socioeconomic History     Marital status:      Spouse name: Wolf     Number of children: 2     Years of education: 12   Occupational History     Occupation: homemaker     Employer: [employer]     Employer:    Tobacco Use     Smoking status: Never     Smokeless tobacco: Never     Tobacco comments:     no smokers in household   Vaping Use     Vaping Use: Never used   Substance and Sexual Activity     Alcohol use: Yes     Alcohol/week: 1.7 standard drinks     Comment: has a couple drinks now and again     Drug use: No     Sexual activity: Yes     Partners: Male     Birth control/protection: None     Comment: Infertility   Other Topics Concern      Service No     Blood Transfusions No     Caffeine Concern No      "Occupational Exposure No     Hobby Hazards No     Sleep Concern No     Stress Concern No     Weight Concern No     Special Diet No     Back Care No     Exercise Yes     Bike Helmet No     Seat Belt Yes     Self-Exams Yes   Social History Narrative    Adopted son at 7wk and dtr at 4mo, both from [place]       Family History -   Family History   Problem Relation Age of Onset     Osteoporosis Mother      Arthritis Mother         lupus     Connective Tissue Disorder Mother         Lupus     Cataracts Mother      Macular Degeneration Mother      Celiac Disease Mother      Heart Disease Father         aortic aneurysm     Gastrointestinal Disease Father         abdominal anurysm     Hypertension Father      Cataracts Father      Other - See Comments Maternal Grandmother         atherosclerosis heart disease     Alzheimer Disease Maternal Grandmother      Osteoporosis Maternal Grandmother      Cerebrovascular Disease Maternal Grandfather      Cancer Paternal Grandmother      Cerebrovascular Disease Paternal Grandfather      Gastrointestinal Disease Sister         IBS, colon problems     Circulatory Sister         carotid stenosis     Cancer - colorectal Maternal Uncle      Cancer - colorectal Maternal Uncle      Glaucoma No family hx of        Review of Systems - As per HPI and PMHx, otherwise review of system review of the head and neck negative. Otherwise 10+ review of system is negative    Physical Exam  /70   Temp 97.4  F (36.3  C) (Temporal)   Ht 1.664 m (5' 5.5\")   Wt 62.6 kg (138 lb)   LMP 07/17/2017 (Approximate)   BMI 22.62 kg/m    BMI: Body mass index is 22.62 kg/m .    General - The patient is well nourished and well developed, and appears to have good nutritional status.  Alert and oriented to person and place, answers questions and cooperates with examination appropriately.    SKIN - No suspicious lesions or rashes.  Respiration - No respiratory distress.  Head and Face - Normocephalic and " atraumatic, with no gross asymmetry noted of the contour of the facial features.  The facial nerve is intact, with strong symmetric movements.    Voice and Breathing - The patient was breathing comfortably without the use of accessory muscles. The patients voice was clear and strong, and had appropriate pitch and quality.    Ears - Bilateral pinna and EACs with normal appearing overlying skin. Tympanic membrane intact with good mobility on pneumatic otoscopy bilaterally. Bony landmarks of the ossicular chain are normal. The tympanic membranes are normal in appearance. No retraction, perforation, or masses.  No fluid or purulence was seen in the external canal or the middle ear.     Eyes - Extraocular movements intact.  Sclera were not icteric or injected, conjunctiva were pink and moist.          Neuro - Nonfocal neuro exam is normal, CN 2 through 12 intact, normal gait and muscle tone.      Performed in clinic today:  No procedures preformed in clinic today      A/P - Zena Quintana is a 57 year old female Patient presents with:  Follow Up: Meniere's disease    At this point patient will continue with the current regimen since she tolerated well.  In regard to the diuretic she will supplement her potassium with either bananas and/or avocados rich in potassium.  Also will recheck in 6 months.  We also discussed possible supplementation of magnesium sulfate that may help her vestibular migraine aspect.    Zena should follow up in 6 months.      At Zena next appointment they will need a hearing test.      Jamir Hauser MD

## 2023-03-15 ENCOUNTER — OFFICE VISIT (OUTPATIENT)
Dept: OTOLARYNGOLOGY | Facility: OTHER | Age: 58
End: 2023-03-15
Payer: COMMERCIAL

## 2023-03-15 VITALS
HEIGHT: 66 IN | WEIGHT: 138 LBS | TEMPERATURE: 97.4 F | DIASTOLIC BLOOD PRESSURE: 70 MMHG | SYSTOLIC BLOOD PRESSURE: 100 MMHG | BODY MASS INDEX: 22.18 KG/M2

## 2023-03-15 DIAGNOSIS — H81.02 MENIERE'S DISEASE, LEFT: ICD-10-CM

## 2023-03-15 PROCEDURE — 99213 OFFICE O/P EST LOW 20 MIN: CPT | Performed by: OTOLARYNGOLOGY

## 2023-03-15 RX ORDER — TRIAMTERENE AND HYDROCHLOROTHIAZIDE 37.5; 25 MG/1; MG/1
1 CAPSULE ORAL EVERY MORNING
Qty: 30 CAPSULE | Refills: 6 | Status: SHIPPED | OUTPATIENT
Start: 2023-03-15 | End: 2023-03-29

## 2023-03-15 NOTE — LETTER
3/15/2023         RE: Zena Quintana  [address] [facility] [id number]        Dear Colleague,    Thank you for referring your patient, Zena Quintana, to the [facility]. Please see a copy of my visit note below.    History of Present Illness - Zena Quintana is a 57 year old female presenting in clinic today for a recheck on Patient presents with:  Follow Up: Meniere's disease    Patient presented with a history of nonspecific findings of a unilateral left-sided ear pressure nonspecific disequilibrium previously short episodes of vertigo nonpositional and low-frequency sensorineural loss.  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  She had an MRI that was negative.  At that time we discussed not only lifestyle changes that may be related to migraine/early Ménière's disease but also discussed dietary restrictions as well as implemented empirically Dyazide diuretic.  Present Symptoms include:   Her dizziness is improving even though when she leans forward sometimes or reads of the screen on the computer she gets a little disequilibrium but no true vertigo.  Pressure is improved in the ear.  Distortion is improved somewhat.        BP Readings from Last 1 Encounters:   03/15/23 100/70       BP noted to be well controlled today in office.     Zena IS NOT a smoker/uses chewing tobacco.  J    Past Medical History -   Past Medical History:   Diagnosis Date     Allergic rhinitis, cause unspecified     Allergic rhinitis     Cervical high risk HPV (human papillomavirus) test positive 03/08/2019    see problem list     Endometriosis, site unspecified     Endometriosis     Female infertility of other specified origin      Iron deficiency anemia, unspecified     Anemia, iron def.     PONV (postoperative nausea and vomiting)      RA (rheumatoid arthritis) (H)      Sjogren's syndrome (H)      Stomatitis and mucositis (ulcerative)     chronic -  non-specific       Current Medications -   Current Outpatient Medications:      adalimumab (HUMIRA *CF*) 40 MG/0.4ML pen kit, Inject 0.4 mLs (40 mg) Subcutaneous every 14 days . Hold for signs of infection, then seek medical attention., Disp: 0.8 mL, Rfl: 6     aspirin (ASA) 325 MG tablet, Take 325 mg by mouth daily, Disp: , Rfl:      bimatoprost (LATISSE) 0.03 % SOLN, Externally apply topically At Bedtime, Disp: 1 Bottle, Rfl: 3     calcium carbonate (OS-VALERIA) 600 MG tablet, Take 2 tablets by mouth daily, Disp: , Rfl:      fluticasone (FLONASE) 50 MCG/ACT spray, Spray 2 sprays into both nostrils daily, Disp: 1 Bottle, Rfl: 0     hydroxychloroquine (PLAQUENIL) 200 MG tablet, Hydroxychloroquine 200mg daily; and an additional 200mg every other day., Disp: 135 tablet, Rfl: 2     ibuprofen (ADVIL/MOTRIN) 200 MG capsule, Take 200 mg by mouth as needed for fever, Disp: , Rfl:      loratadine 10 MG capsule, Take 10 mg by mouth as needed for allergies, Disp: , Rfl:      MULTIPLE VITAMINS/WOMENS OR, None Entered, Disp: , Rfl:      zinc sulfate (ZINCATE) 220 (50 Zn) MG capsule, Take 1 capsule by mouth daily, Disp: , Rfl:      triamterene-HCTZ (DYAZIDE) 37.5-25 MG capsule, Take 1 capsule by mouth every morning for 30 days, Disp: 30 capsule, Rfl: 1    Allergies -   Allergies   Allergen Reactions     Alendronic Acid Other (See Comments)     Hair loss     Dust Mites      Mold      Penicillins Hives     hives       Social History -   Social History     Socioeconomic History     Marital status:      Spouse name: Wolf     Number of children: 2     Years of education: 12   Occupational History     Occupation: homemaker     Employer: [employer]OMEM[employer]ER     Employer:    Tobacco Use     Smoking status: Never     Smokeless tobacco: Never     Tobacco comments:     no smokers in household   Vaping Use     Vaping Use: Never used   Substance and Sexual Activity     Alcohol use: Yes     Alcohol/week: 1.7 standard drinks     Comment:  "has a couple drinks now and again     Drug use: No     Sexual activity: Yes     Partners: Male     Birth control/protection: None     Comment: Infertility   Other Topics Concern      Service No     Blood Transfusions No     Caffeine Concern No     Occupational Exposure No     Hobby Hazards No     Sleep Concern No     Stress Concern No     Weight Concern No     Special Diet No     Back Care No     Exercise Yes     Bike Helmet No     Seat Belt Yes     Self-Exams Yes   Social History Narrative    Adopted son at 7wk and dtr at 4mo, both from [place]       Family History -   Family History   Problem Relation Age of Onset     Osteoporosis Mother      Arthritis Mother         lupus     Connective Tissue Disorder Mother         Lupus     Cataracts Mother      Macular Degeneration Mother      Celiac Disease Mother      Heart Disease Father         aortic aneurysm     Gastrointestinal Disease Father         abdominal anurysm     Hypertension Father      Cataracts Father      Other - See Comments Maternal Grandmother         atherosclerosis heart disease     Alzheimer Disease Maternal Grandmother      Osteoporosis Maternal Grandmother      Cerebrovascular Disease Maternal Grandfather      Cancer Paternal Grandmother      Cerebrovascular Disease Paternal Grandfather      Gastrointestinal Disease Sister         IBS, colon problems     Circulatory Sister         carotid stenosis     Cancer - colorectal Maternal Uncle      Cancer - colorectal Maternal Uncle      Glaucoma No family hx of        Review of Systems - As per HPI and PMHx, otherwise review of system review of the head and neck negative. Otherwise 10+ review of system is negative    Physical Exam  /70   Temp 97.4  F (36.3  C) (Temporal)   Ht 1.664 m (5' 5.5\")   Wt 62.6 kg (138 lb)   LMP 07/17/2017 (Approximate)   BMI 22.62 kg/m    BMI: Body mass index is 22.62 kg/m .    General - The patient is well nourished and well developed, and appears to have " good nutritional status.  Alert and oriented to person and place, answers questions and cooperates with examination appropriately.    SKIN - No suspicious lesions or rashes.  Respiration - No respiratory distress.  Head and Face - Normocephalic and atraumatic, with no gross asymmetry noted of the contour of the facial features.  The facial nerve is intact, with strong symmetric movements.    Voice and Breathing - The patient was breathing comfortably without the use of accessory muscles. The patients voice was clear and strong, and had appropriate pitch and quality.    Ears - Bilateral pinna and EACs with normal appearing overlying skin. Tympanic membrane intact with good mobility on pneumatic otoscopy bilaterally. Bony landmarks of the ossicular chain are normal. The tympanic membranes are normal in appearance. No retraction, perforation, or masses.  No fluid or purulence was seen in the external canal or the middle ear.     Eyes - Extraocular movements intact.  Sclera were not icteric or injected, conjunctiva were pink and moist.          Neuro - Nonfocal neuro exam is normal, CN 2 through 12 intact, normal gait and muscle tone.      Performed in clinic today:  No procedures preformed in clinic today      A/P - Zena Quintana is a 57 year old female Patient presents with:  Follow Up: Meniere's disease    At this point patient will continue with the current regimen since she tolerated well.  In regard to the diuretic she will supplement her potassium with either bananas and/or avocados rich in potassium.  Also will recheck in 6 months.  We also discussed possible supplementation of magnesium sulfate that may help her vestibular migraine aspect.    Zena should follow up in 6 months.      At Zena next appointment they will need a hearing test.      Jamir Hauser MD          Again, thank you for allowing me to participate in the care of your patient.        Sincerely,        Jamir Hauser MD, MD

## 2023-06-19 ENCOUNTER — PATIENT OUTREACH (OUTPATIENT)
Dept: CARE COORDINATION | Facility: CLINIC | Age: 58
End: 2023-06-19
Payer: COMMERCIAL

## 2023-06-21 ENCOUNTER — LAB (OUTPATIENT)
Dept: LAB | Facility: CLINIC | Age: 58
End: 2023-06-21
Attending: INTERNAL MEDICINE
Payer: COMMERCIAL

## 2023-06-21 ENCOUNTER — INFUSION THERAPY VISIT (OUTPATIENT)
Dept: INFUSION THERAPY | Facility: CLINIC | Age: 58
End: 2023-06-21
Attending: INTERNAL MEDICINE
Payer: COMMERCIAL

## 2023-06-21 VITALS
DIASTOLIC BLOOD PRESSURE: 63 MMHG | HEIGHT: 66 IN | OXYGEN SATURATION: 99 % | SYSTOLIC BLOOD PRESSURE: 99 MMHG | BODY MASS INDEX: 22.03 KG/M2 | WEIGHT: 137.1 LBS | HEART RATE: 61 BPM | RESPIRATION RATE: 16 BRPM | TEMPERATURE: 98.2 F

## 2023-06-21 DIAGNOSIS — T45.8X5A ORAL BISPHOSPHONATES CAUSING ADVERSE EFFECT IN THERAPEUTIC USE, INITIAL ENCOUNTER: ICD-10-CM

## 2023-06-21 DIAGNOSIS — M80.00XA OSTEOPOROSIS WITH CURRENT PATHOLOGICAL FRACTURE, UNSPECIFIED OSTEOPOROSIS TYPE, INITIAL ENCOUNTER: Primary | ICD-10-CM

## 2023-06-21 LAB
CALCIUM SERPL-MCNC: 9.5 MG/DL (ref 8.6–10)
CREAT BLD-MCNC: 1.1 MG/DL (ref 0.5–1)
CREAT SERPL-MCNC: 1.01 MG/DL (ref 0.51–0.95)
GFR SERPL CREATININE-BSD FRML MDRD: 58 ML/MIN/1.73M2
GFR SERPL CREATININE-BSD FRML MDRD: 64 ML/MIN/1.73M2
HOLD SPECIMEN: NORMAL
HOLD SPECIMEN: NORMAL

## 2023-06-21 PROCEDURE — 36415 COLL VENOUS BLD VENIPUNCTURE: CPT | Performed by: INTERNAL MEDICINE

## 2023-06-21 PROCEDURE — 82310 ASSAY OF CALCIUM: CPT | Performed by: INTERNAL MEDICINE

## 2023-06-21 PROCEDURE — 82565 ASSAY OF CREATININE: CPT | Performed by: INTERNAL MEDICINE

## 2023-06-21 PROCEDURE — 82565 ASSAY OF CREATININE: CPT

## 2023-06-21 PROCEDURE — 99207 PR NO CHARGE LOS: CPT

## 2023-06-21 PROCEDURE — 96365 THER/PROPH/DIAG IV INF INIT: CPT | Performed by: INTERNAL MEDICINE

## 2023-06-21 RX ORDER — ALBUTEROL SULFATE 90 UG/1
1-2 AEROSOL, METERED RESPIRATORY (INHALATION)
Status: CANCELLED
Start: 2023-06-21

## 2023-06-21 RX ORDER — EPINEPHRINE 1 MG/ML
0.3 INJECTION, SOLUTION INTRAMUSCULAR; SUBCUTANEOUS EVERY 5 MIN PRN
Status: CANCELLED | OUTPATIENT
Start: 2023-06-21

## 2023-06-21 RX ORDER — METHYLPREDNISOLONE SODIUM SUCCINATE 125 MG/2ML
125 INJECTION, POWDER, LYOPHILIZED, FOR SOLUTION INTRAMUSCULAR; INTRAVENOUS
Status: CANCELLED
Start: 2023-06-21

## 2023-06-21 RX ORDER — HEPARIN SODIUM,PORCINE 10 UNIT/ML
5 VIAL (ML) INTRAVENOUS
Status: CANCELLED | OUTPATIENT
Start: 2023-06-21

## 2023-06-21 RX ORDER — ZOLEDRONIC ACID 5 MG/100ML
5 INJECTION, SOLUTION INTRAVENOUS ONCE
Status: CANCELLED
Start: 2023-06-21

## 2023-06-21 RX ORDER — ALBUTEROL SULFATE 0.83 MG/ML
2.5 SOLUTION RESPIRATORY (INHALATION)
Status: CANCELLED | OUTPATIENT
Start: 2023-06-21

## 2023-06-21 RX ORDER — MEPERIDINE HYDROCHLORIDE 25 MG/ML
25 INJECTION INTRAMUSCULAR; INTRAVENOUS; SUBCUTANEOUS EVERY 30 MIN PRN
Status: CANCELLED | OUTPATIENT
Start: 2023-06-21

## 2023-06-21 RX ORDER — HEPARIN SODIUM (PORCINE) LOCK FLUSH IV SOLN 100 UNIT/ML 100 UNIT/ML
5 SOLUTION INTRAVENOUS
Status: CANCELLED | OUTPATIENT
Start: 2023-06-21

## 2023-06-21 RX ORDER — ZOLEDRONIC ACID 5 MG/100ML
5 INJECTION, SOLUTION INTRAVENOUS ONCE
Status: COMPLETED | OUTPATIENT
Start: 2023-06-21 | End: 2023-06-21

## 2023-06-21 RX ORDER — DIPHENHYDRAMINE HYDROCHLORIDE 50 MG/ML
50 INJECTION INTRAMUSCULAR; INTRAVENOUS
Status: CANCELLED
Start: 2023-06-21

## 2023-06-21 RX ORDER — EPINEPHRINE 1 MG/ML
0.3 INJECTION, SOLUTION, CONCENTRATE INTRAVENOUS EVERY 5 MIN PRN
Status: CANCELLED | OUTPATIENT
Start: 2023-06-21

## 2023-06-21 RX ADMIN — ZOLEDRONIC ACID 5 MG: 0.05 INJECTION, SOLUTION INTRAVENOUS at 12:46

## 2023-06-21 RX ADMIN — Medication 250 ML: at 12:46

## 2023-06-21 ASSESSMENT — PAIN SCALES - GENERAL: PAINLEVEL: NO PAIN (0)

## 2023-06-21 NOTE — PROGRESS NOTES
Infusion Nursing Note:  Zena Quintana presents today for Reclast.    Patient seen by provider today: No   present during visit today: Not Applicable.    Note: Patient reports tolerating previous Reclast infusions well. States she takes calcium & vitamin D at home. She had an appointment with her dentist recently as well, no invasive dental work needed at this time.    Lisa MARTINEZ messaged Dr. Hardy to receive OK to proceed with Reclast today using labs from January d/t technical difficulties in lab today. Dr. Hardy gave OK to proceed with infusion today using January calcium level.    Intravenous Access:  Peripheral IV placed.    Treatment Conditions:  Creatinine: 1.1  Calcium: 9.9 (on 1/23/23).    Post Infusion Assessment:  Patient tolerated infusion without incident.  Site patent and intact, free from redness, edema or discomfort.  No evidence of extravasations.  Access discontinued per protocol.     Discharge Plan:   Future appts have been reviewed and crosschecked with appt note and plan.  AVS to patient via [facility].  Patient will return in1 year for next appointment.   Patient discharged in stable condition accompanied by: self.  Departure Mode: Ambulatory.      Deanne Macedo RN BSN [facility]

## 2023-07-18 ENCOUNTER — LAB (OUTPATIENT)
Dept: LAB | Facility: OTHER | Age: 58
End: 2023-07-18
Payer: COMMERCIAL

## 2023-07-18 DIAGNOSIS — M05.79 RHEUMATOID ARTHRITIS INVOLVING MULTIPLE SITES WITH POSITIVE RHEUMATOID FACTOR (H): ICD-10-CM

## 2023-07-18 LAB
ALBUMIN SERPL BCG-MCNC: 4.5 G/DL (ref 3.5–5.2)
ALP SERPL-CCNC: 41 U/L (ref 35–104)
ALT SERPL W P-5'-P-CCNC: 16 U/L (ref 0–50)
AST SERPL W P-5'-P-CCNC: 26 U/L (ref 0–45)
BASOPHILS # BLD AUTO: 0 10E3/UL (ref 0–0.2)
BASOPHILS NFR BLD AUTO: 1 %
BILIRUB DIRECT SERPL-MCNC: <0.2 MG/DL (ref 0–0.3)
BILIRUB SERPL-MCNC: 0.4 MG/DL
CREAT SERPL-MCNC: 0.99 MG/DL (ref 0.51–0.95)
CRP SERPL-MCNC: <3 MG/L
EOSINOPHIL # BLD AUTO: 0.1 10E3/UL (ref 0–0.7)
EOSINOPHIL NFR BLD AUTO: 2 %
ERYTHROCYTE [DISTWIDTH] IN BLOOD BY AUTOMATED COUNT: 11.9 % (ref 10–15)
ERYTHROCYTE [SEDIMENTATION RATE] IN BLOOD BY WESTERGREN METHOD: 8 MM/HR (ref 0–30)
GFR SERPL CREATININE-BSD FRML MDRD: 65 ML/MIN/1.73M2
HCT VFR BLD AUTO: 40.3 % (ref 35–47)
HGB BLD-MCNC: 13.2 G/DL (ref 11.7–15.7)
IMM GRANULOCYTES # BLD: 0 10E3/UL
IMM GRANULOCYTES NFR BLD: 0 %
LYMPHOCYTES # BLD AUTO: 1.7 10E3/UL (ref 0.8–5.3)
LYMPHOCYTES NFR BLD AUTO: 34 %
MCH RBC QN AUTO: 30 PG (ref 26.5–33)
MCHC RBC AUTO-ENTMCNC: 32.8 G/DL (ref 31.5–36.5)
MCV RBC AUTO: 92 FL (ref 78–100)
MONOCYTES # BLD AUTO: 0.7 10E3/UL (ref 0–1.3)
MONOCYTES NFR BLD AUTO: 14 %
NEUTROPHILS # BLD AUTO: 2.5 10E3/UL (ref 1.6–8.3)
NEUTROPHILS NFR BLD AUTO: 49 %
PLATELET # BLD AUTO: 303 10E3/UL (ref 150–450)
PROT SERPL-MCNC: 7.3 G/DL (ref 6.4–8.3)
RBC # BLD AUTO: 4.4 10E6/UL (ref 3.8–5.2)
WBC # BLD AUTO: 5 10E3/UL (ref 4–11)

## 2023-07-18 PROCEDURE — 80076 HEPATIC FUNCTION PANEL: CPT

## 2023-07-18 PROCEDURE — 86140 C-REACTIVE PROTEIN: CPT

## 2023-07-18 PROCEDURE — 85652 RBC SED RATE AUTOMATED: CPT

## 2023-07-18 PROCEDURE — 82565 ASSAY OF CREATININE: CPT

## 2023-07-18 PROCEDURE — 85025 COMPLETE CBC W/AUTO DIFF WBC: CPT

## 2023-07-18 PROCEDURE — 36415 COLL VENOUS BLD VENIPUNCTURE: CPT

## 2023-07-25 ENCOUNTER — OFFICE VISIT (OUTPATIENT)
Dept: RHEUMATOLOGY | Facility: CLINIC | Age: 58
End: 2023-07-25
Payer: COMMERCIAL

## 2023-07-25 VITALS
DIASTOLIC BLOOD PRESSURE: 68 MMHG | WEIGHT: 139 LBS | SYSTOLIC BLOOD PRESSURE: 101 MMHG | BODY MASS INDEX: 22.77 KG/M2 | HEART RATE: 88 BPM

## 2023-07-25 DIAGNOSIS — M05.79 RHEUMATOID ARTHRITIS INVOLVING MULTIPLE SITES WITH POSITIVE RHEUMATOID FACTOR (H): Primary | ICD-10-CM

## 2023-07-25 DIAGNOSIS — M80.00XA OSTEOPOROSIS WITH CURRENT PATHOLOGICAL FRACTURE, UNSPECIFIED OSTEOPOROSIS TYPE, INITIAL ENCOUNTER: ICD-10-CM

## 2023-07-25 DIAGNOSIS — Z79.899 HIGH RISK MEDICATION USE: ICD-10-CM

## 2023-07-25 PROCEDURE — 99214 OFFICE O/P EST MOD 30 MIN: CPT | Performed by: INTERNAL MEDICINE

## 2023-07-25 RX ORDER — HYDROXYCHLOROQUINE SULFATE 200 MG/1
TABLET, FILM COATED ORAL
Qty: 135 TABLET | Refills: 2 | Status: SHIPPED | OUTPATIENT
Start: 2023-07-25 | End: 2024-01-25

## 2023-07-25 NOTE — PROGRESS NOTES
Rheumatology Clinic Visit      Zean Quintana MRN# [id number]   YOB: 1965 Age: 58 year old      Date of visit: 7/25/23   PCP: Dr. Chana Armenta    Chief Complaint   Patient presents with:  Consult: RA    Assessment and Plan     1. Seropositive nonerosive rheumatoid arthritis (RF low positive, CCP negative): Reportedly at the time of diagnosis she had synovitis in a symmetric distribution that was steroid responsive. Previously on MTX (effective, anemia, LFT elevation), leflunomide (neutropenia). Currently on hydroxychloroquine 300 mg daily on average (started in approximately 2007, worsened symptoms when reduced to 200mg daily) and Humira 40mg SQ every 14 days (started 10/10/2021).   Chronic illness    - Continue hydroxychloroquine 200mg daily with an additional 200mg every other day (last eye exam was on 7/28/2022 with Dr. Mirza; yearly eye exam required)  - Ophthalmology referral for hydroxychloroquine toxicity monitoring   - Continue Humira 40 mg SQ every 14 days   - No rheumatology labs prior to 6-month rheumatology follow-up appointment    2. Osteoporosis: s/p hip fx from ground level fall and is now status-post right HUY. Note that her mother has a hx of vertebral compression fx; Ms. Quintana is post-menopausal. 1/13/2020 DEXA showed osteoporosis; L-spine T score of -2.6, Left femoral neck T-score of -2.4. Alendronate was started 1/2020 but stopped in early April 2020 by Ms. Quintana due to hair loss and GERD.  She then received Reclast on 6/16/2020, 6/17/2021, 6/20/2022, 6/21/2023.  Plan to continue Reclast on a yearly basis until 2025.  4/12/2022 DEXA showed improvement of the lumbar spine and stable at the radius and left femoral neck.  Chronic illness, stable.    - Continue reclast 5mg IV once yearly, next due 6/21/2024   - Continue calcium 1200mg daily  - Continue vitamin D 1000 IU daily    3.  History of bilateral carpal tunnel syndrome: doesn't tolerate NSAIDs per patient.  Using wrist  splints bilaterally with significant improvement, and was intermittent symptoms.  Symptoms then worsened and she had the nerve conduction study performed, showing bilateral carpal tunnel syndrome.  She was then seen by orthopedics and had surgery on the right with resolution of symptoms with did not have surgery on the left; left carpal tunnel syndrome symptoms have improved over time and she is not symptomatic today with regard to carpal tunnel syndrome.     4. Bilateral shoulder issues / rotator cuff pathology: Limited range of motion of the right shoulder because of rotator cuff tear.  S/p surgery.  Historical and not discussed in detail today.    5. Sjogren's syndrome: NJ by EIA negative but positive by IF, SSA and SSB negative, and minor salivary gland biopsy negative. She is currently following with ophthalmology who has given her eyedrops that have been beneficial.  Dry mouth is currently treated with frequent sips of water; she has good oral hygiene, follows with a dentist regularly, and does not have frequent dental caries. Pilocarpine was previously Rx'd but never used and has been removed from her medication list previously.      6. History of oral sores / family history of lupus / polyarthritis / recurrent sinus infections: She is not having oral sores or recurrent sinus infections for several years now. Retrospectively, there would be concern for potential ANCA associated sinus issues and this could be checked if she had recurrent sinusitis again. Oral sores have improved since she started Plaquenil, that argues it could be connective tissue disease related. NJ was negative by EIA, but complement was on the low end of the normal range so NJ was rechecked and positive; additional testing was negative.    7.  Vaccinations: Vaccinations reviewed with Ms. Quintana.    - Influenza: encouraged yearly vaccination  - [id number]: up to date  - [id number]: up to date  - Shingrix: advised receiving  - COVID-19:  advised updating      Total minutes spent in evaluation with patient, documentation, , and review of pertinent studies and chart notes: 20     Ms. Quintana verbalized agreement with and understanding of the rational for the diagnosis and treatment plan.  All questions were answered to best of my ability and the patient's satisfaction. Ms. Quintana was advised to contact the clinic with any questions that may arise after the clinic visit.      Thank you for involving me in the care of the patient    Return to clinic: 6 months      HPI   Zena Quintana is a 58 year old female with medical history significant for iron deficiency anemia, nephrolithiasis, hyperlipidemia, xerostomia, dry eyes, status-post right HUY, and rheumatoid arthritis who presents for follow-up of rheumatoid arthritis.    Today, 7/25/2023: RA controlled.  No joint pain or swelling.  No morning stiffness or gelling phenomenon.  Arthritis does not limit her daily activities.  Overall happy with how well she is doing.  Request has been received in June without any adverse reaction.    Denies fevers, chills, nausea, vomiting, constipation, diarrhea. No abdominal pain. No chest pain/pressure, palpitations, or shortness of breath. No neck pain. No rash. No LE swelling.  No photosensitivity or photophobia.  No sicca symptoms.     Tobacco: None  EtOH: Occasional; no more than 2 drinks per day when she does drink  Drugs: None  Occupation: Works at a school    ROS   12 point review of system was completed and negative except as noted in the HPI     Active Problem List     Patient Active Problem List   Diagnosis    Allergic rhinitis    Stomatitis and mucositis (ulcerative)    Pure hypercholesterolemia    HYPERLIPIDEMIA LDL GOAL <160    High risk medication use    RA (rheumatoid arthritis) (H)    Disturbance of salivary secretion (XEROSTOMIA)    Impingement syndrome of right shoulder    Sjogren's syndrome (H)    Palpitations    Cervical high risk HPV  (human papillomavirus) test positive    Osteoporosis with current pathological fracture, unspecified osteoporosis type, initial encounter    Oral bisphosphonates causing adverse effect in therapeutic use, initial encounter     Past Medical History     Past Medical History:   Diagnosis Date    Allergic rhinitis, cause unspecified     Allergic rhinitis    Cervical high risk HPV (human papillomavirus) test positive 03/08/2019    see problem list    Endometriosis, site unspecified     Endometriosis    Female infertility of other specified origin     Iron deficiency anemia, unspecified     Anemia, iron def.    PONV (postoperative nausea and vomiting)     RA (rheumatoid arthritis) (H)     Sjogren's syndrome (H)     Stomatitis and mucositis (ulcerative)     chronic - non-specific     Past Surgical History     Past Surgical History:   Procedure Laterality Date    COLONOSCOPY WITH CO2 INSUFFLATION N/A 8/7/2017    Procedure: COLONOSCOPY WITH CO2 INSUFFLATION;  Colonoscopy, Screen for colon cancer.  BMI  22.17  [facility] [place] [phone];  Surgeon: Berny Pedro MD;  Location: [facility] OR    COLONOSCOPY WITH CO2 INSUFFLATION N/A 9/20/2022    Procedure: COLONOSCOPY, WITH CO2 INSUFFLATION;  Surgeon: Berny Pedro MD;  Location: [facility] OR    HC KNEE SCOPE, DIAGNOSTIC       left knee, ruptured ACL    HC REMOVE TONSILS/ADENOIDS,12+ Y/O      T & A 11+y.o.    ORTHOPEDIC SURGERY      rotator cuff repair, left    ZZC NONSPECIFIC PROCEDURE  1995, 1997    laparoscopy for endometrial fulguration    ZZHC COLONOSCOPY W/WO BRUSH/WASH  5/25/2005    ZZHC CREATE EARDRUM OPENING,GEN ANESTH      P.E. Tubes     Allergy     Allergies   Allergen Reactions    Alendronate Other (See Comments)     Hair loss    Dust Mites     Mold     Penicillins Hives     hives     Current Medication List     Current Outpatient Medications   Medication Sig    adalimumab (HUMIRA *CF*) 40 MG/0.4ML pen kit Inject 0.4 mLs (40 mg) Subcutaneous every 14 days . Hold for signs  of infection, then seek medical attention.    aspirin (ASA) 325 MG tablet Take 325 mg by mouth daily    bimatoprost (LATISSE) 0.03 % SOLN Externally apply topically At Bedtime    calcium carbonate (OS-VALERIA) 600 MG tablet Take 2 tablets by mouth daily    fluticasone (FLONASE) 50 MCG/ACT spray Spray 2 sprays into both nostrils daily    hydroxychloroquine (PLAQUENIL) 200 MG tablet Hydroxychloroquine 200mg daily; and an additional 200mg every other day.    ibuprofen (ADVIL/MOTRIN) 200 MG capsule Take 200 mg by mouth as needed for fever    loratadine 10 MG capsule Take 10 mg by mouth as needed for allergies    MULTIPLE VITAMINS/WOMENS OR None Entered    triamterene-HCTZ (DYAZIDE) 37.5-25 MG capsule TAKE 1 CAPSULE BY MOUTH EVERY MORNING    zinc sulfate (ZINCATE) 220 (50 Zn) MG capsule Take 1 capsule by mouth daily     No current facility-administered medications for this visit.         Social History   See HPI    Family History     Family History   Problem Relation Age of Onset    Osteoporosis Mother     Arthritis Mother         lupus    Connective Tissue Disorder Mother         Lupus    Cataracts Mother     Macular Degeneration Mother     Celiac Disease Mother     Heart Disease Father         aortic aneurysm    Gastrointestinal Disease Father         abdominal anurysm    Hypertension Father     Cataracts Father     Other - See Comments Maternal Grandmother         atherosclerosis heart disease    Alzheimer Disease Maternal Grandmother     Osteoporosis Maternal Grandmother     Cerebrovascular Disease Maternal Grandfather     Cancer Paternal Grandmother     Cerebrovascular Disease Paternal Grandfather     Gastrointestinal Disease Sister         IBS, colon problems    Circulatory Sister         carotid stenosis    Cancer - colorectal Maternal Uncle     Cancer - colorectal Maternal Uncle     Glaucoma No family hx of      Mother: Lupus    Physical Exam     Temp Readings from Last 3 Encounters:   06/21/23 98.2  F (36.8  C)  "(Oral)   03/15/23 97.4  F (36.3  C) (Temporal)   02/01/23 97  F (36.1  C) (Temporal)     BP Readings from Last 5 Encounters:   07/25/23 101/68   06/21/23 99/63   03/15/23 100/70   01/25/23 107/67   01/11/23 102/60     Pulse Readings from Last 1 Encounters:   07/25/23 88     Resp Readings from Last 1 Encounters:   06/21/23 16     Estimated body mass index is 22.77 kg/m  as calculated from the following:    Height as of 6/21/23: 1.664 m (5' 5.51\").    Weight as of this encounter: 63 kg (139 lb).    GEN: NAD.   HEENT:  Anicteric, noninjected sclera. No obvious external lesions of the ear and nose. Hearing intact.  CV: S1, S2. RRR. No m/r/g.  PULM: No increased work of breathing. CTA bilaterally  MSK: MCPs, PIPs, DIPs without swelling or tenderness to palpation.  Wrists without swelling or tenderness to palpation.  Elbows and shoulders without swelling or tenderness to palpation.  Knees, ankles, and MTPs without swelling or tenderness to palpation.    SKIN: No rash or jaundice seen  PSYCH: Alert. Appropriate.      Labs / Imaging (select studies)     CBC  Recent Labs   Lab Test 07/18/23  1002 01/23/23  1531 05/02/22  1526 09/02/21  0857 06/02/21  1508 02/24/21  1521 12/10/20  0951 09/22/20  1517   WBC 5.0 6.0 7.2   < > 4.7 5.3   < > 5.7   RBC 4.40 3.75* 4.03   < > 3.90 3.89   < > 3.78*   HGB 13.2 11.2* 12.5   < > 11.7 11.6*   < > 11.5*   HCT 40.3 35.3 38.0   < > 36.4 36.5   < > 35.0   MCV 92 94 94   < > 93 94   < > 93   RDW 11.9 12.5 12.4   < > 13.0 12.4   < > 12.2    308 307   < > 308 309   < > 284   MCH 30.0 29.9 31.0   < > 30.0 29.8   < > 30.4   MCHC 32.8 31.7 32.9   < > 32.1 31.8   < > 32.9   NEUTROPHIL 49 43 53   < > 55.2 52.6  --  54.2   LYMPH 34 36 27   < > 24.5 26.4  --  26.7   MONOCYTE 14 17 14   < > 16.7 16.8  --  15.6   EOSINOPHIL 2 4 5   < > 3.0 3.6  --  3.0   BASOPHIL 1 1 1   < > 0.6 0.6  --  0.5   ANEU  --   --   --   --  2.6 2.8  --  3.1   ALYM  --   --   --   --  1.2 1.4  --  1.5   ELIGIO  --   --   " --   --  0.8 0.9  --  0.9   AEOS  --   --   --   --  0.1 0.2  --  0.2   ABAS  --   --   --   --  0.0 0.0  --  0.0   ANEUTAUTO 2.5 2.6 3.9   < >  --   --   --   --    ALYMPAUTO 1.7 2.2 2.0   < >  --   --   --   --    AMONOAUTO 0.7 1.0 1.0   < >  --   --   --   --    AEOSAUTO 0.1 0.2 0.4   < >  --   --   --   --    ABSBASO 0.0 0.0 0.0   < >  --   --   --   --     < > = values in this interval not displayed.     CMP  Recent Labs   Lab Test 07/18/23  1002 06/21/23  1202 06/21/23  1117 01/23/23  1531 06/20/22  1139 05/02/22  1526 09/02/21  0857 06/17/21  1158 06/02/21  1508 04/06/21  1518 02/24/21  1521 12/10/20  0951 04/26/19  1327 04/12/19  1509 09/07/17  1322 06/20/17  1437 03/06/17  1300 05/24/16  0940   NA  --   --   --   --   --   --   --   --   --   --   --  137  --  139  --  141  --  137   POTASSIUM  --   --   --   --   --   --   --   --   --   --   --  3.9  --  4.3  --  3.8  --  4.4   CHLORIDE  --   --   --   --   --   --   --   --   --   --   --  104  --  103  --  105  --  105   CO2  --   --   --   --   --   --   --   --   --   --   --  31  --  29  --  29  --  29   ANIONGAP  --   --   --   --   --   --   --   --   --   --   --  2*  --  7  --  7  --  3   GLC  --   --   --   --   --   --   --   --   --   --   --  85  --   --   --  84  --  85   BUN  --   --   --   --   --   --   --   --   --   --   --  10  --   --   --  22  --  18   CR 0.99* 1.01* 1.1* 0.81 0.79 0.96   < > 0.90 0.90  --  0.72 0.70   < > 0.86   < > 0.84   < > 0.92   GFRESTIMATED 65 64 58* 84 86 69   < > 71 71  --  >90 >90   < > 76   < > 71   < > 64   GFRESTBLACK  --   --   --   --   --   --   --  82 83  --  >90 >90   < > 88   < > 85   < > 77   VALERIA  --  9.5  --  9.9 10.2* 9.7   < > 8.7 9.5  --   --  9.6   < > 8.7  --  9.1  --  9.2   BILITOTAL 0.4  --   --  0.2  --  0.3   < >  --  0.3  --  0.2  --    < >  --    < > 0.2   < > 0.4   ALBUMIN 4.5  --   --  4.7  --  4.4   < >  --  4.4  --  4.1  --    < >  --    < > 4.0   < > 4.4   PROTTOTAL 7.3  --   --   6.8  --  7.1   < >  --  7.4  --  7.2  --    < >  --    < > 6.8   < > 7.3   ALKPHOS 41  --   --  36  --  41   < >  --  46  --  57  --    < >  --    < > 40   < > 42   AST 26  --   --  29  --  27   < >  --  27   < > 76*  --    < >  --    < > 21   < > 30   ALT 16  --   --  18  --  23   < >  --  22   < > 31  --    < >  --    < > 21   < > 24    < > = values in this interval not displayed.     Calcium/VitaminD  Recent Labs   Lab Test 06/21/23  1202 01/23/23  1531 06/20/22  1139 05/02/22  1526 06/17/21  1158 06/02/21  1508   VALERIA 9.5 9.9 10.2* 9.7   < > 9.5   VITDT  --  70  --  59  --  61    < > = values in this interval not displayed.     ESR/CRP  Recent Labs   Lab Test 07/18/23  1002 01/23/23  1531 05/02/22  1526 01/10/22  1526 09/02/21  0857   SED 8 8 7 9 6   CRP  --   --  <2.9 <2.9 <2.9   CRPI <3.00 <3.00  --   --   --      Hepatitis B  Recent Labs   Lab Test 09/04/19  1552   HBCAB Nonreactive   HEPBANG Nonreactive     Hepatitis C  Recent Labs   Lab Test 06/20/17  1437   HCVAB Nonreactive   Assay performance characteristics have not been established for newborns,   infants, and children       Tuberculosis Screening  Recent Labs   Lab Test 01/23/23  1531 09/09/21  1502   TBRES Negative Negative     Immunization History     Immunization History   Administered Date(s) Administered    COVID-19 Monovalent 18+ (Moderna) 02/03/2021, 03/03/2021, 12/07/2021, 06/26/2022    FLU 6-35 months 10/12/2019    Influenza (H1N1) 12/03/2009    Influenza (IIV3) PF 11/17/2005, 11/17/2006, 10/25/2010, 01/09/2013    Influenza Vaccine 50-64 or 18-64 w/egg allergy (Flublok) 01/25/2023    Influenza Vaccine >6 months (Alfuria,Fluzone) 10/10/2013, 09/14/2016, 09/13/2017, 01/10/2019    Influenza,INJ,MDCK,PF,Quad >6mo(Flucelvax) 10/16/2020    Pneumo Conj 13-V (2010&after) 09/13/2017    Pneumococcal 23 valent 09/12/2018    TD,PF 7+ (Tenivac) 02/01/2005    TDAP (Adacel,Boostrix) 08/24/2022    TDAP Vaccine (Adacel) 01/09/2013          Chart documentation  done in part with Dragon Voice recognition Software. Although reviewed after completion, some word and grammatical error may remain.    Rome Hardy MD

## 2023-08-02 ENCOUNTER — OFFICE VISIT (OUTPATIENT)
Dept: OPHTHALMOLOGY | Facility: CLINIC | Age: 58
End: 2023-08-02
Payer: COMMERCIAL

## 2023-08-02 DIAGNOSIS — M05.79 RHEUMATOID ARTHRITIS INVOLVING MULTIPLE SITES WITH POSITIVE RHEUMATOID FACTOR (H): ICD-10-CM

## 2023-08-02 DIAGNOSIS — H04.123 DRY EYE SYNDROME, BILATERAL: ICD-10-CM

## 2023-08-02 DIAGNOSIS — Z79.899 HIGH RISK MEDICATION USE: Primary | ICD-10-CM

## 2023-08-02 PROCEDURE — 92012 INTRM OPH EXAM EST PATIENT: CPT | Performed by: STUDENT IN AN ORGANIZED HEALTH CARE EDUCATION/TRAINING PROGRAM

## 2023-08-02 ASSESSMENT — VISUAL ACUITY
CORRECTION_TYPE: CONTACTS
OS_CC: 20/20
OD_CC: 20/25
METHOD: SNELLEN - LINEAR
OD_CC+: +2

## 2023-08-02 ASSESSMENT — SLIT LAMP EXAM - LIDS
COMMENTS: 1+ UPPER LID DERMATOCHALASIS
COMMENTS: 1+ UPPER LID DERMATOCHALASIS

## 2023-08-02 ASSESSMENT — EXTERNAL EXAM - LEFT EYE: OS_EXAM: NORMAL

## 2023-08-02 ASSESSMENT — EXTERNAL EXAM - RIGHT EYE: OD_EXAM: NORMAL

## 2023-08-02 ASSESSMENT — CUP TO DISC RATIO
OD_RATIO: 0.1
OS_RATIO: 0.1

## 2023-08-02 NOTE — PROGRESS NOTES
Current Eye Medications: artificial tears - both eyes PRN     Subjective:  referred by Dr. Hardy for high-risk medication testing - vision stable with multifocal contact lenses.  Gets occasional dryness, use of artificial tears PRN help.    Hydroxychloroquine 200mg - alternating 200mg and 400mg qod for rheumatoid arthritis.      Objective:  See Ophthalmology Exam.       Assessment:  Zena Quintana is a 58 year old female who presents with:   Encounter Diagnoses   Name Primary?    High risk medication use Plaquenil since 2007.     Macular SD-OCT within normal limits both eyes.    Moe visual field (HVF) 10-2 within normal limits both eyes (sup/central loss both eyes)    No signs of Plaquenil retinal toxicity at present.       Rheumatoid arthritis involving multiple sites with positive rheumatoid factor (H)     Dry eye syndrome, bilateral        Plan:  Continue artificial tears up to four times a day as needed    Normal Plaquenil eye tests today.    Ladonna Mirza MD  [phone]

## 2023-08-02 NOTE — PATIENT INSTRUCTIONS
Continue artificial tears up to four times a day as needed    Normal Plaquenil eye tests today.    Ladonna Mirza MD  [phone]

## 2023-08-02 NOTE — LETTER
8/2/2023         RE: Zena Quintana  [address] [facility] [address]        Dear Colleague,    Thank you for referring your patient, Zena Quintana, to the [facility] [place]. Please see a copy of my visit note below.     Current Eye Medications: artificial tears - both eyes PRN     Subjective:  referred by Dr. Hardy for high-risk medication testing - vision stable with multifocal contact lenses.  Gets occasional dryness, use of artificial tears PRN help.    Hydroxychloroquine 200mg - alternating 200mg and 400mg qod for rheumatoid arthritis.      Objective:  See Ophthalmology Exam.       Assessment:  Zena Quintana is a 58 year old female who presents with:   Encounter Diagnoses   Name Primary?     High risk medication use Plaquenil since 2007.     Macular SD-OCT within normal limits both eyes.    Moe visual field (HVF) 10-2 within normal limits both eyes (sup/central loss both eyes)    No signs of Plaquenil retinal toxicity at present.        Rheumatoid arthritis involving multiple sites with positive rheumatoid factor (H)      Dry eye syndrome, bilateral        Plan:  Continue artificial tears up to four times a day as needed    Normal Plaquenil eye tests today.    Ladonna Mirza MD  [phone]       Again, thank you for allowing me to participate in the care of your patient.        Sincerely,        Ladonna Mirza MD

## 2023-08-20 ENCOUNTER — HEALTH MAINTENANCE LETTER (OUTPATIENT)
Age: 58
End: 2023-08-20

## 2023-09-05 NOTE — PROGRESS NOTES
History of Present Illness - Zena Quintana is a 58 year old female presenting in clinic today for a recheck on Patient presents with:  Follow Up: Meniere's disease, left    Patient with history of left-sided Ménière's disease.  Since the last visit patient feels that hearing has not changed a lot.  She has had couple of small episodes of vertigo but very self-limited.  Certainly better than before.  She believes that made the biggest impact is any changes in her diet.  She quit caffeine and that seems to have helped a lot.  Also diuretic seems to help the ear with pressure and tinnitus.    She appears to be tolerating diuretic well.    BP Readings from Last 1 Encounters:   09/13/23 102/60       BP noted to be well controlled today in office.     Zena IS NOT a smoker/uses chewing tobacco.      Past Medical History -   Past Medical History:   Diagnosis Date    Allergic rhinitis, cause unspecified     Allergic rhinitis    Cervical high risk HPV (human papillomavirus) test positive 03/08/2019    see problem list    Endometriosis, site unspecified     Endometriosis    Female infertility of other specified origin     Iron deficiency anemia, unspecified     Anemia, iron def.    PONV (postoperative nausea and vomiting)     RA (rheumatoid arthritis) (H)     Sjogren's syndrome (H)     Stomatitis and mucositis (ulcerative)     chronic - non-specific       Current Medications -   Current Outpatient Medications:     adalimumab (HUMIRA *CF*) 40 MG/0.4ML pen kit, Inject 0.4 mLs (40 mg) Subcutaneous every 14 days . Hold for signs of infection, then seek medical attention., Disp: 0.8 mL, Rfl: 6    aspirin (ASA) 325 MG tablet, Take 325 mg by mouth daily, Disp: , Rfl:     bimatoprost (LATISSE) 0.03 % SOLN, Externally apply topically At Bedtime, Disp: 1 Bottle, Rfl: 3    calcium carbonate (OS-VALERIA) 600 MG tablet, Take 2 tablets by mouth daily, Disp: , Rfl:     hydroxychloroquine (PLAQUENIL) 200 MG tablet, Hydroxychloroquine 200mg  daily; and an additional 200mg every other day., Disp: 135 tablet, Rfl: 2    loratadine 10 MG capsule, Take 10 mg by mouth as needed for allergies, Disp: , Rfl:     MULTIPLE VITAMINS/WOMENS OR, None Entered, Disp: , Rfl:     triamterene-HCTZ (DYAZIDE) 37.5-25 MG capsule, TAKE 1 CAPSULE BY MOUTH EVERY MORNING, Disp: 30 capsule, Rfl: 6    zinc sulfate (ZINCATE) 220 (50 Zn) MG capsule, Take 1 capsule by mouth daily, Disp: , Rfl:     fluticasone (FLONASE) 50 MCG/ACT spray, Spray 2 sprays into both nostrils daily (Patient not taking: Reported on 9/13/2023), Disp: 1 Bottle, Rfl: 0    Allergies -   Allergies   Allergen Reactions    Alendronate Other (See Comments)     Hair loss    Dust Mites     Mold     Penicillins Hives     hives       Social History -   Social History     Socioeconomic History    Marital status:      Spouse name: Wolf    Number of children: 2    Years of education: 12   Occupational History    Occupation: homemaker     Employer: HOMEM[employer]ER     Employer:    Tobacco Use    Smoking status: Never    Smokeless tobacco: Never    Tobacco comments:     no smokers in household   Vaping Use    Vaping Use: Never used   Substance and Sexual Activity    Alcohol use: Yes     Alcohol/week: 1.7 standard drinks of alcohol     Comment: has a couple drinks now and again    Drug use: No    Sexual activity: Yes     Partners: Male     Birth control/protection: None     Comment: Infertility   Other Topics Concern     Service No    Blood Transfusions No    Caffeine Concern No    Occupational Exposure No    Hobby Hazards No    Sleep Concern No    Stress Concern No    Weight Concern No    Special Diet No    Back Care No    Exercise Yes    Bike Helmet No    Seat Belt Yes    Self-Exams Yes   Social History Narrative    Adopted son at 7wk and dtr at 4mo, both from [place]       Family History -   Family History   Problem Relation Age of Onset    Osteoporosis Mother     Arthritis Mother         lupus     Connective Tissue Disorder Mother         Lupus    Cataracts Mother     Macular Degeneration Mother     Celiac Disease Mother     Heart Disease Father         aortic aneurysm    Gastrointestinal Disease Father         abdominal anurysm    Hypertension Father     Cataracts Father     Other - See Comments Maternal Grandmother         atherosclerosis heart disease    Alzheimer Disease Maternal Grandmother     Osteoporosis Maternal Grandmother     Cerebrovascular Disease Maternal Grandfather     Cancer Paternal Grandmother     Cerebrovascular Disease Paternal Grandfather     Gastrointestinal Disease Sister         IBS, colon problems    Circulatory Sister         carotid stenosis    Cancer - colorectal Maternal Uncle     Cancer - colorectal Maternal Uncle     Glaucoma No family hx of        Review of Systems - As per HPI and PMHx, otherwise review of system review of the head and neck negative. Otherwise 10+ review of system is negative    Physical Exam  /60   Temp 97.7  F (36.5  C) (Temporal)   LMP 07/17/2017 (Approximate)   BMI: There is no height or weight on file to calculate BMI.    General - The patient is well nourished and well developed, and appears to have good nutritional status.  Alert and oriented to person and place, answers questions and cooperates with examination appropriately.    SKIN - No suspicious lesions or rashes.  Respiration - No respiratory distress.  Head and Face - Normocephalic and atraumatic, with no gross asymmetry noted of the contour of the facial features.  The facial nerve is intact, with strong symmetric movements.    Voice and Breathing - The patient was breathing comfortably without the use of accessory muscles. The patients voice was clear and strong, and had appropriate pitch and quality.    Ears - Bilateral pinna and EACs with normal appearing overlying skin. Tympanic membrane intact with good mobility on pneumatic otoscopy bilaterally. Bony landmarks of the ossicular  chain are normal. The tympanic membranes are normal in appearance. No retraction, perforation, or masses.  No fluid or purulence was seen in the external canal or the middle ear.     Eyes - Extraocular movements intact.  Sclera were not icteric or injected, conjunctiva were pink and moist.      Neuro - Nonfocal neuro exam is normal, CN 2 through 12 intact, normal gait and muscle tone.      Performed in clinic today:  Audiologic Studies - An audiogram and tympanogram were performed today as part of the evaluation and personally reviewed. The tympanogram shows Type A curves on the right and Type A curves on the left, with normal canal volumes and middle ear pressures.  The audiogram showed normal thresholds on the right and moderate low-frequency loss correcting at mid to high frequencies to normal on the left.    Word recognition score 96% on the right and 96% on the left.    A/P - Zena Quintana is a 58 year old female Patient presents with:  Follow Up: Meniere's disease, left    Patient with stable symptomatically Ménière's involving left ear.  She at this point does not wish to proceed with hearing aid for the left ear.  We will continue dietary restrictions taking her medications and we will recheck the hearing in about 7 months.    Zena should follow up in 7 months.      At Zena next appointment they will need a hearing test.      Jamir Hauser MD

## 2023-09-13 ENCOUNTER — OFFICE VISIT (OUTPATIENT)
Dept: OTOLARYNGOLOGY | Facility: OTHER | Age: 58
End: 2023-09-13
Payer: COMMERCIAL

## 2023-09-13 ENCOUNTER — OFFICE VISIT (OUTPATIENT)
Dept: AUDIOLOGY | Facility: OTHER | Age: 58
End: 2023-09-13
Payer: COMMERCIAL

## 2023-09-13 VITALS — TEMPERATURE: 97.7 F | DIASTOLIC BLOOD PRESSURE: 60 MMHG | SYSTOLIC BLOOD PRESSURE: 102 MMHG

## 2023-09-13 DIAGNOSIS — H90.42 SENSORINEURAL HEARING LOSS (SNHL) OF LEFT EAR WITH UNRESTRICTED HEARING OF RIGHT EAR: ICD-10-CM

## 2023-09-13 DIAGNOSIS — H81.02 MENIERE'S DISEASE, LEFT: Primary | ICD-10-CM

## 2023-09-13 DIAGNOSIS — H90.3 ASYMMETRICAL SENSORINEURAL HEARING LOSS: Primary | ICD-10-CM

## 2023-09-13 PROCEDURE — 99213 OFFICE O/P EST LOW 20 MIN: CPT | Performed by: OTOLARYNGOLOGY

## 2023-09-13 PROCEDURE — 92550 TYMPANOMETRY & REFLEX THRESH: CPT | Performed by: AUDIOLOGIST

## 2023-09-13 PROCEDURE — 92557 COMPREHENSIVE HEARING TEST: CPT | Performed by: AUDIOLOGIST

## 2023-09-13 RX ORDER — TRIAMTERENE AND HYDROCHLOROTHIAZIDE 37.5; 25 MG/1; MG/1
1 CAPSULE ORAL EVERY MORNING
Qty: 30 CAPSULE | Refills: 6 | Status: SHIPPED | OUTPATIENT
Start: 2023-09-13 | End: 2023-11-14

## 2023-09-13 NOTE — LETTER
9/13/2023         RE: Zena Quintana  [address] [facility] [address]        Dear Colleague,    Thank you for referring your patient, Zena Quintana, to the [facility]. Please see a copy of my visit note below.    History of Present Illness - Zena Quintana is a 58 year old female presenting in clinic today for a recheck on Patient presents with:  Follow Up: Meniere's disease, left    Patient with history of left-sided Ménière's disease.  Since the last visit patient feels that hearing has not changed a lot.  She has had couple of small episodes of vertigo but very self-limited.  Certainly better than before.  She believes that made the biggest impact is any changes in her diet.  She quit caffeine and that seems to have helped a lot.  Also diuretic seems to help the ear with pressure and tinnitus.    She appears to be tolerating diuretic well.    BP Readings from Last 1 Encounters:   09/13/23 102/60       BP noted to be well controlled today in office.     Zena IS NOT a smoker/uses chewing tobacco.      Past Medical History -   Past Medical History:   Diagnosis Date     Allergic rhinitis, cause unspecified     Allergic rhinitis     Cervical high risk HPV (human papillomavirus) test positive 03/08/2019    see problem list     Endometriosis, site unspecified     Endometriosis     Female infertility of other specified origin      Iron deficiency anemia, unspecified     Anemia, iron def.     PONV (postoperative nausea and vomiting)      RA (rheumatoid arthritis) (H)      Sjogren's syndrome (H)      Stomatitis and mucositis (ulcerative)     chronic - non-specific       Current Medications -   Current Outpatient Medications:      adalimumab (HUMIRA *CF*) 40 MG/0.4ML pen kit, Inject 0.4 mLs (40 mg) Subcutaneous every 14 days . Hold for signs of infection, then seek medical attention., Disp: 0.8 mL, Rfl: 6     aspirin (ASA) 325 MG tablet, Take 325 mg by mouth daily, Disp: , Rfl:       bimatoprost (LATISSE) 0.03 % SOLN, Externally apply topically At Bedtime, Disp: 1 Bottle, Rfl: 3     calcium carbonate (OS-VALERIA) 600 MG tablet, Take 2 tablets by mouth daily, Disp: , Rfl:      hydroxychloroquine (PLAQUENIL) 200 MG tablet, Hydroxychloroquine 200mg daily; and an additional 200mg every other day., Disp: 135 tablet, Rfl: 2     loratadine 10 MG capsule, Take 10 mg by mouth as needed for allergies, Disp: , Rfl:      MULTIPLE VITAMINS/WOMENS OR, None Entered, Disp: , Rfl:      triamterene-HCTZ (DYAZIDE) 37.5-25 MG capsule, TAKE 1 CAPSULE BY MOUTH EVERY MORNING, Disp: 30 capsule, Rfl: 6     zinc sulfate (ZINCATE) 220 (50 Zn) MG capsule, Take 1 capsule by mouth daily, Disp: , Rfl:      fluticasone (FLONASE) 50 MCG/ACT spray, Spray 2 sprays into both nostrils daily (Patient not taking: Reported on 9/13/2023), Disp: 1 Bottle, Rfl: 0    Allergies -   Allergies   Allergen Reactions     Alendronate Other (See Comments)     Hair loss     Dust Mites      Mold      Penicillins Hives     hives       Social History -   Social History     Socioeconomic History     Marital status:      Spouse name: Wolf     Number of children: 2     Years of education: 12   Occupational History     Occupation: homemaker     Employer: HOMEM[employer]ER     Employer:    Tobacco Use     Smoking status: Never     Smokeless tobacco: Never     Tobacco comments:     no smokers in household   Vaping Use     Vaping Use: Never used   Substance and Sexual Activity     Alcohol use: Yes     Alcohol/week: 1.7 standard drinks of alcohol     Comment: has a couple drinks now and again     Drug use: No     Sexual activity: Yes     Partners: Male     Birth control/protection: None     Comment: Infertility   Other Topics Concern      Service No     Blood Transfusions No     Caffeine Concern No     Occupational Exposure No     Hobby Hazards No     Sleep Concern No     Stress Concern No     Weight Concern No     Special Diet No     Back  Care No     Exercise Yes     Bike Helmet No     Seat Belt Yes     Self-Exams Yes   Social History Narrative    Adopted son at 7wk and dtr at 4mo, both from [place]       Family History -   Family History   Problem Relation Age of Onset     Osteoporosis Mother      Arthritis Mother         lupus     Connective Tissue Disorder Mother         Lupus     Cataracts Mother      Macular Degeneration Mother      Celiac Disease Mother      Heart Disease Father         aortic aneurysm     Gastrointestinal Disease Father         abdominal anurysm     Hypertension Father      Cataracts Father      Other - See Comments Maternal Grandmother         atherosclerosis heart disease     Alzheimer Disease Maternal Grandmother      Osteoporosis Maternal Grandmother      Cerebrovascular Disease Maternal Grandfather      Cancer Paternal Grandmother      Cerebrovascular Disease Paternal Grandfather      Gastrointestinal Disease Sister         IBS, colon problems     Circulatory Sister         carotid stenosis     Cancer - colorectal Maternal Uncle      Cancer - colorectal Maternal Uncle      Glaucoma No family hx of        Review of Systems - As per HPI and PMHx, otherwise review of system review of the head and neck negative. Otherwise 10+ review of system is negative    Physical Exam  /60   Temp 97.7  F (36.5  C) (Temporal)   LMP 07/17/2017 (Approximate)   BMI: There is no height or weight on file to calculate BMI.    General - The patient is well nourished and well developed, and appears to have good nutritional status.  Alert and oriented to person and place, answers questions and cooperates with examination appropriately.    SKIN - No suspicious lesions or rashes.  Respiration - No respiratory distress.  Head and Face - Normocephalic and atraumatic, with no gross asymmetry noted of the contour of the facial features.  The facial nerve is intact, with strong symmetric movements.    Voice and Breathing - The patient was  breathing comfortably without the use of accessory muscles. The patients voice was clear and strong, and had appropriate pitch and quality.    Ears - Bilateral pinna and EACs with normal appearing overlying skin. Tympanic membrane intact with good mobility on pneumatic otoscopy bilaterally. Bony landmarks of the ossicular chain are normal. The tympanic membranes are normal in appearance. No retraction, perforation, or masses.  No fluid or purulence was seen in the external canal or the middle ear.     Eyes - Extraocular movements intact.  Sclera were not icteric or injected, conjunctiva were pink and moist.      Neuro - Nonfocal neuro exam is normal, CN 2 through 12 intact, normal gait and muscle tone.      Performed in clinic today:  Audiologic Studies - An audiogram and tympanogram were performed today as part of the evaluation and personally reviewed. The tympanogram shows Type A curves on the right and Type A curves on the left, with normal canal volumes and middle ear pressures.  The audiogram showed normal thresholds on the right and moderate low-frequency loss correcting at mid to high frequencies to normal on the left.    Word recognition score 96% on the right and 96% on the left.    A/P - Zena Quintana is a 58 year old female Patient presents with:  Follow Up: Meniere's disease, left    Patient with stable symptomatically Ménière's involving left ear.  She at this point does not wish to proceed with hearing aid for the left ear.  We will continue dietary restrictions taking her medications and we will recheck the hearing in about 7 months.    Zena should follow up in 7 months.      At Zena next appointment they will need a hearing test.      Jamir Hauser MD           Again, thank you for allowing me to participate in the care of your patient.        Sincerely,        Jamir Hauser MD, MD

## 2023-09-13 NOTE — PROGRESS NOTES
AUDIOLOGY REPORT:    Patient was referred from ENT by Dr. Hauser for audiology evaluation. The patient was last tested on 1/11/2023 and results showed mild low frequency sensorineural hearing loss in the left ear and essentially normal hearing sensitivity in the right ear, with mild likely sensorineural hearing loss at 8000 Hz. The patient was diagnosed with Meniere's disease. She returns today for follow up, and reports that she has not noted any changes in hearing. She is unsure if her hearing fluctuates. The patient reports occasional dizziness, though not as much recently. She also reports occasional fullness in her left ear. She reports that she had an echo sound quality in her left ear, which was bothersome, but this has improved.    Testing:    Otoscopy:   Otoscopic exam indicates ears are clear of cerumen bilaterally     Tympanograms:    RIGHT: normal eardrum mobility     LEFT:   normal eardrum mobility    Reflexes (reported by stimulus ear):  RIGHT: Ipsilateral is present at normal levels  RIGHT: Contralateral is present at normal levels  LEFT:   Ipsilateral is present at normal levels  LEFT:   Contralateral is present at elevated levels     Thresholds:   Pure Tone Thresholds assessed using conventional audiometry with good reliability from 250-8000 Hz bilaterally using insert earphones and circumaural headphones     RIGHT:  normal hearing sensitivity through 6000 Hz sloping to mild likely sensorineural hearing loss    LEFT:     moderate to mild  sensorineural hearing loss at 250-750 Hz rising to normal hearing sensitivity    Speech Reception Threshold:    RIGHT: 5 dB HL    LEFT:   25 dB HL  Results are in agreement with pure tone average.     Word Recognition Score:     RIGHT: 96% at 55 dB HL using NU-6 recorded word list.    LEFT:   96% at 65 dB HL using NU-6 recorded word list.    Discussed results with the patient. Compared to 1/11/2023, left ear thresholds are 10-20 dB poorer at 250-2000 Hz, 10 dB  better at 6000 Hz, and stable at all other tested frequencies.    Patient was returned to ENT for follow up.     Rachel Valentine, CCC-A  Licensed Audiologist #[id number]  9/13/2023

## 2023-09-20 ENCOUNTER — TELEPHONE (OUTPATIENT)
Dept: RHEUMATOLOGY | Facility: CLINIC | Age: 58
End: 2023-09-20
Payer: COMMERCIAL

## 2023-09-20 NOTE — TELEPHONE ENCOUNTER
PA Initiation    Medication: HUMIRA *CF* 40 MG/0.4ML SC PSKT  Insurance Company:  [employer] - Phone [phone] Fax [phone]   Pharmacy Filling the Rx:    Filling Pharmacy Phone:    Filling Pharmacy Fax:    Start Date: 9/20/2023    Faxed to [facility] [phone]

## 2023-09-21 NOTE — TELEPHONE ENCOUNTER
Prior Authorization Approval    Medication: HUMIRA *CF* 40 MG/0.4ML SC PSKT  Authorization Effective Date: 9/21/2023  Authorization Expiration Date: 9/21/2024  Approved Dose/Quantity: 2 pens per 28 days  Reference #:     Insurance Company: [employer] - Phone [phone] Fax [phone]  Expected CoPay:       CoPay Card Available:      Financial Assistance Needed:   Which Pharm[facility] is filling the prescription: [facility]  Pharmacy Notified: Yes  Patient Notified: Yes

## 2023-11-12 DIAGNOSIS — H81.02 MENIERE'S DISEASE, LEFT: ICD-10-CM

## 2023-11-13 NOTE — TELEPHONE ENCOUNTER
"Routing refill request to provider for review/approval because:  Labs out of range/out of date.     Requested Prescriptions   Pending Prescriptions Disp Refills    triamterene-HCTZ (DYAZIDE) 37.5-25 MG capsule [Pharmacy Med Name: TRIAMTERENE 37.5MG/ HCTZ 25MG CAPS] 30 capsule 6     Sig: Take 1 capsule by mouth every morning       Diuretics (Including Combos) Protocol Failed - 11/12/2023  3:58 AM        Failed - Normal serum creatinine on file in past 12 months     Recent Labs   Lab Test 07/18/23  1002   CR 0.99*              Failed - Normal serum potassium on file in past 12 months     Recent Labs   Lab Test 12/10/20  0951   POTASSIUM 3.9                    Failed - Normal serum sodium on file in past 12 months     Recent Labs   Lab Test 12/10/20  0951                 Passed - Blood pressure under 140/90 in past 12 months     BP Readings from Last 3 Encounters:   09/13/23 102/60   07/25/23 101/68   06/21/23 99/63                 Passed - Recent (12 mo) or future (30 days) visit within the authorizing provider's specialty     Patient has had an office visit with the authorizing provider or a provider within the authorizing providers department within the previous 12 mos or has a future within next 30 days. See \"Patient Info\" tab in inbasket, or \"Choose Columns\" in Meds & Orders section of the refill encounter.              Passed - Medication is active on med list        Passed - Patient is age 18 or older        Passed - No active pregancy on record        Passed - No positive pregnancy test in past 12 months             Sarah Truong RN   [facility]      "

## 2023-11-14 RX ORDER — TRIAMTERENE AND HYDROCHLOROTHIAZIDE 37.5; 25 MG/1; MG/1
1 CAPSULE ORAL EVERY MORNING
Qty: 30 CAPSULE | Refills: 6 | Status: SHIPPED | OUTPATIENT
Start: 2023-11-14 | End: 2024-05-01

## 2023-11-27 ENCOUNTER — TRANSFERRED RECORDS (OUTPATIENT)
Dept: MULTI SPECIALTY CLINIC | Facility: CLINIC | Age: 58
End: 2023-11-27

## 2023-11-27 LAB — RETINOPATHY: NORMAL

## 2024-01-25 ENCOUNTER — OFFICE VISIT (OUTPATIENT)
Dept: RHEUMATOLOGY | Facility: CLINIC | Age: 59
End: 2024-01-25
Payer: COMMERCIAL

## 2024-01-25 VITALS
WEIGHT: 136.2 LBS | OXYGEN SATURATION: 99 % | HEART RATE: 68 BPM | SYSTOLIC BLOOD PRESSURE: 102 MMHG | BODY MASS INDEX: 22.31 KG/M2 | DIASTOLIC BLOOD PRESSURE: 68 MMHG

## 2024-01-25 DIAGNOSIS — T45.8X5A ORAL BISPHOSPHONATES CAUSING ADVERSE EFFECT IN THERAPEUTIC USE, INITIAL ENCOUNTER: ICD-10-CM

## 2024-01-25 DIAGNOSIS — M80.00XA OSTEOPOROSIS WITH CURRENT PATHOLOGICAL FRACTURE, UNSPECIFIED OSTEOPOROSIS TYPE, INITIAL ENCOUNTER: ICD-10-CM

## 2024-01-25 DIAGNOSIS — M05.79 RHEUMATOID ARTHRITIS INVOLVING MULTIPLE SITES WITH POSITIVE RHEUMATOID FACTOR (H): Primary | ICD-10-CM

## 2024-01-25 PROCEDURE — 99214 OFFICE O/P EST MOD 30 MIN: CPT | Performed by: INTERNAL MEDICINE

## 2024-01-25 PROCEDURE — G2211 COMPLEX E/M VISIT ADD ON: HCPCS | Performed by: INTERNAL MEDICINE

## 2024-01-25 RX ORDER — MEPERIDINE HYDROCHLORIDE 25 MG/ML
25 INJECTION INTRAMUSCULAR; INTRAVENOUS; SUBCUTANEOUS EVERY 30 MIN PRN
Status: CANCELLED | OUTPATIENT
Start: 2024-06-21

## 2024-01-25 RX ORDER — METHYLPREDNISOLONE SODIUM SUCCINATE 125 MG/2ML
125 INJECTION, POWDER, LYOPHILIZED, FOR SOLUTION INTRAMUSCULAR; INTRAVENOUS
Status: CANCELLED
Start: 2024-06-21

## 2024-01-25 RX ORDER — HEPARIN SODIUM,PORCINE 10 UNIT/ML
5-20 VIAL (ML) INTRAVENOUS DAILY PRN
Status: CANCELLED | OUTPATIENT
Start: 2024-06-21

## 2024-01-25 RX ORDER — HYDROXYCHLOROQUINE SULFATE 200 MG/1
TABLET, FILM COATED ORAL
Qty: 135 TABLET | Refills: 2 | Status: SHIPPED | OUTPATIENT
Start: 2024-01-25 | End: 2024-07-18

## 2024-01-25 RX ORDER — DIPHENHYDRAMINE HYDROCHLORIDE 50 MG/ML
50 INJECTION INTRAMUSCULAR; INTRAVENOUS
Status: CANCELLED
Start: 2024-06-21

## 2024-01-25 RX ORDER — ALBUTEROL SULFATE 0.83 MG/ML
2.5 SOLUTION RESPIRATORY (INHALATION)
Status: CANCELLED | OUTPATIENT
Start: 2024-06-21

## 2024-01-25 RX ORDER — ZOLEDRONIC ACID 5 MG/100ML
5 INJECTION, SOLUTION INTRAVENOUS ONCE
Status: CANCELLED
Start: 2024-06-21

## 2024-01-25 RX ORDER — HEPARIN SODIUM (PORCINE) LOCK FLUSH IV SOLN 100 UNIT/ML 100 UNIT/ML
5 SOLUTION INTRAVENOUS
Status: CANCELLED | OUTPATIENT
Start: 2024-06-21

## 2024-01-25 RX ORDER — EPINEPHRINE 1 MG/ML
0.3 INJECTION, SOLUTION, CONCENTRATE INTRAVENOUS EVERY 5 MIN PRN
Status: CANCELLED | OUTPATIENT
Start: 2024-06-21

## 2024-01-25 RX ORDER — ALBUTEROL SULFATE 90 UG/1
1-2 AEROSOL, METERED RESPIRATORY (INHALATION)
Status: CANCELLED
Start: 2024-06-21

## 2024-01-25 NOTE — PROGRESS NOTES
Rheumatology Clinic Visit      Zena Quintana MRN# [id number]   YOB: 1965 Age: 58 year old      Date of visit: 1/25/24   PCP: Dr. Chana Armenta    Chief Complaint   Patient presents with:  RECHECK: 6 month follow up RA    Assessment and Plan     1. Seropositive nonerosive rheumatoid arthritis (RF low positive, CCP negative): Reportedly at the time of diagnosis she had synovitis in a symmetric distribution that was steroid responsive. Previously on MTX (effective, anemia, LFT elevation), leflunomide (neutropenia). Currently on hydroxychloroquine 300 mg daily on average (started in approximately 2007, worsened symptoms when reduced to 200mg daily) and Humira 40mg SQ every 14 days (started 10/10/2021).   Chronic illness    - Continue hydroxychloroquine 200mg daily with an additional 200mg every other day (last eye exam was on 8/2/2023 with Dr. Mirza; yearly eye exam required)  - Continue Humira 40 mg SQ every 14 days   - Labs in 5-6 months: CBC, Creatinine, Hepatic Panel, ESR, CRP, quantiferon TB gold plus    2. Osteoporosis: s/p hip fx from ground level fall and is now status-post right HUY. Note that her mother has a hx of vertebral compression fx; Ms. Quintana is post-menopausal. 1/13/2020 DEXA showed osteoporosis; L-spine T score of -2.6, Left femoral neck T-score of -2.4. Alendronate was started 1/2020 but stopped in early April 2020 by Ms. Quintana due to hair loss and GERD.  She then received Reclast on 6/16/2020, 6/17/2021, 6/20/2022, 6/21/2023.  Plan to continue Reclast on a yearly basis until 2025.  4/12/2022 DEXA showed improvement of the lumbar spine and stable at the radius and left femoral neck.  Chronic illness, stable.    - Continue reclast 5mg IV once yearly, next due 6/21/2024; phone number provided so that she may call to schedule and I advised that she have labs performed within 1 week before the Reclast infusion and that she schedule this lab appointment after she schedules the  Reclast infusion  - Continue calcium 1200mg daily  - Continue vitamin D 1000 IU daily  - Labs in 5-6 months: Calcium, vitamin D    3.  History of bilateral carpal tunnel syndrome: doesn't tolerate NSAIDs per patient.  Using wrist splints bilaterally with significant improvement, and was intermittent symptoms.  Symptoms then worsened and she had the nerve conduction study performed, showing bilateral carpal tunnel syndrome.  She was then seen by orthopedics and had surgery on the right with resolution of symptoms with did not have surgery on the left; left carpal tunnel syndrome symptoms have improved over time and she is not symptomatic today with regard to carpal tunnel syndrome.     4. Bilateral shoulder issues / rotator cuff pathology: Limited range of motion of the right shoulder because of rotator cuff tear.  S/p surgery.  Historical and not discussed today.    5. Sjogren's syndrome: NJ by EIA negative but positive by IF, SSA and SSB negative, and minor salivary gland biopsy negative. She is currently following with ophthalmology who has given her eyedrops that have been beneficial.  Dry mouth is currently treated with frequent sips of water; she has good oral hygiene, follows with a dentist regularly, and does not have frequent dental caries. Pilocarpine was previously Rx'd but never used and has been removed from her medication list previously.      6. History of oral sores / family history of lupus / polyarthritis / recurrent sinus infections: She is not having oral sores or recurrent sinus infections for several years now. Retrospectively, there would be concern for potential ANCA associated sinus issues and this could be checked if she had recurrent sinusitis again. Oral sores have improved since she started Plaquenil, that argues it could be connective tissue disease related. NJ was negative by EIA, but complement was on the low end of the normal range so NJ was rechecked and positive; additional  testing was negative.    7.  Vaccinations: Vaccinations reviewed with Ms. Quintana.    - Influenza: encouraged yearly vaccination  - [id number]: up to date  - [id number]: up to date  - Shingrix: advised vaccination   - COVID-19: advised keeping update     Total minutes spent in evaluation with patient, documentation, , and review of pertinent studies and chart notes: 20  The longitudinal plan of care for the rheumatology problem(s) were addressed during this visit.  Due to added complexity of care, we will continue to support the patient and the subsequent management of this condition with ongoing continuity of care.        Ms. Quintana verbalized agreement with and understanding of the rational for the diagnosis and treatment plan.  All questions were answered to best of my ability and the patient's satisfaction. Ms. Quintana was advised to contact the clinic with any questions that may arise after the clinic visit.      Thank you for involving me in the care of the patient    Return to clinic: 6 months      HPI   Zena Quintana is a 58 year old female with medical history significant for iron deficiency anemia, nephrolithiasis, hyperlipidemia, xerostomia, dry eyes, status-post right HUY, and rheumatoid arthritis who presents for follow-up of rheumatoid arthritis.    Today, 1/25/2024:  RA controlled.  No joint pain or swelling.  No morning stiffness or gelling phenomenon.  Arthritis does not limit her daily activities.      Denies fevers, chills, nausea, vomiting, constipation, diarrhea. No abdominal pain. No chest pain/pressure, palpitations, or shortness of breath. No neck pain. No rash. No LE swelling.  No photosensitivity or photophobia.  No sicca symptoms.     Tobacco: None  EtOH: Occasional; no more than 2 drinks per day when she does drink  Drugs: None  Occupation: Works at a school    ROS   12 point review of system was completed and negative except as noted in the HPI     Active Problem List      Patient Active Problem List   Diagnosis    Allergic rhinitis    Stomatitis and mucositis (ulcerative)    Pure hypercholesterolemia    HYPERLIPIDEMIA LDL GOAL <160    High risk medication use    RA (rheumatoid arthritis) (H)    Disturbance of salivary secretion (XEROSTOMIA)    Impingement syndrome of right shoulder    Sjogren's syndrome (H24)    Palpitations    Cervical high risk HPV (human papillomavirus) test positive    Osteoporosis with current pathological fracture, unspecified osteoporosis type, initial encounter    Oral bisphosphonates causing adverse effect in therapeutic use, initial encounter     Past Medical History     Past Medical History:   Diagnosis Date    Allergic rhinitis, cause unspecified     Allergic rhinitis    Cervical high risk HPV (human papillomavirus) test positive 03/08/2019    see problem list    Endometriosis, site unspecified     Endometriosis    Female infertility of other specified origin     Iron deficiency anemia, unspecified     Anemia, iron def.    PONV (postoperative nausea and vomiting)     RA (rheumatoid arthritis) (H)     Sjogren's syndrome (H)     Stomatitis and mucositis (ulcerative)     chronic - non-specific     Past Surgical History     Past Surgical History:   Procedure Laterality Date    COLONOSCOPY WITH CO2 INSUFFLATION N/A 8/7/2017    Procedure: COLONOSCOPY WITH CO2 INSUFFLATION;  Colonoscopy, Screen for colon cancer.  BMI  22.17  [facility] [place] [phone];  Surgeon: Berny Pedro MD;  Location: [facility] OR    COLONOSCOPY WITH CO2 INSUFFLATION N/A 9/20/2022    Procedure: COLONOSCOPY, WITH CO2 INSUFFLATION;  Surgeon: Berny Pedro MD;  Location: [facility] OR    HC KNEE SCOPE, DIAGNOSTIC       left knee, ruptured ACL    HC REMOVE TONSILS/ADENOIDS,11+ Y/O      T & A 11+y.o.    ORTHOPEDIC SURGERY      rotator cuff repair, left    ZZC NONSPECIFIC PROCEDURE  1995, 1997    laparoscopy for endometrial fulguration    [facility] COLONOSCOPY W/WO BRUSH/WASH  5/25/2005    [facility]  EARDR[facility],GEN ANESTH      P.E. Tubes     Allergy     Allergies   Allergen Reactions    Alendronate Other (See Comments)     Hair loss    Dust Mites     Mold     Penicillins Hives     hives     Current Medication List     Current Outpatient Medications   Medication Sig    adalimumab (HUMIRA *CF*) 40 MG/0.4ML pen kit Inject 0.4 mLs (40 mg) Subcutaneous every 14 days . Hold for signs of infection, then seek medical attention.    aspirin (ASA) 325 MG tablet Take 325 mg by mouth daily    bimatoprost (LATISSE) 0.03 % SOLN Externally apply topically At Bedtime    calcium carbonate (OS-VALERIA) 600 MG tablet Take 2 tablets by mouth daily    hydroxychloroquine (PLAQUENIL) 200 MG tablet Hydroxychloroquine 200mg daily; and an additional 200mg every other day.    loratadine 10 MG capsule Take 10 mg by mouth as needed for allergies    MULTIPLE VITAMINS/WOMENS OR None Entered    triamterene-HCTZ (DYAZIDE) 37.5-25 MG capsule TAKE 1 CAPSULE BY MOUTH EVERY MORNING    zinc sulfate (ZINCATE) 220 (50 Zn) MG capsule Take 1 capsule by mouth daily    fluticasone (FLONASE) 50 MCG/ACT spray Spray 2 sprays into both nostrils daily (Patient not taking: Reported on 9/13/2023)     No current facility-administered medications for this visit.         Social History   See HPI    Family History     Family History   Problem Relation Age of Onset    Osteoporosis Mother     Arthritis Mother         lupus    Connective Tissue Disorder Mother         Lupus    Cataracts Mother     Macular Degeneration Mother     Celiac Disease Mother     Heart Disease Father         aortic aneurysm    Gastrointestinal Disease Father         abdominal anurysm    Hypertension Father     Cataracts Father     Other - See Comments Maternal Grandmother         atherosclerosis heart disease    Alzheimer Disease Maternal Grandmother     Osteoporosis Maternal Grandmother     Cerebrovascular Disease Maternal Grandfather     Cancer Paternal Grandmother     Cerebrovascular Disease  "Paternal Grandfather     Gastrointestinal Disease Sister         IBS, colon problems    Circulatory Sister         carotid stenosis    Cancer - colorectal Maternal Uncle     Cancer - colorectal Maternal Uncle     Glaucoma No family hx of      Mother: Lupus    Physical Exam     Temp Readings from Last 3 Encounters:   09/13/23 97.7  F (36.5  C) (Temporal)   06/21/23 98.2  F (36.8  C) (Oral)   03/15/23 97.4  F (36.3  C) (Temporal)     BP Readings from Last 5 Encounters:   01/25/24 102/68   09/13/23 102/60   07/25/23 101/68   06/21/23 99/63   03/15/23 100/70     Pulse Readings from Last 1 Encounters:   01/25/24 68     Resp Readings from Last 1 Encounters:   06/21/23 16     Estimated body mass index is 22.31 kg/m  as calculated from the following:    Height as of 6/21/23: 1.664 m (5' 5.51\").    Weight as of this encounter: 61.8 kg (136 lb 3.2 oz).    GEN: NAD.   HEENT:  Anicteric, noninjected sclera. No obvious external lesions of the ear and nose. Hearing intact.  CV: S1, S2. RRR. No m/r/g.  PULM: No increased work of breathing. CTA bilaterally  MSK: MCPs, PIPs, DIPs without swelling or tenderness to palpation.  Wrists without swelling or tenderness to palpation.  Elbows and shoulders without swelling or tenderness to palpation.  Knees, ankles, and MTPs without swelling or tenderness to palpation.    SKIN: No rash or jaundice seen  PSYCH: Alert. Appropriate.      Labs / Imaging (select studies)     CBC  Recent Labs   Lab Test 07/18/23  1002 01/23/23  1531 05/02/22  1526 09/02/21  0857 06/02/21  1508 02/24/21  1521 12/10/20  0951 09/22/20  1517   WBC 5.0 6.0 7.2   < > 4.7 5.3   < > 5.7   RBC 4.40 3.75* 4.03   < > 3.90 3.89   < > 3.78*   HGB 13.2 11.2* 12.5   < > 11.7 11.6*   < > 11.5*   HCT 40.3 35.3 38.0   < > 36.4 36.5   < > 35.0   MCV 92 94 94   < > 93 94   < > 93   RDW 11.9 12.5 12.4   < > 13.0 12.4   < > 12.2    308 307   < > 308 309   < > 284   MCH 30.0 29.9 31.0   < > 30.0 29.8   < > 30.4   MCHC 32.8 31.7 " 32.9   < > 32.1 31.8   < > 32.9   NEUTROPHIL 49 43 53   < > 55.2 52.6  --  54.2   LYMPH 34 36 27   < > 24.5 26.4  --  26.7   MONOCYTE 14 17 14   < > 16.7 16.8  --  15.6   EOSINOPHIL 2 4 5   < > 3.0 3.6  --  3.0   BASOPHIL 1 1 1   < > 0.6 0.6  --  0.5   ANEU  --   --   --   --  2.6 2.8  --  3.1   ALYM  --   --   --   --  1.2 1.4  --  1.5   ELIGIO  --   --   --   --  0.8 0.9  --  0.9   AEOS  --   --   --   --  0.1 0.2  --  0.2   ABAS  --   --   --   --  0.0 0.0  --  0.0   ANEUTAUTO 2.5 2.6 3.9   < >  --   --   --   --    ALYMPAUTO 1.7 2.2 2.0   < >  --   --   --   --    AMONOAUTO 0.7 1.0 1.0   < >  --   --   --   --    AEOSAUTO 0.1 0.2 0.4   < >  --   --   --   --    ABSBASO 0.0 0.0 0.0   < >  --   --   --   --     < > = values in this interval not displayed.     CMP  Recent Labs   Lab Test 07/18/23  1002 06/21/23  1202 06/21/23  1117 01/23/23  1531 06/20/22  1139 05/02/22  1526 09/02/21  0857 06/17/21  1158 06/02/21  1508 04/06/21  1518 02/24/21  1521 12/10/20  0951 04/26/19  1327 04/12/19  1509 09/07/17  1322 06/20/17  1437 03/06/17  1300 05/24/16  0940   NA  --   --   --   --   --   --   --   --   --   --   --  137  --  139  --  141  --  137   POTASSIUM  --   --   --   --   --   --   --   --   --   --   --  3.9  --  4.3  --  3.8  --  4.4   CHLORIDE  --   --   --   --   --   --   --   --   --   --   --  104  --  103  --  105  --  105   CO2  --   --   --   --   --   --   --   --   --   --   --  31  --  29  --  29  --  29   ANIONGAP  --   --   --   --   --   --   --   --   --   --   --  2*  --  7  --  7  --  3   GLC  --   --   --   --   --   --   --   --   --   --   --  85  --   --   --  84  --  85   BUN  --   --   --   --   --   --   --   --   --   --   --  10  --   --   --  22  --  18   CR 0.99* 1.01* 1.1* 0.81 0.79 0.96   < > 0.90 0.90  --  0.72 0.70   < > 0.86   < > 0.84   < > 0.92   GFRESTIMATED 65 64 58* 84 86 69   < > 71 71  --  >90 >90   < > 76   < > 71   < > 64   GFRESTBLACK  --   --   --   --   --   --   --   82 83  --  >90 >90   < > 88   < > 85   < > 77   VALERIA  --  9.5  --  9.9 10.2* 9.7   < > 8.7 9.5  --   --  9.6   < > 8.7  --  9.1  --  9.2   BILITOTAL 0.4  --   --  0.2  --  0.3   < >  --  0.3  --  0.2  --    < >  --    < > 0.2   < > 0.4   ALBUMIN 4.5  --   --  4.7  --  4.4   < >  --  4.4  --  4.1  --    < >  --    < > 4.0   < > 4.4   PROTTOTAL 7.3  --   --  6.8  --  7.1   < >  --  7.4  --  7.2  --    < >  --    < > 6.8   < > 7.3   ALKPHOS 41  --   --  36  --  41   < >  --  46  --  57  --    < >  --    < > 40   < > 42   AST 26  --   --  29  --  27   < >  --  27   < > 76*  --    < >  --    < > 21   < > 30   ALT 16  --   --  18  --  23   < >  --  22   < > 31  --    < >  --    < > 21   < > 24    < > = values in this interval not displayed.     Calcium/VitaminD  Recent Labs   Lab Test 06/21/23  1202 01/23/23  1531 06/20/22  1139 05/02/22  1526 06/17/21  1158 06/02/21  1508   VALERIA 9.5 9.9 10.2* 9.7   < > 9.5   VITDT  --  70  --  59  --  61    < > = values in this interval not displayed.     ESR/CRP  Recent Labs   Lab Test 07/18/23  1002 01/23/23  1531 05/02/22  1526 01/10/22  1526 09/02/21  0857   SED 8 8 7 9 6   CRP  --   --  <2.9 <2.9 <2.9   CRPI <3.00 <3.00  --   --   --      Lipid Panel  Recent Labs   Lab Test 07/18/22  1046 12/10/20  0951   CHOL 212* 203*   TRIG 76 90    75   LDL 97 110*   NHDL 112 128     Hepatitis B  Recent Labs   Lab Test 09/04/19  1552   HBCAB Nonreactive   HEPBANG Nonreactive     Hepatitis C  Recent Labs   Lab Test 06/20/17  1437   HCVAB Nonreactive   Assay performance characteristics have not been established for newborns,   infants, and children       Tuberculosis Screening  Recent Labs   Lab Test 01/23/23  1531 09/09/21  1502   TBRES Negative Negative     Immunization History     Immunization History   Administered Date(s) Administered    COVID-19 Monovalent 18+ (Moderna) 02/03/2021, 03/03/2021, 12/07/2021, 06/26/2022    Influenza (H1N1) 12/03/2009    Influenza (IIV3) PF 11/17/2005,  11/17/2006, 10/25/2010, 01/09/2013    Influenza Vaccine 18-64 (Flublok) 01/25/2023    Influenza Vaccine >6 months,quad, PF 10/10/2013, 09/14/2016, 09/13/2017, 01/10/2019    Influenza, seasonal, injectable, PF 10/12/2019    Influenza,INJ,MDCK,PF,Quad >6mo(Flucelvax) 10/16/2020    Pneumo Conj 13-V (2010&after) 09/13/2017    Pneumococcal 23 valent 09/12/2018    TD,PF 7+ (Tenivac) 02/01/2005    TDAP (Adacel,Boostrix) 08/24/2022    TDAP Vaccine (Adacel) 01/09/2013          Chart documentation done in part with Dragon Voice recognition Software. Although reviewed after completion, some word and grammatical error may remain.    Rome Hardy MD

## 2024-01-25 NOTE — NURSING NOTE
RAPID 3    Cumulative Score  1       Weighted Score   0.33     Agustina BARNARD, Specialty RN 1/25/2024 2:02 PM

## 2024-01-25 NOTE — PATIENT INSTRUCTIONS
Please call the [facility] infusion center in [place] to schedule the Reclast infusion: [phone].  You are due for Reclast on 6/21/2023.    Have labs within 1 week before the reclast infusion.

## 2024-02-07 ENCOUNTER — TELEPHONE (OUTPATIENT)
Dept: RHEUMATOLOGY | Facility: CLINIC | Age: 59
End: 2024-02-07
Payer: COMMERCIAL

## 2024-02-07 DIAGNOSIS — M05.79 RHEUMATOID ARTHRITIS INVOLVING MULTIPLE SITES WITH POSITIVE RHEUMATOID FACTOR (H): Primary | ICD-10-CM

## 2024-02-07 RX ORDER — ADALIMUMAB-ADAZ 40 MG/.4ML
40 INJECTION, SOLUTION SUBCUTANEOUS
Qty: 0.8 ML | Refills: 6 | OUTPATIENT
Start: 2024-02-07 | End: 2024-02-09

## 2024-02-07 NOTE — TELEPHONE ENCOUNTER
"Called pt and left vm to call back to update her about her humira \"going generic\" and her insurance covering the preferred medication. RN has sent in the new med, pt needs to be updated.    TIGRE Jones RN 2/7/2024 4:01 PM    "

## 2024-02-07 NOTE — TELEPHONE ENCOUNTER
PA Initiation    Medication: HYRIMOZ 40 MG/0.4ML SC SOAJ  Insurance Company: [employer] - Phone [phone] Fax [phone]  [place] [facility] [place] [facility]  Filling Pharmacy Phone: [phone]  Filling Pharmacy Fax: [phone]  Start Date: 2/7/2024  ROSALBA ([place]: [id number])

## 2024-02-09 DIAGNOSIS — M05.79 RHEUMATOID ARTHRITIS INVOLVING MULTIPLE SITES WITH POSITIVE RHEUMATOID FACTOR (H): ICD-10-CM

## 2024-02-09 RX ORDER — ADALIMUMAB-ADAZ 40 MG/.4ML
40 INJECTION, SOLUTION SUBCUTANEOUS
Qty: 0.8 ML | Refills: 6 | Status: SHIPPED | OUTPATIENT
Start: 2024-02-09 | End: 2024-07-18

## 2024-02-09 NOTE — TELEPHONE ENCOUNTER
"Patient returning call - informed of message from specialty     \"  Called pt and left vm to call back to update her about her humira \"going generic\" and her insurance covering the preferred medication. RN has sent in the new med, pt needs to be updated.   \"    Patient verbalized understanding.     Charline Lynn RN  [facility]    "

## 2024-02-12 NOTE — TELEPHONE ENCOUNTER
Prior Authorization Not Needed per Insurance    Medication: HYRIMOZ 40 MG/0.4ML SC SOAJ  Insurance Company: [employer] - Phone [phone] Fax [phone]  Expected CoPay: $ 0  Ph[facility] Filling the Rx[facility]  Pharmacy Notified: Hyrimoz sent to [facility]  Patient Notified: yes - mychart copay card        Thank You,     Flory Mello [facility]  Specialty Pharmacy Clinic [facility] Specialty  [email or web address]  Phone: [phone]  Fax: [phone]

## 2024-04-16 ENCOUNTER — ANCILLARY PROCEDURE (OUTPATIENT)
Dept: MAMMOGRAPHY | Facility: OTHER | Age: 59
End: 2024-04-16
Attending: FAMILY MEDICINE
Payer: COMMERCIAL

## 2024-04-16 DIAGNOSIS — Z12.31 VISIT FOR SCREENING MAMMOGRAM: ICD-10-CM

## 2024-04-16 PROCEDURE — 77067 SCR MAMMO BI INCL CAD: CPT | Mod: TC | Performed by: STUDENT IN AN ORGANIZED HEALTH CARE EDUCATION/TRAINING PROGRAM

## 2024-04-16 PROCEDURE — 77063 BREAST TOMOSYNTHESIS BI: CPT | Mod: TC | Performed by: STUDENT IN AN ORGANIZED HEALTH CARE EDUCATION/TRAINING PROGRAM

## 2024-04-19 NOTE — PROGRESS NOTES
History of Present Illness - Zena Quintana is a 58 year old female presenting in clinic today for a recheck on Patient presents with:  Follow Up: Meniere's        Patient with history of left-sided Ménière's disease. Since the last visit patient feels that hearing has not changed a lot.  She has had couple of small episodes of hearing an echo or ear fullness but very self-limited.  Certainly better than before. She believes the medication is working well. She is still watching her diet.       She appears to be tolerating diuretic well. Requesting a refill.     Present Symptoms include: vertigo and echoing in ears  and they are   getting better .  Zena denies tinnitis.      Body mass index is 22.77 kg/m .    Weight management plan: Patient was referred to their PCP to discuss a diet and exercise plan.    BP Readings from Last 1 Encounters:   05/01/24 116/62       BP noted to be well controlled today in office.     Zena IS NOT a smoker/uses chewing tobacco.    Past Medical History -   Past Medical History:   Diagnosis Date    Allergic rhinitis, cause unspecified     Allergic rhinitis    Cervical high risk HPV (human papillomavirus) test positive 03/08/2019    see problem list    Endometriosis, site unspecified     Endometriosis    Female infertility of other specified origin     Iron deficiency anemia, unspecified     Anemia, iron def.    PONV (postoperative nausea and vomiting)     RA (rheumatoid arthritis) (H)     Sjogren's syndrome (H24)     Stomatitis and mucositis (ulcerative)     chronic - non-specific       Current Medications -   Current Outpatient Medications:     Adalimumab-adaz (HYRIMOZ) 40 MG/0.4ML SOAJ, Inject 40 mg Subcutaneous every 14 days .  Hold for infection and seek medical attention, Disp: 0.8 mL, Rfl: 6    aspirin (ASA) 325 MG tablet, Take 325 mg by mouth daily, Disp: , Rfl:     bimatoprost (LATISSE) 0.03 % SOLN, Externally apply topically At Bedtime, Disp: 1 Bottle, Rfl: 3    calcium  carbonate (OS-VALERIA) 600 MG tablet, Take 2 tablets by mouth daily, Disp: , Rfl:     fluticasone (FLONASE) 50 MCG/ACT spray, Spray 2 sprays into both nostrils daily, Disp: 1 Bottle, Rfl: 0    hydroxychloroquine (PLAQUENIL) 200 MG tablet, Hydroxychloroquine 200mg daily; and an additional 200mg every other day., Disp: 135 tablet, Rfl: 2    loratadine 10 MG capsule, Take 10 mg by mouth as needed for allergies, Disp: , Rfl:     MULTIPLE VITAMINS/WOMENS OR, None Entered, Disp: , Rfl:     triamterene-HCTZ (DYAZIDE) 37.5-25 MG capsule, TAKE 1 CAPSULE BY MOUTH EVERY MORNING, Disp: 30 capsule, Rfl: 6    zinc sulfate (ZINCATE) 220 (50 Zn) MG capsule, Take 1 capsule by mouth daily, Disp: , Rfl:     Allergies -   Allergies   Allergen Reactions    Alendronate Other (See Comments)     Hair loss    Dust Mites     Mold     Penicillins Hives     hives       Social History -   Social History     Socioeconomic History    Marital status:      Spouse name: Wolf    Number of children: 2    Years of education: 12   Occupational History    Occupation: homemaker     Employer: [employer]     Employer:    Tobacco Use    Smoking status: Never    Smokeless tobacco: Never    Tobacco comments:     no smokers in household   Vaping Use    Vaping status: Never Used   Substance and Sexual Activity    Alcohol use: Yes     Alcohol/week: 1.7 standard drinks of alcohol     Comment: has a couple drinks now and again    Drug use: No    Sexual activity: Yes     Partners: Male     Birth control/protection: None     Comment: Infertility   Other Topics Concern     Service No    Blood Transfusions No    Caffeine Concern No    Occupational Exposure No    Hobby Hazards No    Sleep Concern No    Stress Concern No    Weight Concern No    Special Diet No    Back Care No    Exercise Yes    Bike Helmet No    Seat Belt Yes    Self-Exams Yes   Social History Narrative    Adopted son at 7wk and dtr at 4mo, both from [facility]      Received from [employer] [facility], [email or web address] [facility]    Financial Resource Strain    Received from [employer] [facility], [email or web address] [facility]    Social Connections       Family History -   Family History   Problem Relation Age of Onset    Osteoporosis Mother     Arthritis Mother         lupus    Connective Tissue Disorder Mother         Lupus    Cataracts Mother     Macular Degeneration Mother     Celiac Disease Mother     Heart Disease Father         aortic aneurysm    Gastrointestinal Disease Father         abdominal anurysm    Hypertension Father     Cataracts Father     Other - See Comments Maternal Grandmother         atherosclerosis heart disease    Alzheimer Disease Maternal Grandmother     Osteoporosis Maternal Grandmother     Cerebrovascular Disease Maternal Grandfather     Cancer Paternal Grandmother     Cerebrovascular Disease Paternal Grandfather     Gastrointestinal Disease Sister         IBS, colon problems    Circulatory Sister         carotid stenosis    Cancer - colorectal Maternal Uncle     Cancer - colorectal Maternal Uncle     Glaucoma No family hx of        Review of Systems - As per HPI and PMHx, otherwise review of system review of the head and neck negative. Otherwise 10+ review of system is negative    Physical Exam  /62   Temp 97.8  F (36.6  C) (Temporal)   Wt 63 kg (139 lb)   LMP 07/17/2017 (Approximate)   BMI 22.77 kg/m    BMI: Body mass index is 22.77 kg/m .    General - The patient is well nourished and well developed, and appears to have good nutritional status.  Alert and oriented to person and place, answers questions and cooperates with examination appropriately.    SKIN - No suspicious lesions or rashes.  Respiration - No respiratory distress.  Head and Face - Normocephalic and atraumatic, with no gross asymmetry noted of the contour of the facial features.   The facial nerve is intact, with strong symmetric movements.    Voice and Breathing - The patient was breathing comfortably without the use of accessory muscles. The patients voice was clear and strong, and had appropriate pitch and quality.    Ears - Bilateral pinna and EACs with normal appearing overlying skin. Tympanic membrane intact with good mobility on pneumatic otoscopy bilaterally. Bony landmarks of the ossicular chain are normal. The tympanic membranes are normal in appearance. No retraction, perforation, or masses.  No fluid or purulence was seen in the external canal or the middle ear.     Eyes - Extraocular movements intact.  Sclera were not icteric or injected, conjunctiva were pink and moist.    Neuro - Nonfocal neuro exam is normal, CN 2 through 12 intact, normal gait and muscle tone.    Encounter Diagnosis   Name Primary?    Meniere's disease, left          A/P - Zena Quintana is a 58 year old female Patient presents with:  Follow Up: Meniere's     Will continue current medication regimen. Refilled Dyazide.     Zena should follow up in 1 year.      At Zena next appointment they will need a hearing test.      Jamir Hauser MD

## 2024-04-22 ENCOUNTER — OFFICE VISIT (OUTPATIENT)
Dept: AUDIOLOGY | Facility: OTHER | Age: 59
End: 2024-04-22
Payer: COMMERCIAL

## 2024-04-22 DIAGNOSIS — H90.41 SENSORINEURAL HEARING LOSS (SNHL) OF RIGHT EAR WITH UNRESTRICTED HEARING OF LEFT EAR: Primary | ICD-10-CM

## 2024-04-22 DIAGNOSIS — H93.8X2 EAR FULLNESS, LEFT: ICD-10-CM

## 2024-04-22 PROCEDURE — 92550 TYMPANOMETRY & REFLEX THRESH: CPT | Performed by: AUDIOLOGIST

## 2024-04-22 PROCEDURE — 92557 COMPREHENSIVE HEARING TEST: CPT | Performed by: AUDIOLOGIST

## 2024-04-22 NOTE — PROGRESS NOTES
AUDIOLOGY REPORT:    Patient was referred from ENT by Dr. Hauser for audiology evaluation. The patient has a diagnosis of Meniere's disease in her left ear. She was last tested on 9/13/2023 and results showed mild high frequency hearing loss in the right ear and moderate sensorineural hearing loss rising to normal hearing sensitivity in the left ear. She returns today for follow up and reports that she has not noted any major changes. She reports fullness in the left ear and occasional distortion. She does not have ear pain or pressure but has been having allergy symptoms.     Testing:    Otoscopy:   Otoscopic exam indicates ears are clear of cerumen bilaterally     Tympanograms:    RIGHT: normal eardrum mobility     LEFT:   normal eardrum mobility    Reflexes (reported by stimulus ear):  RIGHT: Ipsilateral is present at normal levels  RIGHT: Contralateral is present at elevated levels   LEFT:   Ipsilateral is present at elevated levels   LEFT:   Contralateral is present at elevated levels     Thresholds:   Pure Tone Thresholds assessed using conventional audiometry with good reliability from 250-8000 Hz bilaterally using insert earphones and circumaural headphones     RIGHT:  normal hearing sensitivity through 6000 Hz sloping to mild likely sensorineural hearing loss    LEFT:    normal hearing sensitivity at all tested frequencies     Speech Reception Threshold:    RIGHT: 10 dB HL    LEFT:   15 dB HL  Results are in agreement with pure tone average.     Word Recognition Score:     RIGHT: 100% at 55 dB HL using NU-6 recorded word list.    LEFT:   96% at 60 dB HL using NU-6 recorded word list.    Discussed results with the patient. Compared to 9/13/2023, left ear thresholds are 10-40 dB better at 250-1000 Hz and stable in the higher frequencies. The right ear is stable.    Patient is scheduled to follow up with ENT on 5/1/2024.     Rachel Valentine, CCC-A  Licensed Audiologist #[id number]  4/22/2024

## 2024-05-01 ENCOUNTER — OFFICE VISIT (OUTPATIENT)
Dept: OTOLARYNGOLOGY | Facility: OTHER | Age: 59
End: 2024-05-01
Payer: COMMERCIAL

## 2024-05-01 VITALS
WEIGHT: 139 LBS | TEMPERATURE: 97.8 F | SYSTOLIC BLOOD PRESSURE: 116 MMHG | BODY MASS INDEX: 22.77 KG/M2 | DIASTOLIC BLOOD PRESSURE: 62 MMHG

## 2024-05-01 DIAGNOSIS — H81.02 MENIERE'S DISEASE, LEFT: ICD-10-CM

## 2024-05-01 PROCEDURE — 99213 OFFICE O/P EST LOW 20 MIN: CPT | Performed by: OTOLARYNGOLOGY

## 2024-05-01 RX ORDER — TRIAMTERENE AND HYDROCHLOROTHIAZIDE 37.5; 25 MG/1; MG/1
1 CAPSULE ORAL EVERY MORNING
Qty: 30 CAPSULE | Refills: 6 | Status: SHIPPED | OUTPATIENT
Start: 2024-05-01 | End: 2024-07-17

## 2024-05-01 ASSESSMENT — PAIN SCALES - GENERAL: PAINLEVEL: NO PAIN (0)

## 2024-05-01 NOTE — LETTER
5/1/2024         RE: Zena Quintana  [address] [place] [address]        Dear Colleague,    Thank you for referring your patient, Zena Quintana, to the [facility]. Please see a copy of my visit note below.    History of Present Illness - Zena Quintana is a 58 year old female presenting in clinic today for a recheck on Patient presents with:  Follow Up: Meniere's        Patient with history of left-sided Ménière's disease. Since the last visit patient feels that hearing has not changed a lot.  She has had couple of small episodes of hearing an echo or ear fullness but very self-limited.  Certainly better than before. She believes the medication is working well. She is still watching her diet.       She appears to be tolerating diuretic well. Requesting a refill.     Present Symptoms include: vertigo and echoing in ears  and they are   getting better .  Zena denies tinnitis.      Body mass index is 22.77 kg/m .    Weight management plan: Patient was referred to their PCP to discuss a diet and exercise plan.    BP Readings from Last 1 Encounters:   05/01/24 116/62       BP noted to be well controlled today in office.     Zena IS NOT a smoker/uses chewing tobacco.    Past Medical History -   Past Medical History:   Diagnosis Date     Allergic rhinitis, cause unspecified     Allergic rhinitis     Cervical high risk HPV (human papillomavirus) test positive 03/08/2019    see problem list     Endometriosis, site unspecified     Endometriosis     Female infertility of other specified origin      Iron deficiency anemia, unspecified     Anemia, iron def.     PONV (postoperative nausea and vomiting)      RA (rheumatoid arthritis) (H)      Sjogren's syndrome (H24)      Stomatitis and mucositis (ulcerative)     chronic - non-specific       Current Medications -   Current Outpatient Medications:      Adalimumab-adaz (HYRIMOZ) 40 MG/0.4ML SOAJ, Inject 40 mg Subcutaneous every 14  days .  Hold for infection and seek medical attention, Disp: 0.8 mL, Rfl: 6     aspirin (ASA) 325 MG tablet, Take 325 mg by mouth daily, Disp: , Rfl:      bimatoprost (LATISSE) 0.03 % SOLN, Externally apply topically At Bedtime, Disp: 1 Bottle, Rfl: 3     calcium carbonate (OS-VALERIA) 600 MG tablet, Take 2 tablets by mouth daily, Disp: , Rfl:      fluticasone (FLONASE) 50 MCG/ACT spray, Spray 2 sprays into both nostrils daily, Disp: 1 Bottle, Rfl: 0     hydroxychloroquine (PLAQUENIL) 200 MG tablet, Hydroxychloroquine 200mg daily; and an additional 200mg every other day., Disp: 135 tablet, Rfl: 2     loratadine 10 MG capsule, Take 10 mg by mouth as needed for allergies, Disp: , Rfl:      MULTIPLE VITAMINS/WOMENS OR, None Entered, Disp: , Rfl:      triamterene-HCTZ (DYAZIDE) 37.5-25 MG capsule, TAKE 1 CAPSULE BY MOUTH EVERY MORNING, Disp: 30 capsule, Rfl: 6     zinc sulfate (ZINCATE) 220 (50 Zn) MG capsule, Take 1 capsule by mouth daily, Disp: , Rfl:     Allergies -   Allergies   Allergen Reactions     Alendronate Other (See Comments)     Hair loss     Dust Mites      Mold      Penicillins Hives     hives       Social History -   Social History     Socioeconomic History     Marital status:      Spouse name: Wolf     Number of children: 2     Years of education: 12   Occupational History     Occupation: homemaker     Employer: [employer]     Employer:    Tobacco Use     Smoking status: Never     Smokeless tobacco: Never     Tobacco comments:     no smokers in household   Vaping Use     Vaping status: Never Used   Substance and Sexual Activity     Alcohol use: Yes     Alcohol/week: 1.7 standard drinks of alcohol     Comment: has a couple drinks now and again     Drug use: No     Sexual activity: Yes     Partners: Male     Birth control/protection: None     Comment: Infertility   Other Topics Concern      Service No     Blood Transfusions No     Caffeine Concern No     Occupational Exposure No      Hobby Hazards No     Sleep Concern No     Stress Concern No     Weight Concern No     Special Diet No     Back Care No     Exercise Yes     Bike Helmet No     Seat Belt Yes     Self-Exams Yes   Social History Narrative    Adopted son at 7wk and dtr at 4mo, both from [employer]     Social Determinants of Health      Received from [employer]    Financial Resource Strain    Received from [employer]    Social Connections       Family History -   Family History   Problem Relation Age of Onset     Osteoporosis Mother      Arthritis Mother         lupus     Connective Tissue Disorder Mother         Lupus     Cataracts Mother      Macular Degeneration Mother      Celiac Disease Mother      Heart Disease Father         aortic aneurysm     Gastrointestinal Disease Father         abdominal anurysm     Hypertension Father      Cataracts Father      Other - See Comments Maternal Grandmother         atherosclerosis heart disease     Alzheimer Disease Maternal Grandmother      Osteoporosis Maternal Grandmother      Cerebrovascular Disease Maternal Grandfather      Cancer Paternal Grandmother      Cerebrovascular Disease Paternal Grandfather      Gastrointestinal Disease Sister         IBS, colon problems     Circulatory Sister         carotid stenosis     Cancer - colorectal Maternal Uncle      Cancer - colorectal Maternal Uncle      Glaucoma No family hx of        Review of Systems - As per HPI and PMHx, otherwise review of system review of the head and neck negative. Otherwise 10+ review of system is negative    Physical Exam  /62   Temp 97.8  F (36.6  C) (Temporal)   Wt 63 kg (139 lb)   LMP 07/17/2017 (Approximate)   BMI 22.77 kg/m    BMI: Body mass index is 22.77 kg/m .    General - The patient is well nourished and well developed, and appears to have good  nutritional status.  Alert and oriented to person and place, answers questions and cooperates with examination appropriately.    SKIN - No suspicious lesions or rashes.  Respiration - No respiratory distress.  Head and Face - Normocephalic and atraumatic, with no gross asymmetry noted of the contour of the facial features.  The facial nerve is intact, with strong symmetric movements.    Voice and Breathing - The patient was breathing comfortably without the use of accessory muscles. The patients voice was clear and strong, and had appropriate pitch and quality.    Ears - Bilateral pinna and EACs with normal appearing overlying skin. Tympanic membrane intact with good mobility on pneumatic otoscopy bilaterally. Bony landmarks of the ossicular chain are normal. The tympanic membranes are normal in appearance. No retraction, perforation, or masses.  No fluid or purulence was seen in the external canal or the middle ear.     Eyes - Extraocular movements intact.  Sclera were not icteric or injected, conjunctiva were pink and moist.    Neuro - Nonfocal neuro exam is normal, CN 2 through 12 intact, normal gait and muscle tone.    Encounter Diagnosis   Name Primary?     Meniere's disease, left          A/P - Zena Quintana is a 58 year old female Patient presents with:  Follow Up: Meniere's     Will continue current medication regimen. Refilled Dyazide.     Zena should follow up in 1 year.      At Zena next appointment they will need a hearing test.      Jamir Hauser MD          Again, thank you for allowing me to participate in the care of your patient.        Sincerely,        Jamir Hauser MD, MD

## 2024-07-09 ENCOUNTER — ANCILLARY PROCEDURE (OUTPATIENT)
Dept: GENERAL RADIOLOGY | Facility: CLINIC | Age: 59
End: 2024-07-09
Attending: NURSE PRACTITIONER
Payer: COMMERCIAL

## 2024-07-09 ENCOUNTER — VIRTUAL VISIT (OUTPATIENT)
Dept: FAMILY MEDICINE | Facility: CLINIC | Age: 59
End: 2024-07-09
Payer: COMMERCIAL

## 2024-07-09 ENCOUNTER — OFFICE VISIT (OUTPATIENT)
Dept: PEDIATRICS | Facility: CLINIC | Age: 59
End: 2024-07-09
Payer: COMMERCIAL

## 2024-07-09 VITALS
SYSTOLIC BLOOD PRESSURE: 102 MMHG | OXYGEN SATURATION: 100 % | RESPIRATION RATE: 14 BRPM | TEMPERATURE: 97.4 F | HEART RATE: 70 BPM | DIASTOLIC BLOOD PRESSURE: 72 MMHG

## 2024-07-09 DIAGNOSIS — N30.01 ACUTE CYSTITIS WITH HEMATURIA: ICD-10-CM

## 2024-07-09 DIAGNOSIS — M05.79 RHEUMATOID ARTHRITIS INVOLVING MULTIPLE SITES WITH POSITIVE RHEUMATOID FACTOR (H): ICD-10-CM

## 2024-07-09 DIAGNOSIS — R31.0 GROSS HEMATURIA: Primary | ICD-10-CM

## 2024-07-09 DIAGNOSIS — R42 DIZZINESS: Primary | ICD-10-CM

## 2024-07-09 DIAGNOSIS — N18.9 ACUTE KIDNEY INJURY SUPERIMPOSED ON CHRONIC KIDNEY DISEASE (H): ICD-10-CM

## 2024-07-09 DIAGNOSIS — R00.2 PALPITATIONS: ICD-10-CM

## 2024-07-09 DIAGNOSIS — R06.02 SOB (SHORTNESS OF BREATH): ICD-10-CM

## 2024-07-09 DIAGNOSIS — R42 DIZZINESS: ICD-10-CM

## 2024-07-09 DIAGNOSIS — R30.0 DYSURIA: ICD-10-CM

## 2024-07-09 DIAGNOSIS — N17.9 ACUTE KIDNEY INJURY SUPERIMPOSED ON CHRONIC KIDNEY DISEASE (H): ICD-10-CM

## 2024-07-09 DIAGNOSIS — N18.2 CHRONIC KIDNEY DISEASE, STAGE 2 (MILD): ICD-10-CM

## 2024-07-09 LAB
ALBUMIN SERPL BCG-MCNC: 4.6 G/DL (ref 3.5–5.2)
ALBUMIN UR-MCNC: ABNORMAL G/DL
ALP SERPL-CCNC: 34 U/L (ref 40–150)
ALT SERPL W P-5'-P-CCNC: 11 U/L (ref 0–50)
ANION GAP SERPL CALCULATED.3IONS-SCNC: 10 MMOL/L (ref 7–15)
APPEARANCE UR: ABNORMAL
AST SERPL W P-5'-P-CCNC: 30 U/L (ref 0–45)
BACTERIA #/AREA URNS HPF: ABNORMAL /HPF
BASOPHILS # BLD AUTO: 0.1 10E3/UL (ref 0–0.2)
BASOPHILS NFR BLD AUTO: 1 %
BILIRUB SERPL-MCNC: 0.3 MG/DL
BILIRUB UR QL STRIP: ABNORMAL
BUN SERPL-MCNC: 17.3 MG/DL (ref 8–23)
CALCIUM SERPL-MCNC: 9.8 MG/DL (ref 8.8–10.2)
CHLORIDE SERPL-SCNC: 98 MMOL/L (ref 98–107)
COLOR UR AUTO: ABNORMAL
CREAT SERPL-MCNC: 1.16 MG/DL (ref 0.51–0.95)
DEPRECATED HCO3 PLAS-SCNC: 27 MMOL/L (ref 22–29)
EGFRCR SERPLBLD CKD-EPI 2021: 54 ML/MIN/1.73M2
EOSINOPHIL # BLD AUTO: 0.1 10E3/UL (ref 0–0.7)
EOSINOPHIL NFR BLD AUTO: 1 %
ERYTHROCYTE [DISTWIDTH] IN BLOOD BY AUTOMATED COUNT: 12.1 % (ref 10–15)
FERRITIN SERPL-MCNC: 58 NG/ML (ref 11–328)
GLUCOSE SERPL-MCNC: 97 MG/DL (ref 70–99)
GLUCOSE UR STRIP-MCNC: ABNORMAL MG/DL
HCT VFR BLD AUTO: 36 % (ref 35–47)
HGB BLD-MCNC: 11.8 G/DL (ref 11.7–15.7)
HGB UR QL STRIP: ABNORMAL
IMM GRANULOCYTES # BLD: 0 10E3/UL
IMM GRANULOCYTES NFR BLD: 0 %
IRON BINDING CAPACITY (ROCHE): 313 UG/DL (ref 240–430)
IRON SATN MFR SERPL: 18 % (ref 15–46)
IRON SERPL-MCNC: 56 UG/DL (ref 37–145)
KETONES UR STRIP-MCNC: ABNORMAL MG/DL
LEUKOCYTE ESTERASE UR QL STRIP: ABNORMAL
LYMPHOCYTES # BLD AUTO: 1 10E3/UL (ref 0.8–5.3)
LYMPHOCYTES NFR BLD AUTO: 9 %
MCH RBC QN AUTO: 29.8 PG (ref 26.5–33)
MCHC RBC AUTO-ENTMCNC: 32.8 G/DL (ref 31.5–36.5)
MCV RBC AUTO: 91 FL (ref 78–100)
MONOCYTES # BLD AUTO: 0.8 10E3/UL (ref 0–1.3)
MONOCYTES NFR BLD AUTO: 7 %
NEUTROPHILS # BLD AUTO: 9.4 10E3/UL (ref 1.6–8.3)
NEUTROPHILS NFR BLD AUTO: 83 %
NITRATE UR QL: ABNORMAL
NRBC # BLD AUTO: 0 10E3/UL
NRBC BLD AUTO-RTO: 0 /100
PH UR STRIP: ABNORMAL [PH]
PLATELET # BLD AUTO: 274 10E3/UL (ref 150–450)
POTASSIUM SERPL-SCNC: 3.6 MMOL/L (ref 3.4–5.3)
PROT SERPL-MCNC: 7.5 G/DL (ref 6.4–8.3)
RBC # BLD AUTO: 3.96 10E6/UL (ref 3.8–5.2)
RBC #/AREA URNS AUTO: >100 /HPF
SODIUM SERPL-SCNC: 135 MMOL/L (ref 135–145)
SP GR UR STRIP: ABNORMAL
SQUAMOUS #/AREA URNS AUTO: ABNORMAL /LPF
TRANS CELLS #/AREA URNS HPF: ABNORMAL /HPF
TSH SERPL DL<=0.005 MIU/L-ACNC: 1.63 UIU/ML (ref 0.3–4.2)
UROBILINOGEN UR STRIP-MCNC: ABNORMAL MG/DL
WBC # BLD AUTO: 11.4 10E3/UL (ref 4–11)
WBC #/AREA URNS AUTO: ABNORMAL /HPF

## 2024-07-09 PROCEDURE — 87086 URINE CULTURE/COLONY COUNT: CPT | Performed by: NURSE PRACTITIONER

## 2024-07-09 PROCEDURE — 71046 X-RAY EXAM CHEST 2 VIEWS: CPT | Mod: GC | Performed by: RADIOLOGY

## 2024-07-09 PROCEDURE — 96361 HYDRATE IV INFUSION ADD-ON: CPT | Performed by: NURSE PRACTITIONER

## 2024-07-09 PROCEDURE — 83540 ASSAY OF IRON: CPT | Performed by: NURSE PRACTITIONER

## 2024-07-09 PROCEDURE — 99207 PR FIRST ORDER ACUTE REFERRAL: CPT | Mod: 93 | Performed by: FAMILY MEDICINE

## 2024-07-09 PROCEDURE — 81001 URINALYSIS AUTO W/SCOPE: CPT | Performed by: NURSE PRACTITIONER

## 2024-07-09 PROCEDURE — 82728 ASSAY OF FERRITIN: CPT | Performed by: NURSE PRACTITIONER

## 2024-07-09 PROCEDURE — 99215 OFFICE O/P EST HI 40 MIN: CPT | Mod: 25 | Performed by: NURSE PRACTITIONER

## 2024-07-09 PROCEDURE — 80053 COMPREHEN METABOLIC PANEL: CPT | Performed by: NURSE PRACTITIONER

## 2024-07-09 PROCEDURE — 93242 EXT ECG>48HR<7D RECORDING: CPT | Performed by: NURSE PRACTITIONER

## 2024-07-09 PROCEDURE — 83550 IRON BINDING TEST: CPT | Performed by: NURSE PRACTITIONER

## 2024-07-09 PROCEDURE — 87186 SC STD MICRODIL/AGAR DIL: CPT | Performed by: NURSE PRACTITIONER

## 2024-07-09 PROCEDURE — 85025 COMPLETE CBC W/AUTO DIFF WBC: CPT | Performed by: NURSE PRACTITIONER

## 2024-07-09 PROCEDURE — 96374 THER/PROPH/DIAG INJ IV PUSH: CPT | Performed by: NURSE PRACTITIONER

## 2024-07-09 PROCEDURE — 93005 ELECTROCARDIOGRAM TRACING: CPT | Mod: 59 | Performed by: NURSE PRACTITIONER

## 2024-07-09 PROCEDURE — 36415 COLL VENOUS BLD VENIPUNCTURE: CPT | Performed by: NURSE PRACTITIONER

## 2024-07-09 PROCEDURE — 84443 ASSAY THYROID STIM HORMONE: CPT | Performed by: NURSE PRACTITIONER

## 2024-07-09 RX ORDER — NITROFURANTOIN 25; 75 MG/1; MG/1
100 CAPSULE ORAL 2 TIMES DAILY
Qty: 10 CAPSULE | Refills: 0 | Status: SHIPPED | OUTPATIENT
Start: 2024-07-09 | End: 2024-07-14

## 2024-07-09 RX ORDER — CEFTRIAXONE 1 G/1
1 INJECTION, POWDER, FOR SOLUTION INTRAMUSCULAR; INTRAVENOUS ONCE
Status: COMPLETED | OUTPATIENT
Start: 2024-07-09 | End: 2024-07-09

## 2024-07-09 RX ORDER — CIPROFLOXACIN 500 MG/1
500 TABLET, FILM COATED ORAL 2 TIMES DAILY
Qty: 10 TABLET | Refills: 0 | Status: SHIPPED | OUTPATIENT
Start: 2024-07-09 | End: 2024-07-14

## 2024-07-09 RX ADMIN — CEFTRIAXONE 1 G: 1 INJECTION, POWDER, FOR SOLUTION INTRAMUSCULAR; INTRAVENOUS at 12:11

## 2024-07-09 RX ADMIN — Medication 1000 ML: at 12:06

## 2024-07-09 NOTE — PROGRESS NOTES
"  If patient has telephone visit, have they been educated on video visit as preferred visit method and offered to change to video visit? yes        Instructions Relayed to Patient by Virtual Roomer:     Patient is active on [employer]:   Relayed following to patient: \"It looks like you are active on [employer], are you able to join the visit this way? If not, do you need us to send you a link now or would you like your provider to send a link via text or email when they are ready to initiate the visit?\"      Patient Confirmed they will join visit via: Provider to call patient for telephone visit   Reminded patient to ensure they were logged on to virtual visit by arrival time listed.   Asked if patient has flexibility to initiate visit sooner than arrival time: patient is unable to initiate visit earlier than arrival time     If pediatric virtual visit, ensured pediatric patient along with parent/guardian will be present for video visit.     Patient offered the website [email or web address] and/or phone number to [employer] Help line: [phone]      Zena is a 59 year old who is being evaluated via a billable telephone visit.    What phone number would you like to be contacted at? [phone]   How would you like to obtain your AVS? [facility] ([facility])  Originating Location (pt. Location): Home    Distant Location (provider location):  Off-site    Assessment & Plan     Gross hematuria  Dysuria  Likely uti. Will cover with macrobid. She will leave sample today. Reviewed red flag symptoms that would precipitate the need for routine, urgent or emergent f/u   - nitroFURantoin macrocrystal-monohydrate (MACROBID) 100 MG capsule; Take 1 capsule (100 mg) by mouth 2 times daily for 5 days  - UA Macroscopic with reflex to Microscopic and Culture - Lab Collect; Future  - Urine Culture Aerobic Bacterial - lab collect; Future    Rheumatoid arthritis involving multiple sites with positive rheumatoid factor (H)  On biologics, " follows with rheum    Dizziness  Hx of vertigo but new dizziness that is more orthostatic x 1 mo and episode of syncope recently. Feels poorly with exertion (hard to describe). Has appt with pcp next week to address but Rec same day evaluation and working on getting her scheduled with [facility]        Referral to Acute and Diagnostic Services    [phone] ([place]) [facility] [address] [address] [place] [id number]    Transition to Acute & Diagnostic Services Clinic has been discussed with patient, and she agrees with next level of care.   Patient understands that evaluation/treatment at [facility] typically takes significantly longer than in clinic/urgent care (>2 hours).  The [facility] has been contacted by provider/staff to confirm patient acceptance.         Special issues:      None                     The following provider has assessed this patient for intervention at [facility], and directed the patient for referral: Cristina Fitzgerald   Zena is a 59 year old, presenting for the following health issues:  UTI        7/9/2024     8:03 AM   Additional Questions   Roomed by Tshia   Accompanied by self     History of Present Illness       Reason for visit:  UTI    She eats 0-1 servings of fruits and vegetables daily.She consumes 0 sweetened beverage(s) daily.She exercises with enough effort to increase her heart rate 60 or more minutes per day.  She exercises with enough effort to increase her heart rate 5 days per week.   She is taking medications regularly.       Genitourinary - Female  Onset/Duration: Today  Description:   Painful urination (Dysuria): YES           Frequency: No  Blood in urine (Hematuria): YES, pt states there was a big blood clot during one bathroom visit  Delay in urine (Hesitency): No  Intensity: mild, moderate  Progression of Symptoms:  same  Accompanying Signs & Symptoms:  Fever/chills: No  Flank pain: No  Nausea  and vomiting: No  Vaginal symptoms: none  Abdominal/Pelvic Pain: YES  History:   History of frequent UTI s: YES  History of kidney stones: YES  Sexually Active: N/A  Possibility of pregnancy: No  Precipitating or alleviating factors: None  Therapies tried and outcome: Increase fluid intake      New patient to me  Hx of RA/sjogrens. On biologic, Hyrimoz and plaquenil. Nl renal fx in past    Woke up this am and noted blood in urine. + dysuria.   No sx's yesterday.   Last infection few years ago.   Mild lower abd discomfort. Little better after bm. Some suprapubic pressure. No significant abd pain.   Hx of kidney stones. But no colic sx's.     Some intermittent dizziness about a month and did faint few weeks ago- [place]. Orthostatic. Also noted it one day when out walking   No chest pain or pressure but Feel off with exercise, not normal  Tends to have low bp but not checked lately.   Skipping dyazide for possible meniere's x 1 + year.   Skip dose prior to working out now (just started this week) and seeing if that feels better- not sure yet.               Objective           Vitals:  No vitals were obtained today due to virtual visit.    Physical Exam   General: Alert and no distress //Respiratory: No audible wheeze, cough, or shortness of breath // Psychiatric:  Appropriate affect, tone, and pace of words          Phone call duration: 14 minutes  Signed Electronically by: Cristina Gloria MD

## 2024-07-09 NOTE — PROGRESS NOTES
[facility] Visit    Assessment & Plan   Problem List Items Addressed This Visit       Palpitations    Relevant Orders    TSH with free T4 reflex (Completed)    EKG 12-lead, tracing only (Completed)    XR Chest 2 Views (Completed)    ZIO PATCH 3-7 DAYS (additional cost to patient) (Completed)    ZIO PATCH 3-7 DAYS APPLICATION (Completed)    Adult Cardiology Eval  Referral    Chronic kidney disease, stage 2 (mild)     Other Visit Diagnoses       Dizziness    -  Primary    Relevant Orders    CBC with platelets differential (Completed)    Comprehensive metabolic panel (Completed)    UA with Microscopic reflex to Culture (Completed)    EKG 12-lead, tracing only (Completed)    Iron & Iron Binding Capacity (Completed)    Ferritin (Completed)    UA Microscopic with Reflex to Culture (Completed)    Urine Culture    Adult Cardiology Eval  Referral    SOB (shortness of breath)        Relevant Orders    Echocardiogram Complete    Adult Cardiology Eval  Referral    Acute kidney injury superimposed on chronic kidney disease  (H24)        Relevant Medications    sodium chloride 0.9% BOLUS 1,000 mL (Completed)    Acute cystitis with hematuria        Relevant Medications    cefTRIAXone (ROCEPHIN) in sterile water for IV administration 1 g (Completed)    ciprofloxacin (CIPRO) 500 MG tablet        Acute on chronic kidney injury patient is given a liter of normal saline mild elevation in white count acute cystitis with hematuria given 1 g of Rocephin IM started on Cipro 500 twice daily.  For shortness of breath and heart palpitations a referral to cardiology, echocardiogram and Zio patch been ordered.  Patient is encouraged to increase her water intake. Patient advised if symptoms persist, worsen or new symptoms arise they are to seek medical care.  All patients questions addressed. Patient verbalized understanding and agreement with plan.       45 minutes were spent doing chart  review, history and exam, documentation and further activities per the note.         No follow-ups on file.      Subjective   Zena is a 59 year old, presenting for the following health issues:  Dizziness (For 1 month H/O tinnitus )        7/9/2024     8:03 AM   Additional Questions   Roomed by Tshia   Accompanied by self     HPI     Dizziness  Onset/Duration: x1 month  comes and goes, more prevalent when working out/exercising, heat and humidity make it feel worse  Description:   Do you feel faint: YES  Does it feel like the surroundings (bed, room) are moving: No  Unsteady/off balance: No  Have you passed out or fallen: YES once 3 weeks ago  Intensity: moderate, severe  Progression of Symptoms: same and intermittent  Accompanying Signs & Symptoms:  Heart palpitations or chest pain:  patient reports heart palpitations with sob with activity though not at rest. Patient also indicates reproducible right-sided chest discomfort attributes this to lifting weights  Nausea, vomiting: No  Weakness or lack of coordination in arms or legs: No  Vision or speech changes: No  Numbness or tingling: No  Ringing in ears (Tinnitus): YES  Hearing Loss: YES  History:   Head trauma/concussion history: No  Previous similar symptoms: No  Recent bleeding history: No  Any new medications (BP?): No  Precipitating factors:   Worse with activity: YES- exercise   Worse with head movement: No  Alleviating factors:   Does staying in a fixed position give relief: YES  Therapies tried and outcome: None        Review of Systems  Constitutional, HEENT, cardiovascular, pulmonary, GI, , musculoskeletal, neuro, skin, endocrine and psych systems are negative, except as otherwise noted.      Objective    /72 (BP Location: Right arm, Patient Position: Sitting, Cuff Size: Adult Regular)   Pulse 70   Temp 97.4  F (36.3  C) (Oral)   Resp 14   LMP 07/17/2017 (Approximate)   SpO2 100%   There is no height or weight on file to calculate  BMI.  Physical Exam   GENERAL: alert and no distress  EYES: Eyes grossly normal to inspection, PERRL and conjunctivae and sclerae normal  HENT: ear canals and TM's normal, nose and mouth without ulcers or lesions  NECK: no adenopathy  RESP: lungs clear to auscultation - no rales, rhonchi or wheezes  CV: regular rate and rhythm, normal S1 S2, no S3 or S4, no murmur, click or rub, no peripheral edema  MS: no gross musculoskeletal defects noted, no edema  PSYCH: mentation appears normal, affect normal/bright    Results for orders placed or performed in visit on 07/09/24   XR Chest 2 Views     Status: None    Narrative    Exam: XR CHEST 2 VIEWS, 7/9/2024 10:44 AM    Comparison: 9/9/2021    History: Palpitations    Findings:  PA and lateral chest    Trachea is midline. Mediastinum is within normal limits.  Cardiopulmonary silhouette is within normal limits. No acute airspace  disease. tiny calcified mediastinal/right hilar nodes. There is no  pneumothorax or pleural effusion. The upper abdomen is unremarkable.      Impression    Impression: Clear chest    I have personally reviewed the examination and initial interpretation  and I agree with the findings.    SLIME MORALES MD         SYSTEM ID:  [id number]   Results for orders placed or performed in visit on 07/09/24   Comprehensive metabolic panel     Status: Abnormal   Result Value Ref Range    Sodium 135 135 - 145 mmol/L    Potassium 3.6 3.4 - 5.3 mmol/L    Carbon Dioxide (CO2) 27 22 - 29 mmol/L    Anion Gap 10 7 - 15 mmol/L    Urea Nitrogen 17.3 8.0 - 23.0 mg/dL    Creatinine 1.16 (H) 0.51 - 0.95 mg/dL    GFR Estimate 54 (L) >60 mL/min/1.73m2    Calcium 9.8 8.8 - 10.2 mg/dL    Chloride 98 98 - 107 mmol/L    Glucose 97 70 - 99 mg/dL    Alkaline Phosphatase 34 (L) 40 - 150 U/L    AST 30 0 - 45 U/L    ALT 11 0 - 50 U/L    Protein Total 7.5 6.4 - 8.3 g/dL    Albumin 4.6 3.5 - 5.2 g/dL    Bilirubin Total 0.3 <=1.2 mg/dL   UA with Microscopic reflex to Culture     Status:  Abnormal    Specimen: Urine, Clean Catch   Result Value Ref Range    Color Urine Red (A) Colorless, Straw, Light Yellow, Yellow    Appearance Urine Cloudy (A) Clear    Glucose Urine      Bilirubin Urine      Ketones Urine      Specific Gravity Urine      Blood Urine Large (A) Negative    pH Urine      Protein Albumin Urine      Nitrite Urine      Leukocyte Esterase Urine      Urobilinogen Urine     TSH with free T4 reflex     Status: Normal   Result Value Ref Range    TSH 1.63 0.30 - 4.20 uIU/mL   Iron & Iron Binding Capacity     Status: Normal   Result Value Ref Range    Iron 56 37 - 145 ug/dL    Iron Binding Capacity 313 240 - 430 ug/dL    Iron Sat Index 18 15 - 46 %   Ferritin     Status: Normal   Result Value Ref Range    Ferritin 58 11 - 328 ng/mL   CBC with platelets and differential     Status: Abnormal   Result Value Ref Range    WBC Count 11.4 (H) 4.0 - 11.0 10e3/uL    RBC Count 3.96 3.80 - 5.20 10e6/uL    Hemoglobin 11.8 11.7 - 15.7 g/dL    Hematocrit 36.0 35.0 - 47.0 %    MCV 91 78 - 100 fL    MCH 29.8 26.5 - 33.0 pg    MCHC 32.8 31.5 - 36.5 g/dL    RDW 12.1 10.0 - 15.0 %    Platelet Count 274 150 - 450 10e3/uL    % Neutrophils 83 %    % Lymphocytes 9 %    % Monocytes 7 %    % Eosinophils 1 %    % Basophils 1 %    % Immature Granulocytes 0 %    NRBCs per 100 WBC 0 <1 /100    Absolute Neutrophils 9.4 (H) 1.6 - 8.3 10e3/uL    Absolute Lymphocytes 1.0 0.8 - 5.3 10e3/uL    Absolute Monocytes 0.8 0.0 - 1.3 10e3/uL    Absolute Eosinophils 0.1 0.0 - 0.7 10e3/uL    Absolute Basophils 0.1 0.0 - 0.2 10e3/uL    Absolute Immature Granulocytes 0.0 <=0.4 10e3/uL    Absolute NRBCs 0.0 10e3/uL   UA Microscopic with Reflex to Culture     Status: Abnormal   Result Value Ref Range    Bacteria Urine Few (A) None Seen /HPF    RBC Urine >100 (A) 0-2 /HPF /HPF    WBC Urine 10-25 (A) 0-5 /HPF /HPF    Squamous Epithelials Urine Few (A) None Seen /LPF    Transitional Epithelials Urine Few (A) None Seen /HPF   CBC with platelets  differential     Status: Abnormal    Narrative    The following orders were created for panel order CBC with platelets differential.  Procedure                               Abnormality         Status                     ---------                               -----------         ------                     CBC with platelets and d...[[id number]]  Abnormal            Final result                 Please view results for these tests on the individual orders.     Results for orders placed or performed in visit on 07/09/24 (from the past 24 hour(s))   CBC with platelets differential    Narrative    The following orders were created for panel order CBC with platelets differential.  Procedure                               Abnormality         Status                     ---------                               -----------         ------                     CBC with platelets and d...[[id number]]  Abnormal            Final result                 Please view results for these tests on the individual orders.   Comprehensive metabolic panel   Result Value Ref Range    Sodium 135 135 - 145 mmol/L    Potassium 3.6 3.4 - 5.3 mmol/L    Carbon Dioxide (CO2) 27 22 - 29 mmol/L    Anion Gap 10 7 - 15 mmol/L    Urea Nitrogen 17.3 8.0 - 23.0 mg/dL    Creatinine 1.16 (H) 0.51 - 0.95 mg/dL    GFR Estimate 54 (L) >60 mL/min/1.73m2    Calcium 9.8 8.8 - 10.2 mg/dL    Chloride 98 98 - 107 mmol/L    Glucose 97 70 - 99 mg/dL    Alkaline Phosphatase 34 (L) 40 - 150 U/L    AST 30 0 - 45 U/L    ALT 11 0 - 50 U/L    Protein Total 7.5 6.4 - 8.3 g/dL    Albumin 4.6 3.5 - 5.2 g/dL    Bilirubin Total 0.3 <=1.2 mg/dL   UA with Microscopic reflex to Culture    Specimen: Urine, Clean Catch   Result Value Ref Range    Color Urine Red (A) Colorless, Straw, Light Yellow, Yellow    Appearance Urine Cloudy (A) Clear    Glucose Urine      Bilirubin Urine      Ketones Urine      Specific Gravity Urine      Blood Urine Large (A) Negative    pH Urine      Protein  Albumin Urine      Nitrite Urine      Leukocyte Esterase Urine      Urobilinogen Urine     TSH with free T4 reflex   Result Value Ref Range    TSH 1.63 0.30 - 4.20 uIU/mL   Iron & Iron Binding Capacity   Result Value Ref Range    Iron 56 37 - 145 ug/dL    Iron Binding Capacity 313 240 - 430 ug/dL    Iron Sat Index 18 15 - 46 %   Ferritin   Result Value Ref Range    Ferritin 58 11 - 328 ng/mL   CBC with platelets and differential   Result Value Ref Range    WBC Count 11.4 (H) 4.0 - 11.0 10e3/uL    RBC Count 3.96 3.80 - 5.20 10e6/uL    Hemoglobin 11.8 11.7 - 15.7 g/dL    Hematocrit 36.0 35.0 - 47.0 %    MCV 91 78 - 100 fL    MCH 29.8 26.5 - 33.0 pg    MCHC 32.8 31.5 - 36.5 g/dL    RDW 12.1 10.0 - 15.0 %    Platelet Count 274 150 - 450 10e3/uL    % Neutrophils 83 %    % Lymphocytes 9 %    % Monocytes 7 %    % Eosinophils 1 %    % Basophils 1 %    % Immature Granulocytes 0 %    NRBCs per 100 WBC 0 <1 /100    Absolute Neutrophils 9.4 (H) 1.6 - 8.3 10e3/uL    Absolute Lymphocytes 1.0 0.8 - 5.3 10e3/uL    Absolute Monocytes 0.8 0.0 - 1.3 10e3/uL    Absolute Eosinophils 0.1 0.0 - 0.7 10e3/uL    Absolute Basophils 0.1 0.0 - 0.2 10e3/uL    Absolute Immature Granulocytes 0.0 <=0.4 10e3/uL    Absolute NRBCs 0.0 10e3/uL   UA Microscopic with Reflex to Culture   Result Value Ref Range    Bacteria Urine Few (A) None Seen /HPF    RBC Urine >100 (A) 0-2 /HPF /HPF    WBC Urine 10-25 (A) 0-5 /HPF /HPF    Squamous Epithelials Urine Few (A) None Seen /LPF    Transitional Epithelials Urine Few (A) None Seen /HPF           Signed Electronically by: Mary Ponce NP

## 2024-07-09 NOTE — PROGRESS NOTES
Zena Quintana arrived here on 7/9/2024 11:00 AM for 3-7 Days  Zio monitor placement per ordering provider Mary Ponce for the diagnosis Dizziness.  Patient s skin was prepped per protocol. Mary Ponce is the supervising provider.  Zio monitor was placed.  Instructions were reviewed with and given to the patient.  Patient verbalized understanding of wear, troubleshooting and monitor return instructions.  Kristina Kjellberg, MSN, RN

## 2024-07-10 LAB — BACTERIA UR CULT: ABNORMAL

## 2024-07-12 ENCOUNTER — HOSPITAL ENCOUNTER (OUTPATIENT)
Dept: CARDIOLOGY | Facility: CLINIC | Age: 59
Discharge: HOME | End: 2024-07-12
Attending: NURSE PRACTITIONER | Admitting: NURSE PRACTITIONER
Payer: COMMERCIAL

## 2024-07-12 DIAGNOSIS — R06.02 SOB (SHORTNESS OF BREATH): ICD-10-CM

## 2024-07-12 LAB — LVEF ECHO: NORMAL

## 2024-07-12 PROCEDURE — 93306 TTE W/DOPPLER COMPLETE: CPT

## 2024-07-12 PROCEDURE — 93306 TTE W/DOPPLER COMPLETE: CPT | Mod: 26 | Performed by: INTERNAL MEDICINE

## 2024-07-16 ENCOUNTER — LAB (OUTPATIENT)
Dept: INFUSION THERAPY | Facility: CLINIC | Age: 59
End: 2024-07-16
Attending: INTERNAL MEDICINE
Payer: COMMERCIAL

## 2024-07-16 VITALS
OXYGEN SATURATION: 97 % | BODY MASS INDEX: 22.98 KG/M2 | RESPIRATION RATE: 18 BRPM | HEIGHT: 65 IN | HEART RATE: 56 BPM | TEMPERATURE: 98.1 F | DIASTOLIC BLOOD PRESSURE: 67 MMHG | WEIGHT: 137.9 LBS | SYSTOLIC BLOOD PRESSURE: 96 MMHG

## 2024-07-16 DIAGNOSIS — T45.8X5A ORAL BISPHOSPHONATES CAUSING ADVERSE EFFECT IN THERAPEUTIC USE, INITIAL ENCOUNTER: ICD-10-CM

## 2024-07-16 DIAGNOSIS — T45.8X5A ORAL BISPHOSPHONATES CAUSING ADVERSE EFFECT IN THERAPEUTIC USE, INITIAL ENCOUNTER: Primary | ICD-10-CM

## 2024-07-16 DIAGNOSIS — M80.00XA OSTEOPOROSIS WITH CURRENT PATHOLOGICAL FRACTURE, UNSPECIFIED OSTEOPOROSIS TYPE, INITIAL ENCOUNTER: Primary | ICD-10-CM

## 2024-07-16 DIAGNOSIS — M05.79 RHEUMATOID ARTHRITIS INVOLVING MULTIPLE SITES WITH POSITIVE RHEUMATOID FACTOR (H): ICD-10-CM

## 2024-07-16 DIAGNOSIS — E78.5 HYPERLIPIDEMIA LDL GOAL <160: ICD-10-CM

## 2024-07-16 DIAGNOSIS — M80.00XA OSTEOPOROSIS WITH CURRENT PATHOLOGICAL FRACTURE, UNSPECIFIED OSTEOPOROSIS TYPE, INITIAL ENCOUNTER: ICD-10-CM

## 2024-07-16 LAB
ALBUMIN SERPL BCG-MCNC: 4.7 G/DL (ref 3.5–5.2)
ALP SERPL-CCNC: 35 U/L (ref 40–150)
ALT SERPL W P-5'-P-CCNC: 15 U/L (ref 0–50)
AST SERPL W P-5'-P-CCNC: 39 U/L (ref 0–45)
BASOPHILS # BLD AUTO: 0.1 10E3/UL (ref 0–0.2)
BASOPHILS NFR BLD AUTO: 1 %
BILIRUB DIRECT SERPL-MCNC: <0.2 MG/DL (ref 0–0.3)
BILIRUB SERPL-MCNC: 0.4 MG/DL
CALCIUM SERPL-MCNC: 9.9 MG/DL (ref 8.8–10.2)
CREAT SERPL-MCNC: 1.29 MG/DL (ref 0.51–0.95)
CRP SERPL-MCNC: <3 MG/L
EGFRCR SERPLBLD CKD-EPI 2021: 47 ML/MIN/1.73M2
EOSINOPHIL # BLD AUTO: 0.2 10E3/UL (ref 0–0.7)
EOSINOPHIL NFR BLD AUTO: 3 %
ERYTHROCYTE [DISTWIDTH] IN BLOOD BY AUTOMATED COUNT: 12.2 % (ref 10–15)
ERYTHROCYTE [SEDIMENTATION RATE] IN BLOOD BY WESTERGREN METHOD: 2 MM/HR (ref 0–30)
HCT VFR BLD AUTO: 35.7 % (ref 35–47)
HGB BLD-MCNC: 11.7 G/DL (ref 11.7–15.7)
HOLD SPECIMEN: NORMAL
IMM GRANULOCYTES # BLD: 0 10E3/UL
IMM GRANULOCYTES NFR BLD: 0 %
LYMPHOCYTES # BLD AUTO: 1.7 10E3/UL (ref 0.8–5.3)
LYMPHOCYTES NFR BLD AUTO: 34 %
MCH RBC QN AUTO: 29.6 PG (ref 26.5–33)
MCHC RBC AUTO-ENTMCNC: 32.8 G/DL (ref 31.5–36.5)
MCV RBC AUTO: 90 FL (ref 78–100)
MONOCYTES # BLD AUTO: 0.6 10E3/UL (ref 0–1.3)
MONOCYTES NFR BLD AUTO: 12 %
NEUTROPHILS # BLD AUTO: 2.4 10E3/UL (ref 1.6–8.3)
NEUTROPHILS NFR BLD AUTO: 49 %
NRBC # BLD AUTO: 0 10E3/UL
NRBC BLD AUTO-RTO: 0 /100
PLATELET # BLD AUTO: 310 10E3/UL (ref 150–450)
PROT SERPL-MCNC: 8.1 G/DL (ref 6.4–8.3)
RBC # BLD AUTO: 3.95 10E6/UL (ref 3.8–5.2)
VIT D+METAB SERPL-MCNC: 59 NG/ML (ref 20–50)
WBC # BLD AUTO: 4.9 10E3/UL (ref 4–11)

## 2024-07-16 PROCEDURE — 96365 THER/PROPH/DIAG IV INF INIT: CPT

## 2024-07-16 PROCEDURE — 80076 HEPATIC FUNCTION PANEL: CPT

## 2024-07-16 PROCEDURE — 82310 ASSAY OF CALCIUM: CPT | Performed by: INTERNAL MEDICINE

## 2024-07-16 PROCEDURE — 99207 PR NO CHARGE LOS: CPT

## 2024-07-16 PROCEDURE — 258N000003 HC RX IP 258 OP 636: Performed by: INTERNAL MEDICINE

## 2024-07-16 PROCEDURE — 82565 ASSAY OF CREATININE: CPT | Performed by: INTERNAL MEDICINE

## 2024-07-16 PROCEDURE — 85652 RBC SED RATE AUTOMATED: CPT

## 2024-07-16 PROCEDURE — 85025 COMPLETE CBC W/AUTO DIFF WBC: CPT

## 2024-07-16 PROCEDURE — 36415 COLL VENOUS BLD VENIPUNCTURE: CPT

## 2024-07-16 PROCEDURE — 82306 VITAMIN D 25 HYDROXY: CPT

## 2024-07-16 PROCEDURE — 86481 TB AG RESPONSE T-CELL SUSP: CPT

## 2024-07-16 PROCEDURE — 86140 C-REACTIVE PROTEIN: CPT

## 2024-07-16 PROCEDURE — 83718 ASSAY OF LIPOPROTEIN: CPT

## 2024-07-16 PROCEDURE — 250N000011 HC RX IP 250 OP 636: Performed by: INTERNAL MEDICINE

## 2024-07-16 PROCEDURE — 82465 ASSAY BLD/SERUM CHOLESTEROL: CPT

## 2024-07-16 RX ORDER — ALBUTEROL SULFATE 90 UG/1
1-2 AEROSOL, METERED RESPIRATORY (INHALATION)
Status: CANCELLED
Start: 2025-07-16

## 2024-07-16 RX ORDER — HEPARIN SODIUM (PORCINE) LOCK FLUSH IV SOLN 100 UNIT/ML 100 UNIT/ML
5 SOLUTION INTRAVENOUS
Status: CANCELLED | OUTPATIENT
Start: 2025-07-16

## 2024-07-16 RX ORDER — HEPARIN SODIUM (PORCINE) LOCK FLUSH IV SOLN 100 UNIT/ML 100 UNIT/ML
5 SOLUTION INTRAVENOUS
OUTPATIENT
Start: 2025-07-16

## 2024-07-16 RX ORDER — ZOLEDRONIC ACID 5 MG/100ML
5 INJECTION, SOLUTION INTRAVENOUS ONCE
Status: COMPLETED | OUTPATIENT
Start: 2024-07-16 | End: 2024-07-16

## 2024-07-16 RX ORDER — DIPHENHYDRAMINE HYDROCHLORIDE 50 MG/ML
50 INJECTION INTRAMUSCULAR; INTRAVENOUS
Start: 2025-07-16

## 2024-07-16 RX ORDER — EPINEPHRINE 1 MG/ML
0.3 INJECTION, SOLUTION INTRAMUSCULAR; SUBCUTANEOUS EVERY 5 MIN PRN
Status: CANCELLED | OUTPATIENT
Start: 2025-07-16

## 2024-07-16 RX ORDER — METHYLPREDNISOLONE SODIUM SUCCINATE 125 MG/2ML
125 INJECTION, POWDER, LYOPHILIZED, FOR SOLUTION INTRAMUSCULAR; INTRAVENOUS
Status: CANCELLED
Start: 2025-07-16

## 2024-07-16 RX ORDER — METHYLPREDNISOLONE SODIUM SUCCINATE 125 MG/2ML
125 INJECTION, POWDER, LYOPHILIZED, FOR SOLUTION INTRAMUSCULAR; INTRAVENOUS
Start: 2025-07-16

## 2024-07-16 RX ORDER — EPINEPHRINE 1 MG/ML
0.3 INJECTION, SOLUTION INTRAMUSCULAR; SUBCUTANEOUS EVERY 5 MIN PRN
OUTPATIENT
Start: 2025-07-16

## 2024-07-16 RX ORDER — HEPARIN SODIUM,PORCINE 10 UNIT/ML
5-20 VIAL (ML) INTRAVENOUS DAILY PRN
OUTPATIENT
Start: 2025-07-16

## 2024-07-16 RX ORDER — DIPHENHYDRAMINE HYDROCHLORIDE 50 MG/ML
50 INJECTION INTRAMUSCULAR; INTRAVENOUS
Status: CANCELLED
Start: 2025-07-16

## 2024-07-16 RX ORDER — ALBUTEROL SULFATE 90 UG/1
1-2 AEROSOL, METERED RESPIRATORY (INHALATION)
Start: 2025-07-16

## 2024-07-16 RX ORDER — MEPERIDINE HYDROCHLORIDE 25 MG/ML
25 INJECTION INTRAMUSCULAR; INTRAVENOUS; SUBCUTANEOUS EVERY 30 MIN PRN
Status: CANCELLED | OUTPATIENT
Start: 2025-07-16

## 2024-07-16 RX ORDER — ZOLEDRONIC ACID 5 MG/100ML
5 INJECTION, SOLUTION INTRAVENOUS ONCE
Status: CANCELLED
Start: 2025-07-16

## 2024-07-16 RX ORDER — ALBUTEROL SULFATE 0.83 MG/ML
2.5 SOLUTION RESPIRATORY (INHALATION)
OUTPATIENT
Start: 2025-07-16

## 2024-07-16 RX ORDER — ZOLEDRONIC ACID 5 MG/100ML
5 INJECTION, SOLUTION INTRAVENOUS ONCE
Start: 2025-07-16

## 2024-07-16 RX ORDER — MEPERIDINE HYDROCHLORIDE 25 MG/ML
25 INJECTION INTRAMUSCULAR; INTRAVENOUS; SUBCUTANEOUS EVERY 30 MIN PRN
OUTPATIENT
Start: 2025-07-16

## 2024-07-16 RX ORDER — HEPARIN SODIUM,PORCINE 10 UNIT/ML
5-20 VIAL (ML) INTRAVENOUS DAILY PRN
Status: CANCELLED | OUTPATIENT
Start: 2025-07-16

## 2024-07-16 RX ORDER — ALBUTEROL SULFATE 0.83 MG/ML
2.5 SOLUTION RESPIRATORY (INHALATION)
Status: CANCELLED | OUTPATIENT
Start: 2025-07-16

## 2024-07-16 RX ADMIN — SODIUM CHLORIDE 250 ML: 9 INJECTION, SOLUTION INTRAVENOUS at 10:50

## 2024-07-16 RX ADMIN — ZOLEDRONIC ACID 5 MG: 5 INJECTION, SOLUTION INTRAVENOUS at 10:50

## 2024-07-16 NOTE — PROGRESS NOTES
Infusion Nursing Note:  Zena Quinatna presents today for Reclast.    Patient seen by provider today: No   present during visit today: Not Applicable.    Note: Patient expressed no new medical concerns. Patient has dental visits regularly, no dental concerns today. Patient is taking calcium and vitamin D. Patient encouraged to increase water intake over the next couple days.       Intravenous Access:  Peripheral IV placed.    Treatment Conditions:  Lab Results   Component Value Date     07/09/2024    POTASSIUM 3.6 07/09/2024    CR 1.29 (H) 07/16/2024    VALERIA 9.9 07/16/2024    BILITOTAL 0.4 07/16/2024    ALBUMIN 4.7 07/16/2024    ALT 15 07/16/2024    AST 39 07/16/2024       Results reviewed, labs MET treatment parameters, ok to proceed with treatment.      Post Infusion Assessment:  Patient tolerated infusion without incident.  Blood return noted pre and post infusion.  Site patent and intact, free from redness, edema or discomfort.  No evidence of extravasations.  Access discontinued per protocol.       Discharge Plan:   AVS to patient via MYCHART.  Patient will return next year for next appointment.   Patient discharged in stable condition accompanied by: self.  Departure Mode: Ambulatory.      Brittney Szymanski RN

## 2024-07-17 ENCOUNTER — TELEPHONE (OUTPATIENT)
Dept: FAMILY MEDICINE | Facility: CLINIC | Age: 59
End: 2024-07-17

## 2024-07-17 ENCOUNTER — OFFICE VISIT (OUTPATIENT)
Dept: FAMILY MEDICINE | Facility: CLINIC | Age: 59
End: 2024-07-17
Payer: COMMERCIAL

## 2024-07-17 VITALS
SYSTOLIC BLOOD PRESSURE: 100 MMHG | HEART RATE: 65 BPM | WEIGHT: 137.2 LBS | HEIGHT: 65 IN | TEMPERATURE: 98.3 F | BODY MASS INDEX: 22.86 KG/M2 | OXYGEN SATURATION: 98 % | DIASTOLIC BLOOD PRESSURE: 62 MMHG | RESPIRATION RATE: 13 BRPM

## 2024-07-17 DIAGNOSIS — H81.02 MENIERE DISEASE, LEFT: ICD-10-CM

## 2024-07-17 DIAGNOSIS — N17.9 ACUTE KIDNEY INJURY (H): ICD-10-CM

## 2024-07-17 DIAGNOSIS — Z82.49 FAMILY HISTORY OF ABDOMINAL AORTIC ANEURYSM (AAA): ICD-10-CM

## 2024-07-17 DIAGNOSIS — Z00.00 ROUTINE GENERAL MEDICAL EXAMINATION AT A HEALTH CARE FACILITY: Primary | ICD-10-CM

## 2024-07-17 DIAGNOSIS — E78.5 HYPERLIPIDEMIA LDL GOAL <160: ICD-10-CM

## 2024-07-17 DIAGNOSIS — R42 DIZZINESS: ICD-10-CM

## 2024-07-17 DIAGNOSIS — R53.83 OTHER FATIGUE: ICD-10-CM

## 2024-07-17 LAB
CHOLEST SERPL-MCNC: 256 MG/DL
GAMMA INTERFERON BACKGROUND BLD IA-ACNC: 0.01 IU/ML
HDLC SERPL-MCNC: 96 MG/DL
LDLC SERPL CALC-MCNC: 147 MG/DL
M TB IFN-G BLD-IMP: NEGATIVE
M TB IFN-G CD4+ BCKGRND COR BLD-ACNC: 9.99 IU/ML
MITOGEN IGNF BCKGRD COR BLD-ACNC: 0 IU/ML
MITOGEN IGNF BCKGRD COR BLD-ACNC: 0 IU/ML
NONHDLC SERPL-MCNC: 160 MG/DL
QUANTIFERON MITOGEN: 10 IU/ML
QUANTIFERON NIL TUBE: 0.01 IU/ML
QUANTIFERON TB1 TUBE: 0.01 IU/ML
QUANTIFERON TB2 TUBE: 0.01
TRIGL SERPL-MCNC: 66 MG/DL

## 2024-07-17 PROCEDURE — 99396 PREV VISIT EST AGE 40-64: CPT | Performed by: FAMILY MEDICINE

## 2024-07-17 PROCEDURE — 99214 OFFICE O/P EST MOD 30 MIN: CPT | Mod: 25 | Performed by: FAMILY MEDICINE

## 2024-07-17 SDOH — HEALTH STABILITY: PHYSICAL HEALTH: ON AVERAGE, HOW MANY DAYS PER WEEK DO YOU ENGAGE IN MODERATE TO STRENUOUS EXERCISE (LIKE A BRISK WALK)?: 6 DAYS

## 2024-07-17 SDOH — HEALTH STABILITY: PHYSICAL HEALTH: ON AVERAGE, HOW MANY MINUTES DO YOU ENGAGE IN EXERCISE AT THIS LEVEL?: 60 MIN

## 2024-07-17 ASSESSMENT — SOCIAL DETERMINANTS OF HEALTH (SDOH)
HOW OFTEN DO YOU GET TOGETHER WITH FRIENDS OR RELATIVES?: TWICE A WEEK
HOW OFTEN DO YOU GET TOGETHER WITH FRIENDS OR RELATIVES?: TWICE A WEEK

## 2024-07-17 ASSESSMENT — PAIN SCALES - GENERAL: PAINLEVEL: NO PAIN (0)

## 2024-07-17 NOTE — PROGRESS NOTES
Preventive Care Visit  [facility] ALIZA Berman MD, Family Medicine  Jul 17, 2024      Assessment & Plan     Routine general medical examination at a health care facility  See counseling messages     Acute kidney injury (H24)  Patient has had some rise of creatinine and lowering of GFR over the last couple weeks.   She has been on triamterene hydrochlorothiazide for the last year.   Recommend discontinuing this for now.   Remain hydrated.   blood pressure is controlled.   No NSAID use.   Recheck labs in 3-5 weeks.   Scheduled appointment in 2 months.   - Basic metabolic panel  (Ca, Cl, CO2, Creat, Gluc, K, Na, BUN); Future    Other fatigue  Dizziness  Patient has been noting fatigue for the last several months and fatigue.   Lab workup has been done. She was seen in the [facility].   She had normal workup including Echo.   She mailed in her Zio patch yesterday. Awaiting results.   Will notify of results.     Hyperlipidemia LDL goal <160  Awaiting results. Dose adjustment as needed.    - Lipid panel reflex to direct LDL Fasting; Future    Meniere disease, left  Patient has been on triamterene-hydrochlorothiazide   Patient reports that she has been doing well.   Discussed discontinuation of her medication due to her renal changes.   Patient will reach out as needed for worsening or concerns.     Family history of abdominal aortic aneurysm (AAA)  Recommend ultrasound for evaluation.   Will notify of results.   - US Abdominal Aorta Imaging; Future            Counseling  Appropriate preventive services were addressed with this patient via screening, questionnaire, or discussion as appropriate for fall prevention, nutrition, physical activity, Tobacco-use cessation, weight loss and cognition.  Checklist reviewing preventive services available has been given to the patient.  Reviewed patient's diet, addressing concerns and/or questions.   She is at risk for psychosocial distress and has been provided  with information to reduce risk.           Subjective   Zena is a 59 year old, presenting for the following:  Physical        7/17/2024     2:07 PM   Additional Questions   Roomed by Lizzette STILL   Accompanied by None         7/17/2024     2:07 PM   Patient Reported Additional Medications   Patient reports taking the following new medications NA        Health Care Directive  Patient does not have a Health Care Directive or Living Will: Discussed advance care planning with patient; information given to patient to review.    HPI    Recently evaluated in the [facility]. She was having issues with dizziness and fatigue. She was also recently treated for a UTI with hematuria.  UTI has resolved.   She has noted that she has been more fatigued recently and feels to have less stamina with exercise.   Echo was normal.  Awaiting ziopatch results.   Received fluids in the [facility]. Her creatinine was elevated. Reports eating and drinking well.  Her creatinine yesterday was a little more elevated.     Seeing Dr. Hardy tomorrow.  Feels rheumatoid arthritis is under control overall.     Has Meniere's. On triamterene-hydrochlorothiazide for the last year plus. Reports she is doing well.     Family history of abdominal aortic aneurysm. Both mom and dad. She is wondering about screening.                 7/17/2024   General Health   How would you rate your overall physical health? Good   Feel stress (tense, anxious, or unable to sleep) Only a little      (!) STRESS CONCERN      7/17/2024   Nutrition   Three or more servings of calcium each day? Yes   Diet: Gluten-free/reduced   How many servings of fruit and vegetables per day? (!) 2-3   How many sweetened beverages each day? 0-1            7/17/2024   Exercise   Days per week of moderate/strenous exercise 6 days   Average minutes spent exercising at this level 60 min            7/17/2024   Social Factors   Frequency of gathering with friends or relatives Twice a week   Worry food won't last  until get money to buy more No   Food not last or not have enough money for food? No   Do you have housing? (Housing is defined as stable permanent housing and does not include staying ouside in a car, in a tent, in an abandoned building, in an overnight shelter, or couch-surfing.) Yes   Are you worried about losing your housing? No   Lack of transportation? No   Unable to get utilities (heat,electricity)? No            7/17/2024   Fall Risk   Fallen 2 or more times in the past year? No    No   Trouble with walking or balance? No    No       Multiple values from one day are sorted in reverse-chronological order          7/17/2024   Dental   Dentist two times every year? Yes               Today's PHQ-2 Score:       7/17/2024     1:58 PM   PHQ-2 ( 1999 [employer])   Q1: Little interest or pleasure in doing things 0   Q2: Feeling down, depressed or hopeless 0   PHQ-2 Score 0   Q1: Little interest or pleasure in doing things Not at all   Q2: Feeling down, depressed or hopeless Not at all   PHQ-2 Score 0           7/17/2024   Substance Use   Alcohol more than 3/day or more than 7/wk No   Do you use any other substances recreationally? No        Social History     Tobacco Use    Smoking status: Never    Smokeless tobacco: Never    Tobacco comments:     no smokers in household   Vaping Use    Vaping status: Never Used   Substance Use Topics    Alcohol use: Yes     Alcohol/week: 1.7 standard drinks of alcohol     Comment: has a couple drinks now and again    Drug use: No           4/16/2024   LAST FHS-7 RESULTS   1st degree relative breast or ovarian cancer No   Any relative bilateral breast cancer No   Any male have breast cancer No   Any ONE woman have BOTH breast AND ovarian cancer No   Any woman with breast cancer before 50yrs No   2 or more relatives with breast AND/OR ovarian cancer No   2 or more relatives with breast AND/OR bowel cancer Yes           Mammogram Screening - Mammogram every 1-2 years updated in Health  "Maintenance based on mutual decision making        7/17/2024   STI Screening   New sexual partner(s) since last STI/HIV test? No        History of abnormal Pap smear: No - age 30-64 HPV with reflex Pap every 5 years recommended        Latest Ref Rng & Units 7/18/2022    10:25 AM 6/14/2021     9:04 AM 6/14/2021     8:50 AM   PAP / HPV   PAP  Negative for Intraepithelial Lesion or Malignancy (NILM)      PAP (Historical)   NIL     HPV 16 DNA Negative Negative   Negative    HPV 18 DNA Negative Negative   Negative    Other HR HPV Negative Negative   Positive      ASCVD Risk   The 10-year ASCVD risk score (Vonda OLIVER, et al., 2019) is: 1.4%    Values used to calculate the score:      Age: 59 years      Sex: Female      Is Non- : No      Diabetic: No      Tobacco smoker: No      Systolic Blood Pressure: 100 mmHg      Is BP treated: No      HDL Cholesterol: 100 mg/dL      Total Cholesterol: 212 mg/dL           Reviewed and updated as needed this visit by Provider                    BP Readings from Last 3 Encounters:   07/17/24 100/62   07/16/24 96/67   07/09/24 102/72    Wt Readings from Last 3 Encounters:   07/17/24 62.2 kg (137 lb 3.2 oz)   07/16/24 62.6 kg (137 lb 14.4 oz)   05/01/24 63 kg (139 lb)                         Objective    Exam  /62   Pulse 65   Temp 98.3  F (36.8  C) (Temporal)   Resp 13   Ht 1.66 m (5' 5.35\")   Wt 62.2 kg (137 lb 3.2 oz)   LMP 07/17/2017 (Approximate)   SpO2 98%   BMI 22.58 kg/m     Estimated body mass index is 22.58 kg/m  as calculated from the following:    Height as of this encounter: 1.66 m (5' 5.35\").    Weight as of this encounter: 62.2 kg (137 lb 3.2 oz).    Physical Exam  GENERAL: alert and no distress  EYES: Eyes grossly normal to inspection, PERRL and conjunctivae and sclerae normal  HENT: ear canals and TM's normal, nose and mouth without ulcers or lesions  NECK: no adenopathy, no asymmetry, masses, or scars  RESP: lungs clear to " auscultation - no rales, rhonchi or wheezes  CV: regular rate and rhythm, normal S1 S2, no S3 or S4, no murmur, click or rub, no peripheral edema  ABDOMEN: soft, nontender, no hepatosplenomegaly, no masses and bowel sounds normal  MS: no gross musculoskeletal defects noted, no edema  SKIN: no suspicious lesions or rashes  NEURO: Normal strength and tone, mentation intact and speech normal  PSYCH: mentation appears normal, affect normal/bright        Signed Electronically by: Jailene Berman MD

## 2024-07-18 ENCOUNTER — TELEPHONE (OUTPATIENT)
Dept: FAMILY MEDICINE | Facility: CLINIC | Age: 59
End: 2024-07-18

## 2024-07-18 ENCOUNTER — OFFICE VISIT (OUTPATIENT)
Dept: RHEUMATOLOGY | Facility: CLINIC | Age: 59
End: 2024-07-18
Payer: COMMERCIAL

## 2024-07-18 VITALS
SYSTOLIC BLOOD PRESSURE: 91 MMHG | BODY MASS INDEX: 22.35 KG/M2 | OXYGEN SATURATION: 99 % | DIASTOLIC BLOOD PRESSURE: 65 MMHG | HEART RATE: 64 BPM | RESPIRATION RATE: 12 BRPM | WEIGHT: 135.8 LBS

## 2024-07-18 DIAGNOSIS — R06.09 DOE (DYSPNEA ON EXERTION): Primary | ICD-10-CM

## 2024-07-18 DIAGNOSIS — M80.00XA OSTEOPOROSIS WITH CURRENT PATHOLOGICAL FRACTURE, UNSPECIFIED OSTEOPOROSIS TYPE, INITIAL ENCOUNTER: ICD-10-CM

## 2024-07-18 DIAGNOSIS — M05.79 RHEUMATOID ARTHRITIS INVOLVING MULTIPLE SITES WITH POSITIVE RHEUMATOID FACTOR (H): Primary | ICD-10-CM

## 2024-07-18 DIAGNOSIS — R79.89 ELEVATED SERUM CREATININE: ICD-10-CM

## 2024-07-18 LAB
ALBUMIN MFR UR ELPH: 6.9 MG/DL
ALBUMIN UR-MCNC: NEGATIVE MG/DL
APPEARANCE UR: CLEAR
BILIRUB UR QL STRIP: NEGATIVE
COLOR UR AUTO: YELLOW
CREAT SERPL-MCNC: 1.12 MG/DL (ref 0.51–0.95)
CREAT UR-MCNC: 83.6 MG/DL
CRP SERPL-MCNC: <3 MG/L
EGFRCR SERPLBLD CKD-EPI 2021: 56 ML/MIN/1.73M2
ERYTHROCYTE [SEDIMENTATION RATE] IN BLOOD BY WESTERGREN METHOD: 10 MM/HR (ref 0–30)
GLUCOSE UR STRIP-MCNC: NEGATIVE MG/DL
HGB UR QL STRIP: ABNORMAL
KETONES UR STRIP-MCNC: 15 MG/DL
LEUKOCYTE ESTERASE UR QL STRIP: NEGATIVE
NITRATE UR QL: NEGATIVE
PH UR STRIP: 6.5 [PH] (ref 5–7)
PROT/CREAT 24H UR: 0.08 MG/MG CR (ref 0–0.2)
RBC #/AREA URNS AUTO: NORMAL /HPF
SP GR UR STRIP: 1.01 (ref 1–1.03)
UROBILINOGEN UR STRIP-ACNC: 0.2 E.U./DL
WBC #/AREA URNS AUTO: NORMAL /HPF

## 2024-07-18 PROCEDURE — 99214 OFFICE O/P EST MOD 30 MIN: CPT | Performed by: INTERNAL MEDICINE

## 2024-07-18 PROCEDURE — 84156 ASSAY OF PROTEIN URINE: CPT | Performed by: INTERNAL MEDICINE

## 2024-07-18 PROCEDURE — 86160 COMPLEMENT ANTIGEN: CPT | Performed by: INTERNAL MEDICINE

## 2024-07-18 PROCEDURE — 81001 URINALYSIS AUTO W/SCOPE: CPT | Performed by: INTERNAL MEDICINE

## 2024-07-18 PROCEDURE — G2211 COMPLEX E/M VISIT ADD ON: HCPCS | Performed by: INTERNAL MEDICINE

## 2024-07-18 PROCEDURE — 86235 NUCLEAR ANTIGEN ANTIBODY: CPT | Performed by: INTERNAL MEDICINE

## 2024-07-18 PROCEDURE — 99000 SPECIMEN HANDLING OFFICE-LAB: CPT | Performed by: INTERNAL MEDICINE

## 2024-07-18 PROCEDURE — 85652 RBC SED RATE AUTOMATED: CPT | Performed by: INTERNAL MEDICINE

## 2024-07-18 PROCEDURE — 36415 COLL VENOUS BLD VENIPUNCTURE: CPT | Performed by: INTERNAL MEDICINE

## 2024-07-18 PROCEDURE — 83516 IMMUNOASSAY NONANTIBODY: CPT | Mod: 90 | Performed by: INTERNAL MEDICINE

## 2024-07-18 PROCEDURE — 86140 C-REACTIVE PROTEIN: CPT | Performed by: INTERNAL MEDICINE

## 2024-07-18 PROCEDURE — 82565 ASSAY OF CREATININE: CPT | Performed by: INTERNAL MEDICINE

## 2024-07-18 PROCEDURE — 86225 DNA ANTIBODY NATIVE: CPT | Performed by: INTERNAL MEDICINE

## 2024-07-18 RX ORDER — HYDROXYCHLOROQUINE SULFATE 200 MG/1
TABLET, FILM COATED ORAL
Qty: 135 TABLET | Refills: 2 | Status: SHIPPED | OUTPATIENT
Start: 2024-07-18

## 2024-07-18 RX ORDER — ADALIMUMAB-ADAZ 40 MG/.4ML
40 INJECTION, SOLUTION SUBCUTANEOUS
Qty: 0.8 ML | Refills: 6 | Status: SHIPPED | OUTPATIENT
Start: 2024-07-18

## 2024-07-18 NOTE — PROGRESS NOTES
Rheumatology Clinic Visit      Zena Quintana MRN# [id number]   YOB: 1965 Age: 59 year old      Date of visit: 7/18/24   PCP: Dr. Jailene Berman    Chief Complaint   Patient presents with:  Rheumatoid Arthritis    Assessment and Plan     1. Seropositive nonerosive rheumatoid arthritis (RF low positive, CCP negative): Reportedly at the time of diagnosis she had synovitis in a symmetric distribution that was steroid responsive. Previously on MTX (effective, anemia, LFT elevation), leflunomide (neutropenia). Currently on hydroxychloroquine 300 mg daily on average (started in approximately 2007, worsened symptoms when reduced to 200mg daily) and adalimumab 40mg SQ every 14 days (started Humira 10/10/2021; insurance required changed to Hyrimoz on 2/7/2024).   Chronic illness    - Continue hydroxychloroquine 200mg daily with an additional 200mg every other day (last eye exam was on 8/2/2023 with Dr. Mirza; yearly eye exam required)  - Continue adalimumab 40 mg SQ every 14 days   - Labs in 6 months: CBC, Creatinine, Hepatic Panel, ESR, CRP    2. Osteoporosis: s/p hip fx from ground level fall and is now status-post right HUY. Note that her mother has a hx of vertebral compression fx; Ms. Quintana is post-menopausal. 1/13/2020 DEXA showed osteoporosis; L-spine T score of -2.6, Left femoral neck T-score of -2.4. Alendronate was started 1/2020 but stopped in early April 2020 by Ms. Quintana due to hair loss and GERD.  She then received Reclast on 6/16/2020, 6/17/2021, 6/20/2022, 6/21/2023, 7/16/2024.  Recheck DEXA in spring 2025 to determine next step (potentially changed to Prolia, drug holiday, or other).   Chronic illness, stable.    - Continue calcium 1200mg daily  - Continue vitamin D 1000 IU daily  - DEXA in spring 2025 (not ordered yet)    3.  History of bilateral carpal tunnel syndrome: doesn't tolerate NSAIDs per patient.  Using wrist splints bilaterally with significant improvement, and was  intermittent symptoms.  Symptoms then worsened and she had the nerve conduction study performed, showing bilateral carpal tunnel syndrome.  She was then seen by orthopedics and had surgery on the right with resolution of symptoms with did not have surgery on the left; left carpal tunnel syndrome symptoms have improved over time and she is not symptomatic today with regard to carpal tunnel syndrome.     4. Bilateral shoulder issues / rotator cuff pathology: Limited range of motion of the right shoulder because of rotator cuff tear.  S/p surgery.  Historical and not discussed today.    5. Sjogren's syndrome: NJ by EIA negative but positive by IF, SSA and SSB negative, and minor salivary gland biopsy negative. She is currently following with ophthalmology who has given her eyedrops that have been beneficial.  Dry mouth is currently treated with frequent sips of water; she has good oral hygiene, follows with a dentist regularly, and does not have frequent dental caries. Pilocarpine was previously Rx'd but never used and has been removed from her medication list previously.      6. History of oral sores / family history of lupus / polyarthritis / recurrent sinus infections: She is not having oral sores or recurrent sinus infections for several years now. Retrospectively, there would be concern for potential ANCA associated sinus issues and this could be checked if she had recurrent sinusitis again. Oral sores have improved since she started Plaquenil, that argues it could be connective tissue disease related. NJ was negative by EIA, but complement was on the low end of the normal range so NJ was rechecked and positive; additional testing was negative.    7.  Elevated creatinine:.  Triamterene-hydrochlorothiazide was discontinued by her primary care provider yesterday.  Also on a TNF inhibitor so check labs today to assess for possible association with the elevated creatinine.  Avoid NSAIDs and continue to follow with  her primary care provider as well.  Patient plans to recheck her creatinine in 1 month at the direction of her primary care provider  - Labs today: Creatinine, ESR, CRP, histone, EZEKIEL, dsDNA, C3, C4, UA, Uprotein:creatinine    8.  Vaccinations: Vaccinations reviewed with Ms. Quintana.    - Influenza: encouraged yearly vaccination  - [id number]: up to date  - [id number]: up to date  - Shingrix: advised vaccination   - COVID-19: advised keeping update    9.  Dyspnea on exertion and syncopal episode: 7/9/2024 EKG did not show QT prolongation.  Recently had an echocardiogram and Zio patch; patient is awaiting reports of Zio patch.  No shortness of breath at rest.  No chest pain or pressure.  Dyspnea on exertion resolves quickly with rest.  Patient will continue following with her primary care provider, and consider discussing cardiac stress test as a potential next step in evaluation.  Discussed red flag symptoms that would prompt emergency department evaluation.     Total minutes spent in evaluation with patient, documentation, , and review of pertinent studies and chart notes: 22  The longitudinal plan of care for the rheumatology problem(s) were addressed during this visit.  Due to added complexity of care, we will continue to support the patient and the subsequent management of this condition with ongoing continuity of care.        Ms. Quintana verbalized agreement with and understanding of the rational for the diagnosis and treatment plan.  All questions were answered to best of my ability and the patient's satisfaction. Ms. Quintana was advised to contact the clinic with any questions that may arise after the clinic visit.      Thank you for involving me in the care of the patient    Return to clinic: 6 months      HPI   Zena Quintana is a 59 year old female with medical history significant for iron deficiency anemia, nephrolithiasis, hyperlipidemia, xerostomia, dry eyes, status-post right HUY, and  rheumatoid arthritis who presents for follow-up of rheumatoid arthritis.    Today, 7/18/2024:   RA controlled.  No joint pain or swelling.  No morning stiffness or gelling phenomenon.  Arthritis does not limit daily activities.  Recently had triamterene-hydrochlorothiazide discontinued by her primary care provider because of creatinine elevation with plans to recheck the creatinine in 1 month.  Also having dyspnea on exertion but no shortness of breath at rest; when she has dyspnea on exertion she does not have other associated symptoms such as chest pain, palpitations, lightheadedness, dizziness, nausea, or diaphoresis.  Patient reports history of syncopal episode.  Had an echocardiogram, EKG, and Zio patch; she is awaiting report of the Zio patch.  Recent UTI; was treated with antibiotics and has completed the antibiotics; no UTI symptoms currently.    Denies fevers, chills, nausea, vomiting, constipation, diarrhea. No abdominal pain. No chest pain/pressure, palpitations, or shortness of breath currently. No neck pain. No rash. No LE swelling.  No photosensitivity or photophobia.  No sicca symptoms.     Tobacco: None  EtOH: Occasional; no more than 2 drinks per day when she does drink  Drugs: None  Occupation: Works at a school    ROS   12 point review of system was completed and negative except as noted in the HPI     Active Problem List     Patient Active Problem List   Diagnosis    Allergic rhinitis    Stomatitis and mucositis (ulcerative)    Pure hypercholesterolemia    HYPERLIPIDEMIA LDL GOAL <160    High risk medication use    RA (rheumatoid arthritis) (H)    Disturbance of salivary secretion (XEROSTOMIA)    Impingement syndrome of right shoulder    Sjogren's syndrome (H24)    Palpitations    Cervical high risk HPV (human papillomavirus) test positive    Osteoporosis with current pathological fracture, unspecified osteoporosis type, initial encounter    Oral bisphosphonates causing adverse effect in  therapeutic use, initial encounter    Chronic kidney disease, stage 2 (mild)     Past Medical History     Past Medical History:   Diagnosis Date    Allergic rhinitis, cause unspecified     Allergic rhinitis    Cervical high risk HPV (human papillomavirus) test positive 03/08/2019    see problem list    Endometriosis, site unspecified     Endometriosis    Female infertility of other specified origin     Iron deficiency anemia, unspecified     Anemia, iron def.    PONV (postoperative nausea and vomiting)     RA (rheumatoid arthritis) (H)     Sjogren's syndrome (H24)     Stomatitis and mucositis (ulcerative)     chronic - non-specific     Past Surgical History     Past Surgical History:   Procedure Laterality Date    COLONOSCOPY WITH CO2 INSUFFLATION N/A 8/7/2017    Procedure: COLONOSCOPY WITH CO2 INSUFFLATION;  Colonoscopy, Screen for colon cancer.  BMI  22.17  [facility] [place] [phone];  Surgeon: Berny Pedro MD;  Location: [facility] OR    COLONOSCOPY WITH CO2 INSUFFLATION N/A 9/20/2022    Procedure: COLONOSCOPY, WITH CO2 INSUFFLATION;  Surgeon: Berny Pedro MD;  Location: [facility] OR    HC KNEE SCOPE, DIAGNOSTIC       left knee, ruptured ACL    HC REMOVE TONSILS/ADENOIDS,11+ Y/O      T & A 11+y.o.    ORTHOPEDIC SURGERY      rotator cuff repair, left    ZZC NONSPECIFIC PROCEDURE  1995, 1997    laparoscopy for endometrial fulguration    ZZHC COLONOSCOPY W/WO BRUSH/WASH  5/25/2005    ZZHC CREATE EARDRUM OPENING,GEN ANESTH      P.E. Tubes     Allergy     Allergies   Allergen Reactions    Alendronate Other (See Comments)     Hair loss    Dust Mites     Mold     Penicillins Hives     hives     Current Medication List     Current Outpatient Medications   Medication Sig Dispense Refill    Adalimumab-adaz (HYRIMOZ) 40 MG/0.4ML SOAJ Inject 40 mg Subcutaneous every 14 days .  Hold for infection and seek medical attention 0.8 mL 6    aspirin (ASA) 325 MG tablet Take 325 mg by mouth daily      bimatoprost (LATISSE) 0.03 % SOLN  Externally apply topically At Bedtime 1 Bottle 3    calcium carbonate (OS-VALERIA) 600 MG tablet Take 2 tablets by mouth daily      fluticasone (FLONASE) 50 MCG/ACT spray Spray 2 sprays into both nostrils daily 1 Bottle 0    hydroxychloroquine (PLAQUENIL) 200 MG tablet Hydroxychloroquine 200mg daily; and an additional 200mg every other day. 135 tablet 2    loratadine 10 MG capsule Take 10 mg by mouth as needed for allergies      MULTIPLE VITAMINS/WOMENS OR None Entered      zinc sulfate (ZINCATE) 220 (50 Zn) MG capsule Take 1 capsule by mouth daily       No current facility-administered medications for this visit.         Social History   See HPI    Family History     Family History   Problem Relation Age of Onset    Osteoporosis Mother     Arthritis Mother         lupus    Connective Tissue Disorder Mother         Lupus    Cataracts Mother     Macular Degeneration Mother     Celiac Disease Mother     Heart Disease Father         aortic aneurysm    Gastrointestinal Disease Father         abdominal anurysm    Hypertension Father     Cataracts Father     Other - See Comments Maternal Grandmother         atherosclerosis heart disease    Alzheimer Disease Maternal Grandmother     Osteoporosis Maternal Grandmother     Cerebrovascular Disease Maternal Grandfather     Cancer Paternal Grandmother     Cerebrovascular Disease Paternal Grandfather     Gastrointestinal Disease Sister         IBS, colon problems    Circulatory Sister         carotid stenosis    Cancer - colorectal Maternal Uncle     Cancer - colorectal Maternal Uncle     Glaucoma No family hx of      Mother: Lupus    Physical Exam     Temp Readings from Last 3 Encounters:   07/17/24 98.3  F (36.8  C) (Temporal)   07/16/24 98.1  F (36.7  C)   07/09/24 97.4  F (36.3  C) (Oral)     BP Readings from Last 5 Encounters:   07/18/24 91/65   07/17/24 100/62   07/16/24 96/67   07/09/24 102/72   05/01/24 116/62     Pulse Readings from Last 1 Encounters:   07/18/24 64     Resp  "Readings from Last 1 Encounters:   07/18/24 12     Estimated body mass index is 22.35 kg/m  as calculated from the following:    Height as of 7/17/24: 1.66 m (5' 5.35\").    Weight as of this encounter: 61.6 kg (135 lb 12.8 oz).    GEN: NAD.   HEENT:  Anicteric, noninjected sclera. No obvious external lesions of the ear and nose. Hearing intact.  CV: S1, S2. RRR. No m/r/g.  PULM: No increased work of breathing. CTA bilaterally  MSK: MCPs, PIPs, DIPs without swelling or tenderness to palpation.  Wrists without swelling or tenderness to palpation.  Elbows and shoulders without swelling or tenderness to palpation.  Knees, ankles, and MTPs without swelling or tenderness to palpation.    SKIN: No rash or jaundice seen  PSYCH: Alert. Appropriate.      Labs / Imaging (select studies)       CBC  Recent Labs   Lab Test 07/16/24  1008 07/09/24  1059 07/18/23  1002 09/02/21  0857 06/02/21  1508 02/24/21  1521 12/10/20  0951 09/22/20  1517   WBC 4.9 11.4* 5.0   < > 4.7 5.3   < > 5.7   RBC 3.95 3.96 4.40   < > 3.90 3.89   < > 3.78*   HGB 11.7 11.8 13.2   < > 11.7 11.6*   < > 11.5*   HCT 35.7 36.0 40.3   < > 36.4 36.5   < > 35.0   MCV 90 91 92   < > 93 94   < > 93   RDW 12.2 12.1 11.9   < > 13.0 12.4   < > 12.2    274 303   < > 308 309   < > 284   MCH 29.6 29.8 30.0   < > 30.0 29.8   < > 30.4   MCHC 32.8 32.8 32.8   < > 32.1 31.8   < > 32.9   NEUTROPHIL 49 83 49   < > 55.2 52.6  --  54.2   LYMPH 34 9 34   < > 24.5 26.4  --  26.7   MONOCYTE 12 7 14   < > 16.7 16.8  --  15.6   EOSINOPHIL 3 1 2   < > 3.0 3.6  --  3.0   BASOPHIL 1 1 1   < > 0.6 0.6  --  0.5   ANEU  --   --   --   --  2.6 2.8  --  3.1   ALYM  --   --   --   --  1.2 1.4  --  1.5   ELIGIO  --   --   --   --  0.8 0.9  --  0.9   AEOS  --   --   --   --  0.1 0.2  --  0.2   ABAS  --   --   --   --  0.0 0.0  --  0.0   ANEUTAUTO 2.4 9.4* 2.5   < >  --   --   --   --    ALYMPAUTO 1.7 1.0 1.7   < >  --   --   --   --    AMONOAUTO 0.6 0.8 0.7   < >  --   --   --   --  "   AEOSAUTO 0.2 0.1 0.1   < >  --   --   --   --    ABSBASO 0.1 0.1 0.0   < >  --   --   --   --     < > = values in this interval not displayed.     CMP  Recent Labs   Lab Test 07/16/24  1008 07/09/24  1059 07/18/23  1002 06/21/23  1202 09/02/21  0857 06/17/21  1158 06/02/21  1508 04/06/21  1518 02/24/21  1521 12/10/20  0951 04/26/19  1327 04/12/19  1509 09/07/17  1322 06/20/17  1437   NA  --  135  --   --   --   --   --   --   --  137  --  139  --  141   POTASSIUM  --  3.6  --   --   --   --   --   --   --  3.9  --  4.3  --  3.8   CHLORIDE  --  98  --   --   --   --   --   --   --  104  --  103  --  105   CO2  --  27  --   --   --   --   --   --   --  31  --  29  --  29   ANIONGAP  --  10  --   --   --   --   --   --   --  2*  --  7  --  7   GLC  --  97  --   --   --   --   --   --   --  85  --   --   --  84   BUN  --  17.3  --   --   --   --   --   --   --  10  --   --   --  22   CR 1.29* 1.16* 0.99* 1.01*   < > 0.90 0.90  --  0.72 0.70   < > 0.86   < > 0.84   GFRESTIMATED 47* 54* 65 64   < > 71 71  --  >90 >90   < > 76   < > 71   GFRESTBLACK  --   --   --   --   --  82 83  --  >90 >90   < > 88   < > 85   VALERIA 9.9 9.8  --  9.5   < > 8.7 9.5  --   --  9.6   < > 8.7  --  9.1   BILITOTAL 0.4 0.3 0.4  --    < >  --  0.3  --  0.2  --    < >  --    < > 0.2   ALBUMIN 4.7 4.6 4.5  --    < >  --  4.4  --  4.1  --    < >  --    < > 4.0   PROTTOTAL 8.1 7.5 7.3  --    < >  --  7.4  --  7.2  --    < >  --    < > 6.8   ALKPHOS 35* 34* 41  --    < >  --  46  --  57  --    < >  --    < > 40   AST 39 30 26  --    < >  --  27   < > 76*  --    < >  --    < > 21   ALT 15 11 16  --    < >  --  22   < > 31  --    < >  --    < > 21    < > = values in this interval not displayed.     Calcium/VitaminD  Recent Labs   Lab Test 07/16/24  1008 07/09/24  1059 06/21/23  1202 01/23/23  1531 06/20/22  1139 05/02/22  1526   VALERIA 9.9 9.8 9.5 9.9   < > 9.7   VITDT 59*  --   --  70  --  59    < > = values in this interval not displayed.      ESR/CRP  Recent Labs   Lab Test 07/16/24  1008 07/18/23  1002 01/23/23  1531 05/02/22  1526 01/10/22  1526 09/02/21  0857   SED 2 8 8 7 9 6   CRP  --   --   --  <2.9 <2.9 <2.9   CRPI <3.00 <3.00 <3.00  --   --   --      Lipid Panel  Recent Labs   Lab Test 07/16/24  1008 07/18/22  1046 12/10/20  0951   CHOL 256* 212* 203*   TRIG 66 76 90   HDL 96 100 75   * 97 110*   NHDL 160* 112 128     Hepatitis B  Recent Labs   Lab Test 09/04/19  1552   HBCAB Nonreactive   HEPBANG Nonreactive     Hepatitis C  Recent Labs   Lab Test 06/20/17  1437   HCVAB Nonreactive   Assay performance characteristics have not been established for newborns,   infants, and children       Tuberculosis Screening  Recent Labs   Lab Test 07/16/24  1008 01/23/23  1531 09/09/21  1502   TBRES Negative Negative Negative     Immunization History     Immunization History   Administered Date(s) Administered    COVID-19 Monovalent 18+ (Moderna) 02/03/2021, 03/03/2021, 12/07/2021, 06/26/2022    Influenza (H1N1) 12/03/2009    Influenza (IIV3) PF 11/17/2005, 11/17/2006, 10/25/2010, 01/09/2013    Influenza Vaccine 18-64 (Flublok) 01/25/2023    Influenza Vaccine >6 months,quad, PF 10/10/2013, 09/14/2016, 09/13/2017, 01/10/2019, 10/18/2021, 01/17/2024    Influenza, seasonal, injectable, PF 10/12/2019    Influenza,INJ,MDCK,PF,Quad >6mo(Flucelvax) 10/16/2020    Pneumo Conj 13-V (2010&after) 09/13/2017    Pneumococcal 23 valent 09/12/2018    TD,PF 7+ (Tenivac) 02/01/2005    TDAP (Adacel,Boostrix) 08/24/2022    TDAP Vaccine (Adacel) 01/09/2013          Chart documentation done in part with Dragon Voice recognition Software. Although reviewed after completion, some word and grammatical error may remain.    Rome Hardy MD

## 2024-07-18 NOTE — PATIENT INSTRUCTIONS
RHEUMATOLOGY    [facility]  [address] [place] [address]  [place] [address]    Phone number: [phone]  Fax number: [phone]    If you need a medication refill, please contact us as you may need lab work and/or a follow up visit prior to your refill.      Thank you for choosing [facility]!    Genesis Medley CMA Rheumatology

## 2024-07-18 NOTE — NURSING NOTE
RAPID3 (0-30) Cumulative Score  1.8          RAPID3 Weighted Score (divide #4 by 3 and that is the weighted score)  0.6

## 2024-07-18 NOTE — Clinical Note
Dr. Berman, Considering the shortness of breath only occurs with exertion, would you consider a cardiac stress test? Thank you! [place]

## 2024-07-18 NOTE — PATIENT INSTRUCTIONS
Patient Education   Preventive Care Advice   This is general advice given by our system to help you stay healthy. However, your care team may have specific advice just for you. Please talk to your care team about your preventive care needs.  Nutrition  Eat 5 or more servings of fruits and vegetables each day.  Try wheat bread, brown rice and whole grain pasta (instead of white bread, rice, and pasta).  Get enough calcium and vitamin D. Check the label on foods and aim for 100% of the RDA (recommended daily allowance).  Lifestyle  Exercise at least 150 minutes each week  (30 minutes a day, 5 days a week).  Do muscle strengthening activities 2 days a week. These help control your weight and prevent disease.  No smoking.  Wear sunscreen to prevent skin cancer.  Have a dental exam and cleaning every 6 months.  Yearly exams  See your health care team every year to talk about:  Any changes in your health.  Any medicines your care team has prescribed.  Preventive care, family planning, and ways to prevent chronic diseases.  Shots (vaccines)   HPV shots (up to age 26), if you've never had them before.  Hepatitis B shots (up to age 59), if you've never had them before.  COVID-19 shot: Get this shot when it's due.  Flu shot: Get a flu shot every year.  Tetanus shot: Get a tetanus shot every 10 years.  Pneumococcal, hepatitis A, and RSV shots: Ask your care team if you need these based on your risk.  Shingles shot (for age 50 and up)  General health tests  Diabetes screening:  Starting at age 35, Get screened for diabetes at least every 3 years.  If you are younger than age 35, ask your care team if you should be screened for diabetes.  Cholesterol test: At age 39, start having a cholesterol test every 5 years, or more often if advised.  Bone density scan (DEXA): At age 50, ask your care team if you should have this scan for osteoporosis (brittle bones).  Hepatitis C: Get tested at least once in your life.  STIs (sexually  transmitted infections)  Before age 24: Ask your care team if you should be screened for STIs.  After age 24: Get screened for STIs if you're at risk. You are at risk for STIs (including HIV) if:  You are sexually active with more than one person.  You don't use condoms every time.  You or a partner was diagnosed with a sexually transmitted infection.  If you are at risk for HIV, ask about PrEP medicine to prevent HIV.  Get tested for HIV at least once in your life, whether you are at risk for HIV or not.  Cancer screening tests  Cervical cancer screening: If you have a cervix, begin getting regular cervical cancer screening tests starting at age 21.  Breast cancer scan (mammogram): If you've ever had breasts, begin having regular mammograms starting at age 40. This is a scan to check for breast cancer.  Colon cancer screening: It is important to start screening for colon cancer at age 45.  Have a colonoscopy test every 10 years (or more often if you're at risk) Or, ask your provider about stool tests like a FIT test every year or Cologuard test every 3 years.  To learn more about your testing options, visit:   .  For help making a decision, visit:   https://bit.ly/ye75012.  Prostate cancer screening test: If you have a prostate, ask your care team if a prostate cancer screening test (PSA) at age 55 is right for you.  Lung cancer screening: If you are a current or former smoker ages 50 to 80, ask your care team if ongoing lung cancer screenings are right for you.  For informational purposes only. Not to replace the advice of your health care provider. Copyright   2023 [employer]. All rights reserved. Clinically reviewed by the [facility] Transitions Program. [employer] 394014 - REV 01/24.

## 2024-07-19 LAB
C3 SERPL-MCNC: 93 MG/DL (ref 81–157)
C4 SERPL-MCNC: 22 MG/DL (ref 13–39)
DSDNA AB SER-ACNC: 0.9 IU/ML
ENA SM IGG SER IA-ACNC: <0.7 U/ML
ENA SM IGG SER IA-ACNC: NEGATIVE
ENA SS-A AB SER IA-ACNC: <0.5 U/ML
ENA SS-A AB SER IA-ACNC: NEGATIVE
ENA SS-B IGG SER IA-ACNC: <0.6 U/ML
ENA SS-B IGG SER IA-ACNC: NEGATIVE
U1 SNRNP IGG SER IA-ACNC: 1.2 U/ML
U1 SNRNP IGG SER IA-ACNC: NEGATIVE

## 2024-07-20 LAB — HISTONE IGG SER IA-ACNC: 4.1 UNITS

## 2024-07-23 ENCOUNTER — ANCILLARY PROCEDURE (OUTPATIENT)
Dept: ULTRASOUND IMAGING | Facility: OTHER | Age: 59
End: 2024-07-23
Attending: FAMILY MEDICINE
Payer: COMMERCIAL

## 2024-07-23 DIAGNOSIS — Z82.49 FAMILY HISTORY OF ABDOMINAL AORTIC ANEURYSM (AAA): ICD-10-CM

## 2024-07-23 PROCEDURE — 76775 US EXAM ABDO BACK WALL LIM: CPT | Mod: TC | Performed by: RADIOLOGY

## 2024-07-28 PROCEDURE — 93248 EXT ECG>7D<15D REV&INTERPJ: CPT | Performed by: INTERNAL MEDICINE

## 2024-08-09 ENCOUNTER — LAB (OUTPATIENT)
Dept: LAB | Facility: OTHER | Age: 59
End: 2024-08-09
Payer: COMMERCIAL

## 2024-08-09 DIAGNOSIS — N17.9 ACUTE KIDNEY INJURY (H): ICD-10-CM

## 2024-08-09 DIAGNOSIS — E78.5 HYPERLIPIDEMIA LDL GOAL <160: ICD-10-CM

## 2024-08-09 DIAGNOSIS — N18.2 CHRONIC KIDNEY DISEASE, STAGE 2 (MILD): Primary | ICD-10-CM

## 2024-08-09 LAB
ANION GAP SERPL CALCULATED.3IONS-SCNC: 11 MMOL/L (ref 7–15)
BUN SERPL-MCNC: 14.2 MG/DL (ref 8–23)
CALCIUM SERPL-MCNC: 9.4 MG/DL (ref 8.8–10.4)
CHLORIDE SERPL-SCNC: 102 MMOL/L (ref 98–107)
CHOLEST SERPL-MCNC: 255 MG/DL
CREAT SERPL-MCNC: 1.18 MG/DL (ref 0.51–0.95)
CREAT UR-MCNC: 232.6 MG/DL
EGFRCR SERPLBLD CKD-EPI 2021: 53 ML/MIN/1.73M2
FASTING STATUS PATIENT QL REPORTED: YES
FASTING STATUS PATIENT QL REPORTED: YES
GLUCOSE SERPL-MCNC: 85 MG/DL (ref 70–99)
HCO3 SERPL-SCNC: 25 MMOL/L (ref 22–29)
HDLC SERPL-MCNC: 91 MG/DL
LDLC SERPL CALC-MCNC: 146 MG/DL
MICROALBUMIN UR-MCNC: 19.5 MG/L
MICROALBUMIN/CREAT UR: 8.38 MG/G CR (ref 0–25)
NONHDLC SERPL-MCNC: 164 MG/DL
POTASSIUM SERPL-SCNC: 4.4 MMOL/L (ref 3.4–5.3)
SODIUM SERPL-SCNC: 138 MMOL/L (ref 135–145)
TRIGL SERPL-MCNC: 90 MG/DL

## 2024-08-09 PROCEDURE — 82570 ASSAY OF URINE CREATININE: CPT

## 2024-08-09 PROCEDURE — 82043 UR ALBUMIN QUANTITATIVE: CPT

## 2024-08-09 PROCEDURE — 80048 BASIC METABOLIC PNL TOTAL CA: CPT

## 2024-08-09 PROCEDURE — 80061 LIPID PANEL: CPT

## 2024-08-09 PROCEDURE — 36415 COLL VENOUS BLD VENIPUNCTURE: CPT

## 2024-08-21 ENCOUNTER — TELEPHONE (OUTPATIENT)
Dept: RHEUMATOLOGY | Facility: CLINIC | Age: 59
End: 2024-08-21
Payer: COMMERCIAL

## 2024-08-21 NOTE — TELEPHONE ENCOUNTER
PA Initiation    Medication: HYRIMOZ 40 MG/0.4ML SC SOAJ  Insurance Company: [facility] Prior Auth Dept, phone  [phone], Fax [phone]  Pharmacy Filling the Rx: [facility] [address]  Filling Pharmacy Phone: [phone]  Filling Pharmacy Fax: [phone]  Start Date: 8/21/2024  ROSALBA ([place]: [id number])  PA Case ID #: [id number]        Thank You,     Flory Mello Raúl  Specialty Pharmacy Clinic [facility]  [email or web address]  [email or web address]  Phone: [phone]  Fax: [phone]

## 2024-08-23 NOTE — TELEPHONE ENCOUNTER
Prior Authorization Approval    Medication: HYRIMOZ 40 MG/0.4ML SC SOAJ  Authorization Effective Date: 8/22/2024  Authorization Expiration Date: 8/22/2025  Approved Dose/Quantity: 0.8 ML / 28 day  Reference #: [id number] (Key: [id number])   Insurance Company: [facility] Prior Auth Dept, phone  [phone], Fax [phone]  Expected CoPay: $    CoPay Card Available: Other (see comments)    Financial Assistance Needed: [email or web address]  Which [facility] is filling the [facility]  Pharmacy Notified: renewal  Patient Notified: renewal          Thank You,     Za Mello [facility]  Specialty Pharmacy Clinic [facility]  [email or web address]  [email or web address]  Phone: [phone]  Fax: [phone]

## 2024-08-26 ENCOUNTER — ANCILLARY PROCEDURE (OUTPATIENT)
Dept: CARDIOLOGY | Facility: CLINIC | Age: 59
End: 2024-08-26
Attending: FAMILY MEDICINE
Payer: COMMERCIAL

## 2024-08-26 DIAGNOSIS — R06.09 DOE (DYSPNEA ON EXERTION): ICD-10-CM

## 2024-08-26 PROCEDURE — 93350 STRESS TTE ONLY: CPT | Mod: TC | Performed by: INTERNAL MEDICINE

## 2024-08-26 PROCEDURE — 93325 DOPPLER ECHO COLOR FLOW MAPG: CPT | Mod: TC | Performed by: INTERNAL MEDICINE

## 2024-08-26 PROCEDURE — 93018 CV STRESS TEST I&R ONLY: CPT | Performed by: INTERNAL MEDICINE

## 2024-08-26 PROCEDURE — 93321 DOPPLER ECHO F-UP/LMTD STD: CPT | Mod: 26 | Performed by: INTERNAL MEDICINE

## 2024-08-26 PROCEDURE — 93017 CV STRESS TEST TRACING ONLY: CPT | Performed by: INTERNAL MEDICINE

## 2024-08-26 PROCEDURE — 93016 CV STRESS TEST SUPVJ ONLY: CPT | Performed by: INTERNAL MEDICINE

## 2024-08-26 PROCEDURE — 99207 PR STATISTIC IV PUSH SINGLE INITIAL SUBSTANCE: CPT | Performed by: INTERNAL MEDICINE

## 2024-08-26 PROCEDURE — 93352 ADMIN ECG CONTRAST AGENT: CPT | Performed by: INTERNAL MEDICINE

## 2024-08-26 PROCEDURE — 93321 DOPPLER ECHO F-UP/LMTD STD: CPT | Mod: TC | Performed by: INTERNAL MEDICINE

## 2024-08-26 PROCEDURE — 93325 DOPPLER ECHO COLOR FLOW MAPG: CPT | Mod: 26 | Performed by: INTERNAL MEDICINE

## 2024-08-26 PROCEDURE — 93350 STRESS TTE ONLY: CPT | Mod: 26 | Performed by: INTERNAL MEDICINE

## 2024-08-26 RX ADMIN — Medication 5 ML: at 13:29

## 2024-09-11 ENCOUNTER — OFFICE VISIT (OUTPATIENT)
Dept: FAMILY MEDICINE | Facility: CLINIC | Age: 59
End: 2024-09-11
Attending: FAMILY MEDICINE
Payer: COMMERCIAL

## 2024-09-11 VITALS
SYSTOLIC BLOOD PRESSURE: 104 MMHG | WEIGHT: 140 LBS | RESPIRATION RATE: 12 BRPM | TEMPERATURE: 97.8 F | HEART RATE: 60 BPM | HEIGHT: 65 IN | OXYGEN SATURATION: 99 % | DIASTOLIC BLOOD PRESSURE: 62 MMHG | BODY MASS INDEX: 23.32 KG/M2

## 2024-09-11 DIAGNOSIS — I47.10 SVT (SUPRAVENTRICULAR TACHYCARDIA) (H): ICD-10-CM

## 2024-09-11 DIAGNOSIS — N17.9 ACUTE KIDNEY INJURY (H): Primary | ICD-10-CM

## 2024-09-11 DIAGNOSIS — H81.02 MENIERE DISEASE, LEFT: ICD-10-CM

## 2024-09-11 DIAGNOSIS — R42 DIZZINESS: ICD-10-CM

## 2024-09-11 PROCEDURE — 90471 IMMUNIZATION ADMIN: CPT | Performed by: FAMILY MEDICINE

## 2024-09-11 PROCEDURE — 36415 COLL VENOUS BLD VENIPUNCTURE: CPT | Performed by: FAMILY MEDICINE

## 2024-09-11 PROCEDURE — 99214 OFFICE O/P EST MOD 30 MIN: CPT | Mod: 25 | Performed by: FAMILY MEDICINE

## 2024-09-11 PROCEDURE — 80048 BASIC METABOLIC PNL TOTAL CA: CPT | Performed by: FAMILY MEDICINE

## 2024-09-11 PROCEDURE — 90673 RIV3 VACCINE NO PRESERV IM: CPT | Performed by: FAMILY MEDICINE

## 2024-09-11 ASSESSMENT — PAIN SCALES - GENERAL: PAINLEVEL: NO PAIN (0)

## 2024-09-11 NOTE — PROGRESS NOTES
Assessment & Plan     Acute kidney injury (H24)  Patient is here for follow-up she had acute kidney injury thought to be related to her medications.  She has stopped the triamterene and she did see some improvement of her creatinine.  On last check it has been stable from previous.  She reports that she remains off the triamterene without concerns.  She has been pushing fluids.  Will check again today and evaluate to see if there is any chronic changes.  - Basic metabolic panel  (Ca, Cl, CO2, Creat, Gluc, K, Na, BUN); Future  - Basic metabolic panel  (Ca, Cl, CO2, Creat, Gluc, K, Na, BUN)    Dizziness  Patient reports her dizziness is much better.  She had a couple episodes where she had near syncope and then she reports 1 episode of syncope.  She is noted to have SVT on Zio patch.  These have been very limited episodes and they have not caused her to have any lightheadedness or dizziness associated with that.  She had a stress test which had some possible changes.  Since that test she had 1 episodes but has had nothing else further.  She feels that her energy level is better.  She has evaluation with cardiology scheduled in the next few weeks.  Recommend no changes at this point but if she has any further symptoms I do recommend she go to the emergency department to be evaluated.  She does agree to this plan.  She remains on aspirin daily.    Meniere disease, left  Patient has been off the triamterene due to the renal function and her lightheadedness and fatigue.  She has not noticed any change with her Ménière's and she feels she is doing well with this.  She will have follow-up again in May with ENT and she will follow-up here sooner as needed    SVT (supraventricular tachycardia) (H24)  Rare symptoms.  She is cut out all caffeine.  She is not having any concerns and will reach out if any worsening or issues.        The longitudinal plan of care for the diagnosis(es)/condition(s) as documented were addressed  during this visit. Due to the added complexity in care, I will continue to support Zena in the subsequent management and with ongoing continuity of care.           Subjective   Zena is a 59 year old, presenting for the following health issues:  Dizziness, Chronic Disease Management, and Vascular Disease        9/11/2024     3:42 PM   Additional Questions   Roomed by Alexandra Newman CMA   Accompanied by self         9/11/2024     3:42 PM   Patient Reported Additional Medications   Patient reports taking the following new medications none       Via the Health Maintenance questionnaire, the patient has reported the following services have been completed -Eye Exam: [facility] 2023-11-27, this information has been sent to the abstraction team.  History of Present Illness       CKD: She is not using over the counter pain medicine.     Vascular Disease:  She presents for follow up of vascular disease.     She never takes nitroglycerin. She takes daily aspirin.    She eats 2-3 servings of fruits and vegetables daily.She consumes 0 sweetened beverage(s) daily.She exercises with enough effort to increase her heart rate 30 to 60 minutes per day.  She exercises with enough effort to increase her heart rate 5 days per week.   She is taking medications regularly.         Dizziness  Onset/Duration: a few months  Description:   Do you feel faint: YES  Does it feel like the surroundings (bed, room) are moving: No  Unsteady/off balance: No  Have you passed out or fallen: YES- did twice   Intensity: moderate  Progression of Symptoms: improving and same-last episode was a few days to a week after she saw you last on 7/17/2024  Accompanying Signs & Symptoms:  Heart palpitations or chest pain: YES- and shortness of breath  Nausea, vomiting: No  Weakness or lack of coordination in arms or legs: No  Vision or speech changes: No  Numbness or tingling: No  Ringing in ears (Tinnitus): No  Hearing Loss: No  History:   Head trauma/concussion  "history: No  Previous similar symptoms: YES  Recent bleeding history: No  Any new medications (BP?): YES Was wondering if one med (triamterene/humera/reclast) or maybe humidity?  Precipitating factors:   Was wondering if one med (triamterene/humera/reclast) or maybe humidity?  Worse with activity: YES- when lying down at night certain head movements can cause it, would happen when she was working out  Worse with head movement: YES- see above   Alleviating factors:   Does staying in a fixed position give relief: YES  Therapies tried and outcome: sitting in one spot, stopping certain meds    She had an episode after Humira and her reclast close together.   She has stopped the triamterene.   Feels she is doing much better. Meniere's has been still under control.     She denies any chest pain, shortness of breath.  She feels that she is doing a lot better to use of increased fluid.    She reports she has not had any symptoms now for at least the last month.    She had a stress echo which did not show any abnormalities on the echo portion but did show some downsloping of her ST inferiorly.    She is here today to follow-up.                Objective    /62   Pulse 60   Temp 97.8  F (36.6  C) (Temporal)   Resp 12   Ht 1.66 m (5' 5.35\")   Wt 63.5 kg (140 lb)   LMP 07/17/2017 (Approximate)   SpO2 99%   Breastfeeding No   BMI 23.05 kg/m    Body mass index is 23.05 kg/m .  Physical Exam   GENERAL: alert and no distress  NECK: no adenopathy, no asymmetry, masses, or scars  RESP: lungs clear to auscultation - no rales, rhonchi or wheezes  CV: regular rate and rhythm, normal S1 S2, no S3 or S4, no murmur, click or rub, no peripheral edema  MS: no gross musculoskeletal defects noted, no edema            Signed Electronically by: Jailene Berman MD    "

## 2024-09-11 NOTE — NURSING NOTE
Prior to immunization administration, verified patients identity using patient s name and date of birth. Please see Immunization Activity for additional information.     Screening Questionnaire for Adult Immunization    Are you sick today?   No   Do you have allergies to medications, food, a vaccine component or latex?   No   Have you ever had a serious reaction after receiving a vaccination?   No   Do you have a long-term health problem with heart, lung, kidney, or metabolic disease (e.g., diabetes), asthma, a blood disorder, no spleen, complement component deficiency, a cochlear implant, or a spinal fluid leak?  Are you on long-term aspirin therapy?   Yes   Do you have cancer, leukemia, HIV/AIDS, or any other immune system problem?   No   Do you have a parent, brother, or sister with an immune system problem?   No   In the past 3 months, have you taken medications that affect  your immune system, such as prednisone, other steroids, or anticancer drugs; drugs for the treatment of rheumatoid arthritis, Crohn s disease, or psoriasis; or have you had radiation treatments?   Yes   Have you had a seizure, or a brain or other nervous system problem?   No   During the past year, have you received a transfusion of blood or blood    products, or been given immune (gamma) globulin or antiviral drug?   No   For women: Are you pregnant or is there a chance you could become       pregnant during the next month?   No   Have you received any vaccinations in the past 4 weeks?   No     Immunization questionnaire was positive for at least one answer.  Notified provider.      Patient instructed to remain in clinic for 15 minutes afterwards, and to report any adverse reactions.     Screening performed by Tiarra Tinajero MA on 9/11/2024 at 4:25 PM.

## 2024-09-12 LAB
ANION GAP SERPL CALCULATED.3IONS-SCNC: 14 MMOL/L (ref 7–15)
BUN SERPL-MCNC: 22.2 MG/DL (ref 8–23)
CALCIUM SERPL-MCNC: 9.2 MG/DL (ref 8.8–10.4)
CHLORIDE SERPL-SCNC: 103 MMOL/L (ref 98–107)
CREAT SERPL-MCNC: 1.01 MG/DL (ref 0.51–0.95)
EGFRCR SERPLBLD CKD-EPI 2021: 63 ML/MIN/1.73M2
GLUCOSE SERPL-MCNC: 78 MG/DL (ref 70–99)
HCO3 SERPL-SCNC: 22 MMOL/L (ref 22–29)
POTASSIUM SERPL-SCNC: 4 MMOL/L (ref 3.4–5.3)
SODIUM SERPL-SCNC: 139 MMOL/L (ref 135–145)

## 2024-09-16 NOTE — PROGRESS NOTES
Service Date: 2024    Jailene Berman MD  [id number] [place] [address]     RE:  Zena Quintana   MRN:  [id number]   :  1965       Dear Dr. Berman:    It was a pleasure participating in the care of your patient, Zena Quintana .  As you know, she is a 59 year old year old person who I met in person today for palpitations, blood pressure control, lipids.    Past medical history is significant for the followin.  Hyperlipidemia  2.  Rheumatoid arthritis and Sjogren syndrome with secondary xerostomia and ulcerative mucositis  3.  HPV  4.  Right shoulder impingement  5.  Allergic rhinitis    The patient was seen back in 2024 complaining of dizziness and fatigue after recently being treated for UTI with hematuria.  An echocardiogram and Zio patch monitor were ordered as well as a stress echo.    7-day Zio patch monitor revealed 6 episodes of SVT all asymptomatic, patient events corresponded to normal sinus rhythm, no malignant arrhythmias were identified.    Stress echo 2024 revealed ejection fraction of 55 to 60% at rest with mild TR, no significant inducible ischemia despite inferolateral ST depression noted patient achieved a peak heart rate of 146 bpm, peak systolic blood pressure 152/68 mmHg after exercising for 5 minutes and 57 seconds on a bike    Patient was referred here for further evaluation and treatment after seeing you on 2024 reporting that her dizziness was better at that time.    From a clinical standpoint, she has felt back to her normal self after having her UTI treated with antibiotics.  He is now able to workout in the gym every day doing light weights, and walking to 3 miles every day at a 14 to 15 minute per mile pace.    Back in July when she did have her UTI symptoms she did have 2 episodes of syncope.  1 occurred when she stood up quickly in the middle the night to go to the bathroom, and the second occurred when she was standing in line [place].    She otherwise has felt quite well and has returned to her normal routine without any symptoms whatsoever.  Her energy level is good she does not feel any exertional limitation whatsoever.  She is more nervous about her test results that she had performed recently and his questions regarding.    The patient otherwise denies gross chest pain, shortness of breath, PND, orthopnea, edema, palpitations, syncope or near syncope.    10 Point Review of Systems: Unremarkable    MEDICATIONS:    1.  Adalimumab-adaz  2.  Aspirin 325 mg a day  3.  Bimatoprost  4.  Calcium  5.  Flonase  6.  Hydroxychloroquine  7.  Loratadine  8.  Multivitamin  9.  Zinc supplement    PHYSICAL EXAM:    Vitals: Blood pressures currently running 104/62, pulse 60, weight 139 pounds  General:  Patient appears comfortable, well groomed  Psych:  Patient is alert and oriented X 3  Eyes:  No gross erythema, exudate  Neck:  JVP: Normal  Pulm:  CTA  CV: Rate and rhythm no gross murmur gallop appreciated  Abdomen:  soft, non tender, positive bowel sounds   Lower extremities: No edema      LABS:    8/9/2024: , total cholesterol 255  9/11/2024: Potassium 4.0, GFR normal  7/16/2024: Hemoglobin normal, TSH normal    7-day Zio patch monitor revealed 6 episodes of SVT all asymptomatic, patient events corresponded to normal sinus rhythm, no malignant arrhythmias were identified.    Stress echo 8/26/2024 revealed ejection fraction of 55 to 60% at rest with mild TR, no significant inducible ischemia despite inferolateral ST depression noted patient achieved a peak heart rate of 146 bpm, peak systolic blood pressure 152/68 mmHg after exercising for 5 minutes and 57 seconds on a bike    Regular transthoracic echo 7/12/2024 revealed normal LV systolic function ejection fraction 65% no significant valvular pathology identified    IMPRESSION:    Zena is a 59-year-old lady whose past medical history significant for rheumatoid  arthritis and Sjogren syndrome who has several active issues:    1.  Palpitations    7-day Zio patch monitor revealed 6 episodes of SVT all asymptomatic, patient events corresponded to normal sinus rhythm, no malignant arrhythmias were identified.    Echo 7/12/2024 reveals a structurally normal heart    From a clinical standpoint, she has had no further palpitations, and she feels back to her normal self.  Clinical monitoring would be indicated at the present time    2.  Blood pressure control    Blood pressures currently running generally low at baseline in the area of 95 to 102 mmHg systolic.  Avoiding diuretics for Ménière's disease and or dehydration would be prudent.    3.  Lipids    8/9/2024: , total cholesterol 255    4.  Orthostasis/vasovagal episodes    By history she had 2 episodes of true syncope in the recent past.  One occurred in the middle of the night when she woke up and changed positions from lying to standing quickly and went down.  Second occurred on a very hot day while standing in line at the state fair.  Both occurred during the time when she was symptomatic with a UTI as well.  Dehydration certainly could have played a role while she was ill with her UTI, in combination with her low baseline blood pressures.    In the context of her structurally unremarkable heart by echo on 8/26/2024, lack of inducible ischemia seen on echo imaging, and absence of malignant arrhythmias seen on Zio patch monitor 7/28/2024 would all support a diagnosis of orthostasis and/or vasovagal syncope.  No further treatment or workup would be currently indicated at the present time    PLAN:    1.  Maintaining adequate hydration, and preparing by aggressively hydrating prior to events occurring in the hot weather, or anticipating future circumstances that would predispose to dehydration would help prevent further episodes in the future    2.  She will follow-up with you in the long run, and I be happy to see her  if any new cardiac issues should arise in the future    Once again, it was a pleasure participating in the care of your patient, Zena Quintana .  Please feel free to contact me at any time if there are any questions regarding her care in the future.      Sincerely,          Mason Magallanes M.D.  Cardiovascular Division  [facility]      Total time:  Including pre-visit chart review, in person face to face visit, post visit charting time as well as time for coordination of care:  55min

## 2024-09-18 ENCOUNTER — OFFICE VISIT (OUTPATIENT)
Dept: CARDIOLOGY | Facility: CLINIC | Age: 59
End: 2024-09-18
Payer: COMMERCIAL

## 2024-09-18 VITALS
DIASTOLIC BLOOD PRESSURE: 71 MMHG | HEIGHT: 65 IN | SYSTOLIC BLOOD PRESSURE: 111 MMHG | OXYGEN SATURATION: 100 % | BODY MASS INDEX: 23.16 KG/M2 | WEIGHT: 139 LBS | HEART RATE: 57 BPM

## 2024-09-18 DIAGNOSIS — R00.2 PALPITATIONS: Primary | ICD-10-CM

## 2024-09-18 DIAGNOSIS — R06.02 SOB (SHORTNESS OF BREATH): ICD-10-CM

## 2024-09-18 DIAGNOSIS — R42 DIZZINESS: ICD-10-CM

## 2024-09-18 PROCEDURE — 99204 OFFICE O/P NEW MOD 45 MIN: CPT | Performed by: INTERNAL MEDICINE

## 2024-09-18 NOTE — PATIENT INSTRUCTIONS
Take your medicines every day, as directed     Changes made today:  No medication changes.        Cardiology Care Coordinators:      Melany DAHL RN     Cardiology Rooming Staff:  Tracey TAYLOR EMT    Phone  [phone]      Fax [phone]    To Contact us     During Business Hours:  [phone]     If you are needing refills please contact your pharmacy.     For urgent after hour care please call the [facility] at [phone] or the [facility] at [phone] and ask to speak to the cardiologist on call.    If you are having a medical emergency, please call 911.            HOW TO CHECK YOUR BLOOD PRESSURE AT HOME:     Avoid eating, smoking, and exercising for at least 30 minutes before taking a reading.     Be sure you have taken your BP medication at least 2-3 hours before you check it.      Sit quietly for 10 minutes before a reading.      Sit in a chair with your feet flat on the floor. Rest your  arm on a table so that the arm cuff is at the same level as your heart.     Remain still during the reading.  Record your blood pressure and pulse in a log and bring to your next appointment.       [employer] allows you to communicate directly with your heart team through secure messaging.  [employer] can be accessed any time on your phone, computer, or tablet.  If you need assistance, we'd be happy to help!             Keep your Heart Appointments:

## 2024-09-18 NOTE — NURSING NOTE
"Chief Complaint   Patient presents with    New Patient     dizziness and palpitations       Initial /71 (BP Location: Right arm, Patient Position: Chair, Cuff Size: Adult Regular)   Pulse 57   Ht 1.651 m (5' 5\")   Wt 63 kg (139 lb)   LMP 07/17/2017 (Approximate)   SpO2 100%   BMI 23.13 kg/m   Estimated body mass index is 23.13 kg/m  as calculated from the following:    Height as of this encounter: 1.651 m (5' 5\").    Weight as of this encounter: 63 kg (139 lb)..  BP completed using cuff size: regular    Tracey LILLY CNA  "

## 2025-01-13 ENCOUNTER — LAB (OUTPATIENT)
Dept: LAB | Facility: OTHER | Age: 60
End: 2025-01-13
Payer: COMMERCIAL

## 2025-01-13 DIAGNOSIS — M05.79 RHEUMATOID ARTHRITIS INVOLVING MULTIPLE SITES WITH POSITIVE RHEUMATOID FACTOR (H): ICD-10-CM

## 2025-01-13 LAB
ALBUMIN SERPL BCG-MCNC: 4.9 G/DL (ref 3.5–5.2)
ALP SERPL-CCNC: 41 U/L (ref 40–150)
ALT SERPL W P-5'-P-CCNC: 22 U/L (ref 0–50)
AST SERPL W P-5'-P-CCNC: 27 U/L (ref 0–45)
BASOPHILS # BLD AUTO: 0.1 10E3/UL (ref 0–0.2)
BASOPHILS NFR BLD AUTO: 1 %
BILIRUB DIRECT SERPL-MCNC: <0.2 MG/DL (ref 0–0.3)
BILIRUB SERPL-MCNC: 0.2 MG/DL
CREAT SERPL-MCNC: 1.05 MG/DL (ref 0.51–0.95)
CRP SERPL-MCNC: <3 MG/L
EGFRCR SERPLBLD CKD-EPI 2021: 61 ML/MIN/1.73M2
EOSINOPHIL # BLD AUTO: 0.3 10E3/UL (ref 0–0.7)
EOSINOPHIL NFR BLD AUTO: 4 %
ERYTHROCYTE [DISTWIDTH] IN BLOOD BY AUTOMATED COUNT: 11.9 % (ref 10–15)
ERYTHROCYTE [SEDIMENTATION RATE] IN BLOOD BY WESTERGREN METHOD: 11 MM/HR (ref 0–30)
HCT VFR BLD AUTO: 38.9 % (ref 35–47)
HGB BLD-MCNC: 13 G/DL (ref 11.7–15.7)
IMM GRANULOCYTES # BLD: 0 10E3/UL
IMM GRANULOCYTES NFR BLD: 0 %
LYMPHOCYTES # BLD AUTO: 2.6 10E3/UL (ref 0.8–5.3)
LYMPHOCYTES NFR BLD AUTO: 35 %
MCH RBC QN AUTO: 30.5 PG (ref 26.5–33)
MCHC RBC AUTO-ENTMCNC: 33.4 G/DL (ref 31.5–36.5)
MCV RBC AUTO: 91 FL (ref 78–100)
MONOCYTES # BLD AUTO: 0.9 10E3/UL (ref 0–1.3)
MONOCYTES NFR BLD AUTO: 12 %
NEUTROPHILS # BLD AUTO: 3.6 10E3/UL (ref 1.6–8.3)
NEUTROPHILS NFR BLD AUTO: 48 %
PLATELET # BLD AUTO: 307 10E3/UL (ref 150–450)
PROT SERPL-MCNC: 7.7 G/DL (ref 6.4–8.3)
RBC # BLD AUTO: 4.26 10E6/UL (ref 3.8–5.2)
WBC # BLD AUTO: 7.3 10E3/UL (ref 4–11)

## 2025-01-13 PROCEDURE — 36415 COLL VENOUS BLD VENIPUNCTURE: CPT

## 2025-01-13 PROCEDURE — 85652 RBC SED RATE AUTOMATED: CPT

## 2025-01-13 PROCEDURE — 85025 COMPLETE CBC W/AUTO DIFF WBC: CPT

## 2025-01-13 PROCEDURE — 80076 HEPATIC FUNCTION PANEL: CPT

## 2025-01-13 PROCEDURE — 82565 ASSAY OF CREATININE: CPT

## 2025-01-13 PROCEDURE — 86140 C-REACTIVE PROTEIN: CPT

## 2025-01-16 ENCOUNTER — OFFICE VISIT (OUTPATIENT)
Dept: RHEUMATOLOGY | Facility: CLINIC | Age: 60
End: 2025-01-16
Payer: COMMERCIAL

## 2025-01-16 VITALS
BODY MASS INDEX: 23.56 KG/M2 | OXYGEN SATURATION: 99 % | DIASTOLIC BLOOD PRESSURE: 66 MMHG | SYSTOLIC BLOOD PRESSURE: 105 MMHG | WEIGHT: 141.6 LBS | HEART RATE: 71 BPM

## 2025-01-16 DIAGNOSIS — M05.79 RHEUMATOID ARTHRITIS INVOLVING MULTIPLE SITES WITH POSITIVE RHEUMATOID FACTOR (H): Primary | ICD-10-CM

## 2025-01-16 DIAGNOSIS — M80.00XA OSTEOPOROSIS WITH CURRENT PATHOLOGICAL FRACTURE, UNSPECIFIED OSTEOPOROSIS TYPE, INITIAL ENCOUNTER: ICD-10-CM

## 2025-01-16 DIAGNOSIS — T45.8X5A ORAL BISPHOSPHONATES CAUSING ADVERSE EFFECT IN THERAPEUTIC USE, INITIAL ENCOUNTER: ICD-10-CM

## 2025-01-16 DIAGNOSIS — L65.9 HAIR LOSS: ICD-10-CM

## 2025-01-16 RX ORDER — HYDROXYCHLOROQUINE SULFATE 200 MG/1
TABLET, FILM COATED ORAL
Qty: 135 TABLET | Refills: 1 | Status: SHIPPED | OUTPATIENT
Start: 2025-01-16

## 2025-01-16 NOTE — NURSING NOTE
RAPID3 (0-30) Cumulative Score  1          RAPID3 Weighted Score (divide #4 by 3 and that is the weighted score)  0.3

## 2025-01-16 NOTE — PROGRESS NOTES
Rheumatology Clinic Visit      Zena Quintana MRN# [id number]   YOB: 1965 Age: 59 year old      Date of visit: 1/16/25   PCP: Dr. Jailene Berman    Chief Complaint   Patient presents with:  RECHECK: RA    Assessment and Plan     1. Seropositive nonerosive rheumatoid arthritis (RF low positive, CCP negative): Reportedly at the time of diagnosis she had synovitis in a symmetric distribution that was steroid responsive. Previously on MTX (effective, anemia, LFT elevation), leflunomide (neutropenia). Currently on hydroxychloroquine 300 mg daily on average (started in approximately 2007, worsened symptoms when reduced to 200mg daily) and adalimumab 40mg SQ every 14 days (started Humira 10/10/2021; insurance required changed to Hyrimoz on 2/7/2024).  Patient has held adalimumab for 6 weeks without worsening arthritis symptoms; she held it because of hair thinning.  Note that she also feels that she had hair thinning associated with alendronate and I question if she has another issue going on so will refer to dermatology for the hair thinning.  Considering that she is not worsening with 6 weeks off of adalimumab, we discussed restarting it or holding it to see if she needs it.  Hold adalimumab for now.  If worsening arthritis symptoms then consider restarting adalimumab, she prefers to try different agent..   Chronic illness    - Continue hydroxychloroquine 200mg daily with an additional 200mg every other day (last eye exam was on 8/2/2023 with Dr. Mirza; yearly eye exam required and she has an appointment scheduled for 2/12/2025)  - Discontinue adalimumab 40 mg SQ every 14 days   - No rheumatology labs prior to 6-month rheumatology follow-up appointment    2. Osteoporosis: s/p hip fx from ground level fall and is now status-post right HUY. Note that her mother has a hx of vertebral compression fx; Ms. Quintana is post-menopausal. 1/13/2020 DEXA showed osteoporosis; L-spine T score of -2.6, Left femoral  neck T-score of -2.4. Alendronate was started 1/2020 but stopped in early April 2020 by Ms. Quintana due to hair loss and GERD.  She then received Reclast on 6/16/2020, 6/17/2021, 6/20/2022, 6/21/2023, 7/16/2024.  Recheck DEXA to determine next step (potentially changed to Prolia, drug holiday, or other).   Chronic illness, stable.    - Continue calcium 1200mg daily  - Continue vitamin D 1000 IU daily  - DEXA ordered; advised that she call to schedule to be performed at the [facility] location in [place]    3.  History of bilateral carpal tunnel syndrome: doesn't tolerate NSAIDs per patient.  Using wrist splints bilaterally with significant improvement, and was intermittent symptoms.  Symptoms then worsened and she had the nerve conduction study performed, showing bilateral carpal tunnel syndrome.  She was then seen by orthopedics and had surgery on the right with resolution of symptoms with did not have surgery on the left; left carpal tunnel syndrome symptoms have improved over time and she is not symptomatic today with regard to carpal tunnel syndrome.     4. Bilateral shoulder issues / rotator cuff pathology: Limited range of motion of the right shoulder because of rotator cuff tear.  S/p surgery.  Historical and not discussed today.    5. Sjogren's syndrome: NJ by EIA negative but positive by IF, SSA and SSB negative, and minor salivary gland biopsy negative. She is currently following with ophthalmology who has given her eyedrops that have been beneficial.  Dry mouth is currently treated with frequent sips of water; she has good oral hygiene, follows with a dentist regularly, and does not have frequent dental caries. Pilocarpine was previously Rx'd but never used and has been removed from her medication list previously.      6. History of oral sores / family history of lupus / polyarthritis / recurrent sinus infections: She is not having oral sores or recurrent sinus infections for several years  now. Retrospectively, there would be concern for potential ANCA associated sinus issues and this could be checked if she had recurrent sinusitis again. Oral sores have improved since she started Plaquenil, that argues it could be connective tissue disease related. NJ was negative by EIA, but complement was on the low end of the normal range so NJ was rechecked and positive; additional testing was negative.    7.  Vaccinations: Vaccinations reviewed with Ms. Quintana.    - Influenza: encouraged yearly vaccination  - [id number]: up to date  - [id number]: up to date  - Shingrix: advised vaccination   - COVID-19: advised keeping update    8.  Hair loss: Patient has felt like hair loss was related to alendronate and later adalimumab.  Not clear that the hair loss is medication related.  Would appreciate dermatology input  - Dermatology referral    Total minutes spent in evaluation with patient, documentation, , and review of pertinent studies and chart notes: 14  The longitudinal plan of care for the rheumatology problem(s) were addressed during this visit.  Due to added complexity of care, we will continue to support the patient and the subsequent management of this condition with ongoing continuity of care.        Ms. Quintana verbalized agreement with and understanding of the rational for the diagnosis and treatment plan.  All questions were answered to best of my ability and the patient's satisfaction. Ms. Quintana was advised to contact the clinic with any questions that may arise after the clinic visit.      Thank you for involving me in the care of the patient    Return to clinic: 6 months      HPI   Zena Qunitana is a 59 year old female with medical history significant for iron deficiency anemia, nephrolithiasis, hyperlipidemia, xerostomia, dry eyes, status-post right HUY, and rheumatoid arthritis who presents for follow-up of rheumatoid arthritis.    7/18/2024:   RA controlled.  No joint pain or  swelling.  No morning stiffness or gelling phenomenon.  Arthritis does not limit daily activities.  Recently had triamterene-hydrochlorothiazide discontinued by her primary care provider because of creatinine elevation with plans to recheck the creatinine in 1 month.  Also having dyspnea on exertion but no shortness of breath at rest; when she has dyspnea on exertion she does not have other associated symptoms such as chest pain, palpitations, lightheadedness, dizziness, nausea, or diaphoresis.  Patient reports history of syncopal episode.  Had an echocardiogram, EKG, and Zio patch; she is awaiting report of the Zio patch.  Recent UTI; was treated with antibiotics and has completed the antibiotics; no UTI symptoms currently.    Today, 1/16/2025: Felt like her hair was thinning so she stopped taking adalimumab in case it was related that she thinks that it may have improved the thinning some.  Has never seen dermatology for the hair loss.  No worsening arthritis symptoms since holding adalimumab.  Occasional joint ache or pain with weather changes but otherwise doing well.    Denies fevers, chills, nausea, vomiting, constipation, diarrhea. No abdominal pain. No chest pain/pressure, palpitations, or shortness of breath currently. No neck pain. No rash. No LE swelling.  No photosensitivity or photophobia.  No sicca symptoms.     Tobacco: None  EtOH: Occasional; no more than 2 drinks per day when she does drink  Drugs: None  Occupation: Works at a school    ROS   12 point review of system was completed and negative except as noted in the HPI     Active Problem List     Patient Active Problem List   Diagnosis    Allergic rhinitis    Stomatitis and mucositis (ulcerative)    Pure hypercholesterolemia    HYPERLIPIDEMIA LDL GOAL <160    High risk medication use    RA (rheumatoid arthritis) (H)    Disturbance of salivary secretion (XEROSTOMIA)    Impingement syndrome of right shoulder    Sjogren's syndrome    Palpitations     Cervical high risk HPV (human papillomavirus) test positive    Osteoporosis with current pathological fracture, unspecified osteoporosis type, initial encounter    Oral bisphosphonates causing adverse effect in therapeutic use, initial encounter    Chronic kidney disease, stage 2 (mild)     Past Medical History     Past Medical History:   Diagnosis Date    Allergic rhinitis, cause unspecified     Allergic rhinitis    Cervical high risk HPV (human papillomavirus) test positive 03/08/2019    see problem list    Endometriosis, site unspecified     Endometriosis    Female infertility of other specified origin     Iron deficiency anemia, unspecified     Anemia, iron def.    PONV (postoperative nausea and vomiting)     RA (rheumatoid arthritis) (H)     Sjogren's syndrome     Stomatitis and mucositis (ulcerative)     chronic - non-specific     Past Surgical History     Past Surgical History:   Procedure Laterality Date    COLONOSCOPY WITH CO2 INSUFFLATION N/A 8/7/2017    Procedure: COLONOSCOPY WITH CO2 INSUFFLATION;  Colonoscopy, Screen for colon cancer.  BMI  22.17  [facility] [place] [phone];  Surgeon: Berny Pedro MD;  Location: [facility] OR    COLONOSCOPY WITH CO2 INSUFFLATION N/A 9/20/2022    Procedure: COLONOSCOPY, WITH CO2 INSUFFLATION;  Surgeon: Berny Pedro MD;  Location: [facility] OR    HC KNEE SCOPE, DIAGNOSTIC       left knee, ruptured ACL    HC REMOVE TONSILS/ADENOIDS,11+ Y/O      T & A 11+y.o.    ORTHOPEDIC SURGERY      rotator cuff repair, left    ZZC NONSPECIFIC PROCEDURE  1995, 1997    laparoscopy for endometrial fulguration    ZZHC COLONOSCOPY W/WO BRUSH/WASH  5/25/2005    ZZ CREATE EARDRUM OPENING,GEN ANESTH      P.E. Tubes     Allergy     Allergies   Allergen Reactions    Penicillins Hives     hives    PN: LW Reaction: HIVES    Alendronate Other (See Comments)     Hair loss    Contrast Dye      PN: LW CM1: >>> NO CONTRAST ADVERSE REACTION <<<     Reaction :    Dust Mites     Mold      Current Medication  List     Current Outpatient Medications   Medication Sig Dispense Refill    Adalimumab-adaz (HYRIMOZ) 40 MG/0.4ML SOAJ Inject 40 mg subcutaneously every 14 days .  Hold for infection and seek medical attention 0.8 mL 6    hydroxychloroquine (PLAQUENIL) 200 MG tablet Hydroxychloroquine 200mg daily; and an additional 200mg every other day. 135 tablet 2    aspirin (ASA) 325 MG tablet Take 325 mg by mouth daily      bimatoprost (LATISSE) 0.03 % SOLN Externally apply topically At Bedtime 1 Bottle 3    calcium carbonate (OS-VALERIA) 600 MG tablet Take 2 tablets by mouth daily      fluticasone (FLONASE) 50 MCG/ACT spray Spray 2 sprays into both nostrils daily 1 Bottle 0    loratadine 10 MG capsule Take 10 mg by mouth as needed for allergies      MULTIPLE VITAMINS/WOMENS OR None Entered      zinc sulfate (ZINCATE) 220 (50 Zn) MG capsule Take 1 capsule by mouth daily       No current facility-administered medications for this visit.         Social History   See HPI    Family History     Family History   Problem Relation Age of Onset    Osteoporosis Mother     Arthritis Mother         lupus    Connective Tissue Disorder Mother         Lupus    Cataracts Mother     Macular Degeneration Mother     Celiac Disease Mother     Heart Disease Father         aortic aneurysm    Gastrointestinal Disease Father         abdominal anurysm    Hypertension Father     Cataracts Father     Other - See Comments Maternal Grandmother         atherosclerosis heart disease    Alzheimer Disease Maternal Grandmother     Osteoporosis Maternal Grandmother     Cerebrovascular Disease Maternal Grandfather     Cancer Paternal Grandmother     Cerebrovascular Disease Paternal Grandfather     Gastrointestinal Disease Sister         IBS, colon problems    Circulatory Sister         carotid stenosis    Cancer - colorectal Maternal Uncle     Cancer - colorectal Maternal Uncle     Glaucoma No family hx of      Mother: Lupus    Physical Exam     Temp Readings from  "Last 3 Encounters:   09/11/24 97.8  F (36.6  C) (Temporal)   07/17/24 98.3  F (36.8  C) (Temporal)   07/16/24 98.1  F (36.7  C)     BP Readings from Last 5 Encounters:   01/16/25 105/66   09/18/24 111/71   09/11/24 104/62   07/18/24 91/65   07/17/24 100/62     Pulse Readings from Last 1 Encounters:   01/16/25 71     Resp Readings from Last 1 Encounters:   09/11/24 12     Estimated body mass index is 23.56 kg/m  as calculated from the following:    Height as of 9/18/24: 1.651 m (5' 5\").    Weight as of this encounter: 64.2 kg (141 lb 9.6 oz).    GEN: NAD.   HEENT:  Anicteric, noninjected sclera. No obvious external lesions of the ear and nose. Hearing intact.  PULM: No increased work of breathing.   MSK: MCPs, PIPs, DIPs without swelling or tenderness to palpation.  Wrists without swelling or tenderness to palpation.  Elbows and shoulders without swelling or tenderness to palpation.  Knees and ankles without swelling or tenderness to palpation.  Negative MTP squeeze.  SKIN: No rash or jaundice seen  PSYCH: Alert. Appropriate.      Labs / Imaging (select studies)     CBC  Recent Labs   Lab Test 01/13/25  1514 07/16/24  1008 07/09/24  1059 09/02/21  0857 06/02/21  1508 02/24/21  1521 12/10/20  0951 09/22/20  1517   WBC 7.3 4.9 11.4*   < > 4.7 5.3   < > 5.7   RBC 4.26 3.95 3.96   < > 3.90 3.89   < > 3.78*   HGB 13.0 11.7 11.8   < > 11.7 11.6*   < > 11.5*   HCT 38.9 35.7 36.0   < > 36.4 36.5   < > 35.0   MCV 91 90 91   < > 93 94   < > 93   RDW 11.9 12.2 12.1   < > 13.0 12.4   < > 12.2    310 274   < > 308 309   < > 284   MCH 30.5 29.6 29.8   < > 30.0 29.8   < > 30.4   MCHC 33.4 32.8 32.8   < > 32.1 31.8   < > 32.9   NEUTROPHIL 48 49 83   < > 55.2 52.6  --  54.2   LYMPH 35 34 9   < > 24.5 26.4  --  26.7   MONOCYTE 12 12 7   < > 16.7 16.8  --  15.6   EOSINOPHIL 4 3 1   < > 3.0 3.6  --  3.0   BASOPHIL 1 1 1   < > 0.6 0.6  --  0.5   ANEU  --   --   --   --  2.6 2.8  --  3.1   ALYM  --   --   --   --  1.2 1.4  --  1.5 "   ELIGIO  --   --   --   --  0.8 0.9  --  0.9   AEOS  --   --   --   --  0.1 0.2  --  0.2   ABAS  --   --   --   --  0.0 0.0  --  0.0   ANEUTAUTO 3.6 2.4 9.4*   < >  --   --   --   --    ALYMPAUTO 2.6 1.7 1.0   < >  --   --   --   --    AMONOAUTO 0.9 0.6 0.8   < >  --   --   --   --    AEOSAUTO 0.3 0.2 0.1   < >  --   --   --   --    ABSBASO 0.1 0.1 0.1   < >  --   --   --   --     < > = values in this interval not displayed.     CMP  Recent Labs   Lab Test 01/13/25  1514 09/11/24  1630 08/09/24  0952 07/18/24  1038 07/16/24  1008 07/09/24  1059 09/02/21  0857 06/17/21  1158 06/02/21  1508 04/06/21  1518 02/24/21  1521   NA  --  139 138  --   --  135  --   --   --   --   --    POTASSIUM  --  4.0 4.4  --   --  3.6  --   --   --   --   --    CHLORIDE  --  103 102  --   --  98  --   --   --   --   --    CO2  --  22 25  --   --  27  --   --   --   --   --    ANIONGAP  --  14 11  --   --  10  --   --   --   --   --    GLC  --  78 85  --   --  97  --   --   --   --   --    BUN  --  22.2 14.2  --   --  17.3  --   --   --   --   --    CR 1.05* 1.01* 1.18*   < > 1.29* 1.16*   < > 0.90 0.90  --  0.72   GFRESTIMATED 61 63 53*   < > 47* 54*   < > 71 71  --  >90   GFRESTBLACK  --   --   --   --   --   --   --  82 83  --  >90   VALERIA  --  9.2 9.4  --  9.9 9.8   < > 8.7 9.5  --   --    BILITOTAL 0.2  --   --   --  0.4 0.3   < >  --  0.3  --  0.2   ALBUMIN 4.9  --   --   --  4.7 4.6   < >  --  4.4  --  4.1   PROTTOTAL 7.7  --   --   --  8.1 7.5   < >  --  7.4  --  7.2   ALKPHOS 41  --   --   --  35* 34*   < >  --  46  --  57   AST 27  --   --   --  39 30   < >  --  27   < > 76*   ALT 22  --   --   --  15 11   < >  --  22   < > 31    < > = values in this interval not displayed.     Calcium/VitaminD  Recent Labs   Lab Test 09/11/24  1630 08/09/24  0952 07/16/24  1008 06/21/23  1202 01/23/23  1531 06/20/22  1139 05/02/22  1526   VALERIA 9.2 9.4 9.9   < > 9.9   < > 9.7   VITDT  --   --  59*  --  70  --  59    < > = values in this interval not  displayed.     ESR/CRP  Recent Labs   Lab Test 01/13/25  1514 07/18/24  1038 07/16/24  1008 01/23/23  1531 05/02/22  1526 01/10/22  1526 09/02/21  0857   SED 11 10 2   < > 7 9 6   CRP  --   --   --   --  <2.9 <2.9 <2.9   CRPI <3.00 <3.00 <3.00   < >  --   --   --     < > = values in this interval not displayed.     Lipid Panel  Recent Labs   Lab Test 08/09/24  0952 07/16/24  1008 07/18/22  1046   CHOL 255* 256* 212*   TRIG 90 66 76   HDL 91 96 100   * 147* 97   NHDL 164* 160* 112     Hepatitis B  Recent Labs   Lab Test 09/04/19  1552   HBCAB Nonreactive   HEPBANG Nonreactive     Hepatitis C  Recent Labs   Lab Test 06/20/17  1437   HCVAB Nonreactive   Assay performance characteristics have not been established for newborns,   infants, and children         Tuberculosis Screening  Recent Labs   Lab Test 07/16/24  1008 01/23/23  1531 09/09/21  1502   TBRES Negative Negative Negative       Immunization History     Immunization History   Administered Date(s) Administered    COVID-19 Monovalent 18+ (Moderna) 02/03/2021, 03/03/2021, 12/07/2021, 06/26/2022    Influenza (H1N1) 12/03/2009    Influenza (IIV3) PF 11/17/2005, 11/17/2006, 10/25/2010, 01/09/2013    Influenza (prior to 2024) 10/12/2019    Influenza Vaccine 18-64 (Flublok) 01/25/2023    Influenza Vaccine >6 months,quad, PF 10/10/2013, 09/14/2016, 09/13/2017, 01/10/2019, 10/18/2021, 01/17/2024    Influenza Vaccine Trivalent (FluBlok) 09/11/2024    Influenza,INJ,MDCK,PF,Quad >6mo(Flucelvax) 10/16/2020    Pneumo Conj 13-V (2010&after) 09/13/2017    Pneumococcal 23 valent 09/12/2018    TD,PF 7+ (Tenivac) 02/01/2005    TDAP (Adacel,Boostrix) 08/24/2022    TDAP Vaccine (Adacel) 01/09/2013          Chart documentation done in part with Dragon Voice recognition Software. Although reviewed after completion, some word and grammatical error may remain.    Rome Hardy MD

## 2025-01-22 ENCOUNTER — ANCILLARY PROCEDURE (OUTPATIENT)
Dept: BONE DENSITY | Facility: CLINIC | Age: 60
End: 2025-01-22
Attending: INTERNAL MEDICINE
Payer: COMMERCIAL

## 2025-01-22 DIAGNOSIS — M80.00XA OSTEOPOROSIS WITH CURRENT PATHOLOGICAL FRACTURE, UNSPECIFIED OSTEOPOROSIS TYPE, INITIAL ENCOUNTER: ICD-10-CM

## 2025-01-22 DIAGNOSIS — T45.8X5A ORAL BISPHOSPHONATES CAUSING ADVERSE EFFECT IN THERAPEUTIC USE, INITIAL ENCOUNTER: ICD-10-CM

## 2025-01-22 DIAGNOSIS — M05.79 RHEUMATOID ARTHRITIS INVOLVING MULTIPLE SITES WITH POSITIVE RHEUMATOID FACTOR (H): ICD-10-CM

## 2025-01-22 PROCEDURE — 77080 DXA BONE DENSITY AXIAL: CPT | Performed by: RADIOLOGY

## 2025-02-12 ENCOUNTER — OFFICE VISIT (OUTPATIENT)
Dept: OPHTHALMOLOGY | Facility: CLINIC | Age: 60
End: 2025-02-12
Payer: COMMERCIAL

## 2025-02-12 DIAGNOSIS — M05.79 RHEUMATOID ARTHRITIS INVOLVING MULTIPLE SITES WITH POSITIVE RHEUMATOID FACTOR (H): ICD-10-CM

## 2025-02-12 DIAGNOSIS — Z79.899 HIGH RISK MEDICATION USE: Primary | ICD-10-CM

## 2025-02-12 DIAGNOSIS — H04.123 DRY EYE SYNDROME, BILATERAL: ICD-10-CM

## 2025-02-12 PROCEDURE — 92012 INTRM OPH EXAM EST PATIENT: CPT | Performed by: STUDENT IN AN ORGANIZED HEALTH CARE EDUCATION/TRAINING PROGRAM

## 2025-02-12 PROCEDURE — 92083 EXTENDED VISUAL FIELD XM: CPT | Performed by: STUDENT IN AN ORGANIZED HEALTH CARE EDUCATION/TRAINING PROGRAM

## 2025-02-12 PROCEDURE — 92134 CPTRZ OPH DX IMG PST SGM RTA: CPT | Performed by: STUDENT IN AN ORGANIZED HEALTH CARE EDUCATION/TRAINING PROGRAM

## 2025-02-12 ASSESSMENT — VISUAL ACUITY
OS_CC: 20/25
CORRECTION_TYPE: CONTACTS
OS_CC+: -2
OD_CC+: -1
OD_CC: 20/25
METHOD: SNELLEN - LINEAR

## 2025-02-12 ASSESSMENT — EXTERNAL EXAM - RIGHT EYE: OD_EXAM: NORMAL

## 2025-02-12 ASSESSMENT — EXTERNAL EXAM - LEFT EYE: OS_EXAM: NORMAL

## 2025-02-12 ASSESSMENT — SLIT LAMP EXAM - LIDS
COMMENTS: 1+ UPPER LID DERMATOCHALASIS
COMMENTS: 1+ UPPER LID DERMATOCHALASIS

## 2025-02-12 NOTE — LETTER
2/12/2025      Zena Quintana  [address] [facility] [address]      Dear Colleague,    Thank you for referring your patient, Zena Quintana, to the [facility]. Please see a copy of my visit note below.     Current Eye Medications:  none.       Subjective:  Dr. Hardy recommended a Plaquenil visual field and OCT with Dr. Mirza.  No vision changes or concerns.   Last eye exam was a couple months ago at [facility].     Objective:  See Ophthalmology Exam.       Assessment:  Zena Quintana is a 59 year old female who presents with:   Encounter Diagnoses   Name Primary?     High risk medication use Plaquenil since 2007.      Macular SD-OCT within normal limits both eyes.     Moe visual field (HVF) 10-2 within normal limits both eyes (mild scattered loss both eyes)     No signs of Plaquenil retinal toxicity at present.      Rheumatoid arthritis involving multiple sites with positive rheumatoid factor (H)      Dry eye syndrome, bilateral        Plan:  Normal Plaquenil eye tests today.    Ladonna Mirza MD  [phone]      Again, thank you for allowing me to participate in the care of your patient.        Sincerely,        Ladonna Mirza MD    Electronically signed

## 2025-02-12 NOTE — PROGRESS NOTES
Current Eye Medications:  none.       Subjective:  Dr. Hardy recommended a Plaquenil visual field and OCT with Dr. Mirza.  No vision changes or concerns.   Last eye exam was a couple months ago at [facility].     Objective:  See Ophthalmology Exam.       Assessment:  Zena Quintana is a 59 year old female who presents with:   Encounter Diagnoses   Name Primary?    High risk medication use Plaquenil since 2007.      Macular SD-OCT within normal limits both eyes.     Moe visual field (HVF) 10-2 within normal limits both eyes (mild scattered loss both eyes)     No signs of Plaquenil retinal toxicity at present.     Rheumatoid arthritis involving multiple sites with positive rheumatoid factor (H)     Dry eye syndrome, bilateral        Plan:  Normal Plaquenil eye tests today.    Ladonna Mirza MD  [phone]

## 2025-06-17 ENCOUNTER — PATIENT OUTREACH (OUTPATIENT)
Dept: CARE COORDINATION | Facility: CLINIC | Age: 60
End: 2025-06-17
Payer: COMMERCIAL

## 2025-07-01 ENCOUNTER — PATIENT OUTREACH (OUTPATIENT)
Dept: CARE COORDINATION | Facility: CLINIC | Age: 60
End: 2025-07-01
Payer: COMMERCIAL

## 2025-07-21 ENCOUNTER — OFFICE VISIT (OUTPATIENT)
Dept: RHEUMATOLOGY | Facility: CLINIC | Age: 60
End: 2025-07-21
Payer: COMMERCIAL

## 2025-07-21 VITALS
DIASTOLIC BLOOD PRESSURE: 66 MMHG | OXYGEN SATURATION: 100 % | RESPIRATION RATE: 12 BRPM | WEIGHT: 146 LBS | SYSTOLIC BLOOD PRESSURE: 97 MMHG | HEART RATE: 65 BPM | BODY MASS INDEX: 24.3 KG/M2

## 2025-07-21 DIAGNOSIS — M80.00XA OSTEOPOROSIS WITH CURRENT PATHOLOGICAL FRACTURE, UNSPECIFIED OSTEOPOROSIS TYPE, INITIAL ENCOUNTER: ICD-10-CM

## 2025-07-21 DIAGNOSIS — M05.79 RHEUMATOID ARTHRITIS INVOLVING MULTIPLE SITES WITH POSITIVE RHEUMATOID FACTOR (H): Primary | ICD-10-CM

## 2025-07-21 PROCEDURE — 3078F DIAST BP <80 MM HG: CPT | Performed by: INTERNAL MEDICINE

## 2025-07-21 PROCEDURE — 99214 OFFICE O/P EST MOD 30 MIN: CPT | Performed by: INTERNAL MEDICINE

## 2025-07-21 PROCEDURE — 3074F SYST BP LT 130 MM HG: CPT | Performed by: INTERNAL MEDICINE

## 2025-07-21 PROCEDURE — G2211 COMPLEX E/M VISIT ADD ON: HCPCS | Performed by: INTERNAL MEDICINE

## 2025-07-21 RX ORDER — HYDROXYCHLOROQUINE SULFATE 200 MG/1
TABLET, FILM COATED ORAL
Qty: 135 TABLET | Refills: 2 | Status: SHIPPED | OUTPATIENT
Start: 2025-07-21

## 2025-07-21 NOTE — PROGRESS NOTES
Rheumatology Clinic Visit      Zena Quintana MRN# [id number]   YOB: 1965 Age: 60 year old      Date of visit: 7/21/25   PCP: Dr. Jailene Berman    Chief Complaint   Patient presents with:  Rheumatoid Arthritis    Assessment and Plan     1. Seropositive nonerosive rheumatoid arthritis (RF low positive, CCP negative): Reportedly at the time of diagnosis she had synovitis in a symmetric distribution that was steroid responsive. Previously on MTX (effective, anemia, LFT elevation), leflunomide (neutropenia), adalimumab 40mg SQ every 14 days (started Humira 10/10/2021; insurance required changed to Hyrimoz on 2/7/2024; effective for arthritis).   Currently on hydroxychloroquine 300 mg daily on average (started in approximately 2007, worsened symptoms when reduced to 200mg daily in the past).  Currently doing well with regard to arthritis.  Chronic illness  - Continue hydroxychloroquine 200mg daily with an additional 200mg every other day (last eye exam was on 8/2/2023 with Dr. Mirza; yearly eye exam required and she has an appointment scheduled for 2/12/2025)  - Labs in 6 months: CBC, Creatinine, Hepatic Panel, ESR, CRP      2. Osteoporosis: s/p hip fx from ground level fall and is now status-post right HUY. Note that her mother has a hx of vertebral compression fx. 1/13/2020 DEXA showed osteoporosis; L-spine T score of -2.6, Left femoral neck T-score of -2.4. Alendronate was started 1/2020 but stopped in early April 2020 by Ms. Quintana due to hair loss and GERD.  She then received Reclast on 6/16/2020, 6/17/2021, 6/20/2022, 6/21/2023, 7/16/2024.  1/22/2025 DEXA showed a lumbar spine T-score -2.7, right femoral neck T-score -2.4, and radius T-score -0.8.  Patient would like to have a drug holiday at this time; risks of treating versus risks of not treating reviewed.  Joint decision making to remain off osteoporosis medication for now, with plans to recheck DEXA on or after 1/22/2027.   Chronic illness.     - Continue calcium 1200mg daily  - Continue vitamin D 1000 IU daily    3.  History of bilateral carpal tunnel syndrome: doesn't tolerate NSAIDs per patient.  Using wrist splints bilaterally with significant improvement, and was intermittent symptoms.  Symptoms then worsened and she had the nerve conduction study performed, showing bilateral carpal tunnel syndrome.  She was then seen by orthopedics and had surgery on the right with resolution of symptoms with did not have surgery on the left; left carpal tunnel syndrome symptoms have improved over time and she is not symptomatic today with regard to carpal tunnel syndrome.     4. Bilateral shoulder issues / rotator cuff pathology: Limited range of motion of the right shoulder because of rotator cuff tear.  S/p surgery.  Historical and not discussed today.    5. Sjogren's syndrome: NJ by EIA negative but positive by IF, SSA and SSB negative, and minor salivary gland biopsy negative. She is currently following with ophthalmology who has given her eyedrops that have been beneficial.  Dry mouth is currently treated with frequent sips of water; she has good oral hygiene, follows with a dentist regularly, and does not have frequent dental caries. Pilocarpine was previously Rx'd but never used and has been removed from her medication list previously.      6. History of oral sores / family history of lupus / polyarthritis / recurrent sinus infections: She is not having oral sores or recurrent sinus infections for several years now. Retrospectively, there would be concern for potential ANCA associated sinus issues and this could be checked if she had recurrent sinusitis again. Oral sores have improved since she started Plaquenil, that argues it could be connective tissue disease related. NJ was negative by EIA, but complement was on the low end of the normal range so NJ was rechecked and positive; additional testing was negative.    7.  Vaccinations: Vaccinations  reviewed with Ms. Quintana.    - Influenza: encouraged yearly vaccination  - [id number]: up to date  - [id number]: up to date  - Shingrix: advised vaccination   - COVID-19: advised keeping update    8.  Hair loss: Patient has felt like hair loss was related to alendronate and later adalimumab; both of these had been stopped and hair thinning has improved.  She did not end up meeting with dermatology yet.  Not clear that the hair loss was actually medication related.    9.  Foot cramps that only occur in the winter: Possibly related to reduced physical activity?  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  Symptoms described by the patient suggest possible plantar fasciitis during that time.  Reevaluate in wintertime.  Not an issue currently.    Total minutes spent in evaluation with patient, documentation, , and review of pertinent studies and chart notes: 20  The longitudinal plan of care for the rheumatology problem(s) were addressed during this visit.  Due to added complexity of care, we will continue to support the patient and the subsequent management of this condition with ongoing continuity of care.      Ms. Quintana verbalized agreement with and understanding of the rational for the diagnosis and treatment plan.  All questions were answered to best of my ability and the patient's satisfaction. Ms. Quintana was advised to contact the clinic with any questions that may arise after the clinic visit.      Thank you for involving me in the care of the patient    Return to clinic: 6 months      HPI   Zena Quintana is a 60 year old female with medical history significant for iron deficiency anemia, nephrolithiasis, hyperlipidemia, xerostomia, dry eyes, status-post right HUY, and rheumatoid arthritis who presents for follow-up of rheumatoid arthritis.    7/18/2024:   RA controlled.  No joint pain or swelling.  No morning stiffness or gelling phenomenon.  Arthritis  does not limit daily activities.  Recently had triamterene-hydrochlorothiazide discontinued by her primary care provider because of creatinine elevation with plans to recheck the creatinine in 1 month.  Also having dyspnea on exertion but no shortness of breath at rest; when she has dyspnea on exertion she does not have other associated symptoms such as chest pain, palpitations, lightheadedness, dizziness, nausea, or diaphoresis.  Patient reports history of syncopal episode.  Had an echocardiogram, EKG, and Zio patch; she is awaiting report of the Zio patch.  Recent UTI; was treated with antibiotics and has completed the antibiotics; no UTI symptoms currently.    1/16/2025: Felt like her hair was thinning so she stopped taking adalimumab in case it was related that she thinks that it may have improved the thinning some.  Has never seen dermatology for the hair loss.  No worsening arthritis symptoms since holding adalimumab.  Occasional joint ache or pain with weather changes but otherwise doing well.    Today, 7/21/2025: Hair thinning has improved and she never met with dermatology; therefore she does not plan to meet with dermatology at this point.  Joint pain controlled.  No worsening arthritis symptoms with a longer duration off of adalimumab.  Does not wish to reduce hydroxychloroquine because historically has have had worsening arthritis symptoms when it was reduced previously.  No joint swelling.  Arthritis not limiting daily activities.  She does note having foot cramps in the winter but not in the summer    Denies fevers, chills, nausea, vomiting, constipation, diarrhea. No abdominal pain. No chest pain/pressure, palpitations, or shortness of breath currently. No neck pain. No rash. No LE swelling.  No photosensitivity or photophobia.  No sicca symptoms.     Tobacco: None  EtOH: Occasional; no more than 2 drinks per day when she does drink  Drugs: None  Occupation: Works at a school    ROS   12 point review  of system was completed and negative except as noted in the HPI     Active Problem List     Patient Active Problem List   Diagnosis    Allergic rhinitis    Stomatitis and mucositis (ulcerative)    Pure hypercholesterolemia    HYPERLIPIDEMIA LDL GOAL <160    High risk medication use    RA (rheumatoid arthritis) (H)    Disturbance of salivary secretion (XEROSTOMIA)    Impingement syndrome of right shoulder    Sjogren's syndrome    Palpitations    Cervical high risk HPV (human papillomavirus) test positive    Osteoporosis with current pathological fracture, unspecified osteoporosis type, initial encounter    Oral bisphosphonates causing adverse effect in therapeutic use, initial encounter    Chronic kidney disease, stage 2 (mild)     Past Medical History     Past Medical History:   Diagnosis Date    Allergic rhinitis, cause unspecified     Allergic rhinitis    Cervical high risk HPV (human papillomavirus) test positive 03/08/2019    see problem list    Endometriosis, site unspecified     Endometriosis    Female infertility of other specified origin     Iron deficiency anemia, unspecified     Anemia, iron def.    PONV (postoperative nausea and vomiting)     RA (rheumatoid arthritis) (H)     Sjogren's syndrome     Stomatitis and mucositis (ulcerative)     chronic - non-specific     Past Surgical History     Past Surgical History:   Procedure Laterality Date    COLONOSCOPY WITH CO2 INSUFFLATION N/A 8/7/2017    Procedure: COLONOSCOPY WITH CO2 INSUFFLATION;  Colonoscopy, Screen for colon cancer.  BMI  22.17  [facility] [place] [phone];  Surgeon: Berny Pedro MD;  Location: [facility] OR    COLONOSCOPY WITH CO2 INSUFFLATION N/A 9/20/2022    Procedure: COLONOSCOPY, WITH CO2 INSUFFLATION;  Surgeon: Berny Pedro MD;  Location: [facility] OR    HC KNEE SCOPE, DIAGNOSTIC       left knee, ruptured ACL    HC REMOVE TONSILS/ADENOIDS,11+ Y/O      T & A 11+y.o.    ORTHOPEDIC SURGERY      rotator cuff repair, left    ZZC NONSPECIFIC  PROCEDURE  1995, 1997    laparoscopy for endometrial fulguration    [facility]  [facility]    ZZ CREATE EARDRUM OPENING,GEN ANESTH      P.E. Tubes     Allergy     Allergies   Allergen Reactions    Penicillins Hives     hives    PN: LW Reaction: HIVES    Alendronate Other (See Comments)     Hair loss    Contrast Dye      PN: LW CM1: >>> NO CONTRAST ADVERSE REACTION <<<     Reaction :    Dust Mites     Mold      Current Medication List     Current Outpatient Medications   Medication Sig Dispense Refill    aspirin (ASA) 325 MG tablet Take 325 mg by mouth daily      bimatoprost (LATISSE) 0.03 % SOLN Externally apply topically At Bedtime 1 Bottle 3    calcium carbonate (OS-VALERIA) 600 MG tablet Take 2 tablets by mouth daily      fluticasone (FLONASE) 50 MCG/ACT spray Spray 2 sprays into both nostrils daily 1 Bottle 0    hydroxychloroquine (PLAQUENIL) 200 MG tablet Hydroxychloroquine 200mg daily; and an additional 200mg every other day. 135 tablet 1    loratadine 10 MG capsule Take 10 mg by mouth as needed for allergies      MULTIPLE VITAMINS/WOMENS OR None Entered      zinc sulfate (ZINCATE) 220 (50 Zn) MG capsule Take 1 capsule by mouth daily       No current facility-administered medications for this visit.         Social History   See HPI    Family History     Family History   Problem Relation Age of Onset    Osteoporosis Mother     Arthritis Mother         lupus    Connective Tissue Disorder Mother         Lupus    Cataracts Mother     Macular Degeneration Mother     Celiac Disease Mother     Heart Disease Father         aortic aneurysm    Gastrointestinal Disease Father         abdominal anurysm    Hypertension Father     Cataracts Father     Other - See Comments Maternal Grandmother         atherosclerosis heart disease    Alzheimer Disease Maternal Grandmother     Osteoporosis Maternal Grandmother     Cerebrovascular Disease Maternal Grandfather     Cancer Paternal Grandmother      "Cerebrovascular Disease Paternal Grandfather     Gastrointestinal Disease Sister         IBS, colon problems    Circulatory Sister         carotid stenosis    Cancer - colorectal Maternal Uncle     Cancer - colorectal Maternal Uncle     Glaucoma No family hx of      Mother: Lupus    Physical Exam     Temp Readings from Last 3 Encounters:   09/11/24 97.8  F (36.6  C) (Temporal)   07/17/24 98.3  F (36.8  C) (Temporal)   07/16/24 98.1  F (36.7  C)     BP Readings from Last 5 Encounters:   01/16/25 105/66   09/18/24 111/71   09/11/24 104/62   07/18/24 91/65   07/17/24 100/62     Pulse Readings from Last 1 Encounters:   01/16/25 71     Resp Readings from Last 1 Encounters:   09/11/24 12     Estimated body mass index is 23.56 kg/m  as calculated from the following:    Height as of 9/18/24: 1.651 m (5' 5\").    Weight as of 1/16/25: 64.2 kg (141 lb 9.6 oz).    GEN: NAD. Healthy appearing adult.   HEENT:  Anicteric, noninjected sclera. No obvious external lesions of the ear and nose. Hearing intact.  CV: S1, S2. RRR. No m/r/g  PULM: No increased work of breathing. CTA bilaterally   MSK: MCPs, PIPs, DIPs without swelling or tenderness to palpation.  Wrists without swelling or tenderness to palpation.  Elbows and shoulders without swelling or tenderness to palpation.  Knees, ankles, and MTPs without swelling or tenderness to palpation.    SKIN: No rash or jaundice seen  PSYCH: Alert. Appropriate.       Labs / Imaging (select studies)     CBC  Recent Labs   Lab Test 01/13/25  1514 07/16/24  1008 07/09/24  1059   WBC 7.3 4.9 11.4*   RBC 4.26 3.95 3.96   HGB 13.0 11.7 11.8   HCT 38.9 35.7 36.0   MCV 91 90 91   RDW 11.9 12.2 12.1    310 274   MCH 30.5 29.6 29.8   MCHC 33.4 32.8 32.8   NEUTROPHIL 48 49 83   LYMPH 35 34 9   MONOCYTE 12 12 7   EOSINOPHIL 4 3 1   BASOPHIL 1 1 1   ANEU 3.6 2.4 9.4*   ALYM 2.6 1.7 1.0   ELIGIO 0.9 0.6 0.8   AEOS 0.3 0.2 0.1   ABAS 0.1 0.1 0.1     CMP  Recent Labs   Lab Test 01/13/25  1514 " 09/11/24  1630 08/09/24  0952 07/18/24  1038 07/16/24  1008 07/09/24  1059 09/02/21  0857 06/17/21  1158 06/02/21  1508 04/06/21  1518 02/24/21  1521   NA  --  139 138  --   --  135  --   --   --   --   --    POTASSIUM  --  4.0 4.4  --   --  3.6  --   --   --   --   --    CHLORIDE  --  103 102  --   --  98  --   --   --   --   --    CO2  --  22 25  --   --  27  --   --   --   --   --    ANIONGAP  --  14 11  --   --  10  --   --   --   --   --    GLC  --  78 85  --   --  97  --   --   --   --   --    BUN  --  22.2 14.2  --   --  17.3  --   --   --   --   --    CR 1.05* 1.01* 1.18*   < > 1.29* 1.16*   < > 0.90 0.90  --  0.72   GFRESTIMATED 61 63 53*   < > 47* 54*   < > 71 71  --  >90   GFRESTBLACK  --   --   --   --   --   --   --  82 83  --  >90   VALERIA  --  9.2 9.4  --  9.9 9.8   < > 8.7 9.5  --   --    BILITOTAL 0.2  --   --   --  0.4 0.3   < >  --  0.3  --  0.2   ALBUMIN 4.9  --   --   --  4.7 4.6   < >  --  4.4  --  4.1   PROTTOTAL 7.7  --   --   --  8.1 7.5   < >  --  7.4  --  7.2   ALKPHOS 41  --   --   --  35* 34*   < >  --  46  --  57   AST 27  --   --   --  39 30   < >  --  27   < > 76*   ALT 22  --   --   --  15 11   < >  --  22   < > 31    < > = values in this interval not displayed.     Calcium/VitaminD  Recent Labs   Lab Test 09/11/24  1630 08/09/24  0952 07/16/24  1008 06/21/23  1202 01/23/23  1531 06/20/22  1139 05/02/22  1526   VALERIA 9.2 9.4 9.9   < > 9.9   < > 9.7   VITDT  --   --  59*  --  70  --  59    < > = values in this interval not displayed.     ESR/CRP  Recent Labs   Lab Test 01/13/25  1514 07/18/24  1038 07/16/24  1008 01/23/23  1531 05/02/22  1526 01/10/22  1526 09/02/21  0857   SED 11 10 2   < > 7 9 6   CRP  --   --   --   --  <2.9 <2.9 <2.9   CRPI <3.00 <3.00 <3.00   < >  --   --   --     < > = values in this interval not displayed.     Hepatitis B  Recent Labs   Lab Test 09/04/19  1552   HBCAB Nonreactive   HEPBANG Nonreactive     Hepatitis C  Recent Labs   Lab Test 06/20/17  1437   HCVAB  Nonreactive   Assay performance characteristics have not been established for newborns,   infants, and children       Tuberculosis Screening  Recent Labs   Lab Test 07/16/24  1008 01/23/23  1531 09/09/21  1502   TBRES Negative Negative Negative     Immunization History     Immunization History   Administered Date(s) Administered    COVID-19 Monovalent 18+ (Moderna) 02/03/2021, 03/03/2021, 12/07/2021, 06/26/2022    Influenza (H1N1) 12/03/2009    Influenza (IIV3) PF 11/17/2005, 11/17/2006, 10/25/2010, 01/09/2013    Influenza (prior to 2024) 10/12/2019    Influenza Vaccine 18-64 (Flublok) 01/25/2023    Influenza Vaccine >6 months,quad, PF 10/10/2013, 09/14/2016, 09/13/2017, 01/10/2019, 10/18/2021, 01/17/2024    Influenza Vaccine Trivalent (FluBlok) 09/11/2024    Influenza,INJ,MDCK,PF,Quad >6mo(Flucelvax) 10/16/2020    Pneumo Conj 13-V (2010&after) 09/13/2017    Pneumococcal 23 valent 09/12/2018    TD,PF 7+ (Tenivac) 02/01/2005    TDAP (Adacel,Boostrix) 08/24/2022    TDAP Vaccine (Adacel) 01/09/2013          Chart documentation done in part with Dragon Voice recognition Software. Although reviewed after completion, some word and grammatical error may remain.    Rome Hardy MD

## 2025-07-21 NOTE — PATIENT INSTRUCTIONS
RHEUMATOLOGY    [facility] [place]  [address] [place] [address]  [place] [address]    Phone number: [phone]  Fax number: [phone]    If you need a medication refill, please contact us as you may need lab work and/or a follow up visit prior to your refill.      Thank you for choosing [facility]!    Genesis Medley CMA Rheumatology

## 2025-07-22 ENCOUNTER — OFFICE VISIT (OUTPATIENT)
Dept: FAMILY MEDICINE | Facility: CLINIC | Age: 60
End: 2025-07-22
Attending: FAMILY MEDICINE
Payer: COMMERCIAL

## 2025-07-22 VITALS
RESPIRATION RATE: 11 BRPM | HEART RATE: 72 BPM | WEIGHT: 143.6 LBS | OXYGEN SATURATION: 99 % | TEMPERATURE: 98.3 F | BODY MASS INDEX: 23.93 KG/M2 | DIASTOLIC BLOOD PRESSURE: 54 MMHG | HEIGHT: 65 IN | SYSTOLIC BLOOD PRESSURE: 96 MMHG

## 2025-07-22 DIAGNOSIS — Z12.4 CERVICAL CANCER SCREENING: ICD-10-CM

## 2025-07-22 DIAGNOSIS — E78.5 HYPERLIPIDEMIA LDL GOAL <160: ICD-10-CM

## 2025-07-22 DIAGNOSIS — R61 NIGHT SWEATS: ICD-10-CM

## 2025-07-22 DIAGNOSIS — Z00.00 ROUTINE GENERAL MEDICAL EXAMINATION AT A HEALTH CARE FACILITY: Primary | ICD-10-CM

## 2025-07-22 DIAGNOSIS — J30.1 SEASONAL ALLERGIC RHINITIS DUE TO POLLEN: ICD-10-CM

## 2025-07-22 DIAGNOSIS — N18.2 CHRONIC KIDNEY DISEASE, STAGE 2 (MILD): ICD-10-CM

## 2025-07-22 LAB
ALBUMIN SERPL BCG-MCNC: 4.6 G/DL (ref 3.5–5.2)
ALP SERPL-CCNC: 40 U/L (ref 40–150)
ALT SERPL W P-5'-P-CCNC: 18 U/L (ref 0–50)
ANION GAP SERPL CALCULATED.3IONS-SCNC: 12 MMOL/L (ref 7–15)
AST SERPL W P-5'-P-CCNC: 33 U/L (ref 0–45)
BILIRUB SERPL-MCNC: 0.3 MG/DL
BUN SERPL-MCNC: 17.9 MG/DL (ref 8–23)
CALCIUM SERPL-MCNC: 10 MG/DL (ref 8.8–10.4)
CHLORIDE SERPL-SCNC: 102 MMOL/L (ref 98–107)
CHOLEST SERPL-MCNC: 246 MG/DL
CREAT SERPL-MCNC: 1.16 MG/DL (ref 0.51–0.95)
CREAT UR-MCNC: 188 MG/DL
EGFRCR SERPLBLD CKD-EPI 2021: 53 ML/MIN/1.73M2
FASTING STATUS PATIENT QL REPORTED: NO
FASTING STATUS PATIENT QL REPORTED: NO
GLUCOSE SERPL-MCNC: 86 MG/DL (ref 70–99)
HCO3 SERPL-SCNC: 24 MMOL/L (ref 22–29)
HDLC SERPL-MCNC: 97 MG/DL
LDLC SERPL CALC-MCNC: 135 MG/DL
MICROALBUMIN UR-MCNC: <12 MG/L
MICROALBUMIN/CREAT UR: NORMAL MG/G{CREAT}
NONHDLC SERPL-MCNC: 149 MG/DL
POTASSIUM SERPL-SCNC: 4.2 MMOL/L (ref 3.4–5.3)
PROT SERPL-MCNC: 7.2 G/DL (ref 6.4–8.3)
SODIUM SERPL-SCNC: 138 MMOL/L (ref 135–145)
TRIGL SERPL-MCNC: 68 MG/DL
TSH SERPL DL<=0.005 MIU/L-ACNC: 1.92 UIU/ML (ref 0.3–4.2)

## 2025-07-22 PROCEDURE — 80061 LIPID PANEL: CPT | Performed by: FAMILY MEDICINE

## 2025-07-22 PROCEDURE — 87624 HPV HI-RISK TYP POOLED RSLT: CPT | Performed by: FAMILY MEDICINE

## 2025-07-22 PROCEDURE — 36415 COLL VENOUS BLD VENIPUNCTURE: CPT | Performed by: FAMILY MEDICINE

## 2025-07-22 PROCEDURE — 80053 COMPREHEN METABOLIC PANEL: CPT | Performed by: FAMILY MEDICINE

## 2025-07-22 PROCEDURE — 3078F DIAST BP <80 MM HG: CPT | Performed by: FAMILY MEDICINE

## 2025-07-22 PROCEDURE — 99396 PREV VISIT EST AGE 40-64: CPT | Mod: 25 | Performed by: FAMILY MEDICINE

## 2025-07-22 PROCEDURE — 82570 ASSAY OF URINE CREATININE: CPT | Performed by: FAMILY MEDICINE

## 2025-07-22 PROCEDURE — 1126F AMNT PAIN NOTED NONE PRSNT: CPT | Performed by: FAMILY MEDICINE

## 2025-07-22 PROCEDURE — 3074F SYST BP LT 130 MM HG: CPT | Performed by: FAMILY MEDICINE

## 2025-07-22 PROCEDURE — 3050F LDL-C >= 130 MG/DL: CPT | Performed by: FAMILY MEDICINE

## 2025-07-22 PROCEDURE — 90471 IMMUNIZATION ADMIN: CPT | Performed by: FAMILY MEDICINE

## 2025-07-22 PROCEDURE — 99214 OFFICE O/P EST MOD 30 MIN: CPT | Mod: 25 | Performed by: FAMILY MEDICINE

## 2025-07-22 PROCEDURE — 82043 UR ALBUMIN QUANTITATIVE: CPT | Performed by: FAMILY MEDICINE

## 2025-07-22 PROCEDURE — 90677 PCV20 VACCINE IM: CPT | Performed by: FAMILY MEDICINE

## 2025-07-22 PROCEDURE — 84443 ASSAY THYROID STIM HORMONE: CPT | Performed by: FAMILY MEDICINE

## 2025-07-22 PROCEDURE — G2211 COMPLEX E/M VISIT ADD ON: HCPCS | Performed by: FAMILY MEDICINE

## 2025-07-22 RX ORDER — CETIRIZINE HYDROCHLORIDE, PSEUDOEPHEDRINE HYDROCHLORIDE 5; 120 MG/1; MG/1
1 TABLET, FILM COATED, EXTENDED RELEASE ORAL 2 TIMES DAILY
Qty: 90 TABLET | Refills: 1 | Status: SHIPPED | OUTPATIENT
Start: 2025-07-22

## 2025-07-22 SDOH — HEALTH STABILITY: PHYSICAL HEALTH: ON AVERAGE, HOW MANY DAYS PER WEEK DO YOU ENGAGE IN MODERATE TO STRENUOUS EXERCISE (LIKE A BRISK WALK)?: 5 DAYS

## 2025-07-22 ASSESSMENT — SOCIAL DETERMINANTS OF HEALTH (SDOH): HOW OFTEN DO YOU GET TOGETHER WITH FRIENDS OR RELATIVES?: ONCE A WEEK

## 2025-07-22 ASSESSMENT — PAIN SCALES - GENERAL: PAINLEVEL_OUTOF10: NO PAIN (0)

## 2025-07-22 NOTE — PATIENT INSTRUCTIONS
Patient Education   Preventive Care Advice   This is general advice given by our system to help you stay healthy. However, your care team may have specific advice just for you. Please talk to your care team about your preventive care needs.  Nutrition  Eat 5 or more servings of fruits and vegetables each day.  Try wheat bread, brown rice and whole grain pasta (instead of white bread, rice, and pasta).  Get enough calcium and vitamin D. Check the label on foods and aim for 100% of the RDA (recommended daily allowance).  Lifestyle  Exercise at least 150 minutes each week  (30 minutes a day, 5 days a week).  Do muscle strengthening activities 2 days a week. These help control your weight and prevent disease.  No smoking.  Wear sunscreen to prevent skin cancer.  Have a dental exam and cleaning every 6 months.  Yearly exams  See your health care team every year to talk about:  Any changes in your health.  Any medicines your care team has prescribed.  Preventive care, family planning, and ways to prevent chronic diseases.  Shots (vaccines)   HPV shots (up to age 26), if you've never had them before.  Hepatitis B shots (up to age 59), if you've never had them before.  COVID-19 shot: Get this shot when it's due.  Flu shot: Get a flu shot every year.  Tetanus shot: Get a tetanus shot every 10 years.  Pneumococcal, hepatitis A, and RSV shots: Ask your care team if you need these based on your risk.  Shingles shot (for age 50 and up)  General health tests  Diabetes screening:  Starting at age 35, Get screened for diabetes at least every 3 years.  If you are younger than age 35, ask your care team if you should be screened for diabetes.  Cholesterol test: At age 39, start having a cholesterol test every 5 years, or more often if advised.  Bone density scan (DEXA): At age 50, ask your care team if you should have this scan for osteoporosis (brittle bones).  Hepatitis C: Get tested at least once in your life.  STIs (sexually  transmitted infections)  Before age 24: Ask your care team if you should be screened for STIs.  After age 24: Get screened for STIs if you're at risk. You are at risk for STIs (including HIV) if:  You are sexually active with more than one person.  You don't use condoms every time.  You or a partner was diagnosed with a sexually transmitted infection.  If you are at risk for HIV, ask about PrEP medicine to prevent HIV.  Get tested for HIV at least once in your life, whether you are at risk for HIV or not.  Cancer screening tests  Cervical cancer screening: If you have a cervix, begin getting regular cervical cancer screening tests starting at age 21.  Breast cancer scan (mammogram): If you've ever had breasts, begin having regular mammograms starting at age 40. This is a scan to check for breast cancer.  Colon cancer screening: It is important to start screening for colon cancer at age 45.  Have a colonoscopy test every 10 years (or more often if you're at risk) Or, ask your provider about stool tests like a FIT test every year or Cologuard test every 3 years.  To learn more about your testing options, visit:   .  For help making a decision, visit:   https://bit.ly/ww26478.  Prostate cancer screening test: If you have a prostate, ask your care team if a prostate cancer screening test (PSA) at age 55 is right for you.  Lung cancer screening: If you are a current or former smoker ages 50 to 80, ask your care team if ongoing lung cancer screenings are right for you.  For informational purposes only. Not to replace the advice of your health care provider. Copyright   2023 [facility]. All rights reserved. Clinically reviewed by the [facility]. [employer] 106036 - REV 01/24.  Learning About Stress  What is stress?     Stress is your body's response to a hard situation. Your body can have a physical, emotional, or mental response. Stress is a fact of life for most people, and it  affects everyone differently. What causes stress for you may not be stressful for someone else.  A lot of things can cause stress. You may feel stress when you go on a job interview, take a test, or run a race. This kind of short-term stress is normal and even useful. It can help you if you need to work hard or react quickly. For example, stress can help you finish an important job on time.  Long-term stress is caused by ongoing stressful situations or events. Examples of long-term stress include long-term health problems, ongoing problems at work, or conflicts in your family. Long-term stress can harm your health.  How does stress affect your health?  When you are stressed, your body responds as though you are in danger. It makes hormones that speed up your heart, make you breathe faster, and give you a burst of energy. This is called the fight-or-flight stress response. If the stress is over quickly, your body goes back to normal and no harm is done.  But if stress happens too often or lasts too long, it can have bad effects. Long-term stress can make you more likely to get sick, and it can make symptoms of some diseases worse. If you tense up when you are stressed, you may develop neck, shoulder, or low back pain. Stress is linked to high blood pressure and heart disease.  Stress also harms your emotional health. It can make you shay, tense, or depressed. Your relationships may suffer, and you may not do well at work or school.  What can you do to manage stress?  You can try these things to help manage stress:   Do something active. Exercise or activity can help reduce stress. Walking is a great way to get started. Even everyday activities such as housecleaning or yard work can help.  Try yoga or orly chi. These techniques combine exercise and meditation. You may need some training at first to learn them.  Do something you enjoy. For example, listen to music or go to a movie. Practice your hobby or do volunteer  "work.  Meditate. This can help you relax, because you are not worrying about what happened before or what may happen in the future.  Do guided imagery. Imagine yourself in any setting that helps you feel calm. You can use online videos, books, or a teacher to guide you.  Do breathing exercises. For example:  From a standing position, bend forward from the waist with your knees slightly bent. Let your arms dangle close to the floor.  Breathe in slowly and deeply as you return to a standing position. Roll up slowly and lift your head last.  Hold your breath for just a few seconds in the standing position.  Breathe out slowly and bend forward from the waist.  Let your feelings out. Talk, laugh, cry, and express anger when you need to. Talking with supportive friends or family, a counselor, or a pili leader about your feelings is a healthy way to relieve stress. Avoid discussing your feelings with people who make you feel worse.  Write. It may help to write about things that are bothering you. This helps you find out how much stress you feel and what is causing it. When you know this, you can find better ways to cope.  What can you do to prevent stress?  You might try some of these things to help prevent stress:  Manage your time. This helps you find time to do the things you want and need to do.  Get enough sleep. Your body recovers from the stresses of the day while you are sleeping.  Get support. Your family, friends, and community can make a difference in how you experience stress.  Limit your news feed. Avoid or limit time on social media or news that may make you feel stressed.  Do something active. Exercise or activity can help reduce stress. Walking is a great way to get started.  Where can you learn more?  Go to https://www.[employer].net/patiented  Enter N032 in the search box to learn more about \"Learning About Stress.\"  Current as of: October 24, 2024  Content Version: 14.5 2024-2025 [employer].   Care instructions adapted under license by your healthcare professional. If you have questions about a medical condition or this instruction, always ask your healthcare professional. [employer] disclaims any warranty or liability for your use of this information.

## 2025-07-22 NOTE — PROGRESS NOTES
Preventive Care Visit  [facility] ALIZA Berman MD, Family Medicine  Jul 22, 2025      Assessment & Plan     ASSESSMENT/ORDERS:    ICD-10-CM    1. Routine general medical examination at a health care facility  Z00.00       2. Chronic kidney disease, stage 2 (mild)  N18.2 Albumin Random Urine Quantitative with Creat Ratio     Comprehensive metabolic panel (BMP + Alb, Alk Phos, ALT, AST, Total. Bili, TP)     Albumin Random Urine Quantitative with Creat Ratio     Comprehensive metabolic panel (BMP + Alb, Alk Phos, ALT, AST, Total. Bili, TP)      3. Night sweats  R61 TSH with free T4 reflex     TSH with free T4 reflex      4. Seasonal allergic rhinitis due to pollen  J30.1 cetirizine-pseudoePHEDrine ER (ZYRTEC-D) 5-120 MG 12 hr tablet      5. Hyperlipidemia LDL goal <160  E78.5 Lipid panel reflex to direct LDL Non-fasting     Lipid panel reflex to direct LDL Non-fasting      6. Cervical cancer screening  Z12.4 HPV and Gynecologic Cytology Panel - Recommended Age 30 - 65 Years          Assessment & Plan  Routine general medical examination at a health care facility:  Conducted routine examination including PAP smear and HPV testing. Recommended pneumonia vaccine. Discussed potential need for flu vaccine in the upcoming month.    Chronic kidney disease, stage 2 (mild):  Condition appears stable based on previous lab results.  Continue monitoring kidney function with lab tests.    Night sweats:  Likely related to menopause, but thyroid function will be checked to rule out other causes.  Discussed options including hormone replacement therapy, SSRIs, and herbal remedies. Patient to consider options and discuss with friends and family before deciding.    Cervical cancer screening:  Routine screening due, last PAP was in June 2020.  Performed PAP smear and HPV testing. Results to be communicated via [employer].    Seasonal allergic rhinitis due to pollen:  Allergies have worsened this year.  Prescribed  Zyrtec D for a three-month supply to manage symptoms.    Hyperlipidemia  Will check today.   Will notify of results.     The longitudinal plan of care for the diagnosis(es)/condition(s) as documented were addressed during this visit. Due to the added complexity in care, I will continue to support Zena in the subsequent management and with ongoing continuity of care.        Counseling  Appropriate preventive services were addressed with this patient via screening, questionnaire, or discussion as appropriate for fall prevention, nutrition, physical activity, Tobacco-use cessation, social engagement, weight loss and cognition.  Checklist reviewing preventive services available has been given to the patient.  Reviewed patient's diet, addressing concerns and/or questions.   She is at risk for psychosocial distress and has been provided with information to reduce risk.           Subjective   Zena is a 60 year old, presenting for the following:  Physical        7/22/2025     9:06 AM   Additional Questions   Roomed by Lizzette STILL   Accompanied by None         7/22/2025     9:06 AM   Patient Reported Additional Medications   Patient reports taking the following new medications NA        HPI  Zena Quintana, a 60-year-old female, reported feeling better overall compared to last year, when she experienced multiple health issues and was being monitored for kidney function. She described ongoing and increasingly consistent night sweats, stating that she wakes up every night feeling very hot. She also experiences significant heat and sweating during workouts, sometimes to the extent that she feels the need to go outside even in winter. She noted that it has been approximately five years since her last menstrual period. Zena reported a gradual, consistent weight gain of nearly 10 pounds despite no changes in diet or exercise, and expressed frustration about this unexplained increase. She denied any family history of thyroid  issues. She also reported poor sleep, attributing it to stress and worry related to her children, including her son moving to [place] for medical school and her daughter s upcoming surgery for endometriosis at [facility]. She stated that she is able to fall asleep but has difficulty staying asleep, often waking around 3 a.m. and being unable to return to sleep, which she associates with both stress and night sweats. She denied any issues with swallowing, chest pain, or breathing problems except during periods of hot, humid weather and wildfire smoke. She denied any lumps or bumps of concern, abdominal pain, or changes in bowel habits, and reported no bladder issues. She mentioned a brief episode of vaginal discharge about a month ago that resolved within a week and has not recurred. Zena also reported worsening allergies this year, with significant nasal congestion leading her to use over-the-counter allergy medications such as Zyrtec D or Claritin D intermittently, especially when symptoms are severe, and noted a history of sinus infections in the fall. She stated that she does not use allergy medications consistently, only as needed. She reported recent follow-up with rheumatology, with no changes to her osteoporosis management and a plan to repeat bone density testing in a couple of years. She recalled her last Pap smear was in June 2020. She mentioned her father had cancer, and she is on a five-year plan for colon cancer screening, anticipating her next screening may be due next year.          Advance Care Planning    Discussed advance care planning with patient; however, patient declined at this time.        7/22/2025   General Health   How would you rate your overall physical health? Good   Feel stress (tense, anxious, or unable to sleep) To some extent   (!) STRESS CONCERN      7/22/2025   Nutrition   Three or more servings of calcium each day? Yes   Diet: Gluten-free/reduced   How many servings of fruit  and vegetables per day? (!) 2-3   How many sweetened beverages each day? 0-1         7/22/2025   Exercise   Days per week of moderate/strenous exercise 5 days         7/22/2025   Social Factors   Frequency of gathering with friends or relatives Once a week   Worry food won't last until get money to buy more No   Food not last or not have enough money for food? No   Do you have housing? (Housing is defined as stable permanent housing and does not include staying outside in a car, in a tent, in an abandoned building, in an overnight shelter, or couch-surfing.) Yes   Are you worried about losing your housing? No   Lack of transportation? No   Unable to get utilities (heat,electricity)? No         7/22/2025   Fall Risk   Fallen 2 or more times in the past year? No   Trouble with walking or balance? No          7/22/2025   Dental   Dentist two times every year? Yes         Today's PHQ-2 Score:       7/22/2025     9:06 AM   PHQ-2 ( 1999 [employer])   Q1: Little interest or pleasure in doing things 0   Q2: Feeling down, depressed or hopeless 0   PHQ-2 Score 0    Q1: Little interest or pleasure in doing things Not at all   Q2: Feeling down, depressed or hopeless Not at all   PHQ-2 Score 0       Patient-reported           7/22/2025   Substance Use   Alcohol more than 3/day or more than 7/wk No   Do you use any other substances recreationally? No     Social History     Tobacco Use    Smoking status: Never     Passive exposure: Never    Smokeless tobacco: Never    Tobacco comments:     no smokers in household   Vaping Use    Vaping status: Never Used   Substance Use Topics    Alcohol use: Yes     Alcohol/week: 1.7 standard drinks of alcohol     Comment: has a couple drinks now and again    Drug use: No           4/16/2024   LAST FHS-7 RESULTS   1st degree relative breast or ovarian cancer No   Any relative bilateral breast cancer No   Any male have breast cancer No   Any ONE woman have BOTH breast AND ovarian cancer No   Any woman  "with breast cancer before 50yrs No   2 or more relatives with breast AND/OR ovarian cancer No   2 or more relatives with breast AND/OR bowel cancer Yes        Mammogram Screening - Mammogram every 1-2 years updated in Health Maintenance based on mutual decision making        7/22/2025   STI Screening   New sexual partner(s) since last STI/HIV test? No     History of abnormal Pap smear: No - age 30- 64 PAP with HPV every 5 years recommended        Latest Ref Rng & Units 7/18/2022    10:25 AM 6/14/2021     9:04 AM 6/14/2021     8:50 AM   PAP / HPV   PAP  Negative for Intraepithelial Lesion or Malignancy (NILM)      PAP (Historical)   NIL     HPV 16 DNA Negative Negative   Negative    HPV 18 DNA Negative Negative   Negative    Other HR HPV Negative Negative   Positive      ASCVD Risk   The 10-year ASCVD risk score (Vonda OLIVER, et al., 2019) is: 1.8%    Values used to calculate the score:      Age: 60 years      Sex: Female      Is Non- : No      Diabetic: No      Tobacco smoker: No      Systolic Blood Pressure: 96 mmHg      Is BP treated: No      HDL Cholesterol: 91 mg/dL      Total Cholesterol: 255 mg/dL           Reviewed and updated as needed this visit by Provider   Tobacco  Allergies  Meds  Problems  Med Hx  Surg Hx  Fam Hx            BP Readings from Last 3 Encounters:   07/22/25 96/54   07/21/25 97/66   01/16/25 105/66    Wt Readings from Last 3 Encounters:   07/22/25 65.1 kg (143 lb 9.6 oz)   07/21/25 66.2 kg (146 lb)   01/16/25 64.2 kg (141 lb 9.6 oz)                         Objective    Exam  BP 96/54   Pulse 72   Temp 98.3  F (36.8  C) (Temporal)   Resp 11   Ht 1.66 m (5' 5.35\")   Wt 65.1 kg (143 lb 9.6 oz)   LMP 07/17/2017 (Approximate)   SpO2 99%   BMI 23.64 kg/m     Estimated body mass index is 23.64 kg/m  as calculated from the following:    Height as of this encounter: 1.66 m (5' 5.35\").    Weight as of this encounter: 65.1 kg (143 lb 9.6 oz).    Physical " Exam  GENERAL: alert and no distress  EYES: Eyes grossly normal to inspection, PERRL and conjunctivae and sclerae normal  HENT: ear canals and TM's normal, nose and mouth without ulcers or lesions  NECK: no adenopathy, no asymmetry, masses, or scars  RESP: lungs clear to auscultation - no rales, rhonchi or wheezes  BREAST: normal without masses, tenderness or nipple discharge and no palpable axillary masses or adenopathy  CV: regular rate and rhythm, normal S1 S2, no S3 or S4, no murmur, click or rub, no peripheral edema  ABDOMEN: soft, nontender, no hepatosplenomegaly, no masses and bowel sounds normal  MS: no gross musculoskeletal defects noted, no edema  SKIN: no suspicious lesions or rashes  NEURO: Normal strength and tone, mentation intact and speech normal  PSYCH: mentation appears normal, affect normal/bright        Signed Electronically by: Jailene Berman MD

## 2025-07-23 LAB
HPV HR 12 DNA CVX QL NAA+PROBE: NEGATIVE
HPV16 DNA CVX QL NAA+PROBE: NEGATIVE
HPV18 DNA CVX QL NAA+PROBE: NEGATIVE
HUMAN PAPILLOMA VIRUS FINAL DIAGNOSIS: NORMAL

## 2025-07-28 LAB
BKR AP ASSOCIATED HPV REPORT: NORMAL
BKR LAB AP GYN ADEQUACY: NORMAL
BKR LAB AP GYN INTERPRETATION: NORMAL
BKR LAB AP PREVIOUS ABNORMAL: NORMAL
PATH REPORT.COMMENTS IMP SPEC: NORMAL
PATH REPORT.COMMENTS IMP SPEC: NORMAL
PATH REPORT.RELEVANT HX SPEC: NORMAL

## (undated) DEVICE — SOL WATER IRRIG 1000ML BOTTLE 07139-09

## (undated) RX ORDER — FENTANYL CITRATE 50 UG/ML
INJECTION, SOLUTION INTRAMUSCULAR; INTRAVENOUS
Status: DISPENSED
Start: 2022-09-20

## (undated) RX ORDER — ONDANSETRON 2 MG/ML
INJECTION INTRAMUSCULAR; INTRAVENOUS
Status: DISPENSED
Start: 2022-09-20

## (undated) RX ORDER — SIMETHICONE 40MG/0.6ML
SUSPENSION, DROPS(FINAL DOSAGE FORM)(ML) ORAL
Status: DISPENSED
Start: 2022-09-20